# Patient Record
Sex: FEMALE | Race: BLACK OR AFRICAN AMERICAN | NOT HISPANIC OR LATINO | Employment: FULL TIME | URBAN - METROPOLITAN AREA
[De-identification: names, ages, dates, MRNs, and addresses within clinical notes are randomized per-mention and may not be internally consistent; named-entity substitution may affect disease eponyms.]

---

## 2017-03-09 ENCOUNTER — INITIAL PRENATAL (OUTPATIENT)
Dept: OBGYN | Facility: CLINIC | Age: 37
End: 2017-03-09
Payer: MEDICAID

## 2017-03-09 VITALS
WEIGHT: 237 LBS | HEIGHT: 71 IN | DIASTOLIC BLOOD PRESSURE: 78 MMHG | SYSTOLIC BLOOD PRESSURE: 110 MMHG | BODY MASS INDEX: 33.18 KG/M2

## 2017-03-09 DIAGNOSIS — Z98.891 HISTORY OF CESAREAN DELIVERY: ICD-10-CM

## 2017-03-09 DIAGNOSIS — O09.293 HX OF PREECLAMPSIA, PRIOR PREGNANCY, CURRENTLY PREGNANT, THIRD TRIMESTER: ICD-10-CM

## 2017-03-09 DIAGNOSIS — R87.610 ASCUS OF CERVIX WITH NEGATIVE HIGH RISK HPV: ICD-10-CM

## 2017-03-09 DIAGNOSIS — Z87.51 HISTORY OF PRETERM DELIVERY: ICD-10-CM

## 2017-03-09 DIAGNOSIS — D56.4 SICKLE CELL DISEASE WITH HEREDITARY PERSISTENCE OF FETAL HEMOGLOBIN (HPFH) WITHOUT CRISIS (HCC): ICD-10-CM

## 2017-03-09 DIAGNOSIS — D57.1 SICKLE CELL DISEASE WITH HEREDITARY PERSISTENCE OF FETAL HEMOGLOBIN (HPFH) WITHOUT CRISIS (HCC): ICD-10-CM

## 2017-03-09 DIAGNOSIS — Z90.49 HISTORY OF CHOLECYSTECTOMY: ICD-10-CM

## 2017-03-09 DIAGNOSIS — Z34.83 PRENATAL CARE, SUBSEQUENT PREGNANCY, THIRD TRIMESTER: ICD-10-CM

## 2017-03-09 DIAGNOSIS — Z34.80 SUPERVISION OF OTHER NORMAL PREGNANCY, ANTEPARTUM: ICD-10-CM

## 2017-03-09 PROBLEM — O09.299 HX OF PREECLAMPSIA, PRIOR PREGNANCY, CURRENTLY PREGNANT: Status: ACTIVE | Noted: 2017-03-09

## 2017-03-09 LAB
APPEARANCE UR: NORMAL
BILIRUB UR STRIP-MCNC: NORMAL MG/DL
COLOR UR AUTO: NORMAL
GLUCOSE UR STRIP.AUTO-MCNC: NEGATIVE MG/DL
KETONES UR STRIP.AUTO-MCNC: NEGATIVE MG/DL
LEUKOCYTE ESTERASE UR QL STRIP.AUTO: NEGATIVE
NITRITE UR QL STRIP.AUTO: NEGATIVE
PH UR STRIP.AUTO: 6.5 [PH] (ref 5–8)
PROT UR QL STRIP: NEGATIVE MG/DL
RBC UR QL AUTO: NEGATIVE
SP GR UR STRIP.AUTO: 1.01
UROBILINOGEN UR STRIP-MCNC: NORMAL MG/DL

## 2017-03-09 PROCEDURE — 59402 PR NEW OB HIGH RISK: CPT | Performed by: NURSE PRACTITIONER

## 2017-03-09 PROCEDURE — 90715 TDAP VACCINE 7 YRS/> IM: CPT | Performed by: NURSE PRACTITIONER

## 2017-03-09 PROCEDURE — 90471 IMMUNIZATION ADMIN: CPT | Performed by: NURSE PRACTITIONER

## 2017-03-09 PROCEDURE — 81002 URINALYSIS NONAUTO W/O SCOPE: CPT | Performed by: NURSE PRACTITIONER

## 2017-03-09 NOTE — PROGRESS NOTES
Pt. Here for NOB visit today.  #  [phone]  Transfer of care from [facility].   Pt. States having pain in lower abdomen, swelling in hands.   Pharmacy verified.   Pt given johnna sheet and instructions.  Pt given 1 hr gtt and instructions.   Pt would like tdap.   Tdap vaccine given. right Deltoid. VIS given and screening check list reviewed with pt.

## 2017-03-09 NOTE — Clinical Note
"Count Your Baby's Movements  Another step to a healthy delivery    Kaela Favian             Dept: [phone]    How Many Weeks Pregnant? 30w3d    Date to Begin Counting: 3/9/17              How to use this chart    One way for your physician to keep track of your baby's health is by knowing how often the baby moves (or \"kicks\") in your womb.  You can help your physician to do this by using this chart every day.    Every day, you should see how many hours it takes for your baby to move 10 times.  Start in the morning, as soon as you get up.    · First, write down the time your baby moves until you get to 10.  · Check off one box every time your baby moves until you get to 10.  · Write down the time you finished counting in the last column.  · Total how long it took to count up all 10 movements.  · Finally, fill in the box that shows how long this took.  After counting 10 movements, you no longer have to count any more that day.  The next morning, just start counting again as soon as you get up.    What should you call a \"movement\"?  It is hard to say, because it will feel different from one mother to another and from one pregnancy to the next.  The important thing is that you count the movements the same way throughout your pregnancy.  If you have more questions, you should ask your physician.    Count carefully every day!  SAMPLE:  Week 28    How many hours did it take to feel 10 movements?       Start  Time     1     2     3     4     5     6     7     8     9     10   Finish Time   Mon 8:20 ·  ·  ·  ·  ·  ·  ·  ·  ·  ·  11:40   Tue Wed Thu Fri               Sat               Sun                 IMPORTANT: You should contact your physician if it takes more than two hours for you to feel 10 movements.  Each morning, write down the time and start to count the movements of your baby.  Keep track by checking off one box every time you feel one movement.  When you have " "felt 10 \"kicks\", write down the time you finished counting in the last column.  Then fill in the   box (over the check mikayla) for the number of hours it took.  Be sure to read the complete instructions on the previous page.            "

## 2017-03-09 NOTE — MR AVS SNAPSHOT
"        Kaela Penny Ballesteros   3/9/2017 3:00 PM   Initial Prenatal   MRN: [id number]    [facility]   Dept Phone:  [phone]    Description:  Female : 1980   Provider:  Orly Whitten D.N.P.; [facility] IN[facility]           Allergies as of 3/9/2017     Allergen Noted Reactions    Nkda [No Known Drug Allergy] 2007         You were diagnosed with     Prenatal care, subsequent pregnancy, third trimester   [364294]       ASCUS of cervix with negative high risk HPV   [7204234]       Supervision of other normal pregnancy, antepartum   [0153389]       History of  delivery   [323977]       Hx of preeclampsia, prior pregnancy, currently pregnant, third trimester   [569251]       H/O oophorectomy   [633230]       History of cholecystectomy   [128880]       History of  delivery   [9644316]       Sickle cell disease with hereditary persistence of fetal hemoglobin (HPFH) without crisis (CMS-[facility])   [9922781]         Vital Signs     Blood Pressure Height Weight Body Mass Index Last Menstrual Period Smoking Status    110/78 mmHg 1.803 m (5' 11\") 107.502 kg (237 lb) 33.07 kg/m2 2016 (Exact Date) Former Smoker      Basic Information     Date Of Birth Sex Race Ethnicity Preferred Language    1980 Female Black or  Non- English      Problem List              ICD-10-CM Priority Class Noted - Resolved    Acute pancreatitis K85.90   2015 - Present    Calculus of gallbladder K80.20   3/6/2015 - Present    ASCUS of cervix with negative high risk HPV R87.610   3/9/2017 - Present    Supervision of other normal pregnancy, antepartum Z34.80   3/9/2017 - Present    History of  delivery Z98.891   3/9/2017 - Present    Hx of preeclampsia, prior pregnancy, currently pregnant O09.299   3/9/2017 - Present    H/O oophorectomy Z90.721   3/9/2017 - Present    History of cholecystectomy Z90.49   3/9/2017 - Present    History of  delivery Z87.51   3/9/2017 - " Present    Sickle cell carrier D56.4, D57.1   3/9/2017 - Present      Health Maintenance        Date Due Completion Dates    PAP SMEAR 9/20/1999 ---    IMM INFLUENZA (1) 9/1/2016 9/30/2015    IMM DTaP/Tdap/Td Vaccine (2 - Td) 9/30/2025 9/30/2015            Results     POCT Urinalysis      Component Value Standard Range & Units    POC Color  Negative    POC Appearance  Negative    POC Leukocyte Esterase Negative Negative    POC Nitrites Negative Negative    POC Urobiligen  Negative (0.2) mg/dL    POC Protein Negative Negative mg/dL    POC Urine PH 6.5 5.0 - 8.0    POC Blood Negative Negative    POC Specific Gravity 1.015 <1.005 - >1.030    POC Ketones Negative Negative mg/dL    POC Biliruben  Negative mg/dL    POC Glucose Negative Negative mg/dL                        Current Immunizations     Influenza Vaccine Quad Inj (Preserved) 9/30/2015  5:15 PM    MMR/Varicella Combined Vaccine  Incomplete    Tdap Vaccine  Incomplete,  Incomplete, 9/30/2015  5:14 PM    Tetanus Vaccine 4/1/1998      Below and/or attached are the medications your provider expects you to take. Review all of your home medications and newly ordered medications with your provider and/or pharmacist. Follow medication instructions as directed by your provider and/or pharmacist. Please keep your medication list with you and share with your provider. Update the information when medications are discontinued, doses are changed, or new medications (including over-the-counter products) are added; and carry medication information at all times in the event of emergency situations     Allergies:  NKDA - (reactions not documented)               Medications  Valid as of: March 09, 2017 -  3:47 PM    Generic Name Brand Name Tablet Size Instructions for use    Ondansetron (TABLET DISPERSIBLE) ZOFRAN ODT 4 MG Take 1 Tab by mouth every 8 hours as needed for Nausea/Vomiting (give PO if no IV route available).        Prenatal MV-Min-Fe Fum-FA-DHA   Take  by mouth.          Promethazine HCl (Tab) PHENERGAN 25 MG Take 25 mg by mouth every 6 hours as needed for Nausea/Vomiting.        .                 Medicines prescribed today were sent to:     [id number] - [place], [place] - [address]    [address] [address]    Phone: [phone] Fax: [phone]    Open 24 Hours?: No      Medication refill instructions:       If your prescription bottle indicates you have medication refills left, it is not necessary to call your provider’s office. Please contact your pharmacy and they will refill your medication.    If your prescription bottle indicates you do not have any refills left, you may request refills at any time through one of the following ways: The online Predictvia system (except Urgent Care), by calling your provider’s office, or by asking your pharmacy to contact your provider’s office with a refill request. Medication refills are processed only during regular business hours and may not be available until the next business day. Your provider may request additional information or to have a follow-up visit with you prior to refilling your medication.   *Please Note: Medication refills are assigned a new Rx number when refilled electronically. Your pharmacy may indicate that no refills were authorized even though a new prescription for the same medication is available at the pharmacy. Please request the medicine by name with the pharmacy before contacting your provider for a refill.        Your To Do List     Future Labs/Procedures Complete By Expires    GLUCOSE 1HR GESTATIONAL  As directed 3/9/2018    HCT  As directed 3/9/2018    HGB  As directed 3/9/2018    T.PALLIDUM AB EIA  As directed 3/9/2018         Predictvia Access Code: [email or web address]  Expires: 3/11/2017  2:33 PM    Predictvia  A secure, online tool to manage your health information     [email or web address]’s Predictvia® is a secure, online tool that connects you to your personalized health information from the privacy  of your home -- day or night - making it very easy for you to manage your healthcare. Once the activation process is completed, you can even access your medical information using the Foxwordy cyrus, which is available for free in the Apple Cyrus store or Google Play store.     Foxwordy provides the following levels of access (as shown below):   My Chart Features   [facility] Doctor [facility]  Specialists [facility]  Urgent  Care Non-[facility]  Primary Care  Doctor   Email your healthcare team securely and privately 24/7 X X X    Manage appointments: schedule your next appointment; view details of past/upcoming appointments X      Request prescription refills. X      View recent personal medical records, including lab and immunizations X X X X   View health record, including health history, allergies, medications X X X X   Read reports about your outpatient visits, procedures, consult and ER notes X X X X   See your discharge summary, which is a recap of your hospital and/or ER visit that includes your diagnosis, lab results, and care plan. X X       How to register for Foxwordy:  1. Go to  [email or web address].  2. Click on the Sign Up Now box, which takes you to the New Member Sign Up page. You will need to provide the following information:  a. Enter your Foxwordy Access Code exactly as it appears at the top of this page. (You will not need to use this code after you’ve completed the sign-up process. If you do not sign up before the expiration date, you must request a new code.)   b. Enter your date of birth.   c. Enter your home email address.   d. Click Submit, and follow the next screen’s instructions.  3. Create a Foxwordy ID. This will be your Foxwordy login ID and cannot be changed, so think of one that is secure and easy to remember.  4. Create a Foxwordy password. You can change your password at any time.  5. Enter your Password Reset Question and Answer. This can be used at a later time if you forget your  password.   6. Enter your e-mail address. This allows you to receive e-mail notifications when new information is available in [employer].  7. Click Sign Up. You can now view your health information.    For assistance activating your [employer] account, call [phone]
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
## 2017-03-16 ENCOUNTER — HOSPITAL ENCOUNTER (OUTPATIENT)
Dept: LAB | Facility: MEDICAL CENTER | Age: 37
End: 2017-03-16
Attending: NURSE PRACTITIONER
Payer: MEDICAID

## 2017-03-16 DIAGNOSIS — Z34.83 PRENATAL CARE, SUBSEQUENT PREGNANCY, THIRD TRIMESTER: ICD-10-CM

## 2017-03-16 LAB
GLUCOSE 1H P 50 G GLC PO SERPL-MCNC: 162 MG/DL (ref 70–139)
HCT VFR BLD AUTO: 37.6 % (ref 37–47)
HGB BLD-MCNC: 11.7 G/DL (ref 12–16)
TREPONEMA PALLIDUM IGG+IGM AB [PRESENCE] IN SERUM OR PLASMA BY IMMUNOASSAY: NON REACTIVE

## 2017-03-16 PROCEDURE — 85018 HEMOGLOBIN: CPT

## 2017-03-16 PROCEDURE — 82950 GLUCOSE TEST: CPT

## 2017-03-16 PROCEDURE — 36415 COLL VENOUS BLD VENIPUNCTURE: CPT

## 2017-03-16 PROCEDURE — 86780 TREPONEMA PALLIDUM: CPT

## 2017-03-16 PROCEDURE — 85014 HEMATOCRIT: CPT

## 2017-03-17 ENCOUNTER — TELEPHONE (OUTPATIENT)
Dept: OBGYN | Facility: CLINIC | Age: 37
End: 2017-03-17

## 2017-03-17 DIAGNOSIS — R73.09 ELEVATED GLUCOSE TOLERANCE TEST: ICD-10-CM

## 2017-03-17 NOTE — TELEPHONE ENCOUNTER
----- Message from Orly Whitten D.N.P. sent at 3/17/2017  7:57 AM PDT -----  Needs 3 hour glucose asap.  3/17/17@ 10:25 am. N/a, msg left for patient to call back.  Patient called back, informed of new GDM diagnosis and appt scheduled for Friday for diabetic education, states unable to make it due to having only one car and her  gets off work at 2:00pm. Patient was informed I will see if Ceci is willing to see her here.  3/23/17. Talked to Ceci and she agrees to see patient today in the afternoon. I called patient back and asked what is the earliest that  she could come today, so we can do her diabetic education. She was very rude on the phone and states that she already told me that she can not do it before her appt at 3:30 pm today. I explained  that we are just trying to help her since she is already far away in her pregnancy. I ask that even at 3:00 will be good for us so Ceci has time before her appt, again very rude.   Patient got diabetic education on 3/23/17. F/u with diabetic clinic scheduled.

## 2017-03-21 ENCOUNTER — HOSPITAL ENCOUNTER (OUTPATIENT)
Dept: LAB | Facility: MEDICAL CENTER | Age: 37
End: 2017-03-21
Attending: NURSE PRACTITIONER
Payer: MEDICAID

## 2017-03-21 DIAGNOSIS — R73.09 ELEVATED GLUCOSE TOLERANCE TEST: ICD-10-CM

## 2017-03-21 PROCEDURE — 82951 GLUCOSE TOLERANCE TEST (GTT): CPT

## 2017-03-21 PROCEDURE — 36415 COLL VENOUS BLD VENIPUNCTURE: CPT

## 2017-03-21 PROCEDURE — 82952 GTT-ADDED SAMPLES: CPT

## 2017-03-22 DIAGNOSIS — O24.419 GESTATIONAL DIABETES MELLITUS (GDM), ANTEPARTUM, GESTATIONAL DIABETES METHOD OF CONTROL UNSPECIFIED: ICD-10-CM

## 2017-03-22 PROBLEM — O24.410 DIET CONTROLLED GESTATIONAL DIABETES MELLITUS (GDM) IN THIRD TRIMESTER: Status: ACTIVE | Noted: 2017-03-22

## 2017-03-22 LAB
GLUCOSE 1H P CHAL SERPL-MCNC: 196 MG/DL (ref 65–180)
GLUCOSE 2H P CHAL SERPL-MCNC: 144 MG/DL (ref 65–155)
GLUCOSE 3H P CHAL SERPL-MCNC: 171 MG/DL (ref 65–140)
GLUCOSE PRE 100 G GLC PO SERPL-MCNC: 96 MG/DL (ref 65–95)

## 2017-03-23 ENCOUNTER — ROUTINE PRENATAL (OUTPATIENT)
Dept: OBGYN | Facility: CLINIC | Age: 37
End: 2017-03-23
Payer: MEDICAID

## 2017-03-23 VITALS — DIASTOLIC BLOOD PRESSURE: 74 MMHG | WEIGHT: 240 LBS | BODY MASS INDEX: 33.49 KG/M2 | SYSTOLIC BLOOD PRESSURE: 116 MMHG

## 2017-03-23 DIAGNOSIS — Z34.80 SUPERVISION OF OTHER NORMAL PREGNANCY, ANTEPARTUM: Primary | ICD-10-CM

## 2017-03-23 DIAGNOSIS — O24.410 DIET CONTROLLED GESTATIONAL DIABETES MELLITUS IN THIRD TRIMESTER: ICD-10-CM

## 2017-03-23 DIAGNOSIS — O09.293 HX OF PREECLAMPSIA, PRIOR PREGNANCY, CURRENTLY PREGNANT, THIRD TRIMESTER: ICD-10-CM

## 2017-03-23 PROCEDURE — 90040 PR PRENATAL FOLLOW UP: CPT | Performed by: NURSE PRACTITIONER

## 2017-03-23 NOTE — PROGRESS NOTES
S:  Pt is  at 32w3d for routine OB follow up.  Reports an occ BH UC.  Also an occ bloody nose.  Reports good FM.  Denies VB, LOF, RUCs or vaginal DC.    O:  Please see above vitals.        FHTs: 144        Fundal ht: 34 cm.    A:  IUP at 32w3d  Patient Active Problem List    Diagnosis Date Noted   • Gestational diabetes 2017   • Elevated glucose tolerance test 2017   • ASCUS of cervix with negative high risk HPV 2017   • Supervision of other normal pregnancy, antepartum 2017   • History of  delivery 2017   • Hx of preeclampsia, prior pregnancy, currently pregnant 2017   • H/O oophorectomy 2017   • History of cholecystectomy 2017   • History of  delivery 2017   • Sickle cell carrier 2017   • Calculus of gallbladder 2015   • Acute pancreatitis 2015        P:  1.  GBS @ 35 wks.          2.  Continue FKCs.          3.  Questions answered.          4.  Encouraged pt to tour L&D.          5.  Encourage adequate water intake.        6.  F/u 1 wk [facility].        7.  Labs ordered. Lab slip given to pt.          8.  Keep PANN appt next week.         9.  Ceci in to educate pt.        10.  DM supplies sent to pharmacy.

## 2017-03-23 NOTE — PATIENT INSTRUCTIONS
P:  1.  GBS @ 35 wks.          2.  Continue FKCs.          3.  Questions answered.          4.  Encouraged pt to tour L&D.          5.  Encourage adequate water intake.        6.  F/u 1 wk [facility].        7.  Labs ordered. Lab slip given to pt.          8.  Keep PANN appt next week.         9.  Ceci in to educate pt.        10.  DM supplies sent to pharmacy.

## 2017-03-23 NOTE — MR AVS SNAPSHOT
Kaela Ballesteros   3/23/2017 3:30 PM   Routine Prenatal   MRN: [id number]    [facility]   Dept Phone:  [phone]    Description:  Female : 1980   Provider:  Geovanna Darnell C.N.M.           Allergies as of 3/23/2017     Allergen Noted Reactions    Nkda [No Known Drug Allergy] 2007         You were diagnosed with     Supervision of other normal pregnancy, antepartum   [2517222]  -  Primary     Diet controlled gestational diabetes mellitus in third trimester   [6729869]       Hx of preeclampsia, prior pregnancy, currently pregnant, third trimester   [255855]         Vital Signs     Blood Pressure Weight Last Menstrual Period Smoking Status          116/74 mmHg 108.863 kg (240 lb) 2016 (Exact Date) Former Smoker        Basic Information     Date Of Birth Sex Race Ethnicity Preferred Language    1980 Female Black or  Non- English      Your appointments     Mar 30, 2017  2:45 PM   Diabetic with PC MD   [facility] [address] [id number] [phone]              Problem List              ICD-10-CM Priority Class Noted - Resolved    Acute pancreatitis K85.90   2015 - Present    Calculus of gallbladder K80.20   3/6/2015 - Present    ASCUS of cervix with negative high risk HPV R87.610   3/9/2017 - Present    Supervision of other normal pregnancy, antepartum Z34.80   3/9/2017 - Present    History of  delivery Z98.891   3/9/2017 - Present    Hx of preeclampsia, prior pregnancy, currently pregnant O09.299   3/9/2017 - Present    H/O oophorectomy Z90.721   3/9/2017 - Present    History of cholecystectomy Z90.49   3/9/2017 - Present    History of  delivery Z87.51   3/9/2017 - Present    Sickle cell carrier D56.4, D57.1   3/9/2017 - Present    Elevated glucose tolerance test R73.02   3/17/2017 - Present    Gestational diabetes O24.410   3/22/2017 - Present      Health Maintenance           Date Due Completion Dates    IMM PNEUMOCOCCAL 19-64 (ADULT) HIGHEST RISK SERIES (1 of 3 - PCV13) 9/20/1997 ---    IMM INFLUENZA (1) 9/1/2016 9/30/2015    PAP SMEAR 11/2/2019 11/2/2016    IMM DTaP/Tdap/Td Vaccine (2 - Td) 3/9/2027 3/9/2017, 9/30/2015            Current Immunizations     Influenza Vaccine Quad Inj (Preserved) 9/30/2015  5:15 PM    MMR/Varicella Combined Vaccine  Incomplete    Tdap Vaccine 3/9/2017  3:42 PM,  Incomplete, 9/30/2015  5:14 PM    Tetanus Vaccine 4/1/1998      Below and/or attached are the medications your provider expects you to take. Review all of your home medications and newly ordered medications with your provider and/or pharmacist. Follow medication instructions as directed by your provider and/or pharmacist. Please keep your medication list with you and share with your provider. Update the information when medications are discontinued, doses are changed, or new medications (including over-the-counter products) are added; and carry medication information at all times in the event of emergency situations     Allergies:  NKDA - (reactions not documented)               Medications  Valid as of: March 23, 2017 -  5:01 PM    Generic Name Brand Name Tablet Size Instructions for use    Aspirin (Tablet Delayed Response) ECOTRIN 81 MG Take 81 mg by mouth every day.        Blood Glucose Monitoring Suppl (Misc) Blood Glucose Monitoring Suppl SUPPLIES 1 Strip by Percutaneous route 4 times a day. Please dispense TrueMetrix test strips.        Blood Glucose Monitoring Suppl (Misc) Blood Glucose Monitoring Suppl SUPPLIES 1 Each by Percutaneous route 4 times a day. Please dispense lancets for True Metrix meter        Ondansetron (TABLET DISPERSIBLE) ZOFRAN ODT 4 MG Take 1 Tab by mouth every 8 hours as needed for Nausea/Vomiting (give PO if no IV route available).        Prenatal MV-Min-Fe Fum-FA-DHA   Take  by mouth.        .                 Medicines prescribed today were sent to:      [facility]/PHARMACY #[id number] - [place], [place] - [address]    [address] [place] [address]    Phone: [phone] Fax: [phone]    Open 24 Hours?: No      Medication refill instructions:       If your prescription bottle indicates you have medication refills left, it is not necessary to call your provider’s office. Please contact your pharmacy and they will refill your medication.    If your prescription bottle indicates you do not have any refills left, you may request refills at any time through one of the following ways: The online Technology Keiretsu system (except Urgent Care), by calling your provider’s office, or by asking your pharmacy to contact your provider’s office with a refill request. Medication refills are processed only during regular business hours and may not be available until the next business day. Your provider may request additional information or to have a follow-up visit with you prior to refilling your medication.   *Please Note: Medication refills are assigned a new Rx number when refilled electronically. Your pharmacy may indicate that no refills were authorized even though a new prescription for the same medication is available at the pharmacy. Please request the medicine by name with the pharmacy before contacting your provider for a refill.        Your To Do List     Future Labs/Procedures Complete By Expires    HEMOGLOBIN A1C  As directed 3/23/2018    URINETOTAL PROTEIN 24 HR  As directed 3/23/2018      Instructions    P:  1.  GBS @ 35 wks.          2.  Continue FKCs.          3.  Questions answered.          4.  Encouraged pt to tour L&D.          5.  Encourage adequate water intake.        6.  F/u 1 wk MD w GDM clinic.        7.  Labs ordered. Lab slip given to pt.          8.  Keep PANN appt next week.         9.  Ceci in to educate pt.        10.  DM supplies sent to pharmacy.            Technology Keiretsu Access Code: [email or web address]  Expires: 4/9/2017  2:50 PM    Technology Keiretsu  A secure, online tool to  manage your health information     [employer]’s Nerd Kingdom® is a secure, online tool that connects you to your personalized health information from the privacy of your home -- day or night - making it very easy for you to manage your healthcare. Once the activation process is completed, you can even access your medical information using the Nerd Kingdom cyrus, which is available for free in the Apple Cyrus store or Google Play store.     Nerd Kingdom provides the following levels of access (as shown below):   My Chart Features   [facility] Doctor [facility]  Specialists [facility]  Urgent  Care Non-[facility]  Primary Care  Doctor   Email your healthcare team securely and privately 24/7 X X X    Manage appointments: schedule your next appointment; view details of past/upcoming appointments X      Request prescription refills. X      View recent personal medical records, including lab and immunizations X X X X   View health record, including health history, allergies, medications X X X X   Read reports about your outpatient visits, procedures, consult and ER notes X X X X   See your discharge summary, which is a recap of your hospital and/or ER visit that includes your diagnosis, lab results, and care plan. X X       How to register for Nerd Kingdom:  1. Go to  [email or web address].  2. Click on the Sign Up Now box, which takes you to the New Member Sign Up page. You will need to provide the following information:  a. Enter your Nerd Kingdom Access Code exactly as it appears at the top of this page. (You will not need to use this code after you’ve completed the sign-up process. If you do not sign up before the expiration date, you must request a new code.)   b. Enter your date of birth.   c. Enter your home email address.   d. Click Submit, and follow the next screen’s instructions.  3. Create a Nerd Kingdom ID. This will be your Nerd Kingdom login ID and cannot be changed, so think of one that is secure and easy to remember.  4. Create a Content Savvyt  password. You can change your password at any time.  5. Enter your Password Reset Question and Answer. This can be used at a later time if you forget your password.   6. Enter your e-mail address. This allows you to receive e-mail notifications when new information is available in Recensus.  7. Click Sign Up. You can now view your health information.    For assistance activating your Recensus account, call [phone]

## 2017-03-23 NOTE — PROGRESS NOTES
Pt here today for OB follow up  Pt states having dov osiel   Reports +FM  WT: 240lb  BP:116/74  Good # [phone]

## 2017-03-24 ENCOUNTER — NON-PROVIDER VISIT (OUTPATIENT)
Dept: OBGYN | Facility: CLINIC | Age: 37
End: 2017-03-24
Payer: MEDICAID

## 2017-03-24 ENCOUNTER — APPOINTMENT (OUTPATIENT)
Dept: HEALTH INFORMATION MANAGEMENT | Facility: MEDICAL CENTER | Age: 37
End: 2017-03-24
Payer: MEDICAID

## 2017-03-24 VITALS — BODY MASS INDEX: 33.6 KG/M2 | WEIGHT: 240 LBS | HEIGHT: 71 IN

## 2017-03-24 DIAGNOSIS — O24.410 DIET CONTROLLED GESTATIONAL DIABETES MELLITUS (GDM) IN THIRD TRIMESTER: ICD-10-CM

## 2017-03-24 NOTE — MR AVS SNAPSHOT
Kaela Ballesteros   3/24/2017 8:45 AM   Appointment   MRN: [id number]    [facility]   Dept Phone:  [phone]    Description:  Female : 1980   Provider:  IZA NURSE           Allergies as of 3/24/2017     Allergen Noted Reactions    Nkda [No Known Drug Allergy] 2007         Vital Signs     Last Menstrual Period Smoking Status                2016 (Exact Date) Former Smoker          Basic Information     Date Of Birth Sex Race Ethnicity Preferred Language    1980 Female Black or  Non- English      Your appointments     Mar 24, 2017  8:45 AM   Nurse Visit with PC NURSE   [facility])    [address]  [place] [address] [phone]            Mar 30, 2017  2:45 PM   Diabetic with PC MD   [facility])    [address]  [place] [id number] [phone]              Problem List              ICD-10-CM Priority Class Noted - Resolved    Acute pancreatitis K85.90   2015 - Present    Calculus of gallbladder K80.20   3/6/2015 - Present    ASCUS of cervix with negative high risk HPV R87.610   3/9/2017 - Present    Supervision of other normal pregnancy, antepartum Z34.80   3/9/2017 - Present    History of  delivery Z98.891   3/9/2017 - Present    Hx of preeclampsia, prior pregnancy, currently pregnant O09.299   3/9/2017 - Present    H/O oophorectomy Z90.721   3/9/2017 - Present    History of cholecystectomy Z90.49   3/9/2017 - Present    History of  delivery Z87.51   3/9/2017 - Present    Sickle cell carrier D56.4, D57.1   3/9/2017 - Present    Elevated glucose tolerance test R73.02   3/17/2017 - Present    Gestational diabetes O24.410   3/22/2017 - Present      Health Maintenance        Date Due Completion Dates    IMM PNEUMOCOCCAL 19-64 (ADULT) HIGHEST RISK SERIES (1 of 3 - PCV13) 1997 ---    IMM INFLUENZA (1) 2016    PAP SMEAR 2019    IMM  DTaP/Tdap/Td Vaccine (2 - Td) 3/9/2027 3/9/2017, 9/30/2015            Current Immunizations     Influenza Vaccine Quad Inj (Preserved) 9/30/2015  5:15 PM    MMR/Varicella Combined Vaccine  Incomplete    Tdap Vaccine 3/9/2017  3:42 PM,  Incomplete, 9/30/2015  5:14 PM    Tetanus Vaccine 4/1/1998      Below and/or attached are the medications your provider expects you to take. Review all of your home medications and newly ordered medications with your provider and/or pharmacist. Follow medication instructions as directed by your provider and/or pharmacist. Please keep your medication list with you and share with your provider. Update the information when medications are discontinued, doses are changed, or new medications (including over-the-counter products) are added; and carry medication information at all times in the event of emergency situations     Allergies:  NKDA - (reactions not documented)               Medications  Valid as of: March 24, 2017 -  8:32 AM    Generic Name Brand Name Tablet Size Instructions for use    Aspirin (Tablet Delayed Response) ECOTRIN 81 MG Take 81 mg by mouth every day.        Blood Glucose Monitoring Suppl (Misc) Blood Glucose Monitoring Suppl SUPPLIES 1 Strip by Percutaneous route 4 times a day. Please dispense TrueMetrix test strips.        Blood Glucose Monitoring Suppl (Misc) Blood Glucose Monitoring Suppl SUPPLIES 1 Each by Percutaneous route 4 times a day. Please dispense lancets for True Metrix meter        Ondansetron (TABLET DISPERSIBLE) ZOFRAN ODT 4 MG Take 1 Tab by mouth every 8 hours as needed for Nausea/Vomiting (give PO if no IV route available).        Prenatal MV-Min-Fe Fum-FA-DHA   Take  by mouth.        .                 Medicines prescribed today were sent to:     [facility]/PHARMACY #[id number] - [place], [place] - [address]    [address] [place] [address]    Phone: [phone] Fax: [phone]    Open 24 Hours?: No      Medication refill instructions:       If your  prescription bottle indicates you have medication refills left, it is not necessary to call your provider’s office. Please contact your pharmacy and they will refill your medication.    If your prescription bottle indicates you do not have any refills left, you may request refills at any time through one of the following ways: The online Spreadtrum Communications system (except Urgent Care), by calling your provider’s office, or by asking your pharmacy to contact your provider’s office with a refill request. Medication refills are processed only during regular business hours and may not be available until the next business day. Your provider may request additional information or to have a follow-up visit with you prior to refilling your medication.   *Please Note: Medication refills are assigned a new Rx number when refilled electronically. Your pharmacy may indicate that no refills were authorized even though a new prescription for the same medication is available at the pharmacy. Please request the medicine by name with the pharmacy before contacting your provider for a refill.           Spreadtrum Communications Access Code: [email or web address]  Expires: 4/9/2017  2:50 PM    Spreadtrum Communications  A secure, online tool to manage your health information     [employer]’s Spreadtrum Communications® is a secure, online tool that connects you to your personalized health information from the privacy of your home -- day or night - making it very easy for you to manage your healthcare. Once the activation process is completed, you can even access your medical information using the Spreadtrum Communications cyrus, which is available for free in the Apple Cyrus store or Google Play store.     Spreadtrum Communications provides the following levels of access (as shown below):   My Chart Features   [facility] Doctor [facility]  Specialists [facility]  Urgent  Care Non-[facility]  Primary Care  Doctor   Email your healthcare team securely and privately 24/7 X X X    Manage appointments: schedule your next appointment; view details of  past/upcoming appointments X      Request prescription refills. X      View recent personal medical records, including lab and immunizations X X X X   View health record, including health history, allergies, medications X X X X   Read reports about your outpatient visits, procedures, consult and ER notes X X X X   See your discharge summary, which is a recap of your hospital and/or ER visit that includes your diagnosis, lab results, and care plan. X X       How to register for NavTech:  1. Go to  [email or web address].  2. Click on the Sign Up Now box, which takes you to the New Member Sign Up page. You will need to provide the following information:  a. Enter your NavTech Access Code exactly as it appears at the top of this page. (You will not need to use this code after you’ve completed the sign-up process. If you do not sign up before the expiration date, you must request a new code.)   b. Enter your date of birth.   c. Enter your home email address.   d. Click Submit, and follow the next screen’s instructions.  3. Create a NavTech ID. This will be your NavTech login ID and cannot be changed, so think of one that is secure and easy to remember.  4. Create a NavTech password. You can change your password at any time.  5. Enter your Password Reset Question and Answer. This can be used at a later time if you forget your password.   6. Enter your e-mail address. This allows you to receive e-mail notifications when new information is available in NavTech.  7. Click Sign Up. You can now view your health information.    For assistance activating your NavTech account, call [phone]

## 2017-03-24 NOTE — PROGRESS NOTES
Kaela received gestational diabetes training on 3/23/17.  She was provided a True Metrix glucose meter with 10 strips.  A prescription for strips and lancets was sent to the [facility] on [facility] [address].  She was also given a HbA1c lab slip and Geovanna ordered a 24 hour urine and total protein.  Kaela will follow up at diabetes clinic on 3/30/17.

## 2017-03-24 NOTE — Clinical Note
March 23, 2017                   Re: Kaela Ballesteros   1980         [id number]       [facility]  [address] [place] [address] ()  FABY MENEZES [address]      Dear :[facility], [facility].    On 3/34/2017, your patient Kaela Ballesteros, received 2 hours of nutritionand diabetes training from the [facility] at [facility] for management of her gestational diabetes.  Her EDC is Estimated Date of Delivery: 5/15/17.  We taught the following subjects:    Introduction to gestational diabetes, benefits and responsibilities of patient, physiology of diabetes and the diease process, benefits of blood glucose monitoring and record keeping, medication action and possible side effects, hypoglycemia, sick day management, exercise, stress reduction and travel with diabetes.       Nurse assessment / Education:    Comments:    BP:    Edema:no      Weight:Weight: 108.863 kg (240 lb)         Complaints:no      Pathophysiology of diabetes in pregnancy    Discuss  potential maternal and fetal complications in pregnancy with diabetes.     Importance of blood glucose monitoring   Proper testing technique using a True Metrix meter.    At 4:00, the meter read 104, which was 1 after eating.  Testing: fasting and one hour after meals,  expected ranges and rationale for strict control.   Urine ketone testing and rationale    Ketone testing:  First morning void and one other   time of day, alternating times before meals.    Ketone test today:no       Recognition and treatment of hypoglycemia.     Insulin taught: No  Insulin briefly dicussed at this time.    Should patient require insulin later in pregnancy, she would need further education.   Insulin taught: Yes    Patient provided 2200 calorie meal plan with 3 meals and 3 snacks.   220 grams carbohydrate,   135 grams protein,   85 grams fat  Importance of meal planning in diabetes management during pregnancy  Importance of consistent timing of meals and snacks and agreed  upon times  Avoidance of simple carbohydrates  Metabolism of food components relating to pregnancy  Identification of foods in food groups  Patient demonstrates adequate ability to utilize meal planning manual for reference  Plan 3 meals and 3 snacks with 90% accuracy  Review basic principles of eating out  Reviewed precautions with artificial sweeteners  Comments:  Kaela agreed to follow the meal plan and to eat at the times agreed upon.  She will check blood glucose 4 times a day and record the values in her log book.      Patient/caregiver appeared to understand the content as demonstrated by appropriate questions.     Kaela Ballesteros was encouraged to discuss this further with you.    Hopefully this will help in your management of her care.  If we can be of further assistance, please feel free to call.    Thank you for the referral.    Sincerely,  Ceci Mercedes RD, CDE  Certified Diabetes Educator

## 2017-03-28 ENCOUNTER — TELEPHONE (OUTPATIENT)
Dept: OBGYN | Facility: CLINIC | Age: 37
End: 2017-03-28

## 2017-03-28 NOTE — TELEPHONE ENCOUNTER
I called Pt at [phone] to inform her that she needs to do her 24 HR labs and her HgbA1c labs. I left a message for her to call me back at [phone]. I also called Pt's  Richmond Ballesteros and asked him to have  Pt call me back at [phone]. Pt called me back I asked her if she had done her blood work ordered at her last OB visit. Pt stated that she would do her HgbA1c lab tomorrow, Pt then stated why she had to do another 24 Hr urine lab if had done one with [facility] and had normal results. I reviewed Pt's chart and did find her 24 Hr urine results from the records that were in media. Pt's blood pressures have been normal at her last two appt. I informed Pt that per SEEN report she was advised to do 24 Hr urine labs, but she could speak to the provider at her next OB visit if she had further questions. I asked Pt to please do HgbA1c labs tomorrow, Pt agreed and had no further questions.

## 2017-03-29 ENCOUNTER — HOSPITAL ENCOUNTER (OUTPATIENT)
Dept: LAB | Facility: MEDICAL CENTER | Age: 37
End: 2017-03-29
Attending: NURSE PRACTITIONER
Payer: MEDICAID

## 2017-03-29 DIAGNOSIS — O24.410 DIET CONTROLLED GESTATIONAL DIABETES MELLITUS IN THIRD TRIMESTER: ICD-10-CM

## 2017-03-29 DIAGNOSIS — Z34.80 SUPERVISION OF OTHER NORMAL PREGNANCY, ANTEPARTUM: ICD-10-CM

## 2017-03-29 LAB
EST. AVERAGE GLUCOSE BLD GHB EST-MCNC: 114 MG/DL
HBA1C MFR BLD: 5.6 % (ref 0–5.6)

## 2017-03-29 PROCEDURE — 83036 HEMOGLOBIN GLYCOSYLATED A1C: CPT

## 2017-03-29 PROCEDURE — 36415 COLL VENOUS BLD VENIPUNCTURE: CPT

## 2017-03-30 ENCOUNTER — ROUTINE PRENATAL (OUTPATIENT)
Dept: OBGYN | Facility: CLINIC | Age: 37
End: 2017-03-30
Payer: MEDICAID

## 2017-03-30 VITALS — BODY MASS INDEX: 33.35 KG/M2 | DIASTOLIC BLOOD PRESSURE: 68 MMHG | WEIGHT: 239 LBS | SYSTOLIC BLOOD PRESSURE: 118 MMHG

## 2017-03-30 DIAGNOSIS — O24.410 DIET CONTROLLED GESTATIONAL DIABETES MELLITUS IN THIRD TRIMESTER: ICD-10-CM

## 2017-03-30 DIAGNOSIS — O24.419 GESTATIONAL DIABETES MELLITUS, CLASS A2: ICD-10-CM

## 2017-03-30 LAB
APPEARANCE UR: NORMAL
BILIRUB UR STRIP-MCNC: NORMAL MG/DL
COLOR UR AUTO: NORMAL
GLUCOSE UR STRIP.AUTO-MCNC: NORMAL MG/DL
KETONES UR STRIP.AUTO-MCNC: NORMAL MG/DL
LEUKOCYTE ESTERASE UR QL STRIP.AUTO: NORMAL
NITRITE UR QL STRIP.AUTO: NORMAL
PH UR STRIP.AUTO: 6.5 [PH] (ref 5–8)
PROT UR QL STRIP: NORMAL MG/DL
RBC UR QL AUTO: NORMAL
SP GR UR STRIP.AUTO: 1.01
UROBILINOGEN UR STRIP-MCNC: NORMAL MG/DL

## 2017-03-30 PROCEDURE — 90040 PR PRENATAL FOLLOW UP: CPT | Performed by: OBSTETRICS & GYNECOLOGY

## 2017-03-30 PROCEDURE — 81002 URINALYSIS NONAUTO W/O SCOPE: CPT | Performed by: OBSTETRICS & GYNECOLOGY

## 2017-03-30 NOTE — PROGRESS NOTES
36 y.o.   33w3d with diagnosis of GDM: gestational. Previous C/S , Previous Pre Term Delivery   Previous preeclampsia, Prior Abruption     Subjective: Fetal movement: positive, Vaginal Bleeding:negative, Loss of Fluid: negative, Following diet :positive, Insulin Use:negative. Blood sugar logs reviewed and are posted in diabetic flowsheet.   Patient Active Problem List    Diagnosis Date Noted   • Gestational diabetes 2017   • Elevated glucose tolerance test 2017   • ASCUS of cervix with negative high risk HPV 2017   • Supervision of other normal pregnancy, antepartum 2017   • History of  delivery 2017   • Hx of preeclampsia, prior pregnancy, currently pregnant 2017   • H/O oophorectomy 2017   • History of cholecystectomy 2017   • History of  delivery 2017   • Sickle cell carrier 2017   • Calculus of gallbladder 2015   • Acute pancreatitis 2015       Current Treatment:     AM     diet    PM:    diet    QHS   diet    FBS Range: all FBS >    Postprandial Range: many > 130    Filed Vitals:    17 1513   BP: 118/68   Weight: 108.41 kg (239 lb)       NST:    NST scheduled    Ass:     33w3d  GDM: Class _A-2 by log book _____  Good control negative      GDM: gestational. Previous C/S , Previous Pre Term Delivery   Previous preeclampsia, Prior Abruption     P. New(Yes)  Continue Same ( No )Diabetic treatment Plan  AM: diet  PM diet  QHS:insulin injections: NPH: 5   Need to start insulin     Need to get follow up 24 hr urine     Continue ASA until 36 week   NST 2x /week after 32 weeks  Frequent assessment of  Fetal weight  IOL /C/S delivery at 38-39 weeks

## 2017-03-30 NOTE — PROGRESS NOTES
OB f/u - New GDM  + fetal movement.  No VB, LOF or UC's.  Good phone # [phone]  Rx for lancets and strips sent to pharmacy, pharmacy verified with patient  Pt has f/u appt with KATHERYN on 3-31-17  Pt c/o [place] Flores

## 2017-03-30 NOTE — NON-PROVIDER
Insulin instruction given to patient on  3/30/17. Patient will start 5 units of NPH, QHS. Patient instructed how to draw up insulin,site selection and rotation, self injection technique, proper storage of insulin and disposal of syringes. Patient demonstrated back self injection technique successfully. Advised to follow really close her meal plan. Will call back in case of questions or problems.. Prescriptions were sent for insulin and syringes.

## 2017-03-30 NOTE — MR AVS SNAPSHOT
Kaela Ballesteros   3/30/2017 2:45 PM   Routine Prenatal   MRN: [id number]    [facility]   Dept Phone:  [phone]    Description:  Female : 1980   Provider:  Horace Quinones M.D.           Allergies as of 3/30/2017     Allergen Noted Reactions    Nkda [No Known Drug Allergy] 2007         You were diagnosed with     Diet controlled gestational diabetes mellitus in third trimester   [0731222]       Gestational diabetes mellitus, class A2   [212830]         Vital Signs     Blood Pressure Weight Last Menstrual Period Smoking Status          118/68 mmHg 108.41 kg (239 lb) 2016 (Exact Date) Former Smoker        Basic Information     Date Of Birth Sex Race Ethnicity Preferred Language    1980 Female Black or  Non- English      Problem List              ICD-10-CM Priority Class Noted - Resolved    Acute pancreatitis K85.90   2015 - Present    Calculus of gallbladder K80.20   3/6/2015 - Present    ASCUS of cervix with negative high risk HPV R87.610   3/9/2017 - Present    Supervision of other normal pregnancy, antepartum Z34.80   3/9/2017 - Present    History of  delivery Z98.891   3/9/2017 - Present    Hx of preeclampsia, prior pregnancy, currently pregnant O09.299   3/9/2017 - Present    H/O oophorectomy Z90.721   3/9/2017 - Present    History of cholecystectomy Z90.49   3/9/2017 - Present    History of  delivery Z87.51   3/9/2017 - Present    Sickle cell carrier D56.4, D57.1   3/9/2017 - Present    GDM, class A2 O24.414 High  3/22/2017 - Present      Health Maintenance        Date Due Completion Dates    IMM PNEUMOCOCCAL 19-64 (ADULT) HIGHEST RISK SERIES (1 of 3 - PCV13) 1997 ---    IMM INFLUENZA (1) 2016    PAP SMEAR 2019    IMM DTaP/Tdap/Td Vaccine (2 - Td) 3/9/2027 3/9/2017, 2015            Results     POCT Urinalysis      Component Value Standard Range & Units    POC Color   Negative    POC Appearance  Negative    POC Leukocyte Esterase Neg Negative    POC Nitrites Neg Negative    POC Urobiligen  Negative (0.2) mg/dL    POC Protein Neg Negative mg/dL    POC Urine PH 6.5 5.0 - 8.0    POC Blood 2+ Negative    POC Specific Gravity 1.010 <1.005 - >1.030    POC Ketones Neg Negative mg/dL    POC Biliruben  Negative mg/dL    POC Glucose Neg Negative mg/dL                        Current Immunizations     Influenza Vaccine Quad Inj (Preserved) 9/30/2015  5:15 PM    MMR/Varicella Combined Vaccine  Incomplete    Tdap Vaccine 3/9/2017  3:42 PM,  Incomplete, 9/30/2015  5:14 PM    Tetanus Vaccine 4/1/1998      Below and/or attached are the medications your provider expects you to take. Review all of your home medications and newly ordered medications with your provider and/or pharmacist. Follow medication instructions as directed by your provider and/or pharmacist. Please keep your medication list with you and share with your provider. Update the information when medications are discontinued, doses are changed, or new medications (including over-the-counter products) are added; and carry medication information at all times in the event of emergency situations     Allergies:  NKDA - (reactions not documented)               Medications  Valid as of: March 30, 2017 -  4:00 PM    Generic Name Brand Name Tablet Size Instructions for use    Aspirin (Tablet Delayed Response) ECOTRIN 81 MG Take 81 mg by mouth every day.        Blood Glucose Monitoring Suppl (Misc) Blood Glucose Monitoring Suppl SUPPLIES 1 Strip by Percutaneous route 4 times a day. Please dispense TrueMetrix test strips.        Blood Glucose Monitoring Suppl (Misc) Blood Glucose Monitoring Suppl SUPPLIES 1 Each by Percutaneous route 4 times a day. Please dispense lancets for True Metrix meter        Blood Glucose Monitoring Suppl (Misc) Blood Glucose Monitoring Suppl  Please dispense test strips for True Metrix glucose meter. Patient to check  blood sugars 4 times a day.        Insulin NPH Human (Isophane) (Suspension) HUMULIN,NOVOLIN 100 UNIT/ML Inject 5 units SQ QHS        Insulin Syringes (Disposable) (Misc) Insulin Syringes (Disposable) U-100 1 ML Use to inject insulin QHS        Lancets (Misc) B-D ULTRA FINE LANCETS  Please dispense lancets for True Metrix glucose meter. Patient to check blood sugars 4 times a day.        Ondansetron (TABLET DISPERSIBLE) ZOFRAN ODT 4 MG Take 1 Tab by mouth every 8 hours as needed for Nausea/Vomiting (give PO if no IV route available).        Prenatal MV-Min-Fe Fum-FA-DHA   Take  by mouth.        .                 Medicines prescribed today were sent to:     [facility]/PHARMACY #[id number] - [place], [place] - [address]    [address] [address]    Phone: [phone] Fax: [phone]    Open 24 Hours?: No      Medication refill instructions:       If your prescription bottle indicates you have medication refills left, it is not necessary to call your provider’s office. Please contact your pharmacy and they will refill your medication.    If your prescription bottle indicates you do not have any refills left, you may request refills at any time through one of the following ways: The online [employer] system (except Urgent Care), by calling your provider’s office, or by asking your pharmacy to contact your provider’s office with a refill request. Medication refills are processed only during regular business hours and may not be available until the next business day. Your provider may request additional information or to have a follow-up visit with you prior to refilling your medication.   *Please Note: Medication refills are assigned a new Rx number when refilled electronically. Your pharmacy may indicate that no refills were authorized even though a new prescription for the same medication is available at the pharmacy. Please request the medicine by name with the pharmacy before contacting your provider for a refill.         Your To Do List     Future Labs/Procedures Complete By Expires    URINETOTAL PROTEIN 24 HR  As directed 3/30/2018    Comments:    Please collect serum Creatinine/Bun and random urine protein for calculation      Referral     A referral request has been sent to our patient care coordination department. Please allow 3-5 business days for us to process this request and contact you either by phone or mail. If you do not hear from us by the 5th business day, please call us at [phone].           [employer] Access Code: Activation code not generated  Current [employer] Status: Active

## 2017-04-03 ENCOUNTER — ROUTINE PRENATAL (OUTPATIENT)
Dept: OBGYN | Facility: CLINIC | Age: 37
End: 2017-04-03
Payer: MEDICAID

## 2017-04-03 DIAGNOSIS — O24.419 GDM, CLASS A2: ICD-10-CM

## 2017-04-03 LAB
NST ACOUSTIC STIMULATION: NORMAL
NST ACTION NECESSARY: NORMAL
NST ASSESSMENT: NORMAL
NST BASELINE: NORMAL
NST INDICATIONS: NORMAL
NST OTHER DATA: NORMAL
NST READ BY: NORMAL
NST RETURN: NORMAL
NST UTERINE ACTIVITY: NORMAL

## 2017-04-03 PROCEDURE — 59025 FETAL NON-STRESS TEST: CPT | Performed by: OBSTETRICS & GYNECOLOGY

## 2017-04-03 NOTE — MR AVS SNAPSHOT
Kaela Ballesteros   4/3/2017 3:30 PM   Routine Prenatal   MRN: [id number]    [facility]   Dept Phone:  [phone]    Description:  Female : 1980   Provider:  Siena Farias M.D.           Allergies as of 4/3/2017     Allergen Noted Reactions    Nkda [No Known Drug Allergy] 2007         You were diagnosed with     GDM, class A2   [331250]         Vital Signs     Last Menstrual Period Smoking Status                2016 (Exact Date) Former Smoker          Basic Information     Date Of Birth Sex Race Ethnicity Preferred Language    1980 Female Black or  Non- English      Your appointments     2017  3:00 PM   Fetal Non-Stress Test with PC NST   [facility] [address]  [place] [address]   [phone]            2017  3:30 PM   Diabetic with PC MD   [facility] [address]  [place] [address]   [phone]            Apr 10, 2017  3:30 PM   Fetal Non-Stress Test with PC NST   [facility] [address]  [place] [address]   [phone]              Problem List              ICD-10-CM Priority Class Noted - Resolved    Acute pancreatitis K85.90   2015 - Present    Calculus of gallbladder K80.20   3/6/2015 - Present    ASCUS of cervix with negative high risk HPV R87.610   3/9/2017 - Present    Supervision of other normal pregnancy, antepartum Z34.80   3/9/2017 - Present    History of  delivery Z98.891   3/9/2017 - Present    Hx of preeclampsia, prior pregnancy, currently pregnant O09.299   3/9/2017 - Present    H/O oophorectomy Z90.721   3/9/2017 - Present    History of cholecystectomy Z90.49   3/9/2017 - Present    History of  delivery Z87.51   3/9/2017 - Present    Sickle cell carrier D56.4, D57.1   3/9/2017 - Present    GDM, class A2 O24.414 High  3/22/2017 - Present      Health Maintenance        Date Due  Completion Dates    IMM PNEUMOCOCCAL 19-64 (ADULT) HIGHEST RISK SERIES (1 of 3 - PCV13) 9/20/1997 ---    PAP SMEAR 11/2/2019 11/2/2016    IMM DTaP/Tdap/Td Vaccine (2 - Td) 3/9/2027 3/9/2017, 9/30/2015            Results       Current Immunizations     Influenza Vaccine Quad Inj (Preserved) 9/30/2015  5:15 PM    MMR/Varicella Combined Vaccine  Incomplete    Tdap Vaccine 3/9/2017  3:42 PM,  Incomplete, 9/30/2015  5:14 PM    Tetanus Vaccine 4/1/1998      Below and/or attached are the medications your provider expects you to take. Review all of your home medications and newly ordered medications with your provider and/or pharmacist. Follow medication instructions as directed by your provider and/or pharmacist. Please keep your medication list with you and share with your provider. Update the information when medications are discontinued, doses are changed, or new medications (including over-the-counter products) are added; and carry medication information at all times in the event of emergency situations     Allergies:  NKDA - (reactions not documented)               Medications  Valid as of: April 03, 2017 -  4:31 PM    Generic Name Brand Name Tablet Size Instructions for use    Aspirin (Tablet Delayed Response) ECOTRIN 81 MG Take 81 mg by mouth every day.        Blood Glucose Monitoring Suppl (Misc) Blood Glucose Monitoring Suppl SUPPLIES 1 Strip by Percutaneous route 4 times a day. Please dispense TrueMetrix test strips.        Blood Glucose Monitoring Suppl (Misc) Blood Glucose Monitoring Suppl SUPPLIES 1 Each by Percutaneous route 4 times a day. Please dispense lancets for True Metrix meter        Blood Glucose Monitoring Suppl (Misc) Blood Glucose Monitoring Suppl  Please dispense test strips for True Metrix glucose meter. Patient to check blood sugars 4 times a day.        Insulin NPH Human (Isophane) (Suspension) HUMULIN,NOVOLIN 100 UNIT/ML Inject 5 units SQ QHS        Insulin Syringes (Disposable) (Misc)  Insulin Syringes (Disposable) U-100 1 ML Use to inject insulin QHS        Lancets (Misc) B-D ULTRA FINE LANCETS  Please dispense lancets for True Metrix glucose meter. Patient to check blood sugars 4 times a day.        Ondansetron (TABLET DISPERSIBLE) ZOFRAN ODT 4 MG Take 1 Tab by mouth every 8 hours as needed for Nausea/Vomiting (give PO if no IV route available).        Prenatal MV-Min-Fe Fum-FA-DHA   Take  by mouth.        .                 Medicines prescribed today were sent to:     [facility]/PHARMACY #[id number] - [place], [place] - [address]    [address] [facility] [place] [address]    Phone: [phone] Fax: [phone]    Open 24 Hours?: No      Medication refill instructions:       If your prescription bottle indicates you have medication refills left, it is not necessary to call your provider’s office. Please contact your pharmacy and they will refill your medication.    If your prescription bottle indicates you do not have any refills left, you may request refills at any time through one of the following ways: The online [employer] system (except Urgent Care), by calling your provider’s office, or by asking your pharmacy to contact your provider’s office with a refill request. Medication refills are processed only during regular business hours and may not be available until the next business day. Your provider may request additional information or to have a follow-up visit with you prior to refilling your medication.   *Please Note: Medication refills are assigned a new Rx number when refilled electronically. Your pharmacy may indicate that no refills were authorized even though a new prescription for the same medication is available at the pharmacy. Please request the medicine by name with the pharmacy before contacting your provider for a refill.           [employer] Access Code: Activation code not generated  Current [employer] Status: Active

## 2017-04-04 ENCOUNTER — DATING (OUTPATIENT)
Dept: OBGYN | Facility: CLINIC | Age: 37
End: 2017-04-04

## 2017-04-05 ENCOUNTER — HOSPITAL ENCOUNTER (OUTPATIENT)
Facility: MEDICAL CENTER | Age: 37
End: 2017-04-05
Attending: NURSE PRACTITIONER
Payer: MEDICAID

## 2017-04-05 DIAGNOSIS — Z34.80 SUPERVISION OF OTHER NORMAL PREGNANCY, ANTEPARTUM: ICD-10-CM

## 2017-04-05 DIAGNOSIS — O24.410 DIET CONTROLLED GESTATIONAL DIABETES MELLITUS IN THIRD TRIMESTER: ICD-10-CM

## 2017-04-05 DIAGNOSIS — O09.293 HX OF PREECLAMPSIA, PRIOR PREGNANCY, CURRENTLY PREGNANT, THIRD TRIMESTER: ICD-10-CM

## 2017-04-05 PROCEDURE — 84156 ASSAY OF PROTEIN URINE: CPT

## 2017-04-05 PROCEDURE — 81050 URINALYSIS VOLUME MEASURE: CPT

## 2017-04-05 NOTE — PROGRESS NOTES
Patient is scheduled for Pre OP on 05/05/17 at 3pm With Dr Kirk. Angi  C/S with BTL is scheduled 05/09/17 at 9:30am

## 2017-04-06 ENCOUNTER — ROUTINE PRENATAL (OUTPATIENT)
Dept: OBGYN | Facility: CLINIC | Age: 37
End: 2017-04-06
Payer: MEDICAID

## 2017-04-06 VITALS — DIASTOLIC BLOOD PRESSURE: 64 MMHG | SYSTOLIC BLOOD PRESSURE: 110 MMHG | BODY MASS INDEX: 33.35 KG/M2 | WEIGHT: 239 LBS

## 2017-04-06 DIAGNOSIS — O24.419 GDM, CLASS A2: ICD-10-CM

## 2017-04-06 LAB
APPEARANCE UR: NORMAL
BILIRUB UR STRIP-MCNC: NORMAL MG/DL
COLOR UR AUTO: NORMAL
GLUCOSE UR STRIP.AUTO-MCNC: NORMAL MG/DL
KETONES UR STRIP.AUTO-MCNC: NORMAL MG/DL
LEUKOCYTE ESTERASE UR QL STRIP.AUTO: NORMAL
NITRITE UR QL STRIP.AUTO: NORMAL
NST ACOUSTIC STIMULATION: NO
NST ACTION NECESSARY: NORMAL
NST ASSESSMENT: REACTIVE
NST BASELINE: 130
NST INDICATIONS: NORMAL
NST OTHER DATA: NORMAL
NST READ BY: NORMAL
NST RETURN: NORMAL
NST UTERINE ACTIVITY: NO
PH UR STRIP.AUTO: 6.5 [PH] (ref 5–8)
PROT 24H UR-MCNC: 198.3 MG/24 HR (ref 30–150)
PROT 24H UR-MRATE: 6.5 MG/DL (ref 0–15)
PROT UR QL STRIP: NORMAL MG/DL
RBC UR QL AUTO: NORMAL
SP GR UR STRIP.AUTO: 1
SPECIMEN VOL UR: 3050 ML
UROBILINOGEN UR STRIP-MCNC: NORMAL MG/DL

## 2017-04-06 PROCEDURE — 81002 URINALYSIS NONAUTO W/O SCOPE: CPT | Performed by: OBSTETRICS & GYNECOLOGY

## 2017-04-06 PROCEDURE — 59025 FETAL NON-STRESS TEST: CPT | Performed by: OBSTETRICS & GYNECOLOGY

## 2017-04-06 PROCEDURE — 90040 PR PRENATAL FOLLOW UP: CPT | Performed by: OBSTETRICS & GYNECOLOGY

## 2017-04-06 NOTE — MR AVS SNAPSHOT
Kaela Ballesteros   2017 3:00 PM   Routine Prenatal   MRN: [id number]    [facility]   Dept Phone:  [phone]    Description:  Female : 1980   Provider:  Eryn Mora M.D.           Allergies as of 2017     Allergen Noted Reactions    Nkda [No Known Drug Allergy] 2007         You were diagnosed with     GDM, class A2   [015290]         Vital Signs     Last Menstrual Period Smoking Status                2016 (Exact Date) Former Smoker          Basic Information     Date Of Birth Sex Race Ethnicity Preferred Language    1980 Female Black or  Non- English      Your appointments     Apr 10, 2017  3:30 PM   Fetal Non-Stress Test with PC RENZO   [facility]    [address] [place] [address] [address]  [place] [id number] [phone]            May 05, 2017  3:00 PM   OB Follow Up with IZA GALICIA   [facility] [address]  [place] [address] [phone]              Problem List              ICD-10-CM Priority Class Noted - Resolved    Acute pancreatitis K85.90   2015 - Present    Calculus of gallbladder K80.20   3/6/2015 - Present    ASCUS of cervix with negative high risk HPV R87.610   3/9/2017 - Present    Supervision of other normal pregnancy, antepartum Z34.80   3/9/2017 - Present    History of  delivery Z98.891   3/9/2017 - Present    Hx of preeclampsia, prior pregnancy, currently pregnant O09.299   3/9/2017 - Present    H/O oophorectomy Z90.721   3/9/2017 - Present    History of cholecystectomy Z90.49   3/9/2017 - Present    History of  delivery Z87.51   3/9/2017 - Present    Sickle cell carrier D56.4, D57.1   3/9/2017 - Present    GDM, class A2 O24.414 High  3/22/2017 - Present      Health Maintenance        Date Due Completion Dates    IMM PNEUMOCOCCAL 19-64 (ADULT) HIGHEST RISK SERIES (1 of 3 - PCV13) 1997 ---    PAP SMEAR 2019    IMM DTaP/Tdap/Td  Vaccine (2 - Td) 3/9/2027 3/9/2017, 9/30/2015            Results     POCT Fetal Nonstress Test      Component    NST Indications    gdm    NST Baseline    130    NST Uterine Activity    no    NST Acoustic Stimulation    no    NST Assessment    reactive    NST Action Necessary    NST Other Data    NST Return    NST Read By                        Current Immunizations     Influenza Vaccine Quad Inj (Preserved) 9/30/2015  5:15 PM    MMR/Varicella Combined Vaccine  Incomplete    Tdap Vaccine 3/9/2017  3:42 PM,  Incomplete, 9/30/2015  5:14 PM    Tetanus Vaccine 4/1/1998      Below and/or attached are the medications your provider expects you to take. Review all of your home medications and newly ordered medications with your provider and/or pharmacist. Follow medication instructions as directed by your provider and/or pharmacist. Please keep your medication list with you and share with your provider. Update the information when medications are discontinued, doses are changed, or new medications (including over-the-counter products) are added; and carry medication information at all times in the event of emergency situations     Allergies:  NKDA - (reactions not documented)               Medications  Valid as of: April 06, 2017 -  3:54 PM    Generic Name Brand Name Tablet Size Instructions for use    Aspirin (Tablet Delayed Response) ECOTRIN 81 MG Take 81 mg by mouth every day.        Blood Glucose Monitoring Suppl (Misc) Blood Glucose Monitoring Suppl SUPPLIES 1 Strip by Percutaneous route 4 times a day. Please dispense TrueMetrix test strips.        Blood Glucose Monitoring Suppl (Misc) Blood Glucose Monitoring Suppl SUPPLIES 1 Each by Percutaneous route 4 times a day. Please dispense lancets for True Metrix meter        Blood Glucose Monitoring Suppl (Misc) Blood Glucose Monitoring Suppl  Please dispense test strips for True Metrix glucose meter. Patient to check blood sugars 4 times a day.        Insulin NPH Human  (Isophane) (Suspension) HUMULIN,NOVOLIN 100 UNIT/ML Inject 5 units SQ QHS        Insulin Syringes (Disposable) (Misc) Insulin Syringes (Disposable) U-100 1 ML Use to inject insulin QHS        Lancets (Misc) B-D ULTRA FINE LANCETS  Please dispense lancets for True Metrix glucose meter. Patient to check blood sugars 4 times a day.        Ondansetron (TABLET DISPERSIBLE) ZOFRAN ODT 4 MG Take 1 Tab by mouth every 8 hours as needed for Nausea/Vomiting (give PO if no IV route available).        Prenatal MV-Min-Fe Fum-FA-DHA   Take  by mouth.        .                 Medicines prescribed today were sent to:     [facility]/PHARMACY #[id number] - [place], [place] - [address]    [address] [facility] [place] [address]    Phone: [phone] Fax: [phone]    Open 24 Hours?: No      Medication refill instructions:       If your prescription bottle indicates you have medication refills left, it is not necessary to call your provider’s office. Please contact your pharmacy and they will refill your medication.    If your prescription bottle indicates you do not have any refills left, you may request refills at any time through one of the following ways: The online [employer] system (except Urgent Care), by calling your provider’s office, or by asking your pharmacy to contact your provider’s office with a refill request. Medication refills are processed only during regular business hours and may not be available until the next business day. Your provider may request additional information or to have a follow-up visit with you prior to refilling your medication.   *Please Note: Medication refills are assigned a new Rx number when refilled electronically. Your pharmacy may indicate that no refills were authorized even though a new prescription for the same medication is available at the pharmacy. Please request the medicine by name with the pharmacy before contacting your provider for a refill.           [employer] Access Code: Activation code not  generated  Current MyChart Status: Active

## 2017-04-06 NOTE — PROGRESS NOTES
GDM follow up   +FM  Pt contractions on and off.   Last PANN appt on 3/3/2017 no follow up.  Good phone [phone]  Pt informed about  C/S with BTL on 05/09/17 with Dr Yelena Whitley. Pre OP is scheduled on 05/05/17 at 3pm

## 2017-04-06 NOTE — PROGRESS NOTES
Kaelaabeba Penny Favian 36 y.o.  34w3d here today for obstetrical visit. Patient reports good  fetal movement. denies contractions, denies vaginal bleeding, denies loss of fluid.    Pregnancy is complicated by   Patient Active Problem List    Diagnosis Date Noted   • GDM, class A2 2017     Priority: High   • ASCUS of cervix with negative high risk HPV 2017   • Supervision of other normal pregnancy, antepartum 2017   • History of  delivery 2017   • Hx of preeclampsia, prior pregnancy, currently pregnant 2017   • H/O oophorectomy 2017   • History of cholecystectomy 2017   • History of  delivery 2017   • Sickle cell carrier 2017   • Calculus of gallbladder 2015   • Acute pancreatitis 2015       GBS not done  Fasting blood sugars range >90  Postprandial blood sugars range <140        Insulin regimen  NPH a.m. 0 , regular a.m.0  Regular with dinner 0  NPH at bedtime 10     Followup in 1 weeks   labor precautions are reviewed  Continue kick counts daily after 28 weeks  NST twice weekly after 32 weeks if on insulin

## 2017-04-06 NOTE — MR AVS SNAPSHOT
Kaela Ballesteros   2017 3:30 PM   Routine Prenatal   MRN: [id number]    Department[facility]  [facility]   Dept Phone:  [phone]    Description:  Female : 1980   Provider:  Eryn Mora M.D.           Allergies as of 2017     Allergen Noted Reactions    Nkda [No Known Drug Allergy] 2007         You were diagnosed with     GDM, class A2   [070938]         Vital Signs     Blood Pressure Weight Last Menstrual Period Smoking Status          110/64 mmHg 108.41 kg (239 lb) 2016 (Exact Date) Former Smoker        Basic Information     Date Of Birth Sex Race Ethnicity Preferred Language    1980 Female Black or  Non- English      Your appointments     Apr 10, 2017  3:30 PM   Fetal Non-Stress Test with IZA MULLER   [facility])    [address] [place] [address] [address]  [place] [id number] [phone]            May 05, 2017  3:00 PM   OB Follow Up with IZA GALICIA   [facility])    [address] [place] [address] [address]  [place] [address] [phone]              Problem List              ICD-10-CM Priority Class Noted - Resolved    Acute pancreatitis K85.90   2015 - Present    Calculus of gallbladder K80.20   3/6/2015 - Present    ASCUS of cervix with negative high risk HPV R87.610   3/9/2017 - Present    Supervision of other normal pregnancy, antepartum Z34.80   3/9/2017 - Present    History of  delivery Z98.891   3/9/2017 - Present    Hx of preeclampsia, prior pregnancy, currently pregnant O09.299   3/9/2017 - Present    H/O oophorectomy Z90.721   3/9/2017 - Present    History of cholecystectomy Z90.49   3/9/2017 - Present    History of  delivery Z87.51   3/9/2017 - Present    Sickle cell carrier D56.4, D57.1   3/9/2017 - Present    GDM, class A2 O24.414 High  3/22/2017 - Present      Health Maintenance        Date Due Completion Dates    IMM PNEUMOCOCCAL 19-64 (ADULT) HIGHEST RISK SERIES (1 of 3 - PCV13) 1997 ---    PAP SMEAR  11/2/2019 11/2/2016    IMM DTaP/Tdap/Td Vaccine (2 - Td) 3/9/2027 3/9/2017, 9/30/2015            Results     POCT Urinalysis      Component Value Standard Range & Units    POC Color  Negative    POC Appearance  Negative    POC Leukocyte Esterase Trace Negative    POC Nitrites Neg Negative    POC Urobiligen  Negative (0.2) mg/dL    POC Protein Neg Negative mg/dL    POC Urine PH 6.5 5.0 - 8.0    POC Blood Trace Negative    POC Specific Gravity 1.005 <1.005 - >1.030    POC Ketones Neg Negative mg/dL    POC Biliruben  Negative mg/dL    POC Glucose Neg Negative mg/dL                        Current Immunizations     Influenza Vaccine Quad Inj (Preserved) 9/30/2015  5:15 PM    MMR/Varicella Combined Vaccine  Incomplete    Tdap Vaccine 3/9/2017  3:42 PM,  Incomplete, 9/30/2015  5:14 PM    Tetanus Vaccine 4/1/1998      Below and/or attached are the medications your provider expects you to take. Review all of your home medications and newly ordered medications with your provider and/or pharmacist. Follow medication instructions as directed by your provider and/or pharmacist. Please keep your medication list with you and share with your provider. Update the information when medications are discontinued, doses are changed, or new medications (including over-the-counter products) are added; and carry medication information at all times in the event of emergency situations     Allergies:  NKDA - (reactions not documented)               Medications  Valid as of: April 06, 2017 -  3:54 PM    Generic Name Brand Name Tablet Size Instructions for use    Aspirin (Tablet Delayed Response) ECOTRIN 81 MG Take 81 mg by mouth every day.        Blood Glucose Monitoring Suppl (Misc) Blood Glucose Monitoring Suppl SUPPLIES 1 Strip by Percutaneous route 4 times a day. Please dispense TrueMetrix test strips.        Blood Glucose Monitoring Suppl (Misc) Blood Glucose Monitoring Suppl SUPPLIES 1 Each by Percutaneous route 4 times a day. Please  dispense lancets for True Metrix meter        Blood Glucose Monitoring Suppl (Misc) Blood Glucose Monitoring Suppl  Please dispense test strips for True Metrix glucose meter. Patient to check blood sugars 4 times a day.        Insulin NPH Human (Isophane) (Suspension) HUMULIN,NOVOLIN 100 UNIT/ML Inject 5 units SQ QHS        Insulin Syringes (Disposable) (Misc) Insulin Syringes (Disposable) U-100 1 ML Use to inject insulin QHS        Lancets (Misc) B-D ULTRA FINE LANCETS  Please dispense lancets for True Metrix glucose meter. Patient to check blood sugars 4 times a day.        Ondansetron (TABLET DISPERSIBLE) ZOFRAN ODT 4 MG Take 1 Tab by mouth every 8 hours as needed for Nausea/Vomiting (give PO if no IV route available).        Prenatal MV-Min-Fe Fum-FA-DHA   Take  by mouth.        .                 Medicines prescribed today were sent to:     [facility]/PHARMACY #[id number] - [place], [place] - [address] [facility] [address] [facility]    [address] [facility] [place] [address]    Phone: [phone] Fax: [phone]    Open 24 Hours?: No      Medication refill instructions:       If your prescription bottle indicates you have medication refills left, it is not necessary to call your provider’s office. Please contact your pharmacy and they will refill your medication.    If your prescription bottle indicates you do not have any refills left, you may request refills at any time through one of the following ways: The online [address] system (except Urgent Care), by calling your provider’s office, or by asking your pharmacy to contact your provider’s office with a refill request. Medication refills are processed only during regular business hours and may not be available until the next business day. Your provider may request additional information or to have a follow-up visit with you prior to refilling your medication.   *Please Note: Medication refills are assigned a new Rx number when refilled electronically. Your pharmacy may indicate that no refills were  authorized even though a new prescription for the same medication is available at the pharmacy. Please request the medicine by name with the pharmacy before contacting your provider for a refill.           [employer] Access Code: Activation code not generated  Current [employer] Status: Active

## 2017-04-10 ENCOUNTER — ROUTINE PRENATAL (OUTPATIENT)
Dept: OBGYN | Facility: CLINIC | Age: 37
End: 2017-04-10
Payer: MEDICAID

## 2017-04-10 DIAGNOSIS — O24.419 GDM, CLASS A2: ICD-10-CM

## 2017-04-10 LAB
NST ACOUSTIC STIMULATION: NO
NST ACTION NECESSARY: NORMAL
NST ASSESSMENT: NORMAL
NST BASELINE: 140
NST INDICATIONS: NORMAL
NST OTHER DATA: NORMAL
NST READ BY: NORMAL
NST RETURN: NORMAL
NST UTERINE ACTIVITY: NORMAL

## 2017-04-10 PROCEDURE — 59025 FETAL NON-STRESS TEST: CPT | Performed by: OBSTETRICS & GYNECOLOGY

## 2017-04-13 ENCOUNTER — HOSPITAL ENCOUNTER (OUTPATIENT)
Facility: MEDICAL CENTER | Age: 37
End: 2017-04-13
Attending: OBSTETRICS & GYNECOLOGY
Payer: MEDICAID

## 2017-04-13 ENCOUNTER — ROUTINE PRENATAL (OUTPATIENT)
Dept: OBGYN | Facility: CLINIC | Age: 37
End: 2017-04-13
Payer: MEDICAID

## 2017-04-13 VITALS — WEIGHT: 242 LBS | DIASTOLIC BLOOD PRESSURE: 70 MMHG | BODY MASS INDEX: 33.77 KG/M2 | SYSTOLIC BLOOD PRESSURE: 120 MMHG

## 2017-04-13 DIAGNOSIS — O24.419 GDM, CLASS A2: ICD-10-CM

## 2017-04-13 LAB
APPEARANCE UR: NORMAL
BILIRUB UR STRIP-MCNC: NORMAL MG/DL
COLOR UR AUTO: NORMAL
GLUCOSE UR STRIP.AUTO-MCNC: NEGATIVE MG/DL
KETONES UR STRIP.AUTO-MCNC: NEGATIVE MG/DL
LEUKOCYTE ESTERASE UR QL STRIP.AUTO: NORMAL
NITRITE UR QL STRIP.AUTO: NEGATIVE
NST ACOUSTIC STIMULATION: NORMAL
NST ACTION NECESSARY: NORMAL
NST ASSESSMENT: REACTIVE
NST BASELINE: 150
NST INDICATIONS: NORMAL
NST OTHER DATA: NORMAL
NST READ BY: NORMAL
NST RETURN: NORMAL
NST UTERINE ACTIVITY: NORMAL
PH UR STRIP.AUTO: 6 [PH] (ref 5–8)
PROT UR QL STRIP: NORMAL MG/DL
RBC UR QL AUTO: NORMAL
SP GR UR STRIP.AUTO: 1.02
UROBILINOGEN UR STRIP-MCNC: NORMAL MG/DL

## 2017-04-13 PROCEDURE — 59025 FETAL NON-STRESS TEST: CPT | Performed by: OBSTETRICS & GYNECOLOGY

## 2017-04-13 PROCEDURE — 90040 PR PRENATAL FOLLOW UP: CPT | Performed by: OBSTETRICS & GYNECOLOGY

## 2017-04-13 PROCEDURE — 87653 STREP B DNA AMP PROBE: CPT

## 2017-04-13 PROCEDURE — 81002 URINALYSIS NONAUTO W/O SCOPE: CPT | Performed by: OBSTETRICS & GYNECOLOGY

## 2017-04-13 NOTE — MR AVS SNAPSHOT
Kaela Penny Ballesteros   2017 3:00 PM   Routine Prenatal   MRN: [id number]    Department:  [facility]   Dept Phone:  [phone]    Description:  Female : 1980   Provider:  Angi Cabral M.D.           Allergies as of 2017     Allergen Noted Reactions    Nkda [No Known Drug Allergy] 2007         You were diagnosed with     GDM, class A2   [641824]         Vital Signs     Last Menstrual Period Smoking Status                2016 (Exact Date) Former Smoker          Basic Information     Date Of Birth Sex Race Ethnicity Preferred Language    1980 Female Black or  Non- English      Your appointments     2017  3:30 PM   Fetal Non-Stress Test with PC NST   [facility] [address]  [place] [address]   [phone]            2017  3:00 PM   Fetal Non-Stress Test with PC NST   The [address]  [place] [address]   [phone]            2017  3:30 PM   Diabetic with PC MD   The [address]  [place] [address]   [phone]            May 01, 2017 11:15 AM   OB Follow Up with PC MD   The [address]  [place] [address]   [phone]              Problem List              ICD-10-CM Priority Class Noted - Resolved    Acute pancreatitis K85.90   2015 - Present    Calculus of gallbladder K80.20   3/6/2015 - Present    ASCUS of cervix with negative high risk HPV R87.610   3/9/2017 - Present    Supervision of other normal pregnancy, antepartum Z34.80   3/9/2017 - Present    History of  delivery Z98.891   3/9/2017 - Present    Hx of preeclampsia, prior pregnancy, currently pregnant O09.299   3/9/2017 - Present    H/O oophorectomy Z90.721   3/9/2017 - Present    History of cholecystectomy Z90.49   3/9/2017 - Present    History of  delivery Z87.51   3/9/2017 -  Present    Sickle cell carrier D56.4, D57.1   3/9/2017 - Present    GDM, class A2 O24.414 High  3/22/2017 - Present      Health Maintenance        Date Due Completion Dates    IMM PNEUMOCOCCAL 19-64 (ADULT) HIGHEST RISK SERIES (1 of 3 - PCV13) 9/20/1997 ---    PAP SMEAR 11/2/2019 11/2/2016    IMM DTaP/Tdap/Td Vaccine (2 - Td) 3/9/2027 3/9/2017, 9/30/2015            Results       Current Immunizations     Influenza Vaccine Quad Inj (Preserved) 9/30/2015  5:15 PM    MMR/Varicella Combined Vaccine  Incomplete    Tdap Vaccine 3/9/2017  3:42 PM,  Incomplete, 9/30/2015  5:14 PM    Tetanus Vaccine 4/1/1998      Below and/or attached are the medications your provider expects you to take. Review all of your home medications and newly ordered medications with your provider and/or pharmacist. Follow medication instructions as directed by your provider and/or pharmacist. Please keep your medication list with you and share with your provider. Update the information when medications are discontinued, doses are changed, or new medications (including over-the-counter products) are added; and carry medication information at all times in the event of emergency situations     Allergies:  NKDA - (reactions not documented)               Medications  Valid as of: April 13, 2017 -  4:08 PM    Generic Name Brand Name Tablet Size Instructions for use    Aspirin (Tablet Delayed Response) ECOTRIN 81 MG Take 81 mg by mouth every day.        Blood Glucose Monitoring Suppl (Misc) Blood Glucose Monitoring Suppl SUPPLIES 1 Strip by Percutaneous route 4 times a day. Please dispense TrueMetrix test strips.        Blood Glucose Monitoring Suppl (Misc) Blood Glucose Monitoring Suppl SUPPLIES 1 Each by Percutaneous route 4 times a day. Please dispense lancets for True Metrix meter        Blood Glucose Monitoring Suppl (Misc) Blood Glucose Monitoring Suppl  Please dispense test strips for True Metrix glucose meter. Patient to check blood sugars 4 times  a day.        Insulin NPH Human (Isophane) (Suspension) HUMULIN,NOVOLIN 100 UNIT/ML Inject 5 units SQ QHS        Insulin Syringes (Disposable) (Misc) Insulin Syringes (Disposable) U-100 1 ML Use to inject insulin QHS        Lancets (Misc) B-D ULTRA FINE LANCETS  Please dispense lancets for True Metrix glucose meter. Patient to check blood sugars 4 times a day.        Ondansetron (TABLET DISPERSIBLE) ZOFRAN ODT 4 MG Take 1 Tab by mouth every 8 hours as needed for Nausea/Vomiting (give PO if no IV route available).        Prenatal MV-Min-Fe Fum-FA-DHA   Take  by mouth.        .                 Medicines prescribed today were sent to:     [facility]/PHARMACY #[id number] - [place], [place] - [address] [facility] [address]    [address] [facility] [address]    Phone: [phone] Fax: [phone]    Open 24 Hours?: No      Medication refill instructions:       If your prescription bottle indicates you have medication refills left, it is not necessary to call your provider’s office. Please contact your pharmacy and they will refill your medication.    If your prescription bottle indicates you do not have any refills left, you may request refills at any time through one of the following ways: The online [employer] system (except Urgent Care), by calling your provider’s office, or by asking your pharmacy to contact your provider’s office with a refill request. Medication refills are processed only during regular business hours and may not be available until the next business day. Your provider may request additional information or to have a follow-up visit with you prior to refilling your medication.   *Please Note: Medication refills are assigned a new Rx number when refilled electronically. Your pharmacy may indicate that no refills were authorized even though a new prescription for the same medication is available at the pharmacy. Please request the medicine by name with the pharmacy before contacting your provider for a refill.            [employer] Access Code: Activation code not generated  Current [employer] Status: Active

## 2017-04-13 NOTE — PROGRESS NOTES
S: Doing well. Doing fine. Good FM, no contractions, no LOF< no VB  negative headache. negative nausea/vomiting.  negative RUQ pain.  negative vision changes.  Reviewed blood sugar log with patient.  Reviewed diet.  Questions answered.    O: VSS Afeb    FBS range:wnl       1 hour post prandial range:wnl    A/P:  36 y.o.  35w3d by 14 week ultrasound with A2GDM who presents for obstetric follow-up.    1.  Labs: HgbA1C= 5.6  2.  Insulin dosage as follows:       QHS: 10NPH  3. NSTs: 2x/week   4. Continue prenatal vitamins.  5. Continue fetal kick counts   6.  Watch weight gain.  7.  Increase water intake.  8.  Encouraged prenatal classes.  9.  Follow-up in 1 week for obsetric follow-up  10. GBS collected

## 2017-04-13 NOTE — MR AVS SNAPSHOT
Kaela Ballesteros   2017 3:30 PM   Routine Prenatal   MRN: [id number]    Department[facility]  [facility]   Dept Phone:  [phone]    Description:  Female : 1980   Provider:  Angi Cabral M.D.           Allergies as of 2017     Allergen Noted Reactions    Nkda [No Known Drug Allergy] 2007         You were diagnosed with     GDM, class A2   [287556]         Vital Signs     Blood Pressure Weight Last Menstrual Period Smoking Status          120/70 mmHg 109.77 kg (242 lb) 2016 (Exact Date) Former Smoker        Basic Information     Date Of Birth Sex Race Ethnicity Preferred Language    1980 Female Black or  Non- English      Your appointments     2017  3:30 PM   Fetal Non-Stress Test with PC NST   [facility] [address]  [place] [address]   [phone]            2017  3:00 PM   Fetal Non-Stress Test with PC NST   The [address]  [place] [address]   [phone]            2017  3:30 PM   Diabetic with PC MD   [facility] [address]  [place] [address]   [phone]            May 01, 2017 11:15 AM   OB Follow Up with PC MD   [facility] [address]  [place] [address]   [phone]              Problem List              ICD-10-CM Priority Class Noted - Resolved    Acute pancreatitis K85.90   2015 - Present    Calculus of gallbladder K80.20   3/6/2015 - Present    ASCUS of cervix with negative high risk HPV R87.610   3/9/2017 - Present    Supervision of other normal pregnancy, antepartum Z34.80   3/9/2017 - Present    History of  delivery Z98.891   3/9/2017 - Present    Hx of preeclampsia, prior pregnancy, currently pregnant O09.299   3/9/2017 - Present    H/O oophorectomy Z90.721   3/9/2017 - Present    History of cholecystectomy Z90.49   3/9/2017 - Present    History of  delivery Z87.51   3/9/2017 - Present    Sickle cell carrier D56.4, D57.1   3/9/2017 - Present    GDM, class A2 O24.414 High  3/22/2017 - Present      Health Maintenance        Date Due Completion Dates    IMM PNEUMOCOCCAL 19-64 (ADULT) HIGHEST RISK SERIES (1 of 3 - PCV13) 1997 ---    PAP SMEAR 2019    IMM DTaP/Tdap/Td Vaccine (2 - Td) 3/9/2027 3/9/2017, 2015            Results     POCT Urinalysis      Component Value Standard Range & Units    POC Color  Negative    POC Appearance  Negative    POC Leukocyte Esterase trace Negative    POC Nitrites negative Negative    POC Urobiligen  Negative (0.2) mg/dL    POC Protein trace Negative mg/dL    POC Urine PH 6.0 5.0 - 8.0    POC Blood large Negative    POC Specific Gravity 1.020 <1.005 - >1.030    POC Ketones negative Negative mg/dL    POC Biliruben  Negative mg/dL    POC Glucose negative Negative mg/dL                        Current Immunizations     Influenza Vaccine Quad Inj (Preserved) 2015  5:15 PM    MMR/Varicella Combined Vaccine  Incomplete    Tdap Vaccine 3/9/2017  3:42 PM,  Incomplete, 2015  5:14 PM    Tetanus Vaccine 1998      Below and/or attached are the medications your provider expects you to take. Review all of your home medications and newly ordered medications with your provider and/or pharmacist. Follow medication instructions as directed by your provider and/or pharmacist. Please keep your medication list with you and share with your provider. Update the information when medications are discontinued, doses are changed, or new medications (including over-the-counter products) are added; and carry medication information at all times in the event of emergency situations     Allergies:  NKDA - (reactions not documented)               Medications  Valid as of: 2017 -  4:09 PM    Generic Name Brand Name Tablet Size Instructions for use    Aspirin (Tablet Delayed Response) ECOTRIN 81 MG Take  81 mg by mouth every day.        Blood Glucose Monitoring Suppl (Misc) Blood Glucose Monitoring Suppl SUPPLIES 1 Strip by Percutaneous route 4 times a day. Please dispense TrueMetrix test strips.        Blood Glucose Monitoring Suppl (Misc) Blood Glucose Monitoring Suppl SUPPLIES 1 Each by Percutaneous route 4 times a day. Please dispense lancets for True Metrix meter        Blood Glucose Monitoring Suppl (Misc) Blood Glucose Monitoring Suppl  Please dispense test strips for True Metrix glucose meter. Patient to check blood sugars 4 times a day.        Insulin NPH Human (Isophane) (Suspension) HUMULIN,NOVOLIN 100 UNIT/ML Inject 5 units SQ QHS        Insulin Syringes (Disposable) (Misc) Insulin Syringes (Disposable) U-100 1 ML Use to inject insulin QHS        Lancets (Misc) B-D ULTRA FINE LANCETS  Please dispense lancets for True Metrix glucose meter. Patient to check blood sugars 4 times a day.        Ondansetron (TABLET DISPERSIBLE) ZOFRAN ODT 4 MG Take 1 Tab by mouth every 8 hours as needed for Nausea/Vomiting (give PO if no IV route available).        Prenatal MV-Min-Fe Fum-FA-DHA   Take  by mouth.        .                 Medicines prescribed today were sent to:     [facility]/PHARMACY #[id number] - [place], [place] - [address] [place] [address] AT [address]    [address] [facility] [address]    Phone: [phone] Fax: [phone]    Open 24 Hours?: No      Medication refill instructions:       If your prescription bottle indicates you have medication refills left, it is not necessary to call your provider’s office. Please contact your pharmacy and they will refill your medication.    If your prescription bottle indicates you do not have any refills left, you may request refills at any time through one of the following ways: The online [employer] system (except Urgent Care), by calling your provider’s office, or by asking your pharmacy to contact your provider’s office with a refill request. Medication refills are processed only  during regular business hours and may not be available until the next business day. Your provider may request additional information or to have a follow-up visit with you prior to refilling your medication.   *Please Note: Medication refills are assigned a new Rx number when refilled electronically. Your pharmacy may indicate that no refills were authorized even though a new prescription for the same medication is available at the pharmacy. Please request the medicine by name with the pharmacy before contacting your provider for a refill.           [employer] Access Code: Activation code not generated  Current [employer] Status: Active

## 2017-04-13 NOTE — PROGRESS NOTES
Pt. here for Ob f/u, GBS and NST today. Good # [phone]  Good FM  Pt states having more consistent and painful contractions.   Pharmacy verified.

## 2017-04-15 LAB — GP B STREP DNA SPEC QL NAA+PROBE: NEGATIVE

## 2017-04-17 ENCOUNTER — ROUTINE PRENATAL (OUTPATIENT)
Dept: OBGYN | Facility: CLINIC | Age: 37
End: 2017-04-17
Payer: MEDICAID

## 2017-04-17 DIAGNOSIS — O24.419 GDM, CLASS A2: ICD-10-CM

## 2017-04-17 LAB
NST ACOUSTIC STIMULATION: NORMAL
NST ACTION NECESSARY: NORMAL
NST ASSESSMENT: NORMAL
NST BASELINE: 140
NST INDICATIONS: NORMAL
NST OTHER DATA: NORMAL
NST READ BY: NORMAL
NST RETURN: NORMAL
NST UTERINE ACTIVITY: NORMAL

## 2017-04-17 PROCEDURE — 59025 FETAL NON-STRESS TEST: CPT | Performed by: NURSE PRACTITIONER

## 2017-04-17 NOTE — NON-PROVIDER
Pt here for NST and reports having a cold and cough. Denies any fever. List of cold and cough with GDM restrictions given to pt and advised to use mucinex instead of sudafed and increase water intake. Pt agreed and verbalized understanding.

## 2017-04-20 ENCOUNTER — ROUTINE PRENATAL (OUTPATIENT)
Dept: OBGYN | Facility: CLINIC | Age: 37
End: 2017-04-20
Payer: MEDICAID

## 2017-04-20 VITALS — BODY MASS INDEX: 32.09 KG/M2 | DIASTOLIC BLOOD PRESSURE: 72 MMHG | WEIGHT: 230 LBS | SYSTOLIC BLOOD PRESSURE: 112 MMHG

## 2017-04-20 DIAGNOSIS — O24.419 GDM, CLASS A2: ICD-10-CM

## 2017-04-20 LAB
APPEARANCE UR: NORMAL
BILIRUB UR STRIP-MCNC: NORMAL MG/DL
COLOR UR AUTO: NORMAL
GLUCOSE UR STRIP.AUTO-MCNC: NORMAL MG/DL
KETONES UR STRIP.AUTO-MCNC: NORMAL MG/DL
LEUKOCYTE ESTERASE UR QL STRIP.AUTO: NORMAL
NITRITE UR QL STRIP.AUTO: NORMAL
NST ACOUSTIC STIMULATION: NORMAL
NST ACTION NECESSARY: NORMAL
NST ASSESSMENT: REACTIVE
NST BASELINE: 140
NST INDICATIONS: NORMAL
NST OTHER DATA: NORMAL
NST READ BY: NORMAL
NST RETURN: NORMAL
NST UTERINE ACTIVITY: NORMAL
PH UR STRIP.AUTO: 6.5 [PH] (ref 5–8)
PROT UR QL STRIP: NORMAL MG/DL
RBC UR QL AUTO: NORMAL
SP GR UR STRIP.AUTO: 1.01
UROBILINOGEN UR STRIP-MCNC: NORMAL MG/DL

## 2017-04-20 PROCEDURE — 90040 PR PRENATAL FOLLOW UP: CPT | Performed by: OBSTETRICS & GYNECOLOGY

## 2017-04-20 PROCEDURE — 59025 FETAL NON-STRESS TEST: CPT | Performed by: OBSTETRICS & GYNECOLOGY

## 2017-04-20 PROCEDURE — 81002 URINALYSIS NONAUTO W/O SCOPE: CPT | Performed by: OBSTETRICS & GYNECOLOGY

## 2017-04-20 NOTE — PROGRESS NOTES
Pt here for GDM f/u. Denies MAYA, WANDAF, [facility]. Reports +FM. Reports having a cold  Good phone# [phone]  Pharmacy verified with pt.

## 2017-04-20 NOTE — MR AVS SNAPSHOT
Kaela Ballesteros   2017 3:00 PM   Routine Prenatal   MRN: [id number]    [facility]   Dept Phone:  [phone]    Description:  Female : 1980   Provider:  Freddie Campos M.D.           Allergies as of 2017     Allergen Noted Reactions    Nkda [No Known Drug Allergy] 2007         You were diagnosed with     GDM, class A2   [750662]         Vital Signs     Last Menstrual Period Smoking Status                2016 (Exact Date) Former Smoker          Basic Information     Date Of Birth Sex Race Ethnicity Preferred Language    1980 Female Black or  Non- English      Your appointments     2017  3:00 PM   Fetal Non-Stress Test with PC RENZO   [facility])    [address] [place] [address] [address]  [place] [address] [phone]            May 01, 2017 11:15 AM   OB Follow Up with IZA GALICIA   [facility] [address]  [place] [address] [phone]              Problem List              ICD-10-CM Priority Class Noted - Resolved    Acute pancreatitis K85.90   2015 - Present    Calculus of gallbladder K80.20   3/6/2015 - Present    ASCUS of cervix with negative high risk HPV R87.610   3/9/2017 - Present    Supervision of other normal pregnancy, antepartum Z34.80   3/9/2017 - Present    History of  delivery Z98.891   3/9/2017 - Present    Hx of preeclampsia, prior pregnancy, currently pregnant O09.299   3/9/2017 - Present    H/O oophorectomy Z90.721   3/9/2017 - Present    History of cholecystectomy Z90.49   3/9/2017 - Present    History of  delivery Z87.51   3/9/2017 - Present    Sickle cell carrier D56.4, D57.1   3/9/2017 - Present    GDM, class A2 O24.414 High  3/22/2017 - Present      Health Maintenance        Date Due Completion Dates    IMM PNEUMOCOCCAL 19-64 (ADULT) HIGHEST RISK SERIES (1 of 3 - PCV13) 1997 ---    PAP SMEAR 2019    IMM DTaP/Tdap/Td  Vaccine (2 - Td) 3/9/2027 3/9/2017, 9/30/2015            Results     POCT Urinalysis      Component Value Standard Range & Units    POC Color  Negative    POC Appearance  Negative    POC Leukocyte Esterase Neg Negative    POC Nitrites Neg Negative    POC Urobiligen  Negative (0.2) mg/dL    POC Protein trace Negative mg/dL    POC Urine PH 6.5 5.0 - 8.0    POC Blood trace Negative    POC Specific Gravity 1.010 <1.005 - >1.030    POC Ketones Neg Negative mg/dL    POC Biliruben  Negative mg/dL    POC Glucose Neg Negative mg/dL                        Current Immunizations     Influenza Vaccine Quad Inj (Preserved) 9/30/2015  5:15 PM    MMR/Varicella Combined Vaccine  Incomplete    Tdap Vaccine 3/9/2017  3:42 PM,  Incomplete, 9/30/2015  5:14 PM    Tetanus Vaccine 4/1/1998      Below and/or attached are the medications your provider expects you to take. Review all of your home medications and newly ordered medications with your provider and/or pharmacist. Follow medication instructions as directed by your provider and/or pharmacist. Please keep your medication list with you and share with your provider. Update the information when medications are discontinued, doses are changed, or new medications (including over-the-counter products) are added; and carry medication information at all times in the event of emergency situations     Allergies:  NKDA - (reactions not documented)               Medications  Valid as of: April 20, 2017 -  3:45 PM    Generic Name Brand Name Tablet Size Instructions for use    Aspirin (Tablet Delayed Response) ECOTRIN 81 MG Take 81 mg by mouth every day.        Blood Glucose Monitoring Suppl (Misc) Blood Glucose Monitoring Suppl SUPPLIES 1 Strip by Percutaneous route 4 times a day. Please dispense TrueMetrix test strips.        Blood Glucose Monitoring Suppl (Misc) Blood Glucose Monitoring Suppl SUPPLIES 1 Each by Percutaneous route 4 times a day. Please dispense lancets for True Metrix meter          Blood Glucose Monitoring Suppl (Misc) Blood Glucose Monitoring Suppl  Please dispense test strips for True Metrix glucose meter. Patient to check blood sugars 4 times a day.        Insulin NPH Human (Isophane) (Suspension) HUMULIN,NOVOLIN 100 UNIT/ML Inject 5 units SQ QHS        Insulin Syringes (Disposable) (Misc) Insulin Syringes (Disposable) U-100 1 ML Use to inject insulin QHS        Lancets (Misc) B-D ULTRA FINE LANCETS  Please dispense lancets for True Metrix glucose meter. Patient to check blood sugars 4 times a day.        Ondansetron (TABLET DISPERSIBLE) ZOFRAN ODT 4 MG Take 1 Tab by mouth every 8 hours as needed for Nausea/Vomiting (give PO if no IV route available).        Prenatal MV-Min-Fe Fum-FA-DHA   Take  by mouth.        .                 Medicines prescribed today were sent to:     [facility]/PHARMACY #[id number] - [place], [place] - [address]    [address] [facility] [address]    Phone: [phone] Fax: [phone]    Open 24 Hours?: No      Medication refill instructions:       If your prescription bottle indicates you have medication refills left, it is not necessary to call your provider’s office. Please contact your pharmacy and they will refill your medication.    If your prescription bottle indicates you do not have any refills left, you may request refills at any time through one of the following ways: The online [employer] system (except Urgent Care), by calling your provider’s office, or by asking your pharmacy to contact your provider’s office with a refill request. Medication refills are processed only during regular business hours and may not be available until the next business day. Your provider may request additional information or to have a follow-up visit with you prior to refilling your medication.   *Please Note: Medication refills are assigned a new Rx number when refilled electronically. Your pharmacy may indicate that no refills were authorized even though a  new prescription for the same medication is available at the pharmacy. Please request the medicine by name with the pharmacy before contacting your provider for a refill.           MyChart Access Code: Activation code not generated  Current [employer] Status: Active

## 2017-04-20 NOTE — MR AVS SNAPSHOT
Kaela Ballesteros   2017 3:30 PM   Routine Prenatal   MRN: [id number]    [facility]   Dept Phone:  [phone]    Description:  Female : 1980   Provider:  Freddie Campos M.D.           Allergies as of 2017     Allergen Noted Reactions    Nkda [No Known Drug Allergy] 2007         You were diagnosed with     GDM, class A2   [075392]         Vital Signs     Blood Pressure Weight Last Menstrual Period Smoking Status          112/72 mmHg 104.327 kg (230 lb) 2016 (Exact Date) Former Smoker        Basic Information     Date Of Birth Sex Race Ethnicity Preferred Language    1980 Female Black or  Non- English      Your appointments     2017  3:00 PM   Fetal Non-Stress Test with PC RENZO   [facility])    [address] [place] [address] [address]  [place] [address] [phone]            May 01, 2017 11:15 AM   OB Follow Up with IZA GALICIA   [facility])    [address] [place] [address] [address]  [place] [address] [phone]              Problem List              ICD-10-CM Priority Class Noted - Resolved    Acute pancreatitis K85.90   2015 - Present    Calculus of gallbladder K80.20   3/6/2015 - Present    ASCUS of cervix with negative high risk HPV R87.610   3/9/2017 - Present    Supervision of other normal pregnancy, antepartum Z34.80   3/9/2017 - Present    History of  delivery Z98.891   3/9/2017 - Present    Hx of preeclampsia, prior pregnancy, currently pregnant O09.299   3/9/2017 - Present    H/O oophorectomy Z90.721   3/9/2017 - Present    History of cholecystectomy Z90.49   3/9/2017 - Present    History of  delivery Z87.51   3/9/2017 - Present    Sickle cell carrier D56.4, D57.1   3/9/2017 - Present    GDM, class A2 O24.414 High  3/22/2017 - Present      Health Maintenance        Date Due Completion Dates    IMM PNEUMOCOCCAL 19-64 (ADULT) HIGHEST RISK SERIES (1 of 3 - PCV13) 1997 ---    PAP SMEAR  11/2/2019 11/2/2016    IMM DTaP/Tdap/Td Vaccine (2 - Td) 3/9/2027 3/9/2017, 9/30/2015            Results       Current Immunizations     Influenza Vaccine Quad Inj (Preserved) 9/30/2015  5:15 PM    MMR/Varicella Combined Vaccine  Incomplete    Tdap Vaccine 3/9/2017  3:42 PM,  Incomplete, 9/30/2015  5:14 PM    Tetanus Vaccine 4/1/1998      Below and/or attached are the medications your provider expects you to take. Review all of your home medications and newly ordered medications with your provider and/or pharmacist. Follow medication instructions as directed by your provider and/or pharmacist. Please keep your medication list with you and share with your provider. Update the information when medications are discontinued, doses are changed, or new medications (including over-the-counter products) are added; and carry medication information at all times in the event of emergency situations     Allergies:  NKDA - (reactions not documented)               Medications  Valid as of: April 20, 2017 -  3:45 PM    Generic Name Brand Name Tablet Size Instructions for use    Aspirin (Tablet Delayed Response) ECOTRIN 81 MG Take 81 mg by mouth every day.        Blood Glucose Monitoring Suppl (Misc) Blood Glucose Monitoring Suppl SUPPLIES 1 Strip by Percutaneous route 4 times a day. Please dispense TrueMetrix test strips.        Blood Glucose Monitoring Suppl (Misc) Blood Glucose Monitoring Suppl SUPPLIES 1 Each by Percutaneous route 4 times a day. Please dispense lancets for True Metrix meter        Blood Glucose Monitoring Suppl (Misc) Blood Glucose Monitoring Suppl  Please dispense test strips for True Metrix glucose meter. Patient to check blood sugars 4 times a day.        Insulin NPH Human (Isophane) (Suspension) HUMULIN,NOVOLIN 100 UNIT/ML Inject 5 units SQ QHS        Insulin Syringes (Disposable) (Misc) Insulin Syringes (Disposable) U-100 1 ML Use to inject insulin QHS        Lancets (Misc) B-D ULTRA FINE LANCETS  Please  dispense lancets for True Metrix glucose meter. Patient to check blood sugars 4 times a day.        Ondansetron (TABLET DISPERSIBLE) ZOFRAN ODT 4 MG Take 1 Tab by mouth every 8 hours as needed for Nausea/Vomiting (give PO if no IV route available).        Prenatal MV-Min-Fe Fum-FA-DHA   Take  by mouth.        .                 Medicines prescribed today were sent to:     [facility]/PHARMACY #[id number] - [place], [place] - [address]    [address] [facility] [place] [address]    Phone: [phone] Fax: [phone]    Open 24 Hours?: No      Medication refill instructions:       If your prescription bottle indicates you have medication refills left, it is not necessary to call your provider’s office. Please contact your pharmacy and they will refill your medication.    If your prescription bottle indicates you do not have any refills left, you may request refills at any time through one of the following ways: The online [employer] system (except Urgent Care), by calling your provider’s office, or by asking your pharmacy to contact your provider’s office with a refill request. Medication refills are processed only during regular business hours and may not be available until the next business day. Your provider may request additional information or to have a follow-up visit with you prior to refilling your medication.   *Please Note: Medication refills are assigned a new Rx number when refilled electronically. Your pharmacy may indicate that no refills were authorized even though a new prescription for the same medication is available at the pharmacy. Please request the medicine by name with the pharmacy before contacting your provider for a refill.           [employer] Access Code: Activation code not generated  Current [employer] Status: Active

## 2017-04-21 ENCOUNTER — HOSPITAL ENCOUNTER (OUTPATIENT)
Facility: MEDICAL CENTER | Age: 37
End: 2017-04-21
Attending: OBSTETRICS & GYNECOLOGY | Admitting: OBSTETRICS & GYNECOLOGY
Payer: MEDICAID

## 2017-04-21 ENCOUNTER — APPOINTMENT (OUTPATIENT)
Dept: RADIOLOGY | Facility: MEDICAL CENTER | Age: 37
End: 2017-04-21
Attending: FAMILY MEDICINE
Payer: MEDICAID

## 2017-04-21 VITALS
BODY MASS INDEX: 32.23 KG/M2 | WEIGHT: 238 LBS | HEIGHT: 72 IN | RESPIRATION RATE: 18 BRPM | SYSTOLIC BLOOD PRESSURE: 111 MMHG | DIASTOLIC BLOOD PRESSURE: 76 MMHG | HEART RATE: 137 BPM | TEMPERATURE: 97 F

## 2017-04-21 LAB
BASOPHILS # BLD AUTO: 0.3 % (ref 0–1.8)
BASOPHILS # BLD: 0.02 K/UL (ref 0–0.12)
EOSINOPHIL # BLD AUTO: 0.08 K/UL (ref 0–0.51)
EOSINOPHIL NFR BLD: 1.1 % (ref 0–6.9)
ERYTHROCYTE [DISTWIDTH] IN BLOOD BY AUTOMATED COUNT: 37.7 FL (ref 35.9–50)
HCT VFR BLD AUTO: 34.1 % (ref 37–47)
HGB BLD-MCNC: 11.5 G/DL (ref 12–16)
HOLDING TUBE BB 8507: NORMAL
IMM GRANULOCYTES # BLD AUTO: 0.05 K/UL (ref 0–0.11)
IMM GRANULOCYTES NFR BLD AUTO: 0.7 % (ref 0–0.9)
LYMPHOCYTES # BLD AUTO: 0.96 K/UL (ref 1–4.8)
LYMPHOCYTES NFR BLD: 13.7 % (ref 22–41)
MCH RBC QN AUTO: 29 PG (ref 27–33)
MCHC RBC AUTO-ENTMCNC: 33.7 G/DL (ref 33.6–35)
MCV RBC AUTO: 86.1 FL (ref 81.4–97.8)
MONOCYTES # BLD AUTO: 0.52 K/UL (ref 0–0.85)
MONOCYTES NFR BLD AUTO: 7.4 % (ref 0–13.4)
NEUTROPHILS # BLD AUTO: 5.38 K/UL (ref 2–7.15)
NEUTROPHILS NFR BLD: 76.8 % (ref 44–72)
NRBC # BLD AUTO: 0 K/UL
NRBC BLD AUTO-RTO: 0 /100 WBC
PLATELET # BLD AUTO: 154 K/UL (ref 164–446)
PMV BLD AUTO: 12 FL (ref 9–12.9)
RBC # BLD AUTO: 3.96 M/UL (ref 4.2–5.4)
WBC # BLD AUTO: 7 K/UL (ref 4.8–10.8)

## 2017-04-21 PROCEDURE — 59025 FETAL NON-STRESS TEST: CPT | Performed by: OBSTETRICS & GYNECOLOGY

## 2017-04-21 PROCEDURE — 76819 FETAL BIOPHYS PROFIL W/O NST: CPT

## 2017-04-21 PROCEDURE — 85025 COMPLETE CBC W/AUTO DIFF WBC: CPT

## 2017-04-21 RX ORDER — SODIUM CHLORIDE, SODIUM LACTATE, POTASSIUM CHLORIDE, CALCIUM CHLORIDE 600; 310; 30; 20 MG/100ML; MG/100ML; MG/100ML; MG/100ML
1000 INJECTION, SOLUTION INTRAVENOUS CONTINUOUS
Status: DISCONTINUED | OUTPATIENT
Start: 2017-04-21 | End: 2017-04-21 | Stop reason: HOSPADM

## 2017-04-21 NOTE — PROGRESS NOTES
"[facility] LABOR AND DELIVERY TRIAGE PROGRESS NOTE    PATIENT ID:  NAME:  Kaela Ballesteros  MRN:               [id number]  YOB: 1980     36 y.o. female  at 36w4d.    Subjective: Pt presents for painful bleeding at home at approximately 0900. Reports lower abdominal pain and discomfort with bright red blood from the vaginal. Reports symptoms are similar to previous abruption from previous pregnancy. Headache yesterday, which has resolved; denies visual changes, or epigastric pain. Mild swelling in hands bilaterally. States baby had decreased movement last night, but has been feeling baby move normally this morning. No other complaints at this time.     Pregnancy complicated by GDM A2.     negative  For CTXS.   positive Feels pain   negative for LOF  positive for vaginal bleeding.   positive for fetal movement    ROS: Patient denies any fever chills, nausea, vomiting, headache, chest pain, shortness of breath, or dysuria.     Objective:    Filed Vitals:    17 0931 17 0937   BP: 111/76    Pulse: 137    Height:  1.816 m (5' 11.5\")   Weight:  107.956 kg (238 lb)     No data recorded.    General: No acute distress, resting comfortably in bed.  HEENT: normocephalic, nontraumatic, PERRLA, EOMI  Cardiovascular: Heart RRR with no murmurs, rubs or gallops. Distal Pulses 2+  Respiratory: symmetric chest expansion, lungs CTAB, with no wheezes, rales, rhonci  Abdomen: gravid, nontender, but firm; no guarding or rebound  Musculoskeletal: strength 5/5 in four extremities; no significant swelling appreciated.   Neuro: non focal with no numbness, tingling or changes in sensation    Cervix:  3cm/50%/-3; browish and red blood on glove at 1000; repeat SVE @1100 without cervical change  [place]: Uterine Contractions not regularly seen  FHRM: Baseline 150s, Accels seen, no decels seen, moderate variability    Labs:   CBC: 7>11.5/34.1<154  Blood bank hold done    Ultrasound:   BPP:   LELA: WNL  No signs of " abruption.     Assessment: 36 y.o. female  at 36w4d with vaginal bleeding and cervical dilation. May be in early labor; will hydrate and observe. Given history of previous emergency  due to abruption in setting of preeclampsia, concerned for potential recurrence; blood pressures currently well controlled, no headache, visual changes, epigastric pain, or unusual swelling noted at this time. FHT within acceptable parameters. Will hydrate with IVF and obtain CBC and blood bank orders placed. No cervical change on repeat exam. NST reassuring/category 1.     Plan:   1. Discharge to home.   2. Return precautions for signs or symptoms or labor.       Discussed case with SANJANA Mathew Attending. Case was discussed and attending agreed with plan; agreed with discharge to home.

## 2017-04-21 NOTE — PROGRESS NOTES
0928 Patient is a 36 year old  at 36 4/7 weeks gestation. Patient presents to LND complaining of vaginal bleeding with abdominal pain. External monitors x2 applied/vitals taken. Patient denies LOF, uc's/cramping, and states good fetal movement. 0945 Resident in room, SVE 3/50/-3, vtx, moderate amount of bright red blood noted on glove, abdomen soft to palpation. 0955 Dr. Amie Whitley in room, order received for IV hydration/labs, will continue to monitor for labor. 1110 No uc's noted/palpated. SVE unchanged, small amount of brown blood noted on pad, Dr. Amie Whitley notified, order received for BPP, if WNL patient may be discharged to home. 1151 Ultrasound tech in room. 1230 BPP 8/8, normal LELA, no signs of abruption, Dr. Palm notified/order received for discharge. 1235 Labor discharge instructions given, patient verbalized understanding. Patient discharged to home, ambulatory in stable condition.

## 2017-04-23 ENCOUNTER — HOSPITAL ENCOUNTER (OUTPATIENT)
Facility: MEDICAL CENTER | Age: 37
End: 2017-04-23
Attending: OBSTETRICS & GYNECOLOGY | Admitting: OBSTETRICS & GYNECOLOGY
Payer: MEDICAID

## 2017-04-23 VITALS — DIASTOLIC BLOOD PRESSURE: 77 MMHG | HEART RATE: 95 BPM | TEMPERATURE: 97.9 F | SYSTOLIC BLOOD PRESSURE: 110 MMHG

## 2017-04-23 PROCEDURE — 59025 FETAL NON-STRESS TEST: CPT | Performed by: OBSTETRICS & GYNECOLOGY

## 2017-04-24 ENCOUNTER — ROUTINE PRENATAL (OUTPATIENT)
Dept: OBGYN | Facility: CLINIC | Age: 37
End: 2017-04-24
Payer: MEDICAID

## 2017-04-24 DIAGNOSIS — O24.419 GDM, CLASS A2: ICD-10-CM

## 2017-04-24 PROCEDURE — 59025 FETAL NON-STRESS TEST: CPT | Performed by: OBSTETRICS & GYNECOLOGY

## 2017-04-24 NOTE — MR AVS SNAPSHOT
Kaela Ballesteros   2017 3:00 PM   Routine Prenatal   MRN: [id number]    [facility]   Dept Phone:  [phone]    Description:  Female : 1980   Provider:  Siena Farias M.D.           Allergies as of 2017     Allergen Noted Reactions    Nkda [No Known Drug Allergy] 2007         You were diagnosed with     GDM, class A2   [720903]         Vital Signs     Last Menstrual Period Smoking Status                2016 (Exact Date) Former Smoker          Basic Information     Date Of Birth Sex Race Ethnicity Preferred Language    1980 Female Black or  Non- English      Your appointments     May 01, 2017 11:15 AM   OB Follow Up with IZA GALICIA   [facility])    [address] [place] [address] [phone]              Problem List              ICD-10-CM Priority Class Noted - Resolved    Acute pancreatitis K85.90   2015 - Present    Calculus of gallbladder K80.20   3/6/2015 - Present    ASCUS of cervix with negative high risk HPV R87.610   3/9/2017 - Present    Supervision of other normal pregnancy, antepartum Z34.80   3/9/2017 - Present    History of  delivery Z98.891   3/9/2017 - Present    Hx of preeclampsia, prior pregnancy, currently pregnant O09.299   3/9/2017 - Present    H/O oophorectomy Z90.721   3/9/2017 - Present    History of cholecystectomy Z90.49   3/9/2017 - Present    History of  delivery Z87.51   3/9/2017 - Present    Sickle cell carrier D56.4, D57.1   3/9/2017 - Present    GDM, class A2 O24.414 High  3/22/2017 - Present      Health Maintenance        Date Due Completion Dates    IMM PNEUMOCOCCAL 19-64 (ADULT) HIGHEST RISK SERIES (1 of 3 - PCV13) 1997 ---    PAP SMEAR 2019    IMM DTaP/Tdap/Td Vaccine (2 - Td) 3/9/2027 3/9/2017, 2015            Results       Current Immunizations     Influenza Vaccine Quad Inj (Preserved) 2015  5:15 PM    MMR/Varicella Combined Vaccine  Incomplete    Tdap Vaccine 3/9/2017  3:42 PM,  Incomplete, 9/30/2015  5:14 PM    Tetanus Vaccine 4/1/1998      Below and/or attached are the medications your provider expects you to take. Review all of your home medications and newly ordered medications with your provider and/or pharmacist. Follow medication instructions as directed by your provider and/or pharmacist. Please keep your medication list with you and share with your provider. Update the information when medications are discontinued, doses are changed, or new medications (including over-the-counter products) are added; and carry medication information at all times in the event of emergency situations     Allergies:  NKDA - (reactions not documented)               Medications  Valid as of: April 24, 2017 -  3:48 PM    Generic Name Brand Name Tablet Size Instructions for use    Aspirin (Tablet Delayed Response) ECOTRIN 81 MG Take 81 mg by mouth every day.        Blood Glucose Monitoring Suppl (Misc) Blood Glucose Monitoring Suppl SUPPLIES 1 Strip by Percutaneous route 4 times a day. Please dispense TrueMetrix test strips.        Blood Glucose Monitoring Suppl (Misc) Blood Glucose Monitoring Suppl SUPPLIES 1 Each by Percutaneous route 4 times a day. Please dispense lancets for True Metrix meter        Blood Glucose Monitoring Suppl (Misc) Blood Glucose Monitoring Suppl  Please dispense test strips for True Metrix glucose meter. Patient to check blood sugars 4 times a day.        Insulin NPH Human (Isophane) (Suspension) HUMULIN,NOVOLIN 100 UNIT/ML Inject 5 units SQ QHS        Insulin Syringes (Disposable) (Misc) Insulin Syringes (Disposable) U-100 1 ML Use to inject insulin QHS        Lancets (Misc) B-D ULTRA FINE LANCETS  Please dispense lancets for True Metrix glucose meter. Patient to check blood sugars 4 times a day.        Ondansetron (TABLET DISPERSIBLE) ZOFRAN ODT 4 MG Take 1 Tab by mouth every 8 hours as needed for  Nausea/Vomiting (give PO if no IV route available).        Prenatal MV-Min-Fe Fum-FA-DHA   Take  by mouth.        .                 Medicines prescribed today were sent to:     [facility]/PHARMACY #[id number] - [place], [place] - [address]    [address] [place] [address]    Phone: [phone] Fax: [phone]    Open 24 Hours?: No      Medication refill instructions:       If your prescription bottle indicates you have medication refills left, it is not necessary to call your provider’s office. Please contact your pharmacy and they will refill your medication.    If your prescription bottle indicates you do not have any refills left, you may request refills at any time through one of the following ways: The online [employer] system (except Urgent Care), by calling your provider’s office, or by asking your pharmacy to contact your provider’s office with a refill request. Medication refills are processed only during regular business hours and may not be available until the next business day. Your provider may request additional information or to have a follow-up visit with you prior to refilling your medication.   *Please Note: Medication refills are assigned a new Rx number when refilled electronically. Your pharmacy may indicate that no refills were authorized even though a new prescription for the same medication is available at the pharmacy. Please request the medicine by name with the pharmacy before contacting your provider for a refill.           [employer] Access Code: Activation code not generated  Current [employer] Status: Active

## 2017-04-24 NOTE — PROGRESS NOTES
1900 report from ANGELINA Gaspar, patient may discharge home  1920 discharge instructions provided, patient waiting on ride

## 2017-04-24 NOTE — PROGRESS NOTES
Patient came in for ob check.EFM applied and POC discussed.she is due 5-15 which makes her 36.6weeks.she is here for pressure and loss of mucous plug.she is a repeat  section.baby is moving well.SVE 3/thick/floating.difficult to get baby to stay in on monitor.Dr Campos informed.when good strip is obtained patient can go home.report to Darcie

## 2017-04-27 ENCOUNTER — ROUTINE PRENATAL (OUTPATIENT)
Dept: OBGYN | Facility: CLINIC | Age: 37
End: 2017-04-27
Payer: MEDICAID

## 2017-04-27 VITALS — WEIGHT: 243 LBS | SYSTOLIC BLOOD PRESSURE: 112 MMHG | DIASTOLIC BLOOD PRESSURE: 74 MMHG | BODY MASS INDEX: 33.42 KG/M2

## 2017-04-27 DIAGNOSIS — O24.419 GDM, CLASS A2: ICD-10-CM

## 2017-04-27 LAB
APPEARANCE UR: NORMAL
BILIRUB UR STRIP-MCNC: NORMAL MG/DL
COLOR UR AUTO: NORMAL
GLUCOSE UR STRIP.AUTO-MCNC: NORMAL MG/DL
KETONES UR STRIP.AUTO-MCNC: NORMAL MG/DL
LEUKOCYTE ESTERASE UR QL STRIP.AUTO: NORMAL
NITRITE UR QL STRIP.AUTO: NORMAL
NST ACOUSTIC STIMULATION: NO
NST ACTION NECESSARY: NORMAL
NST ASSESSMENT: REACTIVE
NST BASELINE: 130
NST INDICATIONS: NORMAL
NST OTHER DATA: NORMAL
NST READ BY: NORMAL
NST RETURN: NORMAL
NST UTERINE ACTIVITY: NO
PH UR STRIP.AUTO: 7.5 [PH] (ref 5–8)
PROT UR QL STRIP: NORMAL MG/DL
RBC UR QL AUTO: NORMAL
SP GR UR STRIP.AUTO: 1
UROBILINOGEN UR STRIP-MCNC: NORMAL MG/DL

## 2017-04-27 PROCEDURE — 59025 FETAL NON-STRESS TEST: CPT | Performed by: OBSTETRICS & GYNECOLOGY

## 2017-04-27 PROCEDURE — 90040 PR PRENATAL FOLLOW UP: CPT | Performed by: OBSTETRICS & GYNECOLOGY

## 2017-04-27 PROCEDURE — 81002 URINALYSIS NONAUTO W/O SCOPE: CPT | Performed by: OBSTETRICS & GYNECOLOGY

## 2017-04-27 NOTE — MR AVS SNAPSHOT
Kaela Penny Ballesteros   2017 2:45 PM   Routine Prenatal   MRN: [id number]    Department[facility]  [facility]   Dept Phone:  [phone]    Description:  Female : 1980   Provider:  Eryn Mora M.D.           Allergies as of 2017     Allergen Noted Reactions    Nkda [No Known Drug Allergy] 2007         You were diagnosed with     GDM, class A2   [494547]         Vital Signs     Blood Pressure Weight Last Menstrual Period Smoking Status          112/74 mmHg 110.224 kg (243 lb) 2016 (Exact Date) Former Smoker        Basic Information     Date Of Birth Sex Race Ethnicity Preferred Language    1980 Female Black or  Non- English      Your appointments     May 01, 2017 10:30 AM   Fetal Non-Stress Test with IZA MULLER   [facility])    [address] [place] [address] [address]  [place] [id number] [phone]            May 01, 2017 11:15 AM   OB Follow Up with IZA GALICIA   [facility])    [address] [place] [address] [address]  [place] [address] [phone]              Problem List              ICD-10-CM Priority Class Noted - Resolved    Acute pancreatitis K85.90   2015 - Present    Calculus of gallbladder K80.20   3/6/2015 - Present    ASCUS of cervix with negative high risk HPV R87.610   3/9/2017 - Present    Supervision of other normal pregnancy, antepartum Z34.80   3/9/2017 - Present    History of  delivery Z98.891   3/9/2017 - Present    Hx of preeclampsia, prior pregnancy, currently pregnant O09.299   3/9/2017 - Present    H/O oophorectomy Z90.721   3/9/2017 - Present    History of cholecystectomy Z90.49   3/9/2017 - Present    History of  delivery Z87.51   3/9/2017 - Present    Sickle cell carrier D56.4, D57.1   3/9/2017 - Present    GDM, class A2 O24.414 High  3/22/2017 - Present      Health Maintenance        Date Due Completion Dates    IMM PNEUMOCOCCAL 19-64 (ADULT) HIGHEST RISK SERIES (1 of 3 - PCV13) 1997 ---    PAP  SMEAR 11/2/2019 11/2/2016    IMM DTaP/Tdap/Td Vaccine (2 - Td) 3/9/2027 3/9/2017, 9/30/2015            Results     POCT Urinalysis      Component Value Standard Range & Units    POC Color  Negative    POC Appearance  Negative    POC Leukocyte Esterase small Negative    POC Nitrites neg Negative    POC Urobiligen  Negative (0.2) mg/dL    POC Protein trace Negative mg/dL    POC Urine PH 7.5 5.0 - 8.0    POC Blood moderate Negative    POC Specific Gravity 1.005 <1.005 - >1.030    POC Ketones neg Negative mg/dL    POC Biliruben  Negative mg/dL    POC Glucose neg Negative mg/dL                        Current Immunizations     Influenza Vaccine Quad Inj (Preserved) 9/30/2015  5:15 PM    MMR/Varicella Combined Vaccine  Incomplete    Tdap Vaccine 3/9/2017  3:42 PM,  Incomplete, 9/30/2015  5:14 PM    Tetanus Vaccine 4/1/1998      Below and/or attached are the medications your provider expects you to take. Review all of your home medications and newly ordered medications with your provider and/or pharmacist. Follow medication instructions as directed by your provider and/or pharmacist. Please keep your medication list with you and share with your provider. Update the information when medications are discontinued, doses are changed, or new medications (including over-the-counter products) are added; and carry medication information at all times in the event of emergency situations     Allergies:  NKDA - (reactions not documented)               Medications  Valid as of: April 27, 2017 -  4:54 PM    Generic Name Brand Name Tablet Size Instructions for use    Aspirin (Tablet Delayed Response) ECOTRIN 81 MG Take 81 mg by mouth every day.        Blood Glucose Monitoring Suppl (Misc) Blood Glucose Monitoring Suppl SUPPLIES 1 Strip by Percutaneous route 4 times a day. Please dispense TrueMetrix test strips.        Blood Glucose Monitoring Suppl (Misc) Blood Glucose Monitoring Suppl SUPPLIES 1 Each by Percutaneous route 4 times a day.  Please dispense lancets for True Metrix meter        Blood Glucose Monitoring Suppl (Misc) Blood Glucose Monitoring Suppl  Please dispense test strips for True Metrix glucose meter. Patient to check blood sugars 4 times a day.        Insulin NPH Human (Isophane) (Suspension) HUMULIN,NOVOLIN 100 UNIT/ML Inject 5 units SQ QHS        Insulin Syringes (Disposable) (Misc) Insulin Syringes (Disposable) U-100 1 ML Use to inject insulin QHS        Lancets (Misc) B-D ULTRA FINE LANCETS  Please dispense lancets for True Metrix glucose meter. Patient to check blood sugars 4 times a day.        Ondansetron (TABLET DISPERSIBLE) ZOFRAN ODT 4 MG Take 1 Tab by mouth every 8 hours as needed for Nausea/Vomiting (give PO if no IV route available).        Prenatal MV-Min-Fe Fum-FA-DHA   Take  by mouth.        .                 Medicines prescribed today were sent to:     [facility]/PHARMACY #[id number] - [place], [place] - [address] [facility] [address]    [address] [place] [address]    Phone: [phone] Fax: [phone]    Open 24 Hours?: No      Medication refill instructions:       If your prescription bottle indicates you have medication refills left, it is not necessary to call your provider’s office. Please contact your pharmacy and they will refill your medication.    If your prescription bottle indicates you do not have any refills left, you may request refills at any time through one of the following ways: The online [facility] system (except Urgent Care), by calling your provider’s office, or by asking your pharmacy to contact your provider’s office with a refill request. Medication refills are processed only during regular business hours and may not be available until the next business day. Your provider may request additional information or to have a follow-up visit with you prior to refilling your medication.   *Please Note: Medication refills are assigned a new Rx number when refilled electronically. Your pharmacy may  indicate that no refills were authorized even though a new prescription for the same medication is available at the pharmacy. Please request the medicine by name with the pharmacy before contacting your provider for a refill.           [employer] Access Code: Activation code not generated  Current [employer] Status: Active

## 2017-04-27 NOTE — PROGRESS NOTES
Kaela Penny Ballesteros 36 y.o.  37w3d here today for obstetrical visit. Patient reports good  fetal movement. denies contractions, denies vaginal bleeding, denies loss of fluid.    Pregnancy is complicated by   Patient Active Problem List    Diagnosis Date Noted   • GDM, class A2 2017     Priority: High   • ASCUS of cervix with negative high risk HPV 2017   • Supervision of other normal pregnancy, antepartum 2017   • History of  delivery 2017   • Hx of preeclampsia, prior pregnancy, currently pregnant 2017   • H/O oophorectomy 2017   • History of cholecystectomy 2017   • History of  delivery 2017   • Sickle cell carrier 2017   • Calculus of gallbladder 2015   • Acute pancreatitis 2015       GBS done  Fasting blood sugars range >90  Postprandial blood sugars range <140    Patient has induction of labor scheduled for May 19. Patient is otherwise without complaints    Insulin regimen  NPH a.m. 0 , regular a.m.0  Regular with dinner 0  NPH at bedtime 20     Followup in 1 weeks   labor precautions are reviewed  Continue kick counts daily after 28 weeks  NST twice weekly after 32 weeks if on insulin

## 2017-04-27 NOTE — PROGRESS NOTES
Pt here for GDM f/u. Denies VB, LOF. Reports +FM and irregular UCs  Good phone#  [phone]  Pharmacy verified with pt.

## 2017-04-27 NOTE — MR AVS SNAPSHOT
Kaela Ballesteros   2017 1:30 PM   Routine Prenatal   MRN: [id number]    Department:  [facility]   Dept Phone:  [phone]    Description:  Female : 1980   Provider:  Eryn Mora M.D.           Allergies as of 2017     Allergen Noted Reactions    Nkda [No Known Drug Allergy] 2007         You were diagnosed with     GDM, class A2   [997959]         Vital Signs     Last Menstrual Period Smoking Status                2016 (Exact Date) Former Smoker          Basic Information     Date Of Birth Sex Race Ethnicity Preferred Language    1980 Female Black or  Non- English      Your appointments     May 01, 2017 10:30 AM   Fetal Non-Stress Test with IZA MULLER   [facility])    [address] [place] [address] [address]  [place] [id number] [phone]            May 01, 2017 11:15 AM   OB Follow Up with IZA GALICIA   [facility] [address]  [place] [address] [phone]              Problem List              ICD-10-CM Priority Class Noted - Resolved    Acute pancreatitis K85.90   2015 - Present    Calculus of gallbladder K80.20   3/6/2015 - Present    ASCUS of cervix with negative high risk HPV R87.610   3/9/2017 - Present    Supervision of other normal pregnancy, antepartum Z34.80   3/9/2017 - Present    History of  delivery Z98.891   3/9/2017 - Present    Hx of preeclampsia, prior pregnancy, currently pregnant O09.299   3/9/2017 - Present    H/O oophorectomy Z90.721   3/9/2017 - Present    History of cholecystectomy Z90.49   3/9/2017 - Present    History of  delivery Z87.51   3/9/2017 - Present    Sickle cell carrier D56.4, D57.1   3/9/2017 - Present    GDM, class A2 O24.414 High  3/22/2017 - Present      Health Maintenance        Date Due Completion Dates    IMM PNEUMOCOCCAL 19-64 (ADULT) HIGHEST RISK SERIES (1 of 3 - PCV13) 1997 ---    PAP SMEAR 2019    IMM DTaP/Tdap/Td  Vaccine (2 - Td) 3/9/2027 3/9/2017, 9/30/2015            Results     POCT Fetal Nonstress Test      Component    NST Indications    gdm    NST Baseline    130    NST Uterine Activity    no    NST Acoustic Stimulation    no    NST Assessment    reactive    NST Action Necessary    NST Other Data    NST Return    NST Read By                        Current Immunizations     Influenza Vaccine Quad Inj (Preserved) 9/30/2015  5:15 PM    MMR/Varicella Combined Vaccine  Incomplete    Tdap Vaccine 3/9/2017  3:42 PM,  Incomplete, 9/30/2015  5:14 PM    Tetanus Vaccine 4/1/1998      Below and/or attached are the medications your provider expects you to take. Review all of your home medications and newly ordered medications with your provider and/or pharmacist. Follow medication instructions as directed by your provider and/or pharmacist. Please keep your medication list with you and share with your provider. Update the information when medications are discontinued, doses are changed, or new medications (including over-the-counter products) are added; and carry medication information at all times in the event of emergency situations     Allergies:  NKDA - (reactions not documented)               Medications  Valid as of: April 27, 2017 -  4:54 PM    Generic Name Brand Name Tablet Size Instructions for use    Aspirin (Tablet Delayed Response) ECOTRIN 81 MG Take 81 mg by mouth every day.        Blood Glucose Monitoring Suppl (Misc) Blood Glucose Monitoring Suppl SUPPLIES 1 Strip by Percutaneous route 4 times a day. Please dispense TrueMetrix test strips.        Blood Glucose Monitoring Suppl (Misc) Blood Glucose Monitoring Suppl SUPPLIES 1 Each by Percutaneous route 4 times a day. Please dispense lancets for True Metrix meter        Blood Glucose Monitoring Suppl (Misc) Blood Glucose Monitoring Suppl  Please dispense test strips for True Metrix glucose meter. Patient to check blood sugars 4 times a day.        Insulin NPH Human  (Isophane) (Suspension) HUMULIN,NOVOLIN 100 UNIT/ML Inject 5 units SQ QHS        Insulin Syringes (Disposable) (Misc) Insulin Syringes (Disposable) U-100 1 ML Use to inject insulin QHS        Lancets (Misc) B-D ULTRA FINE LANCETS  Please dispense lancets for True Metrix glucose meter. Patient to check blood sugars 4 times a day.        Ondansetron (TABLET DISPERSIBLE) ZOFRAN ODT 4 MG Take 1 Tab by mouth every 8 hours as needed for Nausea/Vomiting (give PO if no IV route available).        Prenatal MV-Min-Fe Fum-FA-DHA   Take  by mouth.        .                 Medicines prescribed today were sent to:     [facility]ARMACY #[id number] - [place], [place] - [address] [facility] [facility] [address]    [address] [facility] [place] [address]    Phone: [phone] Fax: [phone]    Open 24 Hours?: No      Medication refill instructions:       If your prescription bottle indicates you have medication refills left, it is not necessary to call your provider’s office. Please contact your pharmacy and they will refill your medication.    If your prescription bottle indicates you do not have any refills left, you may request refills at any time through one of the following ways: The online [employer] system (except Urgent Care), by calling your provider’s office, or by asking your pharmacy to contact your provider’s office with a refill request. Medication refills are processed only during regular business hours and may not be available until the next business day. Your provider may request additional information or to have a follow-up visit with you prior to refilling your medication.   *Please Note: Medication refills are assigned a new Rx number when refilled electronically. Your pharmacy may indicate that no refills were authorized even though a new prescription for the same medication is available at the pharmacy. Please request the medicine by name with the pharmacy before contacting your provider for a refill.           [employer] Access Code:  Activation code not generated  Current MyChart Status: Active

## 2017-05-01 ENCOUNTER — ROUTINE PRENATAL (OUTPATIENT)
Dept: OBGYN | Facility: CLINIC | Age: 37
End: 2017-05-01
Payer: MEDICAID

## 2017-05-01 VITALS — WEIGHT: 243 LBS | DIASTOLIC BLOOD PRESSURE: 68 MMHG | SYSTOLIC BLOOD PRESSURE: 117 MMHG | BODY MASS INDEX: 33.42 KG/M2

## 2017-05-01 DIAGNOSIS — O24.419 GDM, CLASS A2: ICD-10-CM

## 2017-05-01 DIAGNOSIS — R05.9 COUGH: ICD-10-CM

## 2017-05-01 DIAGNOSIS — Z98.891 HISTORY OF CESAREAN DELIVERY: ICD-10-CM

## 2017-05-01 LAB
APPEARANCE UR: NORMAL
BILIRUB UR STRIP-MCNC: NORMAL MG/DL
COLOR UR AUTO: NORMAL
GLUCOSE UR STRIP.AUTO-MCNC: NEGATIVE MG/DL
KETONES UR STRIP.AUTO-MCNC: NEGATIVE MG/DL
LEUKOCYTE ESTERASE UR QL STRIP.AUTO: NORMAL
NITRITE UR QL STRIP.AUTO: NEGATIVE
NST ACOUSTIC STIMULATION: NORMAL
NST ACTION NECESSARY: NORMAL
NST ASSESSMENT: REACTIVE
NST BASELINE: 145
NST INDICATIONS: NORMAL
NST OTHER DATA: NORMAL
NST READ BY: NORMAL
NST RETURN: NORMAL
NST UTERINE ACTIVITY: NORMAL
PH UR STRIP.AUTO: 6 [PH] (ref 5–8)
PROT UR QL STRIP: NORMAL MG/DL
RBC UR QL AUTO: NORMAL
SP GR UR STRIP.AUTO: 1.01
UROBILINOGEN UR STRIP-MCNC: NORMAL MG/DL

## 2017-05-01 PROCEDURE — 59025 FETAL NON-STRESS TEST: CPT | Performed by: OBSTETRICS & GYNECOLOGY

## 2017-05-01 PROCEDURE — 81002 URINALYSIS NONAUTO W/O SCOPE: CPT | Performed by: OBSTETRICS & GYNECOLOGY

## 2017-05-01 PROCEDURE — 90040 PR PRENATAL FOLLOW UP: CPT | Performed by: OBSTETRICS & GYNECOLOGY

## 2017-05-01 RX ORDER — AZITHROMYCIN 250 MG/1
TABLET, FILM COATED ORAL
Qty: 6 TAB | Refills: 0 | Status: ON HOLD | OUTPATIENT
Start: 2017-05-01 | End: 2017-05-05

## 2017-05-01 NOTE — MR AVS SNAPSHOT
Kaela Ballesteros   2017 10:30 AM   Routine Prenatal   MRN: [id number]    Department[facility]  [facility]   Dept Phone:  [phone]    Description:  Female : 1980   Provider:  Angi Cabral M.D.           Allergies as of 2017     Allergen Noted Reactions    Nkda [No Known Drug Allergy] 2007         You were diagnosed with     GDM, class A2   [[id number]]         Vital Signs     Last Menstrual Period Smoking Status                2016 (Exact Date) Former Smoker          Basic Information     Date Of Birth Sex Race Ethnicity Preferred Language    1980 Female Black or  Non- English      Problem List              ICD-10-CM Priority Class Noted - Resolved    Acute pancreatitis K85.90   2015 - Present    Calculus of gallbladder K80.20   3/6/2015 - Present    ASCUS of cervix with negative high risk HPV R87.610   3/9/2017 - Present    Supervision of other normal pregnancy, antepartum Z34.80   3/9/2017 - Present    History of  delivery Z98.891   3/9/2017 - Present    Hx of preeclampsia, prior pregnancy, currently pregnant O09.299   3/9/2017 - Present    H/O oophorectomy Z90.721   3/9/2017 - Present    History of cholecystectomy Z90.49   3/9/2017 - Present    History of  delivery Z87.51   3/9/2017 - Present    Sickle cell carrier D56.4, D57.1   3/9/2017 - Present    GDM, class A2 O24.414 High  3/22/2017 - Present      Health Maintenance        Date Due Completion Dates    IMM PNEUMOCOCCAL 19-64 (ADULT) HIGHEST RISK SERIES (1 of 3 - PCV13) 1997 ---    PAP SMEAR 2019    IMM DTaP/Tdap/Td Vaccine (2 - Td) 3/9/2027 3/9/2017, 2015            Results     POCT NST      Component    NST Indications    A2GDM    NST Baseline    145    NST Uterine Activity    none    NST Acoustic Stimulation    none    NST Assessment    reactive    NST Action Necessary    NST Other Data    NST Return    NST Read By    MARTHA                        Current Immunizations     Influenza Vaccine Quad Inj (Preserved) 9/30/2015  5:15 PM    MMR/Varicella Combined Vaccine  Incomplete    Tdap Vaccine 3/9/2017  3:42 PM,  Incomplete, 9/30/2015  5:14 PM    Tetanus Vaccine 4/1/1998      Below and/or attached are the medications your provider expects you to take. Review all of your home medications and newly ordered medications with your provider and/or pharmacist. Follow medication instructions as directed by your provider and/or pharmacist. Please keep your medication list with you and share with your provider. Update the information when medications are discontinued, doses are changed, or new medications (including over-the-counter products) are added; and carry medication information at all times in the event of emergency situations     Allergies:  NKDA - (reactions not documented)               Medications  Valid as of: May 01, 2017 - 11:15 AM    Generic Name Brand Name Tablet Size Instructions for use    Aspirin (Tablet Delayed Response) ECOTRIN 81 MG Take 81 mg by mouth every day.        Blood Glucose Monitoring Suppl (Misc) Blood Glucose Monitoring Suppl SUPPLIES 1 Strip by Percutaneous route 4 times a day. Please dispense TrueMetrix test strips.        Blood Glucose Monitoring Suppl (Misc) Blood Glucose Monitoring Suppl SUPPLIES 1 Each by Percutaneous route 4 times a day. Please dispense lancets for True Metrix meter        Blood Glucose Monitoring Suppl (Misc) Blood Glucose Monitoring Suppl  Please dispense test strips for True Metrix glucose meter. Patient to check blood sugars 4 times a day.        Insulin NPH Human (Isophane) (Suspension) HUMULIN,NOVOLIN 100 UNIT/ML Inject 5 units SQ QHS        Insulin Syringes (Disposable) (Misc) Insulin Syringes (Disposable) U-100 1 ML Use to inject insulin QHS        Lancets (Misc) B-D ULTRA FINE LANCETS  Please dispense lancets for True Metrix glucose meter. Patient to check blood sugars 4 times a day.         Ondansetron (TABLET DISPERSIBLE) ZOFRAN ODT 4 MG Take 1 Tab by mouth every 8 hours as needed for Nausea/Vomiting (give PO if no IV route available).        Prenatal MV-Min-Fe Fum-FA-DHA   Take  by mouth.        .                 Medicines prescribed today were sent to:     [facility]/PHARMACY #[id number] - [place], [place] - [address] [facility] [address]    [address] [facility] [place] [address]    Phone: [phone] Fax: [phone]    Open 24 Hours?: No      Medication refill instructions:       If your prescription bottle indicates you have medication refills left, it is not necessary to call your provider’s office. Please contact your pharmacy and they will refill your medication.    If your prescription bottle indicates you do not have any refills left, you may request refills at any time through one of the following ways: The online [employer] system (except Urgent Care), by calling your provider’s office, or by asking your pharmacy to contact your provider’s office with a refill request. Medication refills are processed only during regular business hours and may not be available until the next business day. Your provider may request additional information or to have a follow-up visit with you prior to refilling your medication.   *Please Note: Medication refills are assigned a new Rx number when refilled electronically. Your pharmacy may indicate that no refills were authorized even though a new prescription for the same medication is available at the pharmacy. Please request the medicine by name with the pharmacy before contacting your provider for a refill.           [employer] Access Code: Activation code not generated  Current [employer] Status: Active
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
## 2017-05-01 NOTE — MR AVS SNAPSHOT
Kaela Ballesteros   2017 11:15 AM   Routine Prenatal   MRN: [id number]    [facility]   Dept Phone:  [phone]    Description:  Female : 1980   Provider:  Angi Cabral M.D.           Allergies as of 2017     Allergen Noted Reactions    Nkda [No Known Drug Allergy] 2007         You were diagnosed with     GDM, class A2   [295473]       History of  delivery   [584598]       Cough   [786.2.ICD-9-CM]         Vital Signs     Blood Pressure Weight Last Menstrual Period Smoking Status          117/68 mmHg 110.224 kg (243 lb) 2016 (Exact Date) Former Smoker        Basic Information     Date Of Birth Sex Race Ethnicity Preferred Language    1980 Female Black or  Non- English      Problem List              ICD-10-CM Priority Class Noted - Resolved    Acute pancreatitis K85.90   2015 - Present    Calculus of gallbladder K80.20   3/6/2015 - Present    ASCUS of cervix with negative high risk HPV R87.610   3/9/2017 - Present    Supervision of other normal pregnancy, antepartum Z34.80   3/9/2017 - Present    History of  delivery Z98.891   3/9/2017 - Present    Hx of preeclampsia, prior pregnancy, currently pregnant O09.299   3/9/2017 - Present    H/O oophorectomy Z90.721   3/9/2017 - Present    History of cholecystectomy Z90.49   3/9/2017 - Present    History of  delivery Z87.51   3/9/2017 - Present    Sickle cell carrier D56.4, D57.1   3/9/2017 - Present    GDM, class A2 O24.414 High  3/22/2017 - Present      Health Maintenance        Date Due Completion Dates    IMM PNEUMOCOCCAL 19-64 (ADULT) HIGHEST RISK SERIES (1 of 3 - PCV13) 1997 ---    PAP SMEAR 2019    IMM DTaP/Tdap/Td Vaccine (2 - Td) 3/9/2027 3/9/2017, 2015            Results     POCT Urinalysis      Component Value Standard Range & Units    POC Color  Negative    POC Appearance  Negative    POC Leukocyte Esterase  trace Negative    POC Nitrites negative Negative    POC Urobiligen  Negative (0.2) mg/dL    POC Protein 1+ Negative mg/dL    POC Urine PH 6.0 5.0 - 8.0    POC Blood large Negative    POC Specific Gravity 1.010 <1.005 - >1.030    POC Ketones negative Negative mg/dL    POC Biliruben  Negative mg/dL    POC Glucose negative Negative mg/dL                        Current Immunizations     Influenza Vaccine Quad Inj (Preserved) 9/30/2015  5:15 PM    MMR/Varicella Combined Vaccine  Incomplete    Tdap Vaccine 3/9/2017  3:42 PM,  Incomplete, 9/30/2015  5:14 PM    Tetanus Vaccine 4/1/1998      Below and/or attached are the medications your provider expects you to take. Review all of your home medications and newly ordered medications with your provider and/or pharmacist. Follow medication instructions as directed by your provider and/or pharmacist. Please keep your medication list with you and share with your provider. Update the information when medications are discontinued, doses are changed, or new medications (including over-the-counter products) are added; and carry medication information at all times in the event of emergency situations     Allergies:  NKDA - (reactions not documented)               Medications  Valid as of: May 01, 2017 - 11:15 AM    Generic Name Brand Name Tablet Size Instructions for use    Aspirin (Tablet Delayed Response) ECOTRIN 81 MG Take 81 mg by mouth every day.        Blood Glucose Monitoring Suppl (Misc) Blood Glucose Monitoring Suppl SUPPLIES 1 Strip by Percutaneous route 4 times a day. Please dispense TrueMetrix test strips.        Blood Glucose Monitoring Suppl (Misc) Blood Glucose Monitoring Suppl SUPPLIES 1 Each by Percutaneous route 4 times a day. Please dispense lancets for True Metrix meter        Blood Glucose Monitoring Suppl (Misc) Blood Glucose Monitoring Suppl  Please dispense test strips for True Metrix glucose meter. Patient to check blood sugars 4 times a day.        Insulin  NPH Human (Isophane) (Suspension) HUMULIN,NOVOLIN 100 UNIT/ML Inject 5 units SQ QHS        Insulin Syringes (Disposable) (Misc) Insulin Syringes (Disposable) U-100 1 ML Use to inject insulin QHS        Lancets (Misc) B-D ULTRA FINE LANCETS  Please dispense lancets for True Metrix glucose meter. Patient to check blood sugars 4 times a day.        Ondansetron (TABLET DISPERSIBLE) ZOFRAN ODT 4 MG Take 1 Tab by mouth every 8 hours as needed for Nausea/Vomiting (give PO if no IV route available).        Prenatal MV-Min-Fe Fum-FA-DHA   Take  by mouth.        .                 Medicines prescribed today were sent to:     [facility] #[id number] - [place], [place] - [address]    [address] [facility] [address] [place] [address]    Phone: [phone] Fax: [phone]    Open 24 Hours?: No      Medication refill instructions:       If your prescription bottle indicates you have medication refills left, it is not necessary to call your provider’s office. Please contact your pharmacy and they will refill your medication.    If your prescription bottle indicates you do not have any refills left, you may request refills at any time through one of the following ways: The online EverPresent system (except Urgent Care), by calling your provider’s office, or by asking your pharmacy to contact your provider’s office with a refill request. Medication refills are processed only during regular business hours and may not be available until the next business day. Your provider may request additional information or to have a follow-up visit with you prior to refilling your medication.   *Please Note: Medication refills are assigned a new Rx number when refilled electronically. Your pharmacy may indicate that no refills were authorized even though a new prescription for the same medication is available at the pharmacy. Please request the medicine by name with the pharmacy before contacting your provider for a refill.           EverPresent  Access Code: Activation code not generated  Current MyChart Status: Active

## 2017-05-01 NOTE — PROGRESS NOTES
Pt. Here for Pre Op, GDM and NST visit today , with Dr. Arzola, c/s scheduled for 5/9/17 @ 0930am.   Pt. Reports Good FM.  Nhan # [phone]  Pt states having cramping.   Pharmacy verified.

## 2017-05-02 ENCOUNTER — HOSPITAL ENCOUNTER (INPATIENT)
Facility: MEDICAL CENTER | Age: 37
LOS: 3 days | End: 2017-05-05
Attending: OBSTETRICS & GYNECOLOGY | Admitting: OBSTETRICS & GYNECOLOGY
Payer: MEDICAID

## 2017-05-02 PROBLEM — O09.529 AMA (ADVANCED MATERNAL AGE) MULTIGRAVIDA 35+: Status: ACTIVE | Noted: 2017-05-02

## 2017-05-02 PROBLEM — Z98.891 H/O CESAREAN SECTION: Status: ACTIVE | Noted: 2017-05-02

## 2017-05-02 PROBLEM — Z30.2 ENCOUNTER FOR STERILIZATION: Status: ACTIVE | Noted: 2017-05-02

## 2017-05-02 LAB
BASOPHILS # BLD AUTO: 0.5 % (ref 0–1.8)
BASOPHILS # BLD: 0.03 K/UL (ref 0–0.12)
EOSINOPHIL # BLD AUTO: 0.05 K/UL (ref 0–0.51)
EOSINOPHIL NFR BLD: 0.8 % (ref 0–6.9)
ERYTHROCYTE [DISTWIDTH] IN BLOOD BY AUTOMATED COUNT: 38.5 FL (ref 35.9–50)
HCT VFR BLD AUTO: 34.4 % (ref 37–47)
HGB BLD-MCNC: 11.3 G/DL (ref 12–16)
HOLDING TUBE BB 8507: NORMAL
IMM GRANULOCYTES # BLD AUTO: 0.05 K/UL (ref 0–0.11)
IMM GRANULOCYTES NFR BLD AUTO: 0.8 % (ref 0–0.9)
LYMPHOCYTES # BLD AUTO: 1.09 K/UL (ref 1–4.8)
LYMPHOCYTES NFR BLD: 17.2 % (ref 22–41)
MCH RBC QN AUTO: 28.5 PG (ref 27–33)
MCHC RBC AUTO-ENTMCNC: 32.8 G/DL (ref 33.6–35)
MCV RBC AUTO: 86.6 FL (ref 81.4–97.8)
MONOCYTES # BLD AUTO: 0.44 K/UL (ref 0–0.85)
MONOCYTES NFR BLD AUTO: 7 % (ref 0–13.4)
NEUTROPHILS # BLD AUTO: 4.66 K/UL (ref 2–7.15)
NEUTROPHILS NFR BLD: 73.7 % (ref 44–72)
NRBC # BLD AUTO: 0 K/UL
NRBC BLD AUTO-RTO: 0 /100 WBC
PLATELET # BLD AUTO: 181 K/UL (ref 164–446)
PMV BLD AUTO: 12.1 FL (ref 9–12.9)
RBC # BLD AUTO: 3.97 M/UL (ref 4.2–5.4)
WBC # BLD AUTO: 6.3 K/UL (ref 4.8–10.8)

## 2017-05-02 PROCEDURE — 700111 HCHG RX REV CODE 636 W/ 250 OVERRIDE (IP)

## 2017-05-02 PROCEDURE — 770002 HCHG ROOM/CARE - OB PRIVATE (112)

## 2017-05-02 PROCEDURE — A9270 NON-COVERED ITEM OR SERVICE: HCPCS | Performed by: ANESTHESIOLOGY

## 2017-05-02 PROCEDURE — 305385 HCHG SURGICAL SERVICES 1/4 HOUR

## 2017-05-02 PROCEDURE — 304964 HCHG RECOVERY ROOM TIME 1HR

## 2017-05-02 PROCEDURE — 0UL70CZ OCCLUSION OF BILATERAL FALLOPIAN TUBES WITH EXTRALUMINAL DEVICE, OPEN APPROACH: ICD-10-PCS | Performed by: OBSTETRICS & GYNECOLOGY

## 2017-05-02 PROCEDURE — 700111 HCHG RX REV CODE 636 W/ 250 OVERRIDE (IP): Performed by: ANESTHESIOLOGY

## 2017-05-02 PROCEDURE — 59514 CESAREAN DELIVERY ONLY: CPT

## 2017-05-02 PROCEDURE — 85025 COMPLETE CBC W/AUTO DIFF WBC: CPT

## 2017-05-02 PROCEDURE — 700102 HCHG RX REV CODE 250 W/ 637 OVERRIDE(OP): Performed by: ANESTHESIOLOGY

## 2017-05-02 PROCEDURE — 36415 COLL VENOUS BLD VENIPUNCTURE: CPT

## 2017-05-02 PROCEDURE — 306828 HCHG ANES-TIME GENERAL: Performed by: OBSTETRICS & GYNECOLOGY

## 2017-05-02 PROCEDURE — 503172 HCHG FILSHIE CLIPS (PAIR)

## 2017-05-02 RX ORDER — SODIUM CHLORIDE, SODIUM LACTATE, POTASSIUM CHLORIDE, CALCIUM CHLORIDE 600; 310; 30; 20 MG/100ML; MG/100ML; MG/100ML; MG/100ML
1500 INJECTION, SOLUTION INTRAVENOUS ONCE
Status: COMPLETED | OUTPATIENT
Start: 2017-05-02 | End: 2017-05-03

## 2017-05-02 RX ORDER — MISOPROSTOL 200 UG/1
800 TABLET ORAL
Status: DISCONTINUED | OUTPATIENT
Start: 2017-05-02 | End: 2017-05-03 | Stop reason: HOSPADM

## 2017-05-02 RX ORDER — SODIUM CHLORIDE, SODIUM LACTATE, POTASSIUM CHLORIDE, CALCIUM CHLORIDE 600; 310; 30; 20 MG/100ML; MG/100ML; MG/100ML; MG/100ML
INJECTION, SOLUTION INTRAVENOUS CONTINUOUS
Status: DISCONTINUED | OUTPATIENT
Start: 2017-05-02 | End: 2017-05-05 | Stop reason: HOSPADM

## 2017-05-02 RX ORDER — SODIUM CHLORIDE, SODIUM LACTATE, POTASSIUM CHLORIDE, CALCIUM CHLORIDE 600; 310; 30; 20 MG/100ML; MG/100ML; MG/100ML; MG/100ML
INJECTION, SOLUTION INTRAVENOUS CONTINUOUS
Status: DISCONTINUED | OUTPATIENT
Start: 2017-05-02 | End: 2017-05-03 | Stop reason: HOSPADM

## 2017-05-02 RX ORDER — METHYLERGONOVINE MALEATE 0.2 MG/ML
0.2 INJECTION INTRAVENOUS
Status: DISCONTINUED | OUTPATIENT
Start: 2017-05-02 | End: 2017-05-03 | Stop reason: HOSPADM

## 2017-05-02 RX ORDER — CEFAZOLIN SODIUM 1 G/3ML
1 INJECTION, POWDER, FOR SOLUTION INTRAMUSCULAR; INTRAVENOUS ONCE
Status: DISCONTINUED | OUTPATIENT
Start: 2017-05-02 | End: 2017-05-03 | Stop reason: HOSPADM

## 2017-05-02 RX ORDER — METOCLOPRAMIDE HYDROCHLORIDE 5 MG/ML
10 INJECTION INTRAMUSCULAR; INTRAVENOUS ONCE
Status: COMPLETED | OUTPATIENT
Start: 2017-05-02 | End: 2017-05-02

## 2017-05-02 RX ADMIN — METOCLOPRAMIDE 10 MG: 5 INJECTION, SOLUTION INTRAMUSCULAR; INTRAVENOUS at 21:44

## 2017-05-02 RX ADMIN — FAMOTIDINE 20 MG: 10 INJECTION, SOLUTION INTRAVENOUS at 21:44

## 2017-05-02 RX ADMIN — SODIUM CHLORIDE, POTASSIUM CHLORIDE, SODIUM LACTATE AND CALCIUM CHLORIDE 1500 ML: 600; 310; 30; 20 INJECTION, SOLUTION INTRAVENOUS at 21:00

## 2017-05-02 RX ADMIN — Medication 2000 ML/HR: at 22:30

## 2017-05-02 RX ADMIN — SODIUM CITRATE AND CITRIC ACID MONOHYDRATE 30 ML: 500; 334 SOLUTION ORAL at 21:44

## 2017-05-02 ASSESSMENT — PAIN SCALES - GENERAL
PAINLEVEL_OUTOF10: 0

## 2017-05-02 ASSESSMENT — LIFESTYLE VARIABLES
ALCOHOL_USE: NO
EVER_SMOKED: YES
DO YOU DRINK ALCOHOL: NO

## 2017-05-02 NOTE — IP AVS SNAPSHOT
Home Care Instructions                                                                                                                Kaela Ballesteros   MRN: [id number]    Department:  POST PARTUM 31   2017           Your appointments     May 10, 2017 11:30 AM   Post Partum  Check with IZA GALICIA   [facility] [address]  [facility] [address]   [phone]            2017  1:00 PM   Post Partum with Geovanna Darnell C.N.M.   [facility] [address]  [facility] [id number] [phone]              Follow-up Information     1. Follow up with Edvin Edouard M.D. In 1 week.    Specialty:  OB/Gyn    Why:  incision check     Contact information    [address] [place] [id number] [phone]          2. Follow up with Edvin Edouard M.D. In 5 weeks.    Specialty:  OB/Gyn    Why:  OB check, hospital follow-up     Contact information    [address] [place] [id number]  [phone]         I assume responsibility for securing a follow-up  Screening blood test on my baby within the specified date range.    -                  Discharge Instructions       POSTPARTUM DISCHARGE INSTRUCTIONS FOR MOM    YOB: 1980   Age: 36 y.o.               Admit Date: 2017     Discharge Date: 2017  Attending Doctor:  Kirti Ehcavarria M.D.                  Allergies:  Nkda    Discharged to home by car. Discharged via wheelchair, hospital escort: Yes.  Special equipment needed: Not Applicable  Belongings with: Personal  Be sure to schedule a follow-up appointment with your primary care doctor or any specialists as instructed.     Discharge Plan:   Influenza Vaccine Indication: Patient Refuses    REASONS TO CALL YOUR OBSTETRICIAN:  1.   Persistent fever or shaking chills (Temperature higher than 100.4)  2.   Heavy bleeding (soaking more than 1 pad per hour); Passing clots  3.   Foul odor from vagina  4.   Mastitis (Breast  "infection; breast pain, chills, fever, redness)  5.   Urinary pain, burning or frequency  6.   Episiotomy infection  7.   Abdominal incision infection  8.   Severe depression longer than 24 hours    HAND WASHING  · Prior to handling the baby.  · Before breastfeeding or bottle feeding baby.  · After using the bathroom or changing the baby's diaper.    WOUND CARE  Ask your physician for additional care instructions.  In general:    ·  Incision:      · Keep clean and dry.    · Do NOT lift anything heavier than your baby for up to 6 weeks.    · There should not be any opening or pus.      VAGINAL CARE  · Nothing inside vagina for 6 weeks: no sexual intercourse, tampons or douching.  · Bleeding may continue for 2-4 weeks.  Amount may vary.    · Call your physician for heavy bleeding which means soaking more than 1 pad per hour    BIRTH CONTROL  · It is possible to become pregnant at any time after delivery and while breastfeeding.  · Plan to discuss a method of birth control with your physician at your follow up visit. visit.    DIET AND ELIMINATION  · Eating more fiber (bran cereal, fruits, and vegetables) and drinking plenty of fluids will help to avoid constipation.  · Urinary frequency after childbirth is normal.    POSTPARTUM BLUES  During the first few days after birth, you may experience a sense of the \"blues\" which may include impatience, irritability or even crying.  These feeling come and go quickly.  However, as many as 1 in 10 women experience emotional symptoms known as postpartum depression.    Postpartum depression:  May start as early as the second or third day after delivery or take several weeks or months to develop.  Symptoms of \"blues\" are present, but are more intense:  Crying spells; loss of appetite; feelings of hopelessness or loss of control; fear of touching the baby; over concern or no concern at all about the baby; little or no concern about your own appearance/caring for yourself; " "and/or inability to sleep or excessive sleeping.  Contact your physician if you are experiencing any of these symptoms.    Crisis Hotline:  · [place] Crisis Hotline:  3-661-SMMAUVL  Or 1-882.585.9384  · [place] Crisis Hotline:  1-892.693.2797  Or 693-924-6971    PREVENTING SHAKEN BABY:  If you are angry or stressed, PUT THE BABY IN THE CRIB, step away, take some deep breaths, and wait until you are calm to care for the baby.  DO NOT SHAKE THE BABY.  You are not alone, call a supporter for help.    · Crisis Call Center 24/7 crisis line 359-416-2490 or 1-657.212.2000  · You can also text them, text \"ANSWER\" to 712887    QUIT SMOKING/TOBACCO USE:  I understand the use of any tobacco products increases my chance of suffering from future heart disease and could cause other illnesses which may shorten my life. Quitting the use of tobacco products is the single most important thing I can do to improve my health. For further information on smoking / tobacco cessation call a Toll Free Quit Line at 1-277.713.7810 (*National Cancer [employer]) or 1-696.987.8077 (American Lung Association) or you can access the web based program at www.lungusa.org.    · [place] Tobacco Users Help Line:  (624) 844-9488       Toll Free: 1-411.979.5849  · Quit Tobacco Program [facility] (357)602-9888    DEPRESSION / SUICIDE RISK:  As you are discharged from this [facility], it is important to learn how to keep safe from harming yourself.    Recognize the warning signs:  · Abrupt changes in personality, positive or negative- including increase in energy   · Giving away possessions  · Change in eating patterns- significant weight changes-  positive or negative  · Change in sleeping patterns- unable to sleep or sleeping all the time   · Unwillingness or inability to communicate  · Depression  · Unusual sadness, discouragement and loneliness  · Talk of wanting to die  · Neglect of personal " appearance   · Rebelliousness- reckless behavior  · Withdrawal from people/activities they love  · Confusion- inability to concentrate     If you or a loved one observes any of these behaviors or has concerns about self-harm, here's what you can do:  · Talk about it- your feelings and reasons for harming yourself  · Remove any means that you might use to hurt yourself (examples: pills, rope, extension cords, firearm)  · Get professional help from the community (Mental Health, Substance Abuse, psychological counseling)  · Do not be alone:Call your Safe Contact- someone whom you trust who will be there for you.  · Call your local CRISIS HOTLINE 461-2275 or 918-310-2794  · Call your local Children's Mobile Crisis Response Team [place] (818) 289-8675 or www.[employer]  · Call the toll free National Suicide Prevention Hotlines   · National Suicide Prevention Lifeline 160-595-LXZE (6625)  · [employer] Hope Line Network 800-SUICIDE (013-1034)    DISCHARGE SURVEY:  Thank you for choosing [facility].  We hope we provided you with very good care.  You may be receiving a survey in the mail.  Please fill it out.  Your opinion is valuable to us.    ADDITIONAL EDUCATIONAL MATERIALS GIVEN TO PATIENT:        My signature on this form indicates that:  1.  I have reviewed and understand the above information  2.  My questions regarding this information have been answered to my satisfaction.  3.  I have formulated a plan with my discharge nurse to obtain my prescribed medication for home.         Discharge Medication Instructions:    Below are the medications your physician expects you to take upon discharge:    Review all your home medications and newly ordered medications with your doctor and/or pharmacist. Follow medication instructions as directed by your doctor and/or pharmacist.    Please keep your medication list with you and share with your physician.               Medication List      START taking these  medications        Instructions    Morning Afternoon Evening Bedtime     MG Caps   Last time this was given:  100 mg on 5/4/2017  1:25 PM        Take 100 mg by mouth 2 times a day as needed for Constipation.   Dose:  100 mg                        ibuprofen 800 MG Tabs   Last time this was given:  800 mg on 5/5/2017  5:50 AM   Commonly known as:  MOTRIN        Take 1 Tab by mouth every 8 hours as needed (For cramping after delivery; do not give if patient is receiving ketorolac (Toradol)).   Dose:  800 mg                        oxycodone-acetaminophen  MG Tabs   Last time this was given:  1 Tab on 5/5/2017  5:50 AM   Commonly known as:  PERCOCET-10        Take 1 Tab by mouth every four hours as needed (1/2 tab for moderate pin (pain scale 4-6/10) and 1 tab for Severe Pain (Pain Scale 7-10) after delivery).   Dose:  1 Tab                        simethicone 80 MG Chew   Last time this was given:  80 mg on 5/5/2017  7:57 AM   Commonly known as:  MYLICON        Take 1 Tab by mouth 4 times a day.   Dose:  80 mg                          CHANGE how you take these medications        Instructions    Morning Afternoon Evening Bedtime    PRENATAL 1 30-0.975-200 MG Caps   What changed:    - medication strength  - how much to take  - when to take this        Take 1 Capsule by mouth every day.   Dose:  1 Capsule                          STOP taking these medications     aspirin EC 81 MG Tbec   Commonly known as:  ECOTRIN               azithromycin 250 MG Tabs   Commonly known as:  ZITHROMAX               B-D ULTRA FINE LANCETS Misc               Blood Glucose Monitoring Suppl Misc               Blood Glucose Monitoring Suppl SUPPLIES Misc               insulin  UNIT/ML Susp   Commonly known as:  HUMULIN,NOVOLIN               Insulin Syringes (Disposable) U-100 1 ML Misc               ondansetron 4 MG Tbdp   Commonly known as:  ZOFRAN ODT                    Where to Get Your Medications      Information about  where to get these medications is not yet available     ! Ask your nurse or doctor about these medications    -  MG Caps  - ibuprofen 800 MG Tabs  - oxycodone-acetaminophen  MG Tabs  - PRENATAL 1 30-0.975-200 MG Caps  - simethicone 80 MG Chew            Crisis Hotline:     [place] Crisis Hotline:  0-071-DJMTZXJ or 1-566.928.9783    [place] Crisis Hotline:    1-994.411.4126 or 469-320-4338        Disclaimer           _____________________________________                     __________       ________       Patient/Mother Signature or Legal                          Date                   Time

## 2017-05-02 NOTE — IP AVS SNAPSHOT
5/5/2017    Kaela Ballesteros  [address] [place] [address]    Dear Kaela:    [facility] wants to ensure your discharge home is safe and you or your loved ones have had all of your questions answered regarding your care after you leave the hospital.    Below is a list of resources and contact information should you have any questions regarding your hospital stay, follow-up instructions, or active medical symptoms.    Questions or Concerns Regarding… Contact   Medical Questions Related to Your Discharge  (7 days a week, 8am-5pm) Contact a Nurse Care Coordinator   [phone]   Medical Questions Not Related to Your Discharge  (24 hours a day / 7 days a week)  Contact the Nurse Health Line   [phone]    Medications or Discharge Instructions Refer to your discharge packet   or contact your [facility] Primary Care Provider   [phone]   Follow-up Appointment(s) Schedule your appointment via [employer]   or contact Scheduling [phone]   Billing Review your statement via [employer]  or contact Billing [phone]   Medical Records Review your records via [employer]   or contact Medical Records [phone]     You may receive a telephone call within two days of discharge. This call is to make certain you understand your discharge instructions and have the opportunity to have any questions answered. You can also easily access your medical information, test results and upcoming appointments via the [employer] free online health management tool. You can learn more and sign up at [employer]. For assistance setting up your [employer] account, please call [phone].    Once again, we want to ensure your discharge home is safe and that you have a clear understanding of any next steps in your care. If you have any questions or concerns, please do not hesitate to contact us, we are here for you. Thank you for choosing [facility] for your healthcare needs.    Sincerely,    Your [facility] Healthcare Team

## 2017-05-02 NOTE — IP AVS SNAPSHOT
eEye Access Code: Activation code not generated  Current eEye Status: Active    [employer]  A secure, online tool to manage your health information     [employer]’s eEye® is a secure, online tool that connects you to your personalized health information from the privacy of your home -- day or night - making it very easy for you to manage your healthcare. Once the activation process is completed, you can even access your medical information using the eEye cyrus, which is available for free in the Apple Cyrus store or Google Play store.     eEye provides the following levels of access (as shown below):   My Chart Features   [facility] Primary Care Doctor [facility]  Specialists [facility]  Urgent  Care Non-[facility]  Primary Care  Doctor   Email your healthcare team securely and privately 24/7 X X X X   Manage appointments: schedule your next appointment; view details of past/upcoming appointments X      Request prescription refills. X      View recent personal medical records, including lab and immunizations X X X X   View health record, including health history, allergies, medications X X X X   Read reports about your outpatient visits, procedures, consult and ER notes X X X X   See your discharge summary, which is a recap of your hospital and/or ER visit that includes your diagnosis, lab results, and care plan. X X       How to register for eEye:  1. Go to  [email or web address].  2. Click on the Sign Up Now box, which takes you to the New Member Sign Up page. You will need to provide the following information:  a. Enter your eEye Access Code exactly as it appears at the top of this page. (You will not need to use this code after you’ve completed the sign-up process. If you do not sign up before the expiration date, you must request a new code.)   b. Enter your date of birth.   c. Enter your home email address.   d. Click Submit, and follow the next screen’s instructions.  3. Create a eEye ID. This will  be your [employer] login ID and cannot be changed, so think of one that is secure and easy to remember.  4. Create a [employer] password. You can change your password at any time.  5. Enter your Password Reset Question and Answer. This can be used at a later time if you forget your password.   6. Enter your e-mail address. This allows you to receive e-mail notifications when new information is available in [employer].  7. Click Sign Up. You can now view your health information.    For assistance activating your [employer] account, call [phone]

## 2017-05-03 LAB
GLUCOSE BLD-MCNC: 135 MG/DL (ref 65–99)
GLUCOSE BLD-MCNC: 92 MG/DL (ref 65–99)

## 2017-05-03 PROCEDURE — 82962 GLUCOSE BLOOD TEST: CPT | Mod: 91

## 2017-05-03 PROCEDURE — 700111 HCHG RX REV CODE 636 W/ 250 OVERRIDE (IP): Performed by: OBSTETRICS & GYNECOLOGY

## 2017-05-03 PROCEDURE — 700112 HCHG RX REV CODE 229: Performed by: OBSTETRICS & GYNECOLOGY

## 2017-05-03 PROCEDURE — 700102 HCHG RX REV CODE 250 W/ 637 OVERRIDE(OP): Performed by: OBSTETRICS & GYNECOLOGY

## 2017-05-03 PROCEDURE — A9270 NON-COVERED ITEM OR SERVICE: HCPCS | Performed by: OBSTETRICS & GYNECOLOGY

## 2017-05-03 PROCEDURE — 700111 HCHG RX REV CODE 636 W/ 250 OVERRIDE (IP): Performed by: FAMILY MEDICINE

## 2017-05-03 PROCEDURE — 770002 HCHG ROOM/CARE - OB PRIVATE (112)

## 2017-05-03 PROCEDURE — 700111 HCHG RX REV CODE 636 W/ 250 OVERRIDE (IP): Performed by: NURSE PRACTITIONER

## 2017-05-03 RX ORDER — MISOPROSTOL 200 UG/1
600 TABLET ORAL
Status: DISCONTINUED | OUTPATIENT
Start: 2017-05-03 | End: 2017-05-05 | Stop reason: HOSPADM

## 2017-05-03 RX ORDER — BISACODYL 10 MG
10 SUPPOSITORY, RECTAL RECTAL PRN
Status: DISCONTINUED | OUTPATIENT
Start: 2017-05-03 | End: 2017-05-05 | Stop reason: HOSPADM

## 2017-05-03 RX ORDER — MORPHINE SULFATE 4 MG/ML
4 INJECTION, SOLUTION INTRAMUSCULAR; INTRAVENOUS
Status: DISCONTINUED | OUTPATIENT
Start: 2017-05-03 | End: 2017-05-05 | Stop reason: HOSPADM

## 2017-05-03 RX ORDER — DIPHENHYDRAMINE HYDROCHLORIDE 50 MG/ML
25 INJECTION INTRAMUSCULAR; INTRAVENOUS EVERY 6 HOURS PRN
Status: DISCONTINUED | OUTPATIENT
Start: 2017-05-03 | End: 2017-05-05 | Stop reason: HOSPADM

## 2017-05-03 RX ORDER — KETOROLAC TROMETHAMINE 30 MG/ML
30 INJECTION, SOLUTION INTRAMUSCULAR; INTRAVENOUS ONCE
Status: COMPLETED | OUTPATIENT
Start: 2017-05-03 | End: 2017-05-03

## 2017-05-03 RX ORDER — ONDANSETRON 4 MG/1
4 TABLET, ORALLY DISINTEGRATING ORAL EVERY 6 HOURS PRN
Status: DISCONTINUED | OUTPATIENT
Start: 2017-05-03 | End: 2017-05-05 | Stop reason: HOSPADM

## 2017-05-03 RX ORDER — ONDANSETRON 2 MG/ML
4 INJECTION INTRAMUSCULAR; INTRAVENOUS EVERY 6 HOURS PRN
Status: DISCONTINUED | OUTPATIENT
Start: 2017-05-03 | End: 2017-05-05 | Stop reason: HOSPADM

## 2017-05-03 RX ORDER — SODIUM CHLORIDE, SODIUM LACTATE, POTASSIUM CHLORIDE, CALCIUM CHLORIDE 600; 310; 30; 20 MG/100ML; MG/100ML; MG/100ML; MG/100ML
INJECTION, SOLUTION INTRAVENOUS PRN
Status: DISCONTINUED | OUTPATIENT
Start: 2017-05-03 | End: 2017-05-05 | Stop reason: HOSPADM

## 2017-05-03 RX ORDER — DOCUSATE SODIUM 100 MG/1
100 CAPSULE, LIQUID FILLED ORAL 2 TIMES DAILY PRN
Status: DISCONTINUED | OUTPATIENT
Start: 2017-05-03 | End: 2017-05-05 | Stop reason: HOSPADM

## 2017-05-03 RX ORDER — SIMETHICONE 80 MG
80 TABLET,CHEWABLE ORAL 4 TIMES DAILY PRN
Status: DISCONTINUED | OUTPATIENT
Start: 2017-05-03 | End: 2017-05-04

## 2017-05-03 RX ORDER — IBUPROFEN 800 MG/1
800 TABLET ORAL EVERY 8 HOURS PRN
Status: DISCONTINUED | OUTPATIENT
Start: 2017-05-03 | End: 2017-05-03

## 2017-05-03 RX ORDER — KETOROLAC TROMETHAMINE 30 MG/ML
30 INJECTION, SOLUTION INTRAMUSCULAR; INTRAVENOUS EVERY 6 HOURS PRN
Status: DISCONTINUED | OUTPATIENT
Start: 2017-05-03 | End: 2017-05-03

## 2017-05-03 RX ORDER — IBUPROFEN 800 MG/1
800 TABLET ORAL EVERY 8 HOURS PRN
Status: DISCONTINUED | OUTPATIENT
Start: 2017-05-04 | End: 2017-05-05 | Stop reason: HOSPADM

## 2017-05-03 RX ORDER — MAG HYDROX/ALUMINUM HYD/SIMETH 400-400-40
1 SUSPENSION, ORAL (FINAL DOSE FORM) ORAL
Status: DISCONTINUED | OUTPATIENT
Start: 2017-05-03 | End: 2017-05-05 | Stop reason: HOSPADM

## 2017-05-03 RX ORDER — METHYLERGONOVINE MALEATE 0.2 MG/ML
0.2 INJECTION INTRAVENOUS
Status: DISCONTINUED | OUTPATIENT
Start: 2017-05-03 | End: 2017-05-05 | Stop reason: HOSPADM

## 2017-05-03 RX ORDER — DIPHENHYDRAMINE HCL 25 MG
25 TABLET ORAL EVERY 6 HOURS PRN
Status: DISCONTINUED | OUTPATIENT
Start: 2017-05-03 | End: 2017-05-05 | Stop reason: HOSPADM

## 2017-05-03 RX ORDER — OXYCODONE HYDROCHLORIDE AND ACETAMINOPHEN 5; 325 MG/1; MG/1
1 TABLET ORAL EVERY 4 HOURS PRN
Status: DISCONTINUED | OUTPATIENT
Start: 2017-05-03 | End: 2017-05-05 | Stop reason: HOSPADM

## 2017-05-03 RX ORDER — OXYCODONE AND ACETAMINOPHEN 10; 325 MG/1; MG/1
1 TABLET ORAL EVERY 4 HOURS PRN
Status: DISCONTINUED | OUTPATIENT
Start: 2017-05-03 | End: 2017-05-05 | Stop reason: HOSPADM

## 2017-05-03 RX ORDER — KETOROLAC TROMETHAMINE 30 MG/ML
30 INJECTION, SOLUTION INTRAMUSCULAR; INTRAVENOUS EVERY 6 HOURS
Status: COMPLETED | OUTPATIENT
Start: 2017-05-03 | End: 2017-05-04

## 2017-05-03 RX ADMIN — KETOROLAC TROMETHAMINE 30 MG: 30 INJECTION, SOLUTION INTRAMUSCULAR at 17:12

## 2017-05-03 RX ADMIN — OXYCODONE HYDROCHLORIDE AND ACETAMINOPHEN 1 TABLET: 10; 325 TABLET ORAL at 10:52

## 2017-05-03 RX ADMIN — SIMETHICONE CHEW TAB 80 MG 80 MG: 80 TABLET ORAL at 14:00

## 2017-05-03 RX ADMIN — OXYCODONE HYDROCHLORIDE AND ACETAMINOPHEN 1 TABLET: 10; 325 TABLET ORAL at 06:16

## 2017-05-03 RX ADMIN — KETOROLAC TROMETHAMINE 30 MG: 30 INJECTION, SOLUTION INTRAMUSCULAR at 23:09

## 2017-05-03 RX ADMIN — KETOROLAC TROMETHAMINE 30 MG: 30 INJECTION, SOLUTION INTRAMUSCULAR at 10:51

## 2017-05-03 RX ADMIN — SIMETHICONE CHEW TAB 80 MG 80 MG: 80 TABLET ORAL at 04:45

## 2017-05-03 RX ADMIN — MORPHINE SULFATE 4 MG: 4 INJECTION INTRAVENOUS at 02:52

## 2017-05-03 RX ADMIN — OXYCODONE HYDROCHLORIDE AND ACETAMINOPHEN 1 TABLET: 10; 325 TABLET ORAL at 01:17

## 2017-05-03 RX ADMIN — KETOROLAC TROMETHAMINE 30 MG: 30 INJECTION, SOLUTION INTRAMUSCULAR at 04:45

## 2017-05-03 RX ADMIN — Medication 125 ML/HR: at 01:29

## 2017-05-03 RX ADMIN — DIPHENHYDRAMINE HCL 25 MG: 25 TABLET ORAL at 17:12

## 2017-05-03 RX ADMIN — OXYCODONE HYDROCHLORIDE AND ACETAMINOPHEN 1 TABLET: 10; 325 TABLET ORAL at 19:23

## 2017-05-03 RX ADMIN — OXYCODONE HYDROCHLORIDE AND ACETAMINOPHEN 1 TABLET: 10; 325 TABLET ORAL at 23:39

## 2017-05-03 RX ADMIN — DOCUSATE SODIUM 100 MG: 100 CAPSULE ORAL at 14:00

## 2017-05-03 RX ADMIN — SIMETHICONE CHEW TAB 80 MG 80 MG: 80 TABLET ORAL at 23:14

## 2017-05-03 RX ADMIN — MORPHINE SULFATE 4 MG: 4 INJECTION INTRAVENOUS at 03:27

## 2017-05-03 RX ADMIN — MORPHINE SULFATE 4 MG: 4 INJECTION INTRAVENOUS at 09:25

## 2017-05-03 RX ADMIN — OXYCODONE HYDROCHLORIDE AND ACETAMINOPHEN 1 TABLET: 10; 325 TABLET ORAL at 15:05

## 2017-05-03 ASSESSMENT — PAIN SCALES - GENERAL
PAINLEVEL_OUTOF10: 8
PAINLEVEL_OUTOF10: 7
PAINLEVEL_OUTOF10: 7
PAINLEVEL_OUTOF10: 5
PAINLEVEL_OUTOF10: 7
PAINLEVEL_OUTOF10: 8
PAINLEVEL_OUTOF10: 10
PAINLEVEL_OUTOF10: 6
PAINLEVEL_OUTOF10: 7
PAINLEVEL_OUTOF10: 10
PAINLEVEL_OUTOF10: 3
PAINLEVEL_OUTOF10: 0

## 2017-05-03 NOTE — PROGRESS NOTES
0425- called midwife Gracia Copeland to report pt's uncontrolled pain. Min ordered Toradol 30 mg IV 1 time dose.

## 2017-05-03 NOTE — CONSULTS
Mother reports baby latching well, states she knows to readjust baby when latch is shallow, educated on importance of deep latch/damage from shallow latch, encouraged to call for assistance as needed, educated on outpatient assistance available at [facility] and encouraged to seek assistance as needed.

## 2017-05-03 NOTE — OR SURGEON
Immediate Post-Operative Note      PreOp Diagnosis:   1. TIUP @ 38w1d  2. Previous C/S  3. A2GDM  4. AMA  5. Desires BTL  6. Late prenatal care    PostOp Diagnosis:   1. TIUP @ 38w1d  2. Previous C/S  3. A2GDM  4. AMA  5. Desires BTL  6. Late prenatal care    Procedure(s):  PRIMARY C SECTION WITH TUBAL LIGATION - Wound Class: Clean Contaminated    Surgeon(s):  Kirti Echavarria M.D.    Assistant:  CASI Copeland CNM    Anesthesiologist/Type of Anesthesia:  Gansert/Spinal    Specimen: none    Estimated Blood Loss: 650 cc    Findings: viable female infant, apgars 8/9. Absence of right tube and ovary, normal left adnexa. Thin lower uterine segment.    Complications: none        5/2/2017 10:18 PM Kirti Echavarria

## 2017-05-03 NOTE — PROGRESS NOTES
Marie RN brought pt from labor and delivery. ID bands and cuddles checked and verified with labor and delivery nurse, Marie. Patient oriented to room and surroundings. Skylight discussed. Bulb syring demonstration. Educated on emergency call light. ID bands and security issues discussed. Educated about the pink photo ID name badges. Assessment completed on patient. IV without redness and swelling. Encourage pt to call for any needs.

## 2017-05-03 NOTE — DISCHARGE PLANNING
:    Infant: Michell Ballesteros (: 17)    Referral: History of meth and THC use.    Intervention:  Reviewed medical record and met with MOB, Kaela Ballesteros.  The FOB is Richmond Ballesteros and this is their second child.  They have a 19 month old daughter, Daniel Ballesteros (9/30/15).  Parents live together at [address] [place] [address].  Phone number is [phone].  RG is receiving Medicaid and WIC and their pediatrician is [facility].  Parents are prepared for infant except for a car seat.  Provided information to the [facility] [place].  RG has a history of meth use over six years ago and stopped using marijuana once she found out she was pregnant.  Infant's tox screen was negative.  Provided MOB with a children's resource list and a diaper bank referral.  No further needs identified at this time.    Plan:  Infant is cleared to discharge home with mother once medically cleared.

## 2017-05-03 NOTE — CARE PLAN
Problem: Potential for postpartum infection related to surgical incision, compromised uterine condition, urinary tract or respiratory compromise  Goal: Patient will be afebrile and free from signs and symptoms of infection  Outcome: PROGRESSING AS EXPECTED  Patient is afebrile. No signs and symptoms of infection.

## 2017-05-03 NOTE — OP REPORT
DATE OF SERVICE:  2017    DATE OF SURGERY:  2017    PREOPERATIVE DIAGNOSES:  1.  Term intrauterine pregnancy at 38 and 1/7 weeks.  2.  Previous  section.  3.  A2 gestational diabetic.  4.  Advanced maternal age.  5.  Multiparous female desires permanent sterilization.  6.  Late prenatal care.    POSTOPERATIVE DIAGNOSES:  1.  Term intrauterine pregnancy at 38 and 1/7 weeks.  2.  Previous  section.  3.  A2 gestational diabetic.  4.  Advanced maternal age.  5.  Multiparous female desires permanent sterilization.  6.  Late prenatal care.    PROCEDURES PERFORMED:  1.  Primary low-transverse  section.  2.  Bilateral tubal ligation with Filshie clips.    SURGEON:  Kirti Echavarria MD    ASSISTANT:  Gracia Copeland.    ANESTHESIA:  Spinal.    ANESTHESIOLOGIST:  Tobey Gansert, MD    SPECIMEN:  None.    ESTIMATED BLOOD LOSS:  650 mL.    FINDINGS:  A viable female infant, Apgars of 8 and 9.  Absence of the right   tube and ovary, normal left adnexa, thin lower uterine segment with clear   urine at the end of procedure.    COMPLICATIONS:  None.    PROCEDURE:  The patient was taken to the operating room, where spinal   anesthesia was applied without complications.  The patient was prepped and   draped in the usual sterile manner.  A Pfannenstiel incision was made with a   knife down to the rectus fascia.  The rectus fascia was  from the   underlying rectus muscle first superiorly then inferiorly.  The rectus muscle   was  sharply in the midline.  The peritoneum was tented up and   entered with Metzenbaum scissors and incision was extended with care to avoid   injury to underlying bowel or bladder.  The vesicouterine peritoneum was   tented up with hemostats and entered with Metzenbaum scissors and a bladder   flap was created.  A 4 cm incision was made in the lower uterine segment   transversely and incision was extended bluntly.  Amniotomy was performed and   there was noted to be  copious amount of clear amniotic fluid.  The head was   then guided towards the hysterotomy incision and delivered.  The mouth and   nares were suctioned.  The remainder of the infant delivered without   complications.  The cord was doubly clamped and cut.  The infant was handed   off to awaiting neonatology team.  The placenta was manually extracted.    Fragments of membranes were removed as well as the placenta, and the   hysterotomy incision was reapproximated with 0 chromic in a running locking   stitch x1.  Hemostasis was noted.  The pericolic gutters were examined.  Blood   clots were removed.  The adnexa were examined and there was noted to be   absence of the right adnexa.  The left adnexa was within normal limits and   therefore, the tubal ligation was started on the left fallopian tube.  The   left fallopian tube was grasped with Babcocks and _____ transverse segment of   the tube was occluded with Filshie clips.  Hemostasis was noted.  The muscle   and peritoneum was then reapproximated with mattress sutures of 0 chromic x3   and hemostasis was noted.  The fascia was then reapproximated with 0 Vicryl.    The subcutaneous fat was irrigated and small bleeders were bovied.  The   subcutaneous fat was reapproximated with 3 interrupted sutures of 0 Vicryl.    The skin was reapproximated with skin staples.  Sponge, needle, instrument,   and lap counts were correct x2.  Patient tolerated the procedure well and went   to recovery room in stable condition.       ____________________________________     MD ANIRUDH HOYOS / MÓNICA    DD:  05/02/2017 23:04:11  DT:  05/02/2017 23:44:20    D#:  [id number]  Job#:  [id number]

## 2017-05-03 NOTE — CARE PLAN
Problem: Potential for postpartum infection related to surgical incision, compromised uterine condition, urinary tract or respiratory compromise  Goal: Patient will be afebrile and free from signs and symptoms of infection  Outcome: PROGRESSING AS EXPECTED  . No signs ans symptoms of infection noted.

## 2017-05-03 NOTE — PROGRESS NOTES
"36 y.o. , EDC 5/15=38w1d here to L&D c/o blood clot from vagina at home and UC. Hx previous c/s    2100- Report received from Jenise SPENCER. Dr. Gansert at bedside drawing labs. Pre-op procedures completed    -  delivery by Dr. Echavarria of viable female, \"Michell\", apgars 8/9    0030- Pt transferred to PPU with infant wrapped and in arms, bedside report given to KELLY Arguello      "

## 2017-05-03 NOTE — CARE PLAN
Problem: Communication  Goal: The ability to communicate needs accurately and effectively will improve  Outcome: PROGRESSING AS EXPECTED  Reviewed plan of care with patient who verbalized understanding.    Problem: Altered physiologic condition related to postoperative  delivery  Goal: Patient physiologically stable as evidenced by normal lochia, palpable uterine involution and vital signs within normal limits  Outcome: PROGRESSING AS EXPECTED  Fundus firm.  Lochia scant.  VSS.    Problem: Potential for postpartum infection related to surgical incision, compromised uterine condition, urinary tract or respiratory compromise  Goal: Patient will be afebrile and free from signs and symptoms of infection  Outcome: PROGRESSING AS EXPECTED  Patient is afebrile.

## 2017-05-03 NOTE — PROGRESS NOTES
EDC 5-15-17 38.1 38.1 weeks pt is here with C/O loosing a very big blood cloth and UCs. External monitors applied.  UCs are every min and very small amount of bloody show is noted. SVE done 4 cm 100% -2. Arleen cnm was called report given. Will be in to see the pt.   Arleen reviewed the prenatal and ordered to monitor and recheck pt.   IV was ordered, difficult IV start pt states she has had IV in her foot and neck before. Dr Gansert was called, IV was started. Labs drawn by lab.  2100 SVE done 4-5cm and has cloths on my glove, CASI Copeland was given report, pt is uncomfortable. Dr JADE was notified and order received to prepare for C/S.  Report given to Marie Agrawal RN.

## 2017-05-03 NOTE — PROGRESS NOTES
Name:   Kaela Ballesteros   Date/Time:  5/3/2017 6:40 AM  Gestational Age:  38w2d  Admit Date:   2017  Admitting Dx:   pregnancy  H/O  section  Indication for care in labor or delivery  AMA (advanced maternal age) multigravida 35+, third trimester  GDM, class A2  Encounter for sterilization  Contractions    POD# 1 S/P repeat      S:  Abdominal pain yes and received morphine with minimal improvement; Toradol was helpful   Ambulating   Not yet  Tolerating PO  yes  Flatus    Unsure   Bleeding   Minimal and well controlled   Voiding   no and still has crawford   Dizziness   No, but has not been up and walking yet   Breast feeding  Yes, but feels she is not producing   Breast tenderness  no    O:  Pulse: 81, Heart Rate (Monitored): 84  Blood Pressure: 110/77 mmHg, NIBP: 118/79 mmHg     Temp  Av.6 °C (97.8 °F)  Min: 36.1 °C (97 °F)  Max: 36.8 °C (98.2 °F)  Heart: regular rate and rhythm without gallops or murmurs  Lungs: clear bases  Abdomen: flat and appropriately tender / bowel sounds present / incision clean and dry.  Extremities: non-tender  Catheter: indwelling  adequate output/clear    Intake/Output Summary (Last 24 hours) at 17 0640  Last data filed at 17 0530   Gross per 24 hour   Intake   1500 ml   Output   1230 ml   Net    270 ml       A:  POD# 1 S/P repeat    Stable/progressing well though some difficulty with pain control, improved with Toradol.   Not up and ambulating or voiding spontaneously; goal to get up and walk after crawford removed today.   Lactation consult placed.   Mother would like blood glucose checked; will order for 4 occurences for GDM A2    P:  Routine C/S Postpartum care, continue pain management, encourage ambulation, advance diet, and anticipate DC POD#3     Chris Palm M.D.

## 2017-05-03 NOTE — PROGRESS NOTES
0705- Report received from JOHANNA Arguello.  Patient sleeping.  0805- Patient stated she ate breakfast already.  Patient instructed to call prior to next meals for blood sugar checks.  Patient verbalized understanding.  Patient assessment done.  Patient stated that she is not passing flatus, denied nausea.  Patient denied dizziness.  Discussed pain management plan and patient prefers to be medicated as it becomes available.  Reviewed plan of care.  1025- Patient sat up at the edge of bed.  Patient able to stand with standby assist.  Catheter care done and pads changed.  Patient c/o incisional pain and assisted back to bed.  Patient medicated for c/o incisional pain.  1712- Patient stated she ate dinner about 20 minutes ago and forgot to call for blood sugar check.

## 2017-05-04 LAB
ERYTHROCYTE [DISTWIDTH] IN BLOOD BY AUTOMATED COUNT: 39.8 FL (ref 35.9–50)
HCT VFR BLD AUTO: 31.8 % (ref 37–47)
HGB BLD-MCNC: 10.1 G/DL (ref 12–16)
MCH RBC QN AUTO: 28.1 PG (ref 27–33)
MCHC RBC AUTO-ENTMCNC: 31.8 G/DL (ref 33.6–35)
MCV RBC AUTO: 88.3 FL (ref 81.4–97.8)
PLATELET # BLD AUTO: 169 K/UL (ref 164–446)
PMV BLD AUTO: 11.7 FL (ref 9–12.9)
RBC # BLD AUTO: 3.6 M/UL (ref 4.2–5.4)
WBC # BLD AUTO: 6.7 K/UL (ref 4.8–10.8)

## 2017-05-04 PROCEDURE — 700102 HCHG RX REV CODE 250 W/ 637 OVERRIDE(OP): Performed by: FAMILY MEDICINE

## 2017-05-04 PROCEDURE — 700102 HCHG RX REV CODE 250 W/ 637 OVERRIDE(OP): Performed by: OBSTETRICS & GYNECOLOGY

## 2017-05-04 PROCEDURE — 700112 HCHG RX REV CODE 229: Performed by: OBSTETRICS & GYNECOLOGY

## 2017-05-04 PROCEDURE — 770002 HCHG ROOM/CARE - OB PRIVATE (112)

## 2017-05-04 PROCEDURE — 36415 COLL VENOUS BLD VENIPUNCTURE: CPT

## 2017-05-04 PROCEDURE — A9270 NON-COVERED ITEM OR SERVICE: HCPCS | Performed by: OBSTETRICS & GYNECOLOGY

## 2017-05-04 PROCEDURE — A9270 NON-COVERED ITEM OR SERVICE: HCPCS | Performed by: FAMILY MEDICINE

## 2017-05-04 PROCEDURE — 700111 HCHG RX REV CODE 636 W/ 250 OVERRIDE (IP): Performed by: FAMILY MEDICINE

## 2017-05-04 PROCEDURE — 85027 COMPLETE CBC AUTOMATED: CPT

## 2017-05-04 RX ORDER — SIMETHICONE 80 MG
80 TABLET,CHEWABLE ORAL 4 TIMES DAILY
Status: DISCONTINUED | OUTPATIENT
Start: 2017-05-04 | End: 2017-05-05 | Stop reason: HOSPADM

## 2017-05-04 RX ADMIN — OXYCODONE HYDROCHLORIDE AND ACETAMINOPHEN 1 TABLET: 5; 325 TABLET ORAL at 13:26

## 2017-05-04 RX ADMIN — OXYCODONE HYDROCHLORIDE AND ACETAMINOPHEN 1 TABLET: 10; 325 TABLET ORAL at 21:34

## 2017-05-04 RX ADMIN — SIMETHICONE 80 MG: 80 TABLET, CHEWABLE ORAL at 08:08

## 2017-05-04 RX ADMIN — OXYCODONE HYDROCHLORIDE AND ACETAMINOPHEN 1 TABLET: 5; 325 TABLET ORAL at 17:16

## 2017-05-04 RX ADMIN — SIMETHICONE CHEW TAB 80 MG 80 MG: 80 TABLET ORAL at 05:20

## 2017-05-04 RX ADMIN — KETOROLAC TROMETHAMINE 30 MG: 30 INJECTION, SOLUTION INTRAMUSCULAR at 05:20

## 2017-05-04 RX ADMIN — SIMETHICONE 80 MG: 80 TABLET, CHEWABLE ORAL at 17:16

## 2017-05-04 RX ADMIN — IBUPROFEN 800 MG: 800 TABLET, FILM COATED ORAL at 21:34

## 2017-05-04 RX ADMIN — DOCUSATE SODIUM 100 MG: 100 CAPSULE ORAL at 13:25

## 2017-05-04 RX ADMIN — OXYCODONE HYDROCHLORIDE AND ACETAMINOPHEN 1 TABLET: 5; 325 TABLET ORAL at 08:08

## 2017-05-04 RX ADMIN — SIMETHICONE 80 MG: 80 TABLET, CHEWABLE ORAL at 13:25

## 2017-05-04 RX ADMIN — IBUPROFEN 800 MG: 800 TABLET, FILM COATED ORAL at 13:25

## 2017-05-04 RX ADMIN — OXYCODONE HYDROCHLORIDE AND ACETAMINOPHEN 1 TABLET: 10; 325 TABLET ORAL at 03:21

## 2017-05-04 ASSESSMENT — PAIN SCALES - GENERAL
PAINLEVEL_OUTOF10: 5
PAINLEVEL_OUTOF10: 7
PAINLEVEL_OUTOF10: 6
PAINLEVEL_OUTOF10: 6
PAINLEVEL_OUTOF10: 5
PAINLEVEL_OUTOF10: 3
PAINLEVEL_OUTOF10: 0
PAINLEVEL_OUTOF10: 4
PAINLEVEL_OUTOF10: 0
PAINLEVEL_OUTOF10: 0
PAINLEVEL_OUTOF10: 8

## 2017-05-04 NOTE — PROGRESS NOTES
Got patient up to bathroom. Candy care done.very little bleeding. Patient up walking around in the room per her request. Says she is not dizzy

## 2017-05-04 NOTE — PROGRESS NOTES
1900 - Report received from JOHANNA Juarez. Patient care assumed.  2030 - Pt assessment complete, wnl. Fundus firm with minimal discharge. Turner catheter in place draining to gravity. Patient denies any dizziness or lightheadedness at this time. Patient denies any calf pain or tenderness at this time. Reviewed use of emergency light. Plan of care discussed with patient for the day. Pain medication plan discussed with patient; patient requesting PRN pain medication ATC and would like to be woken up if sleeping. All questions/concerns addressed at this time. Encouraged to call with needs, will monitor  2035 - Turner catheter D/C'd. Patient ambulated to the bathroom and voided 100mL of urine. Patient requesting infant to go to the NBN so she can get some sleep. Per mom, if infant is fussy and not consolable with a pacifier would like us to give her a bottle.

## 2017-05-04 NOTE — PROGRESS NOTES
Name:   Kaela Ballesteros   Date/Time:  2017 7:02 AM  Gestational Age:  38w3d  Admit Date:   2017  Admitting Dx:   pregnancy  H/O  section  Indication for care in labor or delivery  AMA (advanced maternal age) multigravida 35+, third trimester  GDM, class A2  Encounter for sterilization  Contractions    POD# 2 S/P repeat     S:  Abdominal pain No; well controlled   Ambulating   yes  Tolerating PO  yes  Flatus    yes  Bleeding   Minimal   Voiding   yes   Dizziness   no  Breast feeding  yes  Breast tenderness  no    O:  Pulse: 95  Blood Pressure: 123/78 mmHg     Temp  Av.7 °C (98 °F)  Min: 36.6 °C (97.8 °F)  Max: 36.7 °C (98.1 °F)  Heart: regular rate and rhythm without gallops or murmurs  Lungs: clear bases  Abdomen: slightly distended and soft/nontender / bowelsounds present / incision clean and dry.  Extremities: non-tender  Catheter: DC'd    Intake/Output Summary (Last 24 hours) at 17 0702  Last data filed at 17 2340   Gross per 24 hour   Intake      0 ml   Output   1300 ml   Net  -1300 ml       A:  POD# 2 S/P repeat   Stable/progressing well   Scheduling simethicone for gas/bloating/distension     P:  Routine C/S Postpartum care, continue pain management, encourage ambulation, anticipate DC POD#3     Chris Palm M.D.

## 2017-05-04 NOTE — CARE PLAN
Problem: Safety  Goal: Will remain free from falls  Outcome: PROGRESSING AS EXPECTED  Fall precautions in place: treaded slipper socks on, mobility signs posted, hourly rounding, bed in lowest position, belongings and call light are within reach, and near nurses station.    Problem: Altered physiologic condition related to postoperative  delivery  Goal: Patient physiologically stable as evidenced by normal lochia, palpable uterine involution and vital signs within normal limits  Outcome: PROGRESSING AS EXPECTED  Assessment complete, VSS, fundus firm, lochia light.        Problem: Alteration in comfort related to surgical incision and/or after birth pains  Goal: Patient is able to ambulate, care for self and infant with acceptable pain level  Outcome: PROGRESSING AS EXPECTED  Pain managed well with PRN medication at this time.

## 2017-05-05 VITALS
HEIGHT: 71 IN | DIASTOLIC BLOOD PRESSURE: 77 MMHG | WEIGHT: 244 LBS | BODY MASS INDEX: 34.16 KG/M2 | SYSTOLIC BLOOD PRESSURE: 122 MMHG | RESPIRATION RATE: 20 BRPM | OXYGEN SATURATION: 98 % | TEMPERATURE: 98.4 F | HEART RATE: 103 BPM

## 2017-05-05 PROBLEM — Z30.2 ENCOUNTER FOR STERILIZATION: Status: RESOLVED | Noted: 2017-05-02 | Resolved: 2017-05-05

## 2017-05-05 PROBLEM — O24.419 GDM, CLASS A2: Status: RESOLVED | Noted: 2017-03-22 | Resolved: 2017-05-05

## 2017-05-05 PROCEDURE — A9270 NON-COVERED ITEM OR SERVICE: HCPCS | Performed by: OBSTETRICS & GYNECOLOGY

## 2017-05-05 PROCEDURE — A9270 NON-COVERED ITEM OR SERVICE: HCPCS | Performed by: FAMILY MEDICINE

## 2017-05-05 PROCEDURE — 700102 HCHG RX REV CODE 250 W/ 637 OVERRIDE(OP): Performed by: FAMILY MEDICINE

## 2017-05-05 PROCEDURE — 700111 HCHG RX REV CODE 636 W/ 250 OVERRIDE (IP): Performed by: OBSTETRICS & GYNECOLOGY

## 2017-05-05 PROCEDURE — 700102 HCHG RX REV CODE 250 W/ 637 OVERRIDE(OP): Performed by: OBSTETRICS & GYNECOLOGY

## 2017-05-05 PROCEDURE — 700112 HCHG RX REV CODE 229: Performed by: OBSTETRICS & GYNECOLOGY

## 2017-05-05 RX ORDER — OXYCODONE AND ACETAMINOPHEN 10; 325 MG/1; MG/1
1 TABLET ORAL EVERY 4 HOURS PRN
Qty: 30 TAB | Refills: 0 | Status: SHIPPED | OUTPATIENT
Start: 2017-05-05 | End: 2019-04-14

## 2017-05-05 RX ORDER — PSEUDOEPHEDRINE HCL 30 MG
100 TABLET ORAL 2 TIMES DAILY PRN
Qty: 60 CAP | Refills: 0 | Status: SHIPPED | OUTPATIENT
Start: 2017-05-05 | End: 2019-04-14

## 2017-05-05 RX ORDER — SIMETHICONE 80 MG
80 TABLET,CHEWABLE ORAL 4 TIMES DAILY
Qty: 30 TAB | Refills: 0 | Status: SHIPPED | OUTPATIENT
Start: 2017-05-05 | End: 2019-04-14

## 2017-05-05 RX ORDER — IBUPROFEN 800 MG/1
800 TABLET ORAL EVERY 8 HOURS PRN
Qty: 30 TAB | Refills: 0 | Status: SHIPPED | OUTPATIENT
Start: 2017-05-05 | End: 2019-04-14

## 2017-05-05 RX ADMIN — MORPHINE SULFATE 4 MG: 4 INJECTION INTRAVENOUS at 02:31

## 2017-05-05 RX ADMIN — OXYCODONE HYDROCHLORIDE AND ACETAMINOPHEN 1 TABLET: 10; 325 TABLET ORAL at 01:41

## 2017-05-05 RX ADMIN — IBUPROFEN 800 MG: 800 TABLET, FILM COATED ORAL at 05:50

## 2017-05-05 RX ADMIN — DOCUSATE SODIUM 100 MG: 100 CAPSULE ORAL at 11:06

## 2017-05-05 RX ADMIN — OXYCODONE HYDROCHLORIDE AND ACETAMINOPHEN 1 TABLET: 10; 325 TABLET ORAL at 11:00

## 2017-05-05 RX ADMIN — OXYCODONE HYDROCHLORIDE AND ACETAMINOPHEN 1 TABLET: 10; 325 TABLET ORAL at 05:50

## 2017-05-05 RX ADMIN — SIMETHICONE 80 MG: 80 TABLET, CHEWABLE ORAL at 07:57

## 2017-05-05 ASSESSMENT — PAIN SCALES - GENERAL
PAINLEVEL_OUTOF10: 8
PAINLEVEL_OUTOF10: 7
PAINLEVEL_OUTOF10: 3
PAINLEVEL_OUTOF10: 6
PAINLEVEL_OUTOF10: 7

## 2017-05-05 NOTE — DISCHARGE INSTRUCTIONS
POSTPARTUM DISCHARGE INSTRUCTIONS FOR MOM    YOB: 1980   Age: 36 y.o.               Admit Date: 2017     Discharge Date: 2017  Attending Doctor:  Kirti Echavarria M.D.                  Allergies:  Nkda    Discharged to home by car. Discharged via wheelchair, hospital escort: Yes.  Special equipment needed: Not Applicable  Belongings with: Personal  Be sure to schedule a follow-up appointment with your primary care doctor or any specialists as instructed.     Discharge Plan:   Influenza Vaccine Indication: Patient Refuses    REASONS TO CALL YOUR OBSTETRICIAN:  1.   Persistent fever or shaking chills (Temperature higher than 100.4)  2.   Heavy bleeding (soaking more than 1 pad per hour); Passing clots  3.   Foul odor from vagina  4.   Mastitis (Breast infection; breast pain, chills, fever, redness)  5.   Urinary pain, burning or frequency  6.   Episiotomy infection  7.   Abdominal incision infection  8.   Severe depression longer than 24 hours    HAND WASHING  · Prior to handling the baby.  · Before breastfeeding or bottle feeding baby.  · After using the bathroom or changing the baby's diaper.    WOUND CARE  Ask your physician for additional care instructions.  In general:    ·  Incision:      · Keep clean and dry.    · Do NOT lift anything heavier than your baby for up to 6 weeks.    · There should not be any opening or pus.      VAGINAL CARE  · Nothing inside vagina for 6 weeks: no sexual intercourse, tampons or douching.  · Bleeding may continue for 2-4 weeks.  Amount may vary.    · Call your physician for heavy bleeding which means soaking more than 1 pad per hour    BIRTH CONTROL  · It is possible to become pregnant at any time after delivery and while breastfeeding.  · Plan to discuss a method of birth control with your physician at your follow up visit. visit.    DIET AND ELIMINATION  · Eating more fiber (bran cereal, fruits, and vegetables) and drinking plenty of fluids will help to  "avoid constipation.  · Urinary frequency after childbirth is normal.    POSTPARTUM BLUES  During the first few days after birth, you may experience a sense of the \"blues\" which may include impatience, irritability or even crying.  These feeling come and go quickly.  However, as many as 1 in 10 women experience emotional symptoms known as postpartum depression.    Postpartum depression:  May start as early as the second or third day after delivery or take several weeks or months to develop.  Symptoms of \"blues\" are present, but are more intense:  Crying spells; loss of appetite; feelings of hopelessness or loss of control; fear of touching the baby; over concern or no concern at all about the baby; little or no concern about your own appearance/caring for yourself; and/or inability to sleep or excessive sleeping.  Contact your physician if you are experiencing any of these symptoms.    Crisis Hotline:  · [place] Crisis Hotline:  8-811-UOEVTYG  Or 1-336.161.7038  · [place] Crisis Hotline:  1-313.723.9367  Or 403-766-7881    PREVENTING SHAKEN BABY:  If you are angry or stressed, PUT THE BABY IN THE CRIB, step away, take some deep breaths, and wait until you are calm to care for the baby.  DO NOT SHAKE THE BABY.  You are not alone, call a supporter for help.    · Crisis Call Center 24/7 crisis line 859-437-5418 or 1-595.414.1632  · You can also text them, text \"ANSWER\" to 275980    QUIT SMOKING/TOBACCO USE:  I understand the use of any tobacco products increases my chance of suffering from future heart disease and could cause other illnesses which may shorten my life. Quitting the use of tobacco products is the single most important thing I can do to improve my health. For further information on smoking / tobacco cessation call a Toll Free Quit Line at 1-128.724.8560 (*[employer] [place]) or 1-983.612.7717 (American Lung Association) or you can access the web based program at www.lungusa.org.    · [place] Tobacco " Users Help Line:  (718) 248-1272       Toll Free: 1-309.509.8637  · Quit Tobacco Program [facility] (527)593-4095    DEPRESSION / SUICIDE RISK:  As you are discharged from this [facility], it is important to learn how to keep safe from harming yourself.    Recognize the warning signs:  · Abrupt changes in personality, positive or negative- including increase in energy   · Giving away possessions  · Change in eating patterns- significant weight changes-  positive or negative  · Change in sleeping patterns- unable to sleep or sleeping all the time   · Unwillingness or inability to communicate  · Depression  · Unusual sadness, discouragement and loneliness  · Talk of wanting to die  · Neglect of personal appearance   · Rebelliousness- reckless behavior  · Withdrawal from people/activities they love  · Confusion- inability to concentrate     If you or a loved one observes any of these behaviors or has concerns about self-harm, here's what you can do:  · Talk about it- your feelings and reasons for harming yourself  · Remove any means that you might use to hurt yourself (examples: pills, rope, extension cords, firearm)  · Get professional help from the community (Mental Health, Substance Abuse, psychological counseling)  · Do not be alone:Call your Safe Contact- someone whom you trust who will be there for you.  · Call your local CRISIS HOTLINE 812-2903 or 853-394-5538  · Call your local Children's Mobile Crisis Response Team [place] (826) 443-0806 or www.Social Media [employer]plified  · Call the toll free National Suicide Prevention Hotlines   · National Suicide Prevention Lifeline 209-699-VGVJ (7390)  · National Hope Line Network 800-SUICIDE (335-3618)    DISCHARGE SURVEY:  Thank you for choosing [facility].  We hope we provided you with very good care.  You may be receiving a survey in the mail.  Please fill it out.  Your opinion is valuable to us.    ADDITIONAL EDUCATIONAL MATERIALS  GIVEN TO PATIENT:        My signature on this form indicates that:  1.  I have reviewed and understand the above information  2.  My questions regarding this information have been answered to my satisfaction.  3.  I have formulated a plan with my discharge nurse to obtain my prescribed medication for home.

## 2017-05-05 NOTE — PROGRESS NOTES
Bedside report received on mother and infant. Mother reports pain at a 5/10. Encourage mother to rest and informed her when she had medications available for pain.   0800 Mother reported pain at a 3.
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
## 2017-05-05 NOTE — PROGRESS NOTES
D/c instructions reviewed with pt, questions answered. Pt verbalized understanding when to f/u with TPC.

## 2017-05-05 NOTE — CARE PLAN
Problem: Altered physiologic condition related to postoperative  delivery  Goal: Patient physiologically stable as evidenced by normal lochia, palpable uterine involution and vital signs within normal limits  Outcome: PROGRESSING AS EXPECTED  VSS, fundus firm, lochia light.     Problem: Alteration in comfort related to surgical incision and/or after birth pains  Goal: Patient is able to ambulate, care for self and infant with acceptable pain level  Outcome: PROGRESSING AS EXPECTED  Pt experiencing significant pain when ambulating; requests pain medication scheduled as available, will continue to assess pain.

## 2017-05-05 NOTE — PROGRESS NOTES
Bedside report received, assumed care of pt. Assessment complete, VSS. Pt rating pain 8/10 after ambulating to bathroom and requesting to wait until after pain medication to do fundal rub; lochia is scant. Pt voiding without difficulty. Bonding well with infant. Pt requests to receive pain medication regularly as available. Pt able to latch infant with no assistance from RN. POC discussed with pt and family, questions answered, call light within reach.

## 2017-05-10 ENCOUNTER — POST PARTUM (OUTPATIENT)
Dept: OBGYN | Facility: CLINIC | Age: 37
End: 2017-05-10
Payer: MEDICAID

## 2017-05-10 VITALS — WEIGHT: 230 LBS | SYSTOLIC BLOOD PRESSURE: 130 MMHG | DIASTOLIC BLOOD PRESSURE: 80 MMHG | BODY MASS INDEX: 31.63 KG/M2

## 2017-05-10 DIAGNOSIS — Z48.89 ENCOUNTER FOR POSTOPERATIVE WOUND CHECK: ICD-10-CM

## 2017-05-10 PROCEDURE — 90050 PR POSTPARTUM VISIT: CPT | Performed by: OBSTETRICS & GYNECOLOGY

## 2017-05-10 NOTE — PROGRESS NOTES
S/  No complaints  O/Vss Afebrile      Incision-healing well  IMP/ Normal Post-Op  PLAN/Follow up at 5 week Post partum

## 2017-05-10 NOTE — MR AVS SNAPSHOT
Kaela Ballesteros   5/10/2017 11:30 AM   Post Partum   MRN: [id number]    [facility]   Dept Phone:  [phone]    Description:  Female : 1980   Provider:  Freddie Campos M.D.           Allergies as of 5/10/2017     Allergen Noted Reactions    Nkda [No Known Drug Allergy] 2007         You were diagnosed with     Encounter for postoperative wound check   [[id number]]         Vital Signs     Blood Pressure Weight Last Menstrual Period Smoking Status          130/80 mmHg 104.327 kg (230 lb) 2016 (Exact Date) Former Smoker        Basic Information     Date Of Birth Sex Race Ethnicity Preferred Language    1980 Female Black or  Non- English      Your appointments     2017  1:00 PM   Post Partum with Geovanna Darnell C.N.M.   [facility] [address]  [facility] [id number] [phone]              Problem List              ICD-10-CM Priority Class Noted - Resolved    Acute pancreatitis K85.90   2015 - Present    Calculus of gallbladder K80.20   3/6/2015 - Present    ASCUS of cervix with negative high risk HPV R87.610   3/9/2017 - Present    Supervision of other normal pregnancy, antepartum Z34.80   3/9/2017 - Present    History of  delivery Z98.891   3/9/2017 - Present    Hx of preeclampsia, prior pregnancy, currently pregnant O09.299   3/9/2017 - Present    H/O oophorectomy Z90.721   3/9/2017 - Present    History of cholecystectomy Z90.49   3/9/2017 - Present    History of  delivery Z87.51   3/9/2017 - Present    Sickle cell carrier D56.4, D57.1   3/9/2017 - Present    AMA (advanced maternal age) multigravida 35+ O09.529   2017 - Present    H/O  section Z98.891   2017 - Present      Health Maintenance        Date Due Completion Dates    IMM PNEUMOCOCCAL 19-64 (ADULT) HIGHEST RISK SERIES (1 of 3 - PCV13) 1997 ---    PAP SMEAR 2019    IMM DTaP/Tdap/Td  Vaccine (2 - Td) 3/9/2027 3/9/2017, 9/30/2015            Current Immunizations     Influenza Vaccine Quad Inj (Preserved) 9/30/2015  5:15 PM    MMR/Varicella Combined Vaccine  Incomplete    Tdap Vaccine 3/9/2017  3:42 PM,  Incomplete, 9/30/2015  5:14 PM    Tetanus Vaccine 4/1/1998      Below and/or attached are the medications your provider expects you to take. Review all of your home medications and newly ordered medications with your provider and/or pharmacist. Follow medication instructions as directed by your provider and/or pharmacist. Please keep your medication list with you and share with your provider. Update the information when medications are discontinued, doses are changed, or new medications (including over-the-counter products) are added; and carry medication information at all times in the event of emergency situations     Allergies:  NKDA - (reactions not documented)               Medications  Valid as of: May 10, 2017 - 11:53 AM    Generic Name Brand Name Tablet Size Instructions for use    Docusate Sodium (Cap)  MG Take 100 mg by mouth 2 times a day as needed for Constipation.        Ibuprofen (Tab) MOTRIN 800 MG Take 1 Tab by mouth every 8 hours as needed (For cramping after delivery; do not give if patient is receiving ketorolac (Toradol)).        Oxycodone-Acetaminophen (Tab) PERCOCET-10  MG Take 1 Tab by mouth every four hours as needed (1/2 tab for moderate pin (pain scale 4-6/10) and 1 tab for Severe Pain (Pain Scale 7-10) after delivery).        Prenatal MV-Min-Fe Fum-FA-DHA (Cap) PRENATAL 1 30-0.975-200 MG Take 1 Capsule by mouth every day.        Simethicone (Chew Tab) MYLICON 80 MG Take 1 Tab by mouth 4 times a day.        .                 Medicines prescribed today were sent to:     [facility]/PHARMACY #[id number] - [place], [place] - [address]    [address] [place] [address]    Phone: [phone] Fax: [phone]    Open 24 Hours?: No      Medication  refill instructions:       If your prescription bottle indicates you have medication refills left, it is not necessary to call your provider’s office. Please contact your pharmacy and they will refill your medication.    If your prescription bottle indicates you do not have any refills left, you may request refills at any time through one of the following ways: The online [employer] [facility] system (except Urgent Care), by calling your provider’s office, or by asking your pharmacy to contact your provider’s office with a refill request. Medication refills are processed only during regular business hours and may not be available until the next business day. Your provider may request additional information or to have a follow-up visit with you prior to refilling your medication.   *Please Note: Medication refills are assigned a new Rx number when refilled electronically. Your pharmacy may indicate that no refills were authorized even though a new prescription for the same medication is available at the pharmacy. Please request the medicine by name with the pharmacy before contacting your provider for a refill.           [employer] [facility] Access Code: Activation code not generated  Current [employer] [facility] Status: Active

## 2017-06-14 ENCOUNTER — POST PARTUM (OUTPATIENT)
Dept: OBGYN | Facility: CLINIC | Age: 37
End: 2017-06-14
Payer: MEDICAID

## 2017-06-14 VITALS — DIASTOLIC BLOOD PRESSURE: 82 MMHG | WEIGHT: 220 LBS | BODY MASS INDEX: 30.26 KG/M2 | SYSTOLIC BLOOD PRESSURE: 124 MMHG

## 2017-06-14 PROBLEM — Z34.80 SUPERVISION OF OTHER NORMAL PREGNANCY, ANTEPARTUM: Status: RESOLVED | Noted: 2017-03-09 | Resolved: 2017-06-14

## 2017-06-14 PROBLEM — Z98.891 HISTORY OF CESAREAN DELIVERY: Status: RESOLVED | Noted: 2017-03-09 | Resolved: 2017-06-14

## 2017-06-14 PROBLEM — Z87.51 HISTORY OF PRETERM DELIVERY: Status: RESOLVED | Noted: 2017-03-09 | Resolved: 2017-06-14

## 2017-06-14 PROBLEM — O09.299 HX OF PREECLAMPSIA, PRIOR PREGNANCY, CURRENTLY PREGNANT: Status: RESOLVED | Noted: 2017-03-09 | Resolved: 2017-06-14

## 2017-06-14 PROBLEM — O09.529 AMA (ADVANCED MATERNAL AGE) MULTIGRAVIDA 35+: Status: RESOLVED | Noted: 2017-05-02 | Resolved: 2017-06-14

## 2017-06-14 PROBLEM — Z90.49 HISTORY OF CHOLECYSTECTOMY: Status: RESOLVED | Noted: 2017-03-09 | Resolved: 2017-06-14

## 2017-06-14 PROBLEM — Z98.891 H/O CESAREAN SECTION: Status: RESOLVED | Noted: 2017-05-02 | Resolved: 2017-06-14

## 2017-06-14 PROCEDURE — 90050 PR POSTPARTUM VISIT: CPT | Performed by: NURSE PRACTITIONER

## 2017-06-14 NOTE — MR AVS SNAPSHOT
Kaela Ballesteros   2017 1:00 PM   Post Partum   MRN: [id number]    [facility]   Dept Phone:  [phone]    Description:  Female : 1980   Provider:  Geovanna Darnell C.N.M.           Allergies as of 2017     Allergen Noted Reactions    Nkda [No Known Drug Allergy] 2007         You were diagnosed with     Postpartum care following  delivery   [[id number]]  -  Primary       Vital Signs     Blood Pressure Weight Last Menstrual Period Smoking Status          124/82 mmHg 99.791 kg (220 lb) 2016 (Exact Date) Former Smoker        Basic Information     Date Of Birth Sex Race Ethnicity Preferred Language    1980 Female Black or  Non- English      Problem List              ICD-10-CM Priority Class Noted - Resolved    ASCUS of cervix with negative high risk HPV R87.610   3/9/2017 - Present    H/O oophorectomy Z90.721   3/9/2017 - Present    Sickle cell carrier D56.4, D57.1   3/9/2017 - Present    Postpartum care following  delivery Z39.2   2017 - Present      Health Maintenance        Date Due Completion Dates    IMM PNEUMOCOCCAL 19-64 (ADULT) HIGHEST RISK SERIES (1 of 3 - PCV13) 1997 ---    PAP SMEAR 2019    IMM DTaP/Tdap/Td Vaccine (2 - Td) 3/9/2027 3/9/2017, 2015            Current Immunizations     Influenza Vaccine Quad Inj (Preserved) 2015  5:15 PM    MMR/Varicella Combined Vaccine  Incomplete    Tdap Vaccine 3/9/2017  3:42 PM,  Incomplete, 2015  5:14 PM    Tetanus Vaccine 1998      Below and/or attached are the medications your provider expects you to take. Review all of your home medications and newly ordered medications with your provider and/or pharmacist. Follow medication instructions as directed by your provider and/or pharmacist. Please keep your medication list with you and share with your provider. Update the information when medications are discontinued, doses  are changed, or new medications (including over-the-counter products) are added; and carry medication information at all times in the event of emergency situations     Allergies:  NKDA - (reactions not documented)               Medications  Valid as of: June 14, 2017 -  1:18 PM    Generic Name Brand Name Tablet Size Instructions for use    Docusate Sodium (Cap)  MG Take 100 mg by mouth 2 times a day as needed for Constipation.        Ibuprofen (Tab) MOTRIN 800 MG Take 1 Tab by mouth every 8 hours as needed (For cramping after delivery; do not give if patient is receiving ketorolac (Toradol)).        Oxycodone-Acetaminophen (Tab) PERCOCET-10  MG Take 1 Tab by mouth every four hours as needed (1/2 tab for moderate pin (pain scale 4-6/10) and 1 tab for Severe Pain (Pain Scale 7-10) after delivery).        Prenatal MV-Min-Fe Fum-FA-DHA (Cap) PRENATAL 1 30-0.975-200 MG Take 1 Capsule by mouth every day.        Simethicone (Chew Tab) MYLICON 80 MG Take 1 Tab by mouth 4 times a day.        .                 Medicines prescribed today were sent to:     [facility]/PHARMACY #[id number] - [place], [place] - [address]    [address] [place] [address] [place] [address]    Phone: [phone] Fax: [phone]    Open 24 Hours?: No      Medication refill instructions:       If your prescription bottle indicates you have medication refills left, it is not necessary to call your provider’s office. Please contact your pharmacy and they will refill your medication.    If your prescription bottle indicates you do not have any refills left, you may request refills at any time through one of the following ways: The online [employer] system (except Urgent Care), by calling your provider’s office, or by asking your pharmacy to contact your provider’s office with a refill request. Medication refills are processed only during regular business hours and may not be available until the next business day. Your provider may request  additional information or to have a follow-up visit with you prior to refilling your medication.   *Please Note: Medication refills are assigned a new Rx number when refilled electronically. Your pharmacy may indicate that no refills were authorized even though a new prescription for the same medication is available at the pharmacy. Please request the medicine by name with the pharmacy before contacting your provider for a refill.           [employer] Access Code: Activation code not generated  Current [employer] Status: Active

## 2017-06-14 NOTE — PROGRESS NOTES
Pt here today for postpartum exam.  Operation Date: 5/2/17  Currently: bottle feeding   BCM: pt had btl , [facility]   Good ph:[phone]  Pt states no complaints

## 2017-06-14 NOTE — PROGRESS NOTES
Subjective   Subjective:    Kaela Ballesteros is a 36 y.o. AA female who presents for her postpartum exam. She had RCS c BTL without complication. Her prenatal course was uncomplicated. She denies dysuria, vaginal bleeding, odor, itching or breast problems. She is bottlefeeding. She has BTL for her birth control method. Reports sex prior to this appointment.  Denies any S/S of PP depression.            HPI  Review of Systems   All other systems reviewed and are negative.         Objective:     /82 mmHg  Wt 99.791 kg (220 lb)  LMP 08/19/2016 (Exact Date)     Physical Exam   Constitutional: She is oriented to person, place, and time. She appears well-developed and well-nourished.   HENT:   Head: Normocephalic and atraumatic.   Neck: Normal range of motion. Neck supple. No thyromegaly present.   Cardiovascular: Normal rate, regular rhythm, normal heart sounds and intact distal pulses.    Pulmonary/Chest: Effort normal and breath sounds normal. Right breast exhibits no inverted nipple, no mass, no nipple discharge, no skin change and no tenderness. Left breast exhibits no inverted nipple, no mass, no nipple discharge, no skin change and no tenderness. Breasts are symmetrical. There is no breast swelling.   Abdominal: Soft. Normal appearance and bowel sounds are normal. There is no CVA tenderness.   Genitourinary: Vagina normal and uterus normal. No breast tenderness, discharge or bleeding. Pelvic exam was performed with patient supine. No labial fusion. There is no rash, tenderness, lesion or injury on the right labia. There is no rash, tenderness, lesion or injury on the left labia. Cervix exhibits no motion tenderness, no discharge and no friability. Right adnexum displays no mass, no tenderness and no fullness. Left adnexum displays no mass, no tenderness and no fullness.   Musculoskeletal: Normal range of motion.   Neurological: She is alert and oriented to person, place, and time. She has normal  reflexes.   Skin: Skin is warm and dry.   Incision well healed   Psychiatric: She has a normal mood and affect. Her behavior is normal. Judgment and thought content normal.   Nursing note and vitals reviewed.         Assessment   Assessment:    1. PP care s/p RCS c BTL  2. Exam WNL   3. Pap ASCUS    Patient Active Problem List    Diagnosis Date Noted   • Postpartum care following  delivery 2017   • ASCUS of cervix with negative high risk HPV 2017   • H/O oophorectomy 2017   • Sickle cell carrier 2017       Plan   Plan:    1. Encourage monthly SBE  2. Continue PNV   3. Encouraged condom use   4. Discussed diet, exercise and resumption of sexual activity   5. Gave copy of pap , needs f/u appt w Gyn for repeat pap  7.  F/u c PCP or [facility] clinic as needed for primary care needs.

## 2017-10-03 ENCOUNTER — GYNECOLOGY VISIT (OUTPATIENT)
Dept: OBGYN | Facility: CLINIC | Age: 37
End: 2017-10-03
Payer: MEDICAID

## 2017-10-03 VITALS — BODY MASS INDEX: 32.23 KG/M2 | WEIGHT: 238 LBS | HEIGHT: 72 IN

## 2017-10-03 DIAGNOSIS — Z01.419 ENCOUNTER FOR GYNECOLOGICAL EXAMINATION WITHOUT ABNORMAL FINDING: ICD-10-CM

## 2017-10-03 DIAGNOSIS — Z86.32 HX OF GESTATIONAL DIABETES MELLITUS, NOT CURRENTLY PREGNANT: ICD-10-CM

## 2017-10-03 PROCEDURE — 99395 PREV VISIT EST AGE 18-39: CPT | Performed by: OBSTETRICS & GYNECOLOGY
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
## 2017-11-07 ENCOUNTER — GYNECOLOGY VISIT (OUTPATIENT)
Dept: OBGYN | Facility: CLINIC | Age: 37
End: 2017-11-07
Payer: MEDICAID

## 2017-11-07 ENCOUNTER — HOSPITAL ENCOUNTER (OUTPATIENT)
Facility: MEDICAL CENTER | Age: 37
End: 2017-11-07
Attending: OBSTETRICS & GYNECOLOGY
Payer: MEDICAID

## 2017-11-07 VITALS — SYSTOLIC BLOOD PRESSURE: 100 MMHG | DIASTOLIC BLOOD PRESSURE: 64 MMHG | WEIGHT: 239 LBS | BODY MASS INDEX: 32.87 KG/M2

## 2017-11-07 DIAGNOSIS — Z11.51 SCREENING FOR HPV (HUMAN PAPILLOMAVIRUS): ICD-10-CM

## 2017-11-07 DIAGNOSIS — Z12.4 SCREENING FOR MALIGNANT NEOPLASM OF CERVIX: ICD-10-CM

## 2017-11-07 DIAGNOSIS — Z01.419 ENCOUNTER FOR GYNECOLOGICAL EXAMINATION WITHOUT ABNORMAL FINDING: ICD-10-CM

## 2017-11-07 PROCEDURE — 99395 PREV VISIT EST AGE 18-39: CPT | Mod: 25 | Performed by: OBSTETRICS & GYNECOLOGY

## 2017-11-07 PROCEDURE — 90686 IIV4 VACC NO PRSV 0.5 ML IM: CPT | Performed by: OBSTETRICS & GYNECOLOGY

## 2017-11-07 PROCEDURE — 87624 HPV HI-RISK TYP POOLED RSLT: CPT

## 2017-11-07 PROCEDURE — 90471 IMMUNIZATION ADMIN: CPT | Performed by: OBSTETRICS & GYNECOLOGY

## 2017-11-07 PROCEDURE — 88175 CYTOPATH C/V AUTO FLUID REDO: CPT
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
## 2017-11-07 NOTE — NON-PROVIDER
Pt here today for Pap    LMP 10/17/17  Flu shot today  Will do labs tomorrow  Good # [phone]   Pharmacy verified

## 2017-11-08 LAB
CYTOLOGY REG CYTOL: NORMAL
HPV HR 12 DNA CVX QL NAA+PROBE: NEGATIVE
HPV16 DNA SPEC QL NAA+PROBE: NEGATIVE
HPV18 DNA SPEC QL NAA+PROBE: NEGATIVE
SPECIMEN SOURCE: NORMAL

## 2018-11-05 ENCOUNTER — GYNECOLOGY VISIT (OUTPATIENT)
Dept: OBGYN | Facility: CLINIC | Age: 38
End: 2018-11-05
Payer: MEDICAID

## 2018-11-05 ENCOUNTER — HOSPITAL ENCOUNTER (OUTPATIENT)
Facility: MEDICAL CENTER | Age: 38
End: 2018-11-05
Attending: NURSE PRACTITIONER
Payer: MEDICAID

## 2018-11-05 VITALS — WEIGHT: 256 LBS | BODY MASS INDEX: 35.21 KG/M2 | SYSTOLIC BLOOD PRESSURE: 126 MMHG | DIASTOLIC BLOOD PRESSURE: 84 MMHG

## 2018-11-05 DIAGNOSIS — Z01.419 WOMEN'S ANNUAL ROUTINE GYNECOLOGICAL EXAMINATION: Primary | ICD-10-CM

## 2018-11-05 PROBLEM — R87.610 ASCUS OF CERVIX WITH NEGATIVE HIGH RISK HPV: Status: RESOLVED | Noted: 2017-03-09 | Resolved: 2018-11-05

## 2018-11-05 PROCEDURE — G0101 CA SCREEN;PELVIC/BREAST EXAM: HCPCS | Mod: 25 | Performed by: NURSE PRACTITIONER

## 2018-11-05 PROCEDURE — 87491 CHLMYD TRACH DNA AMP PROBE: CPT

## 2018-11-05 PROCEDURE — 87624 HPV HI-RISK TYP POOLED RSLT: CPT

## 2018-11-05 PROCEDURE — 88175 CYTOPATH C/V AUTO FLUID REDO: CPT

## 2018-11-05 PROCEDURE — 87591 N.GONORRHOEAE DNA AMP PROB: CPT
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
## 2018-11-06 DIAGNOSIS — Z01.419 WOMEN'S ANNUAL ROUTINE GYNECOLOGICAL EXAMINATION: ICD-10-CM

## 2018-11-08 LAB
C TRACH DNA GENITAL QL NAA+PROBE: NEGATIVE
CYTOLOGY REG CYTOL: NORMAL
HPV HR 12 DNA CVX QL NAA+PROBE: NEGATIVE
HPV16 DNA SPEC QL NAA+PROBE: NEGATIVE
HPV18 DNA SPEC QL NAA+PROBE: NEGATIVE
N GONORRHOEA DNA GENITAL QL NAA+PROBE: NEGATIVE
SPECIMEN SOURCE: NORMAL
SPECIMEN SOURCE: NORMAL

## 2018-11-26 ENCOUNTER — HOSPITAL ENCOUNTER (OUTPATIENT)
Dept: RADIOLOGY | Facility: MEDICAL CENTER | Age: 38
End: 2018-11-26
Attending: NURSE PRACTITIONER
Payer: MEDICAID

## 2018-11-26 DIAGNOSIS — Z01.419 WOMEN'S ANNUAL ROUTINE GYNECOLOGICAL EXAMINATION: ICD-10-CM

## 2018-11-26 PROCEDURE — 77067 SCR MAMMO BI INCL CAD: CPT

## 2019-03-26 ENCOUNTER — HOSPITAL ENCOUNTER (EMERGENCY)
Facility: MEDICAL CENTER | Age: 39
End: 2019-03-26
Attending: EMERGENCY MEDICINE
Payer: MEDICAID

## 2019-03-26 VITALS
HEIGHT: 72 IN | OXYGEN SATURATION: 98 % | DIASTOLIC BLOOD PRESSURE: 78 MMHG | RESPIRATION RATE: 18 BRPM | TEMPERATURE: 97.9 F | HEART RATE: 78 BPM | BODY MASS INDEX: 32.55 KG/M2 | SYSTOLIC BLOOD PRESSURE: 120 MMHG | WEIGHT: 240.3 LBS
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
## 2019-03-26 DIAGNOSIS — M54.2 NECK PAIN: ICD-10-CM

## 2019-03-26 PROCEDURE — 99284 EMERGENCY DEPT VISIT MOD MDM: CPT
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
## 2019-03-27 NOTE — ED NOTES
Discharge instructions provided.  Pt verbalized the understanding of discharge instructions to follow up with PCP and to return to ER if condition worsens.  Pt ambulated out of ER without difficulty. No Rx.

## 2019-03-27 NOTE — ED NOTES
ERP at bedside. Pt agrees with plan of care discussed by ERP. AIDET acknowledged with patient. Vira in low position, side rail up for pt safety. Call light within reach. Will continue to monitor.

## 2019-04-12 ENCOUNTER — HOSPITAL ENCOUNTER (EMERGENCY)
Facility: MEDICAL CENTER | Age: 39
End: 2019-04-13
Attending: EMERGENCY MEDICINE
Payer: MEDICAID

## 2019-04-12 ENCOUNTER — APPOINTMENT (OUTPATIENT)
Dept: RADIOLOGY | Facility: MEDICAL CENTER | Age: 39
End: 2019-04-12
Attending: EMERGENCY MEDICINE
Payer: MEDICAID

## 2019-04-12 DIAGNOSIS — R11.2 NON-INTRACTABLE VOMITING WITH NAUSEA, UNSPECIFIED VOMITING TYPE: ICD-10-CM

## 2019-04-12 DIAGNOSIS — R10.84 GENERALIZED ABDOMINAL PAIN: ICD-10-CM

## 2019-04-12 DIAGNOSIS — K63.89 EPIPLOIC APPENDAGITIS: ICD-10-CM

## 2019-04-12 DIAGNOSIS — K52.9 GASTROENTERITIS: ICD-10-CM

## 2019-04-12 LAB
ALBUMIN SERPL BCP-MCNC: 4.8 G/DL (ref 3.2–4.9)
ALBUMIN/GLOB SERPL: 1.5 G/DL
ALP SERPL-CCNC: 60 U/L (ref 30–99)
ALT SERPL-CCNC: 24 U/L (ref 2–50)
ANION GAP SERPL CALC-SCNC: 15 MMOL/L (ref 0–11.9)
APPEARANCE UR: CLEAR
AST SERPL-CCNC: 20 U/L (ref 12–45)
BASOPHILS # BLD AUTO: 0.7 % (ref 0–1.8)
BASOPHILS # BLD: 0.05 K/UL (ref 0–0.12)
BILIRUB SERPL-MCNC: 0.6 MG/DL (ref 0.1–1.5)
BILIRUB UR QL STRIP.AUTO: NEGATIVE
BLOOD CULTURE HOLD CXBCH: NORMAL
BUN SERPL-MCNC: 9 MG/DL (ref 8–22)
CALCIUM SERPL-MCNC: 10.3 MG/DL (ref 8.5–10.5)
CHLORIDE SERPL-SCNC: 104 MMOL/L (ref 96–112)
CO2 SERPL-SCNC: 22 MMOL/L (ref 20–33)
COLOR UR: YELLOW
CREAT SERPL-MCNC: 0.92 MG/DL (ref 0.5–1.4)
EOSINOPHIL # BLD AUTO: 0.03 K/UL (ref 0–0.51)
EOSINOPHIL NFR BLD: 0.4 % (ref 0–6.9)
ERYTHROCYTE [DISTWIDTH] IN BLOOD BY AUTOMATED COUNT: 40.1 FL (ref 35.9–50)
GLOBULIN SER CALC-MCNC: 3.2 G/DL (ref 1.9–3.5)
GLUCOSE SERPL-MCNC: 125 MG/DL (ref 65–99)
GLUCOSE UR STRIP.AUTO-MCNC: NEGATIVE MG/DL
HCG SERPL QL: NEGATIVE
HCT VFR BLD AUTO: 44.4 % (ref 37–47)
HGB BLD-MCNC: 14.4 G/DL (ref 12–16)
IMM GRANULOCYTES # BLD AUTO: 0.02 K/UL (ref 0–0.11)
IMM GRANULOCYTES NFR BLD AUTO: 0.3 % (ref 0–0.9)
KETONES UR STRIP.AUTO-MCNC: NEGATIVE MG/DL
LEUKOCYTE ESTERASE UR QL STRIP.AUTO: NEGATIVE
LIPASE SERPL-CCNC: 12 U/L (ref 11–82)
LYMPHOCYTES # BLD AUTO: 1.34 K/UL (ref 1–4.8)
LYMPHOCYTES NFR BLD: 18 % (ref 22–41)
MCH RBC QN AUTO: 27.3 PG (ref 27–33)
MCHC RBC AUTO-ENTMCNC: 32.4 G/DL (ref 33.6–35)
MCV RBC AUTO: 84.3 FL (ref 81.4–97.8)
MICRO URNS: NORMAL
MONOCYTES # BLD AUTO: 0.43 K/UL (ref 0–0.85)
MONOCYTES NFR BLD AUTO: 5.8 % (ref 0–13.4)
NEUTROPHILS # BLD AUTO: 5.58 K/UL (ref 2–7.15)
NEUTROPHILS NFR BLD: 74.8 % (ref 44–72)
NITRITE UR QL STRIP.AUTO: NEGATIVE
NRBC # BLD AUTO: 0 K/UL
NRBC BLD-RTO: 0 /100 WBC
PH UR STRIP.AUTO: 8 [PH]
PLATELET # BLD AUTO: 262 K/UL (ref 164–446)
PMV BLD AUTO: 11.6 FL (ref 9–12.9)
POTASSIUM SERPL-SCNC: 3.8 MMOL/L (ref 3.6–5.5)
PROT SERPL-MCNC: 8 G/DL (ref 6–8.2)
PROT UR QL STRIP: NEGATIVE MG/DL
RBC # BLD AUTO: 5.27 M/UL (ref 4.2–5.4)
RBC UR QL AUTO: NEGATIVE
SODIUM SERPL-SCNC: 141 MMOL/L (ref 135–145)
SP GR UR STRIP.AUTO: 1.02
UROBILINOGEN UR STRIP.AUTO-MCNC: 0.2 MG/DL
WBC # BLD AUTO: 7.5 K/UL (ref 4.8–10.8)

## 2019-04-12 PROCEDURE — 85025 COMPLETE CBC W/AUTO DIFF WBC: CPT

## 2019-04-12 PROCEDURE — 80053 COMPREHEN METABOLIC PANEL: CPT

## 2019-04-12 PROCEDURE — 84703 CHORIONIC GONADOTROPIN ASSAY: CPT

## 2019-04-12 PROCEDURE — 700111 HCHG RX REV CODE 636 W/ 250 OVERRIDE (IP): Performed by: EMERGENCY MEDICINE

## 2019-04-12 PROCEDURE — 81003 URINALYSIS AUTO W/O SCOPE: CPT

## 2019-04-12 PROCEDURE — 99285 EMERGENCY DEPT VISIT HI MDM: CPT

## 2019-04-12 PROCEDURE — 83690 ASSAY OF LIPASE: CPT

## 2019-04-12 PROCEDURE — 96372 THER/PROPH/DIAG INJ SC/IM: CPT

## 2019-04-12 RX ORDER — DICYCLOMINE HYDROCHLORIDE 10 MG/ML
20 INJECTION INTRAMUSCULAR ONCE
Status: COMPLETED | OUTPATIENT
Start: 2019-04-12 | End: 2019-04-12

## 2019-04-12 RX ORDER — ONDANSETRON 4 MG/1
4 TABLET, ORALLY DISINTEGRATING ORAL ONCE
Status: COMPLETED | OUTPATIENT
Start: 2019-04-12 | End: 2019-04-12

## 2019-04-12 RX ORDER — MORPHINE SULFATE 4 MG/ML
4 INJECTION, SOLUTION INTRAMUSCULAR; INTRAVENOUS ONCE
Status: COMPLETED | OUTPATIENT
Start: 2019-04-12 | End: 2019-04-13

## 2019-04-12 RX ADMIN — ONDANSETRON 4 MG: 4 TABLET, ORALLY DISINTEGRATING ORAL at 22:48

## 2019-04-12 RX ADMIN — DICYCLOMINE HYDROCHLORIDE 20 MG: 20 INJECTION, SOLUTION INTRAMUSCULAR at 22:48

## 2019-04-12 ASSESSMENT — ENCOUNTER SYMPTOMS
CHILLS: 0
DIZZINESS: 0
MYALGIAS: 0
NAUSEA: 1
VOMITING: 1
FEVER: 0
PALPITATIONS: 0

## 2019-04-12 ASSESSMENT — PAIN DESCRIPTION - DESCRIPTORS: DESCRIPTORS: CRAMPING

## 2019-04-13 ENCOUNTER — APPOINTMENT (OUTPATIENT)
Dept: RADIOLOGY | Facility: MEDICAL CENTER | Age: 39
End: 2019-04-13
Attending: EMERGENCY MEDICINE
Payer: MEDICAID

## 2019-04-13 ENCOUNTER — HOSPITAL ENCOUNTER (EMERGENCY)
Facility: MEDICAL CENTER | Age: 39
End: 2019-04-13
Attending: EMERGENCY MEDICINE
Payer: MEDICAID

## 2019-04-13 VITALS
WEIGHT: 233.47 LBS | HEART RATE: 70 BPM | BODY MASS INDEX: 32.69 KG/M2 | DIASTOLIC BLOOD PRESSURE: 73 MMHG | RESPIRATION RATE: 16 BRPM | OXYGEN SATURATION: 97 % | SYSTOLIC BLOOD PRESSURE: 122 MMHG | TEMPERATURE: 97 F | HEIGHT: 71 IN

## 2019-04-13 VITALS
SYSTOLIC BLOOD PRESSURE: 145 MMHG | BODY MASS INDEX: 32.62 KG/M2 | OXYGEN SATURATION: 91 % | HEART RATE: 99 BPM | WEIGHT: 233 LBS | HEIGHT: 71 IN | TEMPERATURE: 99.2 F | RESPIRATION RATE: 17 BRPM | DIASTOLIC BLOOD PRESSURE: 96 MMHG

## 2019-04-13 DIAGNOSIS — E86.0 DEHYDRATION: ICD-10-CM

## 2019-04-13 DIAGNOSIS — K63.89 EPIPLOIC APPENDAGITIS: ICD-10-CM

## 2019-04-13 DIAGNOSIS — R10.9 ABDOMINAL PAIN, UNSPECIFIED ABDOMINAL LOCATION: ICD-10-CM

## 2019-04-13 DIAGNOSIS — R11.2 NAUSEA AND VOMITING, INTRACTABILITY OF VOMITING NOT SPECIFIED, UNSPECIFIED VOMITING TYPE: ICD-10-CM

## 2019-04-13 LAB
ALBUMIN SERPL BCP-MCNC: 4.9 G/DL (ref 3.2–4.9)
ALBUMIN/GLOB SERPL: 1.5 G/DL
ALP SERPL-CCNC: 58 U/L (ref 30–99)
ALT SERPL-CCNC: 26 U/L (ref 2–50)
ANION GAP SERPL CALC-SCNC: 14 MMOL/L (ref 0–11.9)
AST SERPL-CCNC: 22 U/L (ref 12–45)
BASOPHILS # BLD AUTO: 0.2 % (ref 0–1.8)
BASOPHILS # BLD: 0.02 K/UL (ref 0–0.12)
BILIRUB SERPL-MCNC: 0.6 MG/DL (ref 0.1–1.5)
BUN SERPL-MCNC: 10 MG/DL (ref 8–22)
CALCIUM SERPL-MCNC: 11 MG/DL (ref 8.5–10.5)
CHLORIDE SERPL-SCNC: 103 MMOL/L (ref 96–112)
CO2 SERPL-SCNC: 20 MMOL/L (ref 20–33)
CREAT SERPL-MCNC: 0.9 MG/DL (ref 0.5–1.4)
EOSINOPHIL # BLD AUTO: 0 K/UL (ref 0–0.51)
EOSINOPHIL NFR BLD: 0 % (ref 0–6.9)
ERYTHROCYTE [DISTWIDTH] IN BLOOD BY AUTOMATED COUNT: 39.8 FL (ref 35.9–50)
GLOBULIN SER CALC-MCNC: 3.3 G/DL (ref 1.9–3.5)
GLUCOSE SERPL-MCNC: 124 MG/DL (ref 65–99)
HCT VFR BLD AUTO: 41.8 % (ref 37–47)
HGB BLD-MCNC: 13.6 G/DL (ref 12–16)
IMM GRANULOCYTES # BLD AUTO: 0.03 K/UL (ref 0–0.11)
IMM GRANULOCYTES NFR BLD AUTO: 0.3 % (ref 0–0.9)
LACTATE BLD-SCNC: 2.3 MMOL/L (ref 0.5–2)
LIPASE SERPL-CCNC: 14 U/L (ref 11–82)
LYMPHOCYTES # BLD AUTO: 1.1 K/UL (ref 1–4.8)
LYMPHOCYTES NFR BLD: 9.5 % (ref 22–41)
MCH RBC QN AUTO: 27 PG (ref 27–33)
MCHC RBC AUTO-ENTMCNC: 32.5 G/DL (ref 33.6–35)
MCV RBC AUTO: 82.9 FL (ref 81.4–97.8)
MONOCYTES # BLD AUTO: 0.58 K/UL (ref 0–0.85)
MONOCYTES NFR BLD AUTO: 5 % (ref 0–13.4)
NEUTROPHILS # BLD AUTO: 9.8 K/UL (ref 2–7.15)
NEUTROPHILS NFR BLD: 85 % (ref 44–72)
NRBC # BLD AUTO: 0 K/UL
NRBC BLD-RTO: 0 /100 WBC
PLATELET # BLD AUTO: 243 K/UL (ref 164–446)
PMV BLD AUTO: 11.5 FL (ref 9–12.9)
POTASSIUM SERPL-SCNC: 3.6 MMOL/L (ref 3.6–5.5)
PROT SERPL-MCNC: 8.2 G/DL (ref 6–8.2)
RBC # BLD AUTO: 5.04 M/UL (ref 4.2–5.4)
SODIUM SERPL-SCNC: 137 MMOL/L (ref 135–145)
WBC # BLD AUTO: 11.5 K/UL (ref 4.8–10.8)

## 2019-04-13 PROCEDURE — 700117 HCHG RX CONTRAST REV CODE 255: Performed by: EMERGENCY MEDICINE

## 2019-04-13 PROCEDURE — 700111 HCHG RX REV CODE 636 W/ 250 OVERRIDE (IP): Performed by: EMERGENCY MEDICINE

## 2019-04-13 PROCEDURE — 83605 ASSAY OF LACTIC ACID: CPT

## 2019-04-13 PROCEDURE — 80053 COMPREHEN METABOLIC PANEL: CPT

## 2019-04-13 PROCEDURE — 96374 THER/PROPH/DIAG INJ IV PUSH: CPT | Mod: XU

## 2019-04-13 PROCEDURE — 700105 HCHG RX REV CODE 258: Performed by: EMERGENCY MEDICINE

## 2019-04-13 PROCEDURE — 99284 EMERGENCY DEPT VISIT MOD MDM: CPT

## 2019-04-13 PROCEDURE — 36415 COLL VENOUS BLD VENIPUNCTURE: CPT

## 2019-04-13 PROCEDURE — 85025 COMPLETE CBC W/AUTO DIFF WBC: CPT

## 2019-04-13 PROCEDURE — 96374 THER/PROPH/DIAG INJ IV PUSH: CPT

## 2019-04-13 PROCEDURE — 96375 TX/PRO/DX INJ NEW DRUG ADDON: CPT

## 2019-04-13 PROCEDURE — 96372 THER/PROPH/DIAG INJ SC/IM: CPT

## 2019-04-13 PROCEDURE — 74177 CT ABD & PELVIS W/CONTRAST: CPT

## 2019-04-13 PROCEDURE — 74176 CT ABD & PELVIS W/O CONTRAST: CPT

## 2019-04-13 PROCEDURE — 83690 ASSAY OF LIPASE: CPT

## 2019-04-13 RX ORDER — ONDANSETRON 4 MG/1
4 TABLET, ORALLY DISINTEGRATING ORAL EVERY 6 HOURS PRN
Qty: 10 TAB | Refills: 0 | Status: SHIPPED | OUTPATIENT
Start: 2019-04-13 | End: 2019-05-17

## 2019-04-13 RX ORDER — SODIUM CHLORIDE 9 MG/ML
1000 INJECTION, SOLUTION INTRAVENOUS ONCE
Status: COMPLETED | OUTPATIENT
Start: 2019-04-13 | End: 2019-04-13

## 2019-04-13 RX ORDER — ONDANSETRON 4 MG/1
4 TABLET, ORALLY DISINTEGRATING ORAL EVERY 6 HOURS PRN
Qty: 10 TAB | Refills: 0 | Status: SHIPPED | OUTPATIENT
Start: 2019-04-13 | End: 2019-04-14

## 2019-04-13 RX ORDER — ONDANSETRON 2 MG/ML
4 INJECTION INTRAMUSCULAR; INTRAVENOUS ONCE
Status: COMPLETED | OUTPATIENT
Start: 2019-04-13 | End: 2019-04-13

## 2019-04-13 RX ORDER — PROMETHAZINE HYDROCHLORIDE 25 MG/1
25 SUPPOSITORY RECTAL EVERY 6 HOURS PRN
Qty: 10 SUPPOSITORY | Refills: 0 | Status: SHIPPED | OUTPATIENT
Start: 2019-04-13 | End: 2019-04-14

## 2019-04-13 RX ORDER — HYDROMORPHONE HYDROCHLORIDE 1 MG/ML
0.5 INJECTION, SOLUTION INTRAMUSCULAR; INTRAVENOUS; SUBCUTANEOUS ONCE
Status: COMPLETED | OUTPATIENT
Start: 2019-04-13 | End: 2019-04-13

## 2019-04-13 RX ORDER — DICYCLOMINE HYDROCHLORIDE 10 MG/1
10 CAPSULE ORAL
Qty: 120 CAP | Refills: 0 | Status: SHIPPED | OUTPATIENT
Start: 2019-04-13 | End: 2019-05-17

## 2019-04-13 RX ORDER — HALOPERIDOL 5 MG/ML
5 INJECTION INTRAMUSCULAR ONCE
Status: COMPLETED | OUTPATIENT
Start: 2019-04-13 | End: 2019-04-13

## 2019-04-13 RX ADMIN — ONDANSETRON 4 MG: 2 INJECTION INTRAMUSCULAR; INTRAVENOUS at 17:38

## 2019-04-13 RX ADMIN — MORPHINE SULFATE 4 MG: 4 INJECTION INTRAVENOUS at 00:04

## 2019-04-13 RX ADMIN — HALOPERIDOL LACTATE 5 MG: 5 INJECTION, SOLUTION INTRAMUSCULAR at 03:26

## 2019-04-13 RX ADMIN — IOHEXOL 100 ML: 350 INJECTION, SOLUTION INTRAVENOUS at 19:19

## 2019-04-13 RX ADMIN — HYDROMORPHONE HYDROCHLORIDE 0.5 MG: 1 INJECTION, SOLUTION INTRAMUSCULAR; INTRAVENOUS; SUBCUTANEOUS at 17:38

## 2019-04-13 RX ADMIN — SODIUM CHLORIDE 1000 ML: 9 INJECTION, SOLUTION INTRAVENOUS at 17:48

## 2019-04-13 RX ADMIN — FENTANYL CITRATE 50 MCG: 50 INJECTION, SOLUTION INTRAMUSCULAR; INTRAVENOUS at 02:15

## 2019-04-13 NOTE — ED NOTES
"Assist RN note: Pt out of room, sat on floor, c/o increased low abd pain, states, \"I need more pain medication.\" Pt reports vomiting in trash can. Escorted pt back to room, pt steady on feet. Dr. Israel informed, further orders received. Pt medicated per orders.   "

## 2019-04-13 NOTE — ED NOTES
Patient given dc instructions and prescription and understands to follow up with digestive health. She ambulated out of department with steady gait.

## 2019-04-13 NOTE — ED TRIAGE NOTES
"Kaela Penny Favian  38 y.o. female  Chief Complaint   Patient presents with   • Abdominal Pain     Pt reports lower abdominal pain off and on since Wednesday.  Pt reports 9/10 cramping pain   • Nausea/Vomiting/Diarrhea     Pt unsure how many episodes but patient states it has been frequent.        Pt ambulated to triage with steady gait for above complaint. Pt extremely tearful, and reports fatigue associated with symptoms.   Pt placed in hallway for blood draw.   Protocol ordered.    Hx: pancreatitis    Blood Pressure:  (unable to obtain, pt will not stay still ), Pulse: (!) 103, Respiration: (!) 24 (pt crying), Temperature: 36.1 °C (97 °F), Height: 180.3 cm (5' 11\"), Weight: 105.9 kg (233 lb 7.5 oz), Pulse Oximetry: 95 %, O2 (LPM): 0  "

## 2019-04-13 NOTE — ED PROVIDER NOTES
ED Provider Note    CHIEF COMPLAINT  Chief Complaint   Patient presents with   • Abdominal Pain     Pt reports lower abdominal pain off and on since Wednesday.  Pt reports 9/10 cramping pain   • Nausea/Vomiting/Diarrhea     Pt unsure how many episodes but patient states it has been frequent.        HPI  Kaela Ballesteros is a 38 y.o. female who presents with nausea vomiting diarrhea.  Mild associated abdominal cramping.  She reports a cramping comes and goes and is in her lower abdomen.  She denies any associated dysuria, hematuria, urgency or frequency.  She recently just finished her cycle, she denies any vaginal discharge.  Patient reports her daughter was recently seen for identical symptoms.  Patient denies any hematemesis or bilious emesis.  Patient reports loose stool without any melena or hematochezia.  Patient denies any history of abdominal surgeries.  Patient denies any fevers.  No recent travel.    REVIEW OF SYSTEMS  Review of Systems   Constitutional: Positive for malaise/fatigue. Negative for chills and fever.   Cardiovascular: Negative for chest pain and palpitations.   Gastrointestinal: Positive for nausea and vomiting.   Genitourinary: Negative for dysuria, frequency and urgency.   Musculoskeletal: Negative for myalgias.   Neurological: Negative for dizziness.       See HPI for further details. All other systems are negative.     PAST MEDICAL HISTORY   has a past medical history of Anemia (2017); Backpain (2001); Diabetes (HCC) (2017); Heart burn; Hypertension (2017); Indigestion (2006); Infectious disease (1995); Other specified symptom associated with female genital organs (2012); Pain (03/03/15); Pancreatitis (2011/2014); Sickle cell trait syndrome; and Urinary tract infection.    SOCIAL HISTORY  Social History     Social History Main Topics   • Smoking status: Former Smoker     Packs/day: 0.50     Years: 15.00     Types: Cigarettes     Quit date: 1/1/2011   • Smokeless tobacco: Never  Used      Comment: occasional   • Alcohol use No      Comment: occasionally   • Drug use: Yes     Types: Marijuana, Inhaled      Comment: marijuana   • Sexual activity: Yes     Partners: Male      Comment: BTL       SURGICAL HISTORY   has a past surgical history that includes pelviscopy (1/13/2012); dilation and curettage (12/3/2012); egd w/endoscopic ultrasound (1/26/2015); gastroscopy with biopsy (1/26/2015); primary c section (9/30/2015); primary c section with tubal ligation (5/2/2017); abdominal exploration (2016); and cholecystectomy.    CURRENT MEDICATIONS  Home Medications     Reviewed by Alistair Romero R.N. (Registered Nurse) on 04/12/19 at 2100  Med List Status: Partial   Medication Last Dose Status   docusate sodium 100 MG Cap  Active   ibuprofen (MOTRIN) 800 MG Tab  Active   oxycodone-acetaminophen (PERCOCET-10)  MG Tab  Active   Prenatal MV-Min-Fe Fum-FA-DHA (PRENATAL 1) 30-0.975-200 MG Cap  Active   simethicone (MYLICON) 80 MG Chew Tab  Active                ALLERGIES  Allergies   Allergen Reactions   • Nkda [No Known Drug Allergy]        PHYSICAL EXAM  Physical Exam   Constitutional: She is oriented to person, place, and time. She appears well-developed and well-nourished.   HENT:   Head: Normocephalic and atraumatic.   Eyes: Conjunctivae are normal.   Neck: Normal range of motion. Neck supple.   Cardiovascular: Normal rate and regular rhythm.    Pulmonary/Chest: Effort normal and breath sounds normal.   Abdominal: Soft. Bowel sounds are normal. She exhibits no distension. There is no tenderness. There is no rebound.   Neurological: She is alert and oriented to person, place, and time.   Skin: Skin is warm and dry. No rash noted.   Psychiatric: She has a normal mood and affect. Her behavior is normal.         DIAGNOSTIC STUDIES / PROCEDURES      LABS  Results for orders placed or performed during the hospital encounter of 04/12/19   CBC WITH DIFFERENTIAL   Result Value Ref Range    WBC 7.5  4.8 - 10.8 K/uL    RBC 5.27 4.20 - 5.40 M/uL    Hemoglobin 14.4 12.0 - 16.0 g/dL    Hematocrit 44.4 37.0 - 47.0 %    MCV 84.3 81.4 - 97.8 fL    MCH 27.3 27.0 - 33.0 pg    MCHC 32.4 (L) 33.6 - 35.0 g/dL    RDW 40.1 35.9 - 50.0 fL    Platelet Count 262 164 - 446 K/uL    MPV 11.6 9.0 - 12.9 fL    Neutrophils-Polys 74.80 (H) 44.00 - 72.00 %    Lymphocytes 18.00 (L) 22.00 - 41.00 %    Monocytes 5.80 0.00 - 13.40 %    Eosinophils 0.40 0.00 - 6.90 %    Basophils 0.70 0.00 - 1.80 %    Immature Granulocytes 0.30 0.00 - 0.90 %    Nucleated RBC 0.00 /100 WBC    Neutrophils (Absolute) 5.58 2.00 - 7.15 K/uL    Lymphs (Absolute) 1.34 1.00 - 4.80 K/uL    Monos (Absolute) 0.43 0.00 - 0.85 K/uL    Eos (Absolute) 0.03 0.00 - 0.51 K/uL    Baso (Absolute) 0.05 0.00 - 0.12 K/uL    Immature Granulocytes (abs) 0.02 0.00 - 0.11 K/uL    NRBC (Absolute) 0.00 K/uL   COMP METABOLIC PANEL   Result Value Ref Range    Sodium 141 135 - 145 mmol/L    Potassium 3.8 3.6 - 5.5 mmol/L    Chloride 104 96 - 112 mmol/L    Co2 22 20 - 33 mmol/L    Anion Gap 15.0 (H) 0.0 - 11.9    Glucose 125 (H) 65 - 99 mg/dL    Bun 9 8 - 22 mg/dL    Creatinine 0.92 0.50 - 1.40 mg/dL    Calcium 10.3 8.5 - 10.5 mg/dL    AST(SGOT) 20 12 - 45 U/L    ALT(SGPT) 24 2 - 50 U/L    Alkaline Phosphatase 60 30 - 99 U/L    Total Bilirubin 0.6 0.1 - 1.5 mg/dL    Albumin 4.8 3.2 - 4.9 g/dL    Total Protein 8.0 6.0 - 8.2 g/dL    Globulin 3.2 1.9 - 3.5 g/dL    A-G Ratio 1.5 g/dL   LIPASE   Result Value Ref Range    Lipase 12 11 - 82 U/L   URINALYSIS,CULTURE IF INDICATED   Result Value Ref Range    Color Yellow     Character Clear     Specific Gravity 1.016 <1.035    Ph 8.0 5.0 - 8.0    Glucose Negative Negative mg/dL    Ketones Negative Negative mg/dL    Protein Negative Negative mg/dL    Bilirubin Negative Negative    Urobilinogen, Urine 0.2 Negative    Nitrite Negative Negative    Leukocyte Esterase Negative Negative    Occult Blood Negative Negative    Micro Urine Req see below     ESTIMATED GFR   Result Value Ref Range    GFR If African American >60 >60 mL/min/1.73 m 2    GFR If Non African American >60 >60 mL/min/1.73 m 2   BETA-HCG QUALITATIVE SERUM   Result Value Ref Range    Beta-Hcg Qualitative Serum Negative Negative   Blood Culture,Hold   Result Value Ref Range    Blood Culture Hold Collected      RADIOLOGY  CT-ABDOMEN-PELVIS WITH    (Results Pending)       COURSE & MEDICAL DECISION MAKING  Pertinent Labs & Imaging studies reviewed. (See chart for details)  Patient with likely gastroenteritis, this is more likely given patient with recent sick contacts, her daughter.  She does not have any blood cultures or fever to suggest bacterial enteritis at this point.  Patient's abdominal exam is benign, she has no focal tenderness to suggest a surgical pathology.  She has no bilious emesis or distention to suggest bowel obstruction.  Will check basic labs and give Bentyl and Zofran.  Patient will be reassessed.  Patient's labs are unremarkable.  Given patient is remaining in pain will check CT for possible atypical presentation of small bowel obstruction do not believe this is less likely.  Fortunately there is been a delay in patient's CT as patient's IV blew immediately prior to the CT, she did not have any contrast infusion prior to this.  We are having considerable physical the placing of her IV, given patient with a considerable adipose load, I believe this will provide enough contrast for detection of obvious surgical pathology.  I believe a low pretest probability of surgical pathology in this well-appearing patient.  I believe torsion is highly unlikely in this patient given her benign exam and no focal tenderness.  She will be followed up by oncoming emergency physician, if CT is normal and patient's pain is well controlled she will be discharged home.      FINAL IMPRESSION  1.  Abdominal pain      Electronically signed by: Joshua Mackey, 4/12/2019 9:55 PM
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
## 2019-04-13 NOTE — ED PROVIDER NOTES
ED Provider Note    Patient was signed out from Dr. Mackey with CT abdomen/pelvis pending.  Please see his note for the initial evaluation and management.  CT abdomen/pelvis without significant acute findings except for epiploic appendagitis.  Labs were essentially unremarkable.  Patient was given Haldol for her nausea and vomiting with no subsequent vomiting noted in the ED.  I discussed the findings with the patient.  She is noted to be ambulating without difficulty and in no acute distress, nontoxic in appearance.  She has been hemodynamically stable.  No clinical evidence of acute surgical abdomen.  She will be given outpatient referral to GI for follow-up.  I will also give her prescription of Zofran to use as needed.  Return to ED precautions were discussed and given.  She verbalized understanding and agreed with plan of care with no further questions or concerns.

## 2019-04-13 NOTE — ED NOTES
Assumed care for this patient who upon receiving report from JOHANNA Monaco was screaming in her room reportedly due to pain, no active vomiting noted, Dr. Israel notified of patient's behavior, orders to continue to observe and dc in 30 mins.

## 2019-04-13 NOTE — ED NOTES
"Assist RN note: Pt walked to nurses station with steady gait. Pt states, \"Eveything I have gotten for pain is not working. I was here before and they gave me dilaudid and it worked. When is the doctor coming? I need some pain medication.\" Advised pt her chart is up for re-eval and the doctor will be in shortly. Pt walked back to room with steady gait.   "

## 2019-04-13 NOTE — ED TRIAGE NOTES
Chief Complaint   Patient presents with   • N/V     pt reports N/V since Wed. pt explains taking presribed meds and still vomiting. told to come back if not better.      Explained to pt triage process, made pt aware to tell this RN/staff of any changes/concerns, pt verbalized understanding of process and instructions given. Pt to ER lobby.

## 2019-04-14 ENCOUNTER — HOSPITAL ENCOUNTER (OUTPATIENT)
Facility: MEDICAL CENTER | Age: 39
End: 2019-04-15
Attending: EMERGENCY MEDICINE | Admitting: HOSPITALIST
Payer: MEDICAID

## 2019-04-14 DIAGNOSIS — R11.15 INTRACTABLE CYCLICAL VOMITING WITH NAUSEA: ICD-10-CM

## 2019-04-14 DIAGNOSIS — R10.84 GENERALIZED ABDOMINAL PAIN: ICD-10-CM

## 2019-04-14 LAB
ALBUMIN SERPL BCP-MCNC: 4.8 G/DL (ref 3.2–4.9)
ALBUMIN/GLOB SERPL: 1.4 G/DL
ALP SERPL-CCNC: 62 U/L (ref 30–99)
ALT SERPL-CCNC: 28 U/L (ref 2–50)
AMPHET UR QL SCN: NEGATIVE
ANION GAP SERPL CALC-SCNC: 10 MMOL/L (ref 0–11.9)
APPEARANCE UR: ABNORMAL
AST SERPL-CCNC: 36 U/L (ref 12–45)
BACTERIA #/AREA URNS HPF: ABNORMAL /HPF
BARBITURATES UR QL SCN: POSITIVE
BASOPHILS # BLD AUTO: 0.3 % (ref 0–1.8)
BASOPHILS # BLD: 0.03 K/UL (ref 0–0.12)
BENZODIAZ UR QL SCN: NEGATIVE
BILIRUB SERPL-MCNC: 0.6 MG/DL (ref 0.1–1.5)
BILIRUB UR QL STRIP.AUTO: NEGATIVE
BUN SERPL-MCNC: 9 MG/DL (ref 8–22)
BZE UR QL SCN: NEGATIVE
CALCIUM SERPL-MCNC: 10.4 MG/DL (ref 8.5–10.5)
CANNABINOIDS UR QL SCN: POSITIVE
CHLORIDE SERPL-SCNC: 103 MMOL/L (ref 96–112)
CO2 SERPL-SCNC: 22 MMOL/L (ref 20–33)
COLOR UR: YELLOW
CREAT SERPL-MCNC: 0.83 MG/DL (ref 0.5–1.4)
EOSINOPHIL # BLD AUTO: 0 K/UL (ref 0–0.51)
EOSINOPHIL NFR BLD: 0 % (ref 0–6.9)
EPI CELLS #/AREA URNS HPF: ABNORMAL /HPF
ERYTHROCYTE [DISTWIDTH] IN BLOOD BY AUTOMATED COUNT: 41.4 FL (ref 35.9–50)
GLOBULIN SER CALC-MCNC: 3.5 G/DL (ref 1.9–3.5)
GLUCOSE SERPL-MCNC: 135 MG/DL (ref 65–99)
GLUCOSE UR STRIP.AUTO-MCNC: NEGATIVE MG/DL
HCT VFR BLD AUTO: 44.9 % (ref 37–47)
HGB BLD-MCNC: 14.2 G/DL (ref 12–16)
IMM GRANULOCYTES # BLD AUTO: 0.03 K/UL (ref 0–0.11)
IMM GRANULOCYTES NFR BLD AUTO: 0.3 % (ref 0–0.9)
KETONES UR STRIP.AUTO-MCNC: NEGATIVE MG/DL
LACTATE BLD-SCNC: 1.8 MMOL/L (ref 0.5–2)
LEUKOCYTE ESTERASE UR QL STRIP.AUTO: ABNORMAL
LYMPHOCYTES # BLD AUTO: 1.18 K/UL (ref 1–4.8)
LYMPHOCYTES NFR BLD: 11.5 % (ref 22–41)
MCH RBC QN AUTO: 27 PG (ref 27–33)
MCHC RBC AUTO-ENTMCNC: 31.6 G/DL (ref 33.6–35)
MCV RBC AUTO: 85.5 FL (ref 81.4–97.8)
METHADONE UR QL SCN: NEGATIVE
MICRO URNS: ABNORMAL
MONOCYTES # BLD AUTO: 0.53 K/UL (ref 0–0.85)
MONOCYTES NFR BLD AUTO: 5.2 % (ref 0–13.4)
MUCOUS THREADS #/AREA URNS HPF: ABNORMAL /HPF
NEUTROPHILS # BLD AUTO: 8.46 K/UL (ref 2–7.15)
NEUTROPHILS NFR BLD: 82.7 % (ref 44–72)
NITRITE UR QL STRIP.AUTO: POSITIVE
NRBC # BLD AUTO: 0 K/UL
NRBC BLD-RTO: 0 /100 WBC
OPIATES UR QL SCN: NEGATIVE
OXYCODONE UR QL SCN: NEGATIVE
PCP UR QL SCN: NEGATIVE
PH UR STRIP.AUTO: 6.5 [PH]
PLATELET # BLD AUTO: 263 K/UL (ref 164–446)
PMV BLD AUTO: 11.1 FL (ref 9–12.9)
POTASSIUM SERPL-SCNC: 4.2 MMOL/L (ref 3.6–5.5)
PROPOXYPH UR QL SCN: NEGATIVE
PROT SERPL-MCNC: 8.3 G/DL (ref 6–8.2)
PROT UR QL STRIP: 100 MG/DL
RBC # BLD AUTO: 5.25 M/UL (ref 4.2–5.4)
RBC # URNS HPF: ABNORMAL /HPF
RBC UR QL AUTO: ABNORMAL
SODIUM SERPL-SCNC: 135 MMOL/L (ref 135–145)
SP GR UR STRIP.AUTO: 1.02
UROBILINOGEN UR STRIP.AUTO-MCNC: 1 MG/DL
WBC # BLD AUTO: 10.2 K/UL (ref 4.8–10.8)
WBC #/AREA URNS HPF: ABNORMAL /HPF

## 2019-04-14 PROCEDURE — G0378 HOSPITAL OBSERVATION PER HR: HCPCS

## 2019-04-14 PROCEDURE — 700111 HCHG RX REV CODE 636 W/ 250 OVERRIDE (IP): Performed by: STUDENT IN AN ORGANIZED HEALTH CARE EDUCATION/TRAINING PROGRAM

## 2019-04-14 PROCEDURE — A9270 NON-COVERED ITEM OR SERVICE: HCPCS | Performed by: STUDENT IN AN ORGANIZED HEALTH CARE EDUCATION/TRAINING PROGRAM

## 2019-04-14 PROCEDURE — 81001 URINALYSIS AUTO W/SCOPE: CPT | Mod: XU

## 2019-04-14 PROCEDURE — 96375 TX/PRO/DX INJ NEW DRUG ADDON: CPT

## 2019-04-14 PROCEDURE — 96374 THER/PROPH/DIAG INJ IV PUSH: CPT

## 2019-04-14 PROCEDURE — 96372 THER/PROPH/DIAG INJ SC/IM: CPT

## 2019-04-14 PROCEDURE — 700101 HCHG RX REV CODE 250: Performed by: EMERGENCY MEDICINE

## 2019-04-14 PROCEDURE — 700102 HCHG RX REV CODE 250 W/ 637 OVERRIDE(OP): Performed by: STUDENT IN AN ORGANIZED HEALTH CARE EDUCATION/TRAINING PROGRAM

## 2019-04-14 PROCEDURE — 80307 DRUG TEST PRSMV CHEM ANLYZR: CPT

## 2019-04-14 PROCEDURE — 36415 COLL VENOUS BLD VENIPUNCTURE: CPT

## 2019-04-14 PROCEDURE — 700105 HCHG RX REV CODE 258: Performed by: STUDENT IN AN ORGANIZED HEALTH CARE EDUCATION/TRAINING PROGRAM

## 2019-04-14 PROCEDURE — 700111 HCHG RX REV CODE 636 W/ 250 OVERRIDE (IP): Performed by: EMERGENCY MEDICINE

## 2019-04-14 PROCEDURE — 85025 COMPLETE CBC W/AUTO DIFF WBC: CPT

## 2019-04-14 PROCEDURE — 83605 ASSAY OF LACTIC ACID: CPT

## 2019-04-14 PROCEDURE — 700105 HCHG RX REV CODE 258: Performed by: EMERGENCY MEDICINE

## 2019-04-14 PROCEDURE — 700111 HCHG RX REV CODE 636 W/ 250 OVERRIDE (IP): Performed by: HOSPITALIST

## 2019-04-14 PROCEDURE — 80053 COMPREHEN METABOLIC PANEL: CPT

## 2019-04-14 PROCEDURE — 99285 EMERGENCY DEPT VISIT HI MDM: CPT

## 2019-04-14 RX ORDER — ONDANSETRON 2 MG/ML
4 INJECTION INTRAMUSCULAR; INTRAVENOUS EVERY 4 HOURS PRN
Status: DISCONTINUED | OUTPATIENT
Start: 2019-04-14 | End: 2019-04-14

## 2019-04-14 RX ORDER — POLYETHYLENE GLYCOL 3350 17 G/17G
1 POWDER, FOR SOLUTION ORAL
Status: DISCONTINUED | OUTPATIENT
Start: 2019-04-14 | End: 2019-04-15 | Stop reason: HOSPADM

## 2019-04-14 RX ORDER — PROMETHAZINE HYDROCHLORIDE 25 MG/1
12.5-25 TABLET ORAL EVERY 4 HOURS PRN
Status: DISCONTINUED | OUTPATIENT
Start: 2019-04-14 | End: 2019-04-15 | Stop reason: HOSPADM

## 2019-04-14 RX ORDER — HYDRALAZINE HYDROCHLORIDE 20 MG/ML
10 INJECTION INTRAMUSCULAR; INTRAVENOUS EVERY 4 HOURS PRN
Status: DISCONTINUED | OUTPATIENT
Start: 2019-04-14 | End: 2019-04-15 | Stop reason: HOSPADM

## 2019-04-14 RX ORDER — ACETAMINOPHEN 325 MG/1
650 TABLET ORAL EVERY 6 HOURS PRN
Status: DISCONTINUED | OUTPATIENT
Start: 2019-04-14 | End: 2019-04-15 | Stop reason: HOSPADM

## 2019-04-14 RX ORDER — SODIUM CHLORIDE 9 MG/ML
1000 INJECTION, SOLUTION INTRAVENOUS CONTINUOUS
Status: ACTIVE | OUTPATIENT
Start: 2019-04-14 | End: 2019-04-14

## 2019-04-14 RX ORDER — KETOROLAC TROMETHAMINE 30 MG/ML
30 INJECTION, SOLUTION INTRAMUSCULAR; INTRAVENOUS EVERY 6 HOURS PRN
Status: DISCONTINUED | OUTPATIENT
Start: 2019-04-14 | End: 2019-04-15 | Stop reason: HOSPADM

## 2019-04-14 RX ORDER — ONDANSETRON 2 MG/ML
4 INJECTION INTRAMUSCULAR; INTRAVENOUS EVERY 4 HOURS PRN
Status: DISCONTINUED | OUTPATIENT
Start: 2019-04-14 | End: 2019-04-15 | Stop reason: HOSPADM

## 2019-04-14 RX ORDER — MORPHINE SULFATE 4 MG/ML
2 INJECTION, SOLUTION INTRAMUSCULAR; INTRAVENOUS EVERY 4 HOURS PRN
Status: DISCONTINUED | OUTPATIENT
Start: 2019-04-14 | End: 2019-04-15 | Stop reason: HOSPADM

## 2019-04-14 RX ORDER — PROMETHAZINE HYDROCHLORIDE 25 MG/1
12.5-25 SUPPOSITORY RECTAL EVERY 4 HOURS PRN
Status: DISCONTINUED | OUTPATIENT
Start: 2019-04-14 | End: 2019-04-15 | Stop reason: HOSPADM

## 2019-04-14 RX ORDER — ONDANSETRON 4 MG/1
4 TABLET, ORALLY DISINTEGRATING ORAL EVERY 4 HOURS PRN
Status: DISCONTINUED | OUTPATIENT
Start: 2019-04-14 | End: 2019-04-15 | Stop reason: HOSPADM

## 2019-04-14 RX ORDER — BISACODYL 10 MG
10 SUPPOSITORY, RECTAL RECTAL
Status: DISCONTINUED | OUTPATIENT
Start: 2019-04-14 | End: 2019-04-15 | Stop reason: HOSPADM

## 2019-04-14 RX ORDER — HALOPERIDOL 5 MG/ML
2 INJECTION INTRAMUSCULAR ONCE
Status: COMPLETED | OUTPATIENT
Start: 2019-04-14 | End: 2019-04-14

## 2019-04-14 RX ORDER — KETOROLAC TROMETHAMINE 30 MG/ML
30 INJECTION, SOLUTION INTRAMUSCULAR; INTRAVENOUS ONCE
Status: COMPLETED | OUTPATIENT
Start: 2019-04-14 | End: 2019-04-14

## 2019-04-14 RX ORDER — DIPHENHYDRAMINE HCL 25 MG
50 TABLET ORAL ONCE
Status: COMPLETED | OUTPATIENT
Start: 2019-04-14 | End: 2019-04-14

## 2019-04-14 RX ORDER — ONDANSETRON 2 MG/ML
4 INJECTION INTRAMUSCULAR; INTRAVENOUS ONCE
Status: COMPLETED | OUTPATIENT
Start: 2019-04-14 | End: 2019-04-14

## 2019-04-14 RX ORDER — AMOXICILLIN 250 MG
2 CAPSULE ORAL 2 TIMES DAILY
Status: DISCONTINUED | OUTPATIENT
Start: 2019-04-14 | End: 2019-04-15 | Stop reason: HOSPADM

## 2019-04-14 RX ORDER — SODIUM CHLORIDE 9 MG/ML
INJECTION, SOLUTION INTRAVENOUS CONTINUOUS
Status: DISCONTINUED | OUTPATIENT
Start: 2019-04-14 | End: 2019-04-15 | Stop reason: HOSPADM

## 2019-04-14 RX ADMIN — KETOROLAC TROMETHAMINE 30 MG: 30 INJECTION, SOLUTION INTRAMUSCULAR; INTRAVENOUS at 06:29

## 2019-04-14 RX ADMIN — HALOPERIDOL LACTATE 2 MG: 5 INJECTION, SOLUTION INTRAMUSCULAR at 06:31

## 2019-04-14 RX ADMIN — KETAMINE HYDROCHLORIDE 25 MG: 10 INJECTION, SOLUTION INTRAMUSCULAR; INTRAVENOUS at 11:38

## 2019-04-14 RX ADMIN — PROMETHAZINE HYDROCHLORIDE 25 MG: 25 TABLET ORAL at 18:39

## 2019-04-14 RX ADMIN — ENOXAPARIN SODIUM 40 MG: 100 INJECTION SUBCUTANEOUS at 10:18

## 2019-04-14 RX ADMIN — SENNOSIDES, DOCUSATE SODIUM 2 TABLET: 50; 8.6 TABLET, FILM COATED ORAL at 10:19

## 2019-04-14 RX ADMIN — MORPHINE SULFATE 2 MG: 4 INJECTION INTRAVENOUS at 10:16

## 2019-04-14 RX ADMIN — SODIUM CHLORIDE: 900 INJECTION INTRAVENOUS at 10:17

## 2019-04-14 RX ADMIN — PROCHLORPERAZINE EDISYLATE 10 MG: 5 INJECTION INTRAMUSCULAR; INTRAVENOUS at 13:41

## 2019-04-14 RX ADMIN — DIPHENHYDRAMINE HCL 50 MG: 25 TABLET ORAL at 23:26

## 2019-04-14 RX ADMIN — ONDANSETRON 4 MG: 2 SOLUTION INTRAMUSCULAR; INTRAVENOUS at 06:29

## 2019-04-14 RX ADMIN — SODIUM CHLORIDE 1000 ML: 9 INJECTION, SOLUTION INTRAVENOUS at 06:29

## 2019-04-14 RX ADMIN — ONDANSETRON 4 MG: 4 TABLET, ORALLY DISINTEGRATING ORAL at 20:12

## 2019-04-14 RX ADMIN — SENNOSIDES, DOCUSATE SODIUM 2 TABLET: 50; 8.6 TABLET, FILM COATED ORAL at 18:20

## 2019-04-14 ASSESSMENT — COGNITIVE AND FUNCTIONAL STATUS - GENERAL
MOBILITY SCORE: 24
SUGGESTED CMS G CODE MODIFIER MOBILITY: CH
DAILY ACTIVITIY SCORE: 24
SUGGESTED CMS G CODE MODIFIER DAILY ACTIVITY: CH

## 2019-04-14 ASSESSMENT — COPD QUESTIONNAIRES
DO YOU EVER COUGH UP ANY MUCUS OR PHLEGM?: NO/ONLY WITH OCCASIONAL COLDS OR INFECTIONS
IN THE PAST 12 MONTHS DO YOU DO LESS THAN YOU USED TO BECAUSE OF YOUR BREATHING PROBLEMS: DISAGREE/UNSURE
DURING THE PAST 4 WEEKS HOW MUCH DID YOU FEEL SHORT OF BREATH: NONE/LITTLE OF THE TIME
COPD SCREENING SCORE: 2
HAVE YOU SMOKED AT LEAST 100 CIGARETTES IN YOUR ENTIRE LIFE: YES

## 2019-04-14 ASSESSMENT — LIFESTYLE VARIABLES: ALCOHOL_USE: NO

## 2019-04-14 ASSESSMENT — PATIENT HEALTH QUESTIONNAIRE - PHQ9
2. FEELING DOWN, DEPRESSED, IRRITABLE, OR HOPELESS: NOT AT ALL
1. LITTLE INTEREST OR PLEASURE IN DOING THINGS: NOT AT ALL
SUM OF ALL RESPONSES TO PHQ9 QUESTIONS 1 AND 2: 0

## 2019-04-14 NOTE — ED TRIAGE NOTES
Patient to ED via [facility] from home for generalized abd pain. Patient was seen yesterday for same complaint and discharged. Unable to fill prescriptions and states the pain has not improved. Patient appeared calm and in no distress after immediately arriving to ED. However, after getting on cot patient began crying hysterically and rolling around complaining of pain. After a few minutes, patient stopped and again appeared calm. Got up and drank water from the sink, turned the lights off, and appeared to fall asleep. Patient received 600mg of ibuprofen en route.

## 2019-04-14 NOTE — ED NOTES
Patient updated to plan of care, resting on gurney, no distress noted. Respirations even and unlabored.

## 2019-04-14 NOTE — ED NOTES
Report to JOHANNA Wilkes. [phone]. Saline to be continued to the floor and ketamine ordered reported to oncoming RN. Lock box not currently available.

## 2019-04-14 NOTE — ED PROVIDER NOTES
ED Provider Note    Scribed for Dhara Huffman M.D. by Nadia Rod. 4/14/2019  6:02 AM    Primary care provider: Pcp Pt States None  Means of arrival: Ambulance  History obtained from: Patient  History limited by: None    CHIEF COMPLAINT  Chief Complaint   Patient presents with   • Abdominal Pain     HPI  Kaela Ballesteros is a 38 y.o. female who presents to the Emergency Department brought in by ambulance from home for generalized abdominal pain with associated nausea, vomiting, and diarrhea. Patient had a cholecystectomy but still is unable to keep anything down. She was seen here the last two days for this same complaint and treated with IV fluids. She had lab work done yesterday evening showing an elevated white count and a CT showing a small focal area of colitis. Denies bloody or mucousy stool, fever, chills.     REVIEW OF SYSTEMS  HEENT:  No ear pain, congestion, or sore throat   EYES: no discharge, redness, or vision changes  CARDIAC: no chest pain, no palpitations    PULMONARY: no dyspnea, cough, or congestion   GI: Positive for abdominal pain, nausea, vomiting, and diarrhea. No hematochezia or mucous in stool.  : no dysuria, back pain, or hematuria   Neuro: no weakness, numbness, aphasia, or headache  Musculoskeletal: no swelling, deformity, pain, or joint swelling  Endocrine: no fevers, chills, sweating, or weight loss   SKIN: no rash, erythema, or contusions     See history of present illness. All other systems are negative.     PAST MEDICAL HISTORY   has a past medical history of Anemia (2017); Backpain (2001); Diabetes (HCC) (2017); Heart burn; Hypertension (2017); Indigestion (2006); Infectious disease (1995); Other specified symptom associated with female genital organs (2012); Pain (03/03/15); Pancreatitis (2011/2014); Sickle cell trait syndrome; and Urinary tract infection.    SURGICAL HISTORY   has a past surgical history that includes pelviscopy (1/13/2012); dilation and  curettage (12/3/2012); egd w/endoscopic ultrasound (1/26/2015); gastroscopy with biopsy (1/26/2015); primary c section (9/30/2015); primary c section with tubal ligation (5/2/2017); abdominal exploration (2016); and cholecystectomy.    SOCIAL HISTORY  Social History   Substance Use Topics   • Smoking status: Former Smoker     Packs/day: 0.50     Years: 15.00     Types: Cigarettes     Quit date: 1/1/2011   • Smokeless tobacco: Never Used      Comment: occasional   • Alcohol use No      Comment: occasionally      History   Drug Use   • Types: Marijuana, Inhaled     Comment: marijuana       FAMILY HISTORY  Family History   Problem Relation Age of Onset   • Diabetes Father    • Diabetes Brother    • Diabetes Paternal Grandmother        CURRENT MEDICATIONS    Current Facility-Administered Medications:   •  NS infusion 1,000 mL, 1,000 mL, Intravenous, Continuous, Dhara Huffman M.D., Last Rate: 1,000 mL/hr at 04/14/19 0629, 1,000 mL at 04/14/19 0629    Current Outpatient Prescriptions:   •  dicyclomine (BENTYL) 10 MG Cap, Take 1 Cap by mouth 4 Times a Day,Before Meals and at Bedtime., Disp: 120 Cap, Rfl: 0  •  ondansetron (ZOFRAN ODT) 4 MG TABLET DISPERSIBLE, Take 1 Tab by mouth every 6 hours as needed for Nausea., Disp: 10 Tab, Rfl: 0  •  ondansetron (ZOFRAN ODT) 4 MG TABLET DISPERSIBLE, Take 1 Tab by mouth every 6 hours as needed., Disp: 10 Tab, Rfl: 0  •  promethazine (PHENERGAN) 25 MG Suppos, Insert 1 Suppository in rectum every 6 hours as needed for Nausea/Vomiting., Disp: 10 Suppository, Rfl: 0  •  oxycodone-acetaminophen (PERCOCET-10)  MG Tab, Take 1 Tab by mouth every four hours as needed (1/2 tab for moderate pin (pain scale 4-6/10) and 1 tab for Severe Pain (Pain Scale 7-10) after delivery). (Patient not taking: Reported on 11/5/2018), Disp: 30 Tab, Rfl: 0  •  ibuprofen (MOTRIN) 800 MG Tab, Take 1 Tab by mouth every 8 hours as needed (For cramping after delivery; do not give if patient is receiving  "ketorolac (Toradol)). (Patient not taking: Reported on 11/5/2018), Disp: 30 Tab, Rfl: 0  •  docusate sodium 100 MG Cap, Take 100 mg by mouth 2 times a day as needed for Constipation. (Patient not taking: Reported on 11/5/2018), Disp: 60 Cap, Rfl: 0  •  simethicone (MYLICON) 80 MG Chew Tab, Take 1 Tab by mouth 4 times a day. (Patient not taking: Reported on 11/5/2018), Disp: 30 Tab, Rfl: 0  •  Prenatal MV-Min-Fe Fum-FA-DHA (PRENATAL 1) 30-0.975-200 MG Cap, Take 1 Capsule by mouth every day. (Patient not taking: Reported on 11/5/2018), Disp: 30 Cap, Rfl: 0    ALLERGIES  Allergies   Allergen Reactions   • Nkda [No Known Drug Allergy]        PHYSICAL EXAM  VITAL SIGNS: /99   Pulse 86   Resp 18   Ht 1.803 m (5' 11\")   Wt 105.7 kg (233 lb)   LMP 04/08/2019 (Approximate)   BMI 32.50 kg/m²     Constitutional: Well developed, Well nourished, No acute distress, Non-toxic appearance.   HEENT: Normocephalic, Atraumatic, external ears normal, pharynx pink,  Mucous  Membranes moist, No rhinorrhea or mucosal edema  Eyes: PERRL, EOMI, Conjunctiva normal, No discharge.   Neck: Normal range of motion, No tenderness, Supple, No stridor.   Lymphatic: No lymphadenopathy    Cardiovascular: Regular Rate and Rhythm, No murmurs,  rubs, or gallops.   Thorax & Lungs: Lungs clear to auscultation bilaterally, No respiratory distress, No wheezes, rhales or rhonchi, No chest wall tenderness.   Abdomen: Diffuse tenderness. Obese. Bowel sounds normal, Soft, non distended,  No pulsatile masses., no rebound guarding or peritoneal signs.   Skin: Warm, Dry, No erythema, No rash,   Back:  No CVA tenderness,  No spinal tenderness, bony crepitance, step offs, or instability.   Neurologic: Alert & oriented x 3, Normal motor function, Normal sensory function, No focal deficits noted. Normal reflexes. Normal Cranial Nerves.  Extremities: Equal, intact distal pulses, No cyanosis, clubbing or edema,  No tenderness.   Musculoskeletal: Good range of " motion in all major joints. No tenderness to palpation or major deformities noted.       DIAGNOSTIC STUDIES / PROCEDURES    LABS  Results for orders placed or performed during the hospital encounter of 04/14/19   CBC WITH DIFFERENTIAL   Result Value Ref Range    WBC 10.2 4.8 - 10.8 K/uL    RBC 5.25 4.20 - 5.40 M/uL    Hemoglobin 14.2 12.0 - 16.0 g/dL    Hematocrit 44.9 37.0 - 47.0 %    MCV 85.5 81.4 - 97.8 fL    MCH 27.0 27.0 - 33.0 pg    MCHC 31.6 (L) 33.6 - 35.0 g/dL    RDW 41.4 35.9 - 50.0 fL    Platelet Count 263 164 - 446 K/uL    MPV 11.1 9.0 - 12.9 fL    Neutrophils-Polys 82.70 (H) 44.00 - 72.00 %    Lymphocytes 11.50 (L) 22.00 - 41.00 %    Monocytes 5.20 0.00 - 13.40 %    Eosinophils 0.00 0.00 - 6.90 %    Basophils 0.30 0.00 - 1.80 %    Immature Granulocytes 0.30 0.00 - 0.90 %    Nucleated RBC 0.00 /100 WBC    Neutrophils (Absolute) 8.46 (H) 2.00 - 7.15 K/uL    Lymphs (Absolute) 1.18 1.00 - 4.80 K/uL    Monos (Absolute) 0.53 0.00 - 0.85 K/uL    Eos (Absolute) 0.00 0.00 - 0.51 K/uL    Baso (Absolute) 0.03 0.00 - 0.12 K/uL    Immature Granulocytes (abs) 0.03 0.00 - 0.11 K/uL    NRBC (Absolute) 0.00 K/uL   CMP   Result Value Ref Range    Sodium 135 135 - 145 mmol/L    Potassium 4.2 3.6 - 5.5 mmol/L    Chloride 103 96 - 112 mmol/L    Co2 22 20 - 33 mmol/L    Anion Gap 10.0 0.0 - 11.9    Glucose 135 (H) 65 - 99 mg/dL    Bun 9 8 - 22 mg/dL    Creatinine 0.83 0.50 - 1.40 mg/dL    Calcium 10.4 8.5 - 10.5 mg/dL    AST(SGOT) 36 12 - 45 U/L    ALT(SGPT) 28 2 - 50 U/L    Alkaline Phosphatase 62 30 - 99 U/L    Total Bilirubin 0.6 0.1 - 1.5 mg/dL    Albumin 4.8 3.2 - 4.9 g/dL    Total Protein 8.3 (H) 6.0 - 8.2 g/dL    Globulin 3.5 1.9 - 3.5 g/dL    A-G Ratio 1.4 g/dL   LACTIC ACID   Result Value Ref Range    Lactic Acid 1.8 0.5 - 2.0 mmol/L   URINE DRUG SCREEN   Result Value Ref Range    Amphetamines Urine Negative Negative    Barbiturates Positive (A) Negative    Benzodiazepines Negative Negative    Cocaine Metabolite  Negative Negative    Methadone Negative Negative    Opiates Negative Negative    Oxycodone Negative Negative    Phencyclidine -Pcp Negative Negative    Propoxyphene Negative Negative    Cannabinoid Metab Positive (A) Negative   ESTIMATED GFR   Result Value Ref Range    GFR If African American >60 >60 mL/min/1.73 m 2    GFR If Non African American >60 >60 mL/min/1.73 m 2     All labs reviewed by me.      COURSE & MEDICAL DECISION MAKING  Nursing notes, VS, PMSFHx reviewed in chart.    6:02 AM - Patient seen and examined at bedside. Plan of care was discussed with the patient which is to repeat labs and treat her pain and dehydration. Patient will be treated with IV fluids, Haldol 2 mg, Zofran 4 mg, and Toradol 30 mg. Intravenous fluids administered for rehydration. Ordered urinalysis, urine drug screen, CBC with differential, CMP, and lactic acid to evaluate her symptoms. The differential diagnoses include but are not limited to: worsening colitis, marijuana induced hyperemesis    7:09 AM - Evaluated lab work showing improvement from yesterday.     7:32 AM - Recheck. Patient is resting but still having abdominal pain. Updated her on her lab work showing improvement from yesterday. I suspect her symptoms are due to her marijuana use and strongly advised the patient on marijuana cessation. Patient verbalized her understanding and agrees to stop using marijuana. There is no surgical intervention required today but she will be admitted overnight for pain management and observation.     7:37 AM - Consulted with Dr. Corea (Hospitalist) who says to call [facility].     7:56 AM - Paged [facility] to admit.     8:11 AM - Consultd with [facility] who accepts the patient for admission.     HYDRATION: Based on the patient's presentation of Dehydration the patient was given IV fluids. IV Hydration was used because oral hydration was not adequate alone. Upon recheck following hydration, the patient was  improved.    Heart Score is:    DISPOSITION:  Patient will be admitted to [facility] (Hospitalist) in stable condition.      FINAL IMPRESSION  1. Generalized abdominal pain    2. Intractable cyclical vomiting with nausea          Nadia LERNER (Scribe), am scribing for, and in the presence of, Dhara Huffman M.D..    Electronically signed by: Nadia Rod (Scribe), 4/14/2019    Dhara LERNER M.D. personally performed the services described in this documentation, as scribed by Nadia Rod in my presence, and it is both accurate and complete. C    The note accurately reflects work and decisions made by me.  Dhara Huffman  4/14/2019  12:53 PM
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
## 2019-04-14 NOTE — ED NOTES
PT given meds per mar.  Tolerated well, resting at this time. siderails up x2, call light within reach.

## 2019-04-14 NOTE — ED PROVIDER NOTES
ED Provider Note    Scribed for Rosas Fatima M.D. by Nadia Rod. 4/13/2019  5:13 PM    Primary care provider: Pcp Pt States None  Means of arrival: Walk-In  History obtained from: Patient  History limited by: None    CHIEF COMPLAINT  Chief Complaint   Patient presents with   • N/V     pt reports N/V since Wed. pt explains taking presribed meds and still vomiting. told to come back if not better.      HPI  Kaela Ballesteros is a 38 y.o. female who presents to the Emergency Department for bilateral lower abdominal pain with associated constant nausea and vomiting onset 3 days ago. She also endorses chills, hot and cold flashes, and diaphoresis. Patient was seen here 2 days ago on Thursday for these same complaints and had CT abdomen showing epiploic appendagitis. Since being discharged she reports worsening abdominal pain and persistent nausea and vomiting. She was discharged home with a prescription for Zofran and outpatient referral to GI. Patient has been treated with multiple medication regimens in the past and states that she has only ever experienced relief after given Dilaudid. She has been drinking fluids but unable to tolerate them and is feeling dehydrated. Denies fever.     REVIEW OF SYSTEMS  Pertinent positives include nausea, vomiting, abdominal pain, chills, hot and cold flashes, diaphoresis. Pertinent negatives include no fever. As above, all other systems reviewed and are negative  See HPI for further details.     PAST MEDICAL HISTORY   has a past medical history of Anemia (2017); Backpain (2001); Diabetes (HCC) (2017); Heart burn; Hypertension (2017); Indigestion (2006); Infectious disease (1995); Other specified symptom associated with female genital organs (2012); Pain (03/03/15); Pancreatitis (2011/2014); Sickle cell trait syndrome; and Urinary tract infection.    SURGICAL HISTORY   has a past surgical history that includes pelviscopy (1/13/2012); dilation and curettage  (12/3/2012); egd w/endoscopic ultrasound (1/26/2015); gastroscopy with biopsy (1/26/2015); primary c section (9/30/2015); primary c section with tubal ligation (5/2/2017); abdominal exploration (2016); and cholecystectomy.    SOCIAL HISTORY  Social History   Substance Use Topics   • Smoking status: Former Smoker     Packs/day: 0.50     Years: 15.00     Types: Cigarettes     Quit date: 1/1/2011   • Smokeless tobacco: Never Used      Comment: occasional   • Alcohol use No      Comment: occasionally      History   Drug Use   • Types: Marijuana, Inhaled     Comment: marijuana       FAMILY HISTORY  Family History   Problem Relation Age of Onset   • Diabetes Father    • Diabetes Brother    • Diabetes Paternal Grandmother        CURRENT MEDICATIONS  No current facility-administered medications for this encounter.     Current Outpatient Prescriptions:   •  dicyclomine (BENTYL) 10 MG Cap, Take 1 Cap by mouth 4 Times a Day,Before Meals and at Bedtime., Disp: 120 Cap, Rfl: 0  •  ondansetron (ZOFRAN ODT) 4 MG TABLET DISPERSIBLE, Take 1 Tab by mouth every 6 hours as needed for Nausea., Disp: 10 Tab, Rfl: 0  •  ondansetron (ZOFRAN ODT) 4 MG TABLET DISPERSIBLE, Take 1 Tab by mouth every 6 hours as needed., Disp: 10 Tab, Rfl: 0  •  oxycodone-acetaminophen (PERCOCET-10)  MG Tab, Take 1 Tab by mouth every four hours as needed (1/2 tab for moderate pin (pain scale 4-6/10) and 1 tab for Severe Pain (Pain Scale 7-10) after delivery). (Patient not taking: Reported on 11/5/2018), Disp: 30 Tab, Rfl: 0  •  ibuprofen (MOTRIN) 800 MG Tab, Take 1 Tab by mouth every 8 hours as needed (For cramping after delivery; do not give if patient is receiving ketorolac (Toradol)). (Patient not taking: Reported on 11/5/2018), Disp: 30 Tab, Rfl: 0  •  docusate sodium 100 MG Cap, Take 100 mg by mouth 2 times a day as needed for Constipation. (Patient not taking: Reported on 11/5/2018), Disp: 60 Cap, Rfl: 0  •  simethicone (MYLICON) 80 MG Chew Tab, Take  "1 Tab by mouth 4 times a day. (Patient not taking: Reported on 11/5/2018), Disp: 30 Tab, Rfl: 0  •  Prenatal MV-Min-Fe Fum-FA-DHA (PRENATAL 1) 30-0.975-200 MG Cap, Take 1 Capsule by mouth every day. (Patient not taking: Reported on 11/5/2018), Disp: 30 Cap, Rfl: 0    ALLERGIES  Allergies   Allergen Reactions   • Nkda [No Known Drug Allergy]        PHYSICAL EXAM  VITAL SIGNS: /96   Pulse (!) 105   Temp 37.1 °C (98.7 °F) (Temporal)   Resp 18   Ht 1.803 m (5' 11\")   Wt 105.7 kg (233 lb)   LMP 04/08/2019 (Approximate)   SpO2 100%   BMI 32.50 kg/m²     Constitutional: Well developed, Well nourished, Moderate distress, Non-toxic appearance.   HENT: Normocephalic, Atraumatic, Bilateral external ears normal, Oropharynx moist, No oral exudates.   Eyes: PERRLA, EOMI, Conjunctiva normal, No discharge.   Neck: No tenderness, Supple, No stridor.   Lymphatic: No lymphadenopathy noted.   Cardiovascular: Normal heart rate, Normal rhythm.   Thorax & Lungs: Clear to auscultation bilaterally, No respiratory distress, No wheezing, No crackles.   Abdomen: Lower abdominal tenderness, Soft, No masses, No pulsatile masses.   Skin: Warm, Dry, No erythema, No rash.   Extremities: No edema No cyanosis.   Musculoskeletal: No tenderness to palpation or major deformities noted.  Intact distal pulses  Neurologic: Awake, alert. Moves all extremities spontaneously.  Psychiatric: Affect normal, Judgment normal, Mood normal.       LABS  Results for orders placed or performed during the hospital encounter of 04/13/19   CBC WITH DIFFERENTIAL   Result Value Ref Range    WBC 11.5 (H) 4.8 - 10.8 K/uL    RBC 5.04 4.20 - 5.40 M/uL    Hemoglobin 13.6 12.0 - 16.0 g/dL    Hematocrit 41.8 37.0 - 47.0 %    MCV 82.9 81.4 - 97.8 fL    MCH 27.0 27.0 - 33.0 pg    MCHC 32.5 (L) 33.6 - 35.0 g/dL    RDW 39.8 35.9 - 50.0 fL    Platelet Count 243 164 - 446 K/uL    MPV 11.5 9.0 - 12.9 fL    Neutrophils-Polys 85.00 (H) 44.00 - 72.00 %    Lymphocytes 9.50 (L) " 22.00 - 41.00 %    Monocytes 5.00 0.00 - 13.40 %    Eosinophils 0.00 0.00 - 6.90 %    Basophils 0.20 0.00 - 1.80 %    Immature Granulocytes 0.30 0.00 - 0.90 %    Nucleated RBC 0.00 /100 WBC    Neutrophils (Absolute) 9.80 (H) 2.00 - 7.15 K/uL    Lymphs (Absolute) 1.10 1.00 - 4.80 K/uL    Monos (Absolute) 0.58 0.00 - 0.85 K/uL    Eos (Absolute) 0.00 0.00 - 0.51 K/uL    Baso (Absolute) 0.02 0.00 - 0.12 K/uL    Immature Granulocytes (abs) 0.03 0.00 - 0.11 K/uL    NRBC (Absolute) 0.00 K/uL   LIPASE   Result Value Ref Range    Lipase 14 11 - 82 U/L   COMP METABOLIC PANEL   Result Value Ref Range    Sodium 137 135 - 145 mmol/L    Potassium 3.6 3.6 - 5.5 mmol/L    Chloride 103 96 - 112 mmol/L    Co2 20 20 - 33 mmol/L    Anion Gap 14.0 (H) 0.0 - 11.9    Glucose 124 (H) 65 - 99 mg/dL    Bun 10 8 - 22 mg/dL    Creatinine 0.90 0.50 - 1.40 mg/dL    Calcium 11.0 (H) 8.5 - 10.5 mg/dL    AST(SGOT) 22 12 - 45 U/L    ALT(SGPT) 26 2 - 50 U/L    Alkaline Phosphatase 58 30 - 99 U/L    Total Bilirubin 0.6 0.1 - 1.5 mg/dL    Albumin 4.9 3.2 - 4.9 g/dL    Total Protein 8.2 6.0 - 8.2 g/dL    Globulin 3.3 1.9 - 3.5 g/dL    A-G Ratio 1.5 g/dL   LACTIC ACID   Result Value Ref Range    Lactic Acid 2.3 (H) 0.5 - 2.0 mmol/L   ESTIMATED GFR   Result Value Ref Range    GFR If African American >60 >60 mL/min/1.73 m 2    GFR If Non African American >60 >60 mL/min/1.73 m 2     All labs reviewed by me.    RADIOLOGY  CT-ABDOMEN-PELVIS WITH   Final Result      1.  2 small focal areas of soft tissue density or induration are unchanged anterior to the distal end of the left colon. These could represent small areas of inflammation.      2.  Probable left ovary cyst again identified.      3.  4 cm hiatal hernia again identified.      4.  Post cholecystectomy.           The radiologist's interpretation of all radiological studies have been reviewed by me.    COURSE & MEDICAL DECISION MAKING  Nursing notes, VS, PMSFHx reviewed in chart.    5:13 PM - Patient  seen and examined at bedside. Plan of care was discussed with the patient who verbalized her agreement. Patient is requesting pain medication at this time, specifically Dilaudid. Patient will be treated with IV fluids, Zofran 4 mg, and Dilaudid 0.5 mg. Ordered CT-abdomen, CBC with differential, CMP, lipase, and lactic acid to evaluate her symptoms.     7:40 PM - Patient was improved after treated with IV fluids and Dilaudid. Upon recheck patient was found to be sleeping comfortably. After waking her up she reports unchanged abdominal pain. Discussed lab and radiology results, see above. Strict ED return precautions discussed and follow-up encouraged. She will be discharged home at this time. Patient verbalized her understanding and agrees to the discharge instructions.     HYDRATION: Based on the patient's presentation of Acute Vomiting, Dehydration and Inability to take oral fluids the patient was given IV fluids. IV Hydration was used because oral hydration was not adequate alone. Upon recheck following hydration, the patient was improved.    The patient will return for new or worsening symptoms and is stable at the time of discharge.    The patient is referred to a primary physician for blood pressure management, diabetic screening, and for all other preventative health concerns.  The patient's workup does show same findings as previously found last night.  I had repeated her CT scan because the first 1 was done without contrast and she has reportedly increased pain.  The patient continues to complain of some pain and nausea although was sleeping soundly.  There is no other findings found think the patient can be managed on an outpatient basis I will prescribe some Phenergan suppositories for nausea patient was treated with IV fluids for dehydration as she is taking not taking much fluids she had elevated lactic acid level which I think is secondary to dehydration.  She does not have significant leukocytosis or  fever and has a benign exam, I do not think significant intra-abdominal processes likely    FOLLOW UP:  No follow-up provider specified.    OUTPATIENT MEDICATIONS:  New Prescriptions    PROMETHAZINE (PHENERGAN) 25 MG SUPPOS    Insert 1 Suppository in rectum every 6 hours as needed for Nausea/Vomiting.         FINAL IMPRESSION  1. Nausea and vomiting, intractability of vomiting not specified, unspecified vomiting type    2. Dehydration    3. Abdominal pain, unspecified abdominal location    4. Epiploic appendagitis          Nadia LERNER (Glenn), am scribing for, and in the presence of, Rosas Fatima M.D..    Electronically signed by: Nadia Rod (Glenn), 4/13/2019    IRosas M.D. personally performed the services described in this documentation, as scribed by Nadia Rod in my presence, and it is both accurate and complete. C    The note accurately reflects work and decisions made by me.  Rosas Fatima  4/13/2019  8:18 PM

## 2019-04-14 NOTE — ED TRIAGE NOTES
Patient to ED via [facility] from home for generalized abd pain. Patient was seen yesterday for same complaint and discharged. Unable to fill prescriptions and states the pain has not improved. Patient appeared calm and in no distress after immediately arriving to ED. However, after getting on cot patient began crying hysterically and rolling around complaining of pain. After a few minutes, patient stopped and again appeared calm. Got up and drank water from the sink, turned the lights off, and appeared to fall asleep. Patient received 600mg of ibuprofen en route.

## 2019-04-14 NOTE — ED NOTES
PIV removed and bandaged.  Kaela Ballesteros discharged via ambulation with steady gait.  Discharge instructions given and reviewed, patient educated to follow up with Primary MD, verbalized understanding.  Prescriptions given x 1.  All personal belongings in possession.  No questions at this time.  Patient instructed not to drive. Pt reports  is coming to pick her up.        .

## 2019-04-15 VITALS
SYSTOLIC BLOOD PRESSURE: 144 MMHG | DIASTOLIC BLOOD PRESSURE: 98 MMHG | HEART RATE: 92 BPM | HEIGHT: 71 IN | TEMPERATURE: 97.8 F | OXYGEN SATURATION: 93 % | WEIGHT: 233 LBS | RESPIRATION RATE: 18 BRPM | BODY MASS INDEX: 32.62 KG/M2

## 2019-04-15 LAB
ALBUMIN SERPL BCP-MCNC: 4.4 G/DL (ref 3.2–4.9)
ALBUMIN/GLOB SERPL: 1.5 G/DL
ALP SERPL-CCNC: 59 U/L (ref 30–99)
ALT SERPL-CCNC: 40 U/L (ref 2–50)
ANION GAP SERPL CALC-SCNC: 12 MMOL/L (ref 0–11.9)
AST SERPL-CCNC: 35 U/L (ref 12–45)
BASOPHILS # BLD AUTO: 0.3 % (ref 0–1.8)
BASOPHILS # BLD: 0.02 K/UL (ref 0–0.12)
BILIRUB SERPL-MCNC: 0.6 MG/DL (ref 0.1–1.5)
BUN SERPL-MCNC: 7 MG/DL (ref 8–22)
CALCIUM SERPL-MCNC: 9.8 MG/DL (ref 8.5–10.5)
CHLORIDE SERPL-SCNC: 100 MMOL/L (ref 96–112)
CO2 SERPL-SCNC: 23 MMOL/L (ref 20–33)
CREAT SERPL-MCNC: 0.75 MG/DL (ref 0.5–1.4)
EOSINOPHIL # BLD AUTO: 0 K/UL (ref 0–0.51)
EOSINOPHIL NFR BLD: 0 % (ref 0–6.9)
ERYTHROCYTE [DISTWIDTH] IN BLOOD BY AUTOMATED COUNT: 38.9 FL (ref 35.9–50)
GLOBULIN SER CALC-MCNC: 3 G/DL (ref 1.9–3.5)
GLUCOSE SERPL-MCNC: 133 MG/DL (ref 65–99)
HCT VFR BLD AUTO: 43.2 % (ref 37–47)
HGB BLD-MCNC: 14.1 G/DL (ref 12–16)
IMM GRANULOCYTES # BLD AUTO: 0.02 K/UL (ref 0–0.11)
IMM GRANULOCYTES NFR BLD AUTO: 0.3 % (ref 0–0.9)
LYMPHOCYTES # BLD AUTO: 1.19 K/UL (ref 1–4.8)
LYMPHOCYTES NFR BLD: 19.2 % (ref 22–41)
MCH RBC QN AUTO: 27.1 PG (ref 27–33)
MCHC RBC AUTO-ENTMCNC: 32.6 G/DL (ref 33.6–35)
MCV RBC AUTO: 82.9 FL (ref 81.4–97.8)
MONOCYTES # BLD AUTO: 0.45 K/UL (ref 0–0.85)
MONOCYTES NFR BLD AUTO: 7.2 % (ref 0–13.4)
NEUTROPHILS # BLD AUTO: 4.53 K/UL (ref 2–7.15)
NEUTROPHILS NFR BLD: 73 % (ref 44–72)
NRBC # BLD AUTO: 0 K/UL
NRBC BLD-RTO: 0 /100 WBC
PLATELET # BLD AUTO: 208 K/UL (ref 164–446)
PMV BLD AUTO: 11.2 FL (ref 9–12.9)
POTASSIUM SERPL-SCNC: 3.8 MMOL/L (ref 3.6–5.5)
PROT SERPL-MCNC: 7.4 G/DL (ref 6–8.2)
RBC # BLD AUTO: 5.21 M/UL (ref 4.2–5.4)
SODIUM SERPL-SCNC: 135 MMOL/L (ref 135–145)
WBC # BLD AUTO: 6.2 K/UL (ref 4.8–10.8)

## 2019-04-15 PROCEDURE — 97161 PT EVAL LOW COMPLEX 20 MIN: CPT

## 2019-04-15 PROCEDURE — 700102 HCHG RX REV CODE 250 W/ 637 OVERRIDE(OP): Performed by: STUDENT IN AN ORGANIZED HEALTH CARE EDUCATION/TRAINING PROGRAM

## 2019-04-15 PROCEDURE — G0378 HOSPITAL OBSERVATION PER HR: HCPCS

## 2019-04-15 PROCEDURE — 96376 TX/PRO/DX INJ SAME DRUG ADON: CPT

## 2019-04-15 PROCEDURE — 80053 COMPREHEN METABOLIC PANEL: CPT

## 2019-04-15 PROCEDURE — 700105 HCHG RX REV CODE 258: Performed by: STUDENT IN AN ORGANIZED HEALTH CARE EDUCATION/TRAINING PROGRAM

## 2019-04-15 PROCEDURE — A9270 NON-COVERED ITEM OR SERVICE: HCPCS | Performed by: STUDENT IN AN ORGANIZED HEALTH CARE EDUCATION/TRAINING PROGRAM

## 2019-04-15 PROCEDURE — 700111 HCHG RX REV CODE 636 W/ 250 OVERRIDE (IP): Performed by: HOSPITALIST

## 2019-04-15 PROCEDURE — 85025 COMPLETE CBC W/AUTO DIFF WBC: CPT

## 2019-04-15 PROCEDURE — 700111 HCHG RX REV CODE 636 W/ 250 OVERRIDE (IP): Performed by: STUDENT IN AN ORGANIZED HEALTH CARE EDUCATION/TRAINING PROGRAM

## 2019-04-15 PROCEDURE — 96372 THER/PROPH/DIAG INJ SC/IM: CPT

## 2019-04-15 PROCEDURE — 36415 COLL VENOUS BLD VENIPUNCTURE: CPT

## 2019-04-15 RX ORDER — ENALAPRIL MALEATE 2.5 MG/1
2.5 TABLET ORAL ONCE
Status: COMPLETED | OUTPATIENT
Start: 2019-04-15 | End: 2019-04-15

## 2019-04-15 RX ADMIN — ENOXAPARIN SODIUM 40 MG: 100 INJECTION SUBCUTANEOUS at 04:27

## 2019-04-15 RX ADMIN — PROMETHAZINE HYDROCHLORIDE 25 MG: 25 SUPPOSITORY RECTAL at 00:21

## 2019-04-15 RX ADMIN — ENALAPRIL MALEATE 2.5 MG: 2.5 TABLET ORAL at 04:27

## 2019-04-15 RX ADMIN — SENNOSIDES, DOCUSATE SODIUM 2 TABLET: 50; 8.6 TABLET, FILM COATED ORAL at 04:27

## 2019-04-15 RX ADMIN — ONDANSETRON 4 MG: 2 SOLUTION INTRAMUSCULAR; INTRAVENOUS at 09:48

## 2019-04-15 RX ADMIN — MORPHINE SULFATE 2 MG: 4 INJECTION INTRAVENOUS at 01:40

## 2019-04-15 RX ADMIN — SODIUM CHLORIDE: 900 INJECTION INTRAVENOUS at 09:56

## 2019-04-15 ASSESSMENT — COGNITIVE AND FUNCTIONAL STATUS - GENERAL
SUGGESTED CMS G CODE MODIFIER MOBILITY: CJ
WALKING IN HOSPITAL ROOM: A LITTLE
CLIMB 3 TO 5 STEPS WITH RAILING: A LITTLE
MOBILITY SCORE: 22

## 2019-04-15 ASSESSMENT — GAIT ASSESSMENTS
ASSISTIVE DEVICE: HAND HELD ASSIST
DEVIATION: DECREASED BASE OF SUPPORT;OTHER (COMMENT)
DISTANCE (FEET): 200
GAIT LEVEL OF ASSIST: MINIMAL ASSIST

## 2019-04-15 NOTE — PROGRESS NOTES
Received report from day shift RN. Assumed patient care at 1900. Patient resting comfortably, sleeping with no complaints of pain. Bed locked and in the lowest position. Call light within reach. RN and CNA numbers provided.

## 2019-04-15 NOTE — PROGRESS NOTES
received return call from night MD team. Ok to not have IV access as long as pt tolerating fluids orally. If pt becomes ill and vomits, team to be notified to obtain access. PO zofran for overnight. If pain becomes issue, MD to be notified to give something IM.

## 2019-04-15 NOTE — PROGRESS NOTES
Spoke with team about lack of PIV access. Informed team would need to place IJ/EJ if they want access. Informed team no POpain meds ordered and only PO zofran. Team to review and call this RN back

## 2019-04-15 NOTE — PROGRESS NOTES
Pt complains of difficulty sleeping, asking for something to help sleep. [facility] family medicine doctor paged.     11:14 PM Discussed with Dr. Cruz. Order received for one time dose of Benadryl 50 mg.     12:12 AM Patient is now vomiting. Family medicine doctor paged.    12:23 AM Discussed with Dr. Cruz. Phenergan suppository administered. Rapid nurse contacted for IV start.     1:00 AM IV placed L AC. Patient feeling improved following administration of phenergan suppository. Nausea subsided, no further vomiting.     3:49 AM Loss of IV access. BP elevated 152/111, hr 93. [facility] family [facility] doctor paged.     4:05 AM Discussed with Dr. Cruz, order received for one time dose of Vasotec 2.5 mg PO. No further nausea or vomiting.

## 2019-04-15 NOTE — PROGRESS NOTES
Oral and written discharge instructions reviewed. IV removed. Patient understands follow up instructions. Discharged to home with .

## 2019-04-15 NOTE — PROGRESS NOTES
Report received from previous RN at the bedside. Assumed care of patient. Assessment completed. See doc flowsheet. Bed in low locked position. Call light at the bedside. Fall precautions maintained. Hourly rounding initiated. Patient encouraged to call out with any needs.

## 2019-04-15 NOTE — PROGRESS NOTES
Unable to obtain IV access. x1 with RN. Tried with 2 different RNs to use US to place. Unable to obtain access. [facility] family [facility] paged for orders. No PO pain meds available and will need team to decide if IJ/EJ appropriate

## 2019-04-15 NOTE — DISCHARGE INSTRUCTIONS
Nausea and Vomiting, Adult  Introduction  Feeling sick to your stomach (nausea) means that your stomach is upset or you feel like you have to throw up (vomit). Feeling more and more sick to your stomach can lead to throwing up. Throwing up happens when food and liquid from your stomach are thrown up and out the mouth. Throwing up can make you feel weak and cause you to get dehydrated. Dehydration can make you tired and thirsty, make you have a dry mouth, and make it so you pee (urinate) less often. Older adults and people with other diseases or a weak defense system (immune system) are at higher risk for dehydration. If you feel sick to your stomach or if you throw up, it is important to follow instructions from your doctor about how to take care of yourself.  Follow these instructions at home:  Eating and drinking  Follow these instructions as told by your doctor:  · Take an oral rehydration solution (ORS). This is a drink that is sold at pharmacies and stores.  · Drink clear fluids in small amounts as you are able, such as:  ¨ Water.  ¨ Ice chips.  ¨ Diluted fruit juice.  ¨ Low-calorie sports drinks.  · Eat bland, easy-to-digest foods in small amounts as you are able, such as:  ¨ Bananas.  ¨ Applesauce.  ¨ Rice.  ¨ Low-fat (lean) meats.  ¨ Toast.  ¨ Crackers.  · Avoid fluids that have a lot of sugar or caffeine in them.  · Avoid alcohol.  · Avoid spicy or fatty foods.  General instructions  · Drink enough fluid to keep your pee (urine) clear or pale yellow.  · Wash your hands often. If you cannot use soap and water, use hand .  · Make sure that all people in your home wash their hands well and often.  · Take over-the-counter and prescription medicines only as told by your doctor.  · Rest at home while you get better.  · Watch your condition for any changes.  · Breathe slowly and deeply when you feel sick to your stomach.  · Keep all follow-up visits as told by your doctor. This is important.  Contact  a doctor if:  · You have a fever.  · You cannot keep fluids down.  · Your symptoms get worse.  · You have new symptoms.  · You feel sick to your stomach for more than two days.  · You feel light-headed or dizzy.  · You have a headache.  · You have muscle cramps.  Get help right away if:  · You have pain in your chest, neck, arm, or jaw.  · You feel very weak or you pass out (faint).  · You throw up again and again.  · You see blood in your throw-up.  · Your throw-up looks like black coffee grounds.  · You have bloody or black poop (stools) or poop that look like tar.  · You have a very bad headache, a stiff neck, or both.  · You have a rash.  · You have very bad pain, cramping, or bloating in your belly (abdomen).  · You have trouble breathing.  · You are breathing very quickly.  · Your heart is beating very quickly.  · Your skin feels cold and clammy.  · You feel confused.  · You have pain when you pee.  · You have signs of dehydration, such as:  ¨ Dark pee, hardly any pee, or no pee.  ¨ Cracked lips.  ¨ Dry mouth.  ¨ Sunken eyes.  ¨ Sleepiness.  ¨ Weakness.  These symptoms may be an emergency. Do not wait to see if the symptoms will go away. Get medical help right away. Call your local emergency services (911 in the [place] Do not drive yourself to the hospital.   This information is not intended to replace advice given to you by your health care provider. Make sure you discuss any questions you have with your health care provider.  Document Released: 06/05/2009 Document Revised: 07/07/2017 Document Reviewed: 08/23/2016  © 2017 Elsevier      Discharge Instructions    Discharged to home by car with friend. Discharged via walking, hospital escort: Yes.  Special equipment needed: Not Applicable    Be sure to schedule a follow-up appointment with your primary care doctor or any specialists as instructed.     Discharge Plan:   Pneumococcal Vaccine Administered/Refused: Not given - Patient refused pneumococcal  vaccine  Influenza Vaccine Indication: Not indicated: Previously immunized this influenza season and > 8 years of age ([birth year])    I understand that a diet low in cholesterol, fat, and sodium is recommended for good health. Unless I have been given specific instructions below for another diet, I accept this instruction as my diet prescription.   Other diet: Regular diet    Special Instructions: None    · Is patient discharged on Warfarin / Coumadin?   No     Depression / Suicide Risk    As you are discharged from this [facility] facility, it is important to learn how to keep safe from harming yourself.    Recognize the warning signs:  · Abrupt changes in personality, positive or negative- including increase in energy   · Giving away possessions  · Change in eating patterns- significant weight changes-  positive or negative  · Change in sleeping patterns- unable to sleep or sleeping all the time   · Unwillingness or inability to communicate  · Depression  · Unusual sadness, discouragement and loneliness  · Talk of wanting to die  · Neglect of personal appearance   · Rebelliousness- reckless behavior  · Withdrawal from people/activities they love  · Confusion- inability to concentrate     If you or a loved one observes any of these behaviors or has concerns about self-harm, here's what you can do:  · Talk about it- your feelings and reasons for harming yourself  · Remove any means that you might use to hurt yourself (examples: pills, rope, extension cords, firearm)  · Get professional help from the community (Mental Health, Substance Abuse, psychological counseling)  · Do not be alone:Call your Safe Contact- someone whom you trust who will be there for you.  · Call your local CRISIS HOTLINE 425-0745 or 453-775-0381  · Call your local Children's Mobile Crisis Response Team [place] (678) 525-1465 or www.Mobcl[employer]  · Call the toll free National Suicide Prevention Hotlines   · National Suicide Prevention  Lifeline 225-064-UENL (8387)  · National [place] Line Network 800-SUICIDE (491-0433)

## 2019-04-15 NOTE — THERAPY
"Physical Therapy Evaluation completed.   Bed Mobility:  Supine to Sit:  (Seated EOB upon arrival)  Transfers: Sit to Stand: Supervised  Gait: Level Of Assist: Minimal Assist with Intermittent hand held assist or use of external objects for stability       Plan of Care: Will benefit from Physical Therapy 1-2 more treatment sessions prior to DC and Plan to complete next treatment by Wednesday 4/17  Discharge Recommendations: Equipment: Will Continue to Assess for Equipment Needs. Post-acute therapy Currently anticipate no further skilled therapy needs once patient is discharged from the inpatient setting.    Pt is a 38 year old female admitted to the hospital for abdominal pain, nausea and vomiting. Pt had just been DC'd from the ER for similar complaints. Pt reports that prior to admit, she was independent with ambulation and ADL's. PT does have 2 young children at home. At time of initial evaluation, pt performed majority of mobility tasks at SPV level. Pt ambulated around unit without use of AD, intermittent use of HHA or external support to maintain balance. Pt endorses dizziness with ambulation. Pt ambulates with narrow GM and intermittent lateral trunk sway. Requires steadying assist at times for balance. Pt did ascend/descend 1 flight of stairs with use of hand rail and 1 HHA. Pt having a difficult time maintaining eyes open throughout session due to being drowsy. Pt returned to room. Given mild balance impairment, pt would benefit from 1-2 more sessions prior to DC, however, she is functionally capable of DC home. Gait impairments seem to be more related to minor dizziness and lethargy vs true balance deficits.     See \"Rehab Therapy-Acute\" Patient Summary Report for complete documentation.     "

## 2019-04-15 NOTE — PROGRESS NOTES
Page out to family medicine, patient was positive for UTI and is not being discharged on ABT's. Per Sd GALICIA, no antibiotics needed for asymptomatic uti. Will proceed with discharge.

## 2019-04-17 ENCOUNTER — OFFICE VISIT (OUTPATIENT)
Dept: INTERNAL MEDICINE | Facility: MEDICAL CENTER | Age: 39
End: 2019-04-17
Payer: MEDICAID

## 2019-04-17 ENCOUNTER — APPOINTMENT (OUTPATIENT)
Dept: RADIOLOGY | Facility: MEDICAL CENTER | Age: 39
End: 2019-04-17
Attending: EMERGENCY MEDICINE
Payer: MEDICAID

## 2019-04-17 ENCOUNTER — HOSPITAL ENCOUNTER (EMERGENCY)
Facility: MEDICAL CENTER | Age: 39
End: 2019-04-17
Attending: EMERGENCY MEDICINE
Payer: MEDICAID

## 2019-04-17 VITALS
HEIGHT: 71 IN | SYSTOLIC BLOOD PRESSURE: 126 MMHG | BODY MASS INDEX: 32.41 KG/M2 | WEIGHT: 231.48 LBS | HEART RATE: 78 BPM | OXYGEN SATURATION: 99 % | RESPIRATION RATE: 16 BRPM | DIASTOLIC BLOOD PRESSURE: 93 MMHG | TEMPERATURE: 97.4 F

## 2019-04-17 VITALS
DIASTOLIC BLOOD PRESSURE: 112 MMHG | HEART RATE: 110 BPM | TEMPERATURE: 97.3 F | OXYGEN SATURATION: 98 % | SYSTOLIC BLOOD PRESSURE: 130 MMHG

## 2019-04-17 DIAGNOSIS — N83.202 BILATERAL OVARIAN CYSTS: ICD-10-CM

## 2019-04-17 DIAGNOSIS — R10.30 LOWER ABDOMINAL PAIN: ICD-10-CM

## 2019-04-17 DIAGNOSIS — E86.0 DEHYDRATION: ICD-10-CM

## 2019-04-17 DIAGNOSIS — R19.7 DIARRHEA, UNSPECIFIED TYPE: ICD-10-CM

## 2019-04-17 DIAGNOSIS — N83.201 BILATERAL OVARIAN CYSTS: ICD-10-CM

## 2019-04-17 DIAGNOSIS — E87.6 HYPOKALEMIA: ICD-10-CM

## 2019-04-17 DIAGNOSIS — F12.10 MARIJUANA ABUSE: ICD-10-CM

## 2019-04-17 DIAGNOSIS — N80.9 ENDOMETRIOSIS: ICD-10-CM

## 2019-04-17 DIAGNOSIS — R11.2 INTRACTABLE VOMITING WITH NAUSEA, UNSPECIFIED VOMITING TYPE: ICD-10-CM

## 2019-04-17 DIAGNOSIS — R00.0 TACHYCARDIA: ICD-10-CM

## 2019-04-17 LAB
ALBUMIN SERPL BCP-MCNC: 4.1 G/DL (ref 3.2–4.9)
ALBUMIN/GLOB SERPL: 1.2 G/DL
ALP SERPL-CCNC: 71 U/L (ref 30–99)
ALT SERPL-CCNC: 83 U/L (ref 2–50)
ANION GAP SERPL CALC-SCNC: 12 MMOL/L (ref 0–11.9)
APPEARANCE UR: CLEAR
AST SERPL-CCNC: 33 U/L (ref 12–45)
BACTERIA #/AREA URNS HPF: ABNORMAL /HPF
BASOPHILS # BLD AUTO: 0.5 % (ref 0–1.8)
BASOPHILS # BLD: 0.04 K/UL (ref 0–0.12)
BILIRUB SERPL-MCNC: 1 MG/DL (ref 0.1–1.5)
BILIRUB UR QL STRIP.AUTO: ABNORMAL
BUN SERPL-MCNC: 8 MG/DL (ref 8–22)
CALCIUM SERPL-MCNC: 9.8 MG/DL (ref 8.4–10.2)
CHLORIDE SERPL-SCNC: 97 MMOL/L (ref 96–112)
CO2 SERPL-SCNC: 25 MMOL/L (ref 20–33)
COLOR UR: YELLOW
CREAT SERPL-MCNC: 0.94 MG/DL (ref 0.5–1.4)
EOSINOPHIL # BLD AUTO: 0.03 K/UL (ref 0–0.51)
EOSINOPHIL NFR BLD: 0.4 % (ref 0–6.9)
EPI CELLS #/AREA URNS HPF: ABNORMAL /HPF
ERYTHROCYTE [DISTWIDTH] IN BLOOD BY AUTOMATED COUNT: 35.8 FL (ref 35.9–50)
GLOBULIN SER CALC-MCNC: 3.5 G/DL (ref 1.9–3.5)
GLUCOSE SERPL-MCNC: 106 MG/DL (ref 65–99)
GLUCOSE UR STRIP.AUTO-MCNC: NEGATIVE MG/DL
HCG UR QL: NEGATIVE
HCT VFR BLD AUTO: 45.9 % (ref 37–47)
HGB BLD-MCNC: 15.4 G/DL (ref 12–16)
IMM GRANULOCYTES # BLD AUTO: 0.03 K/UL (ref 0–0.11)
IMM GRANULOCYTES NFR BLD AUTO: 0.4 % (ref 0–0.9)
KETONES UR STRIP.AUTO-MCNC: NEGATIVE MG/DL
LEUKOCYTE ESTERASE UR QL STRIP.AUTO: NEGATIVE
LIPASE SERPL-CCNC: 28 U/L (ref 7–58)
LYMPHOCYTES # BLD AUTO: 1.74 K/UL (ref 1–4.8)
LYMPHOCYTES NFR BLD: 23.3 % (ref 22–41)
MCH RBC QN AUTO: 26.9 PG (ref 27–33)
MCHC RBC AUTO-ENTMCNC: 33.6 G/DL (ref 33.6–35)
MCV RBC AUTO: 80.1 FL (ref 81.4–97.8)
MICRO URNS: ABNORMAL
MONOCYTES # BLD AUTO: 0.67 K/UL (ref 0–0.85)
MONOCYTES NFR BLD AUTO: 9 % (ref 0–13.4)
MUCOUS THREADS #/AREA URNS HPF: ABNORMAL /HPF
NEUTROPHILS # BLD AUTO: 4.97 K/UL (ref 2–7.15)
NEUTROPHILS NFR BLD: 66.4 % (ref 44–72)
NITRITE UR QL STRIP.AUTO: POSITIVE
NRBC # BLD AUTO: 0 K/UL
NRBC BLD-RTO: 0 /100 WBC
PH UR STRIP.AUTO: 6 [PH]
PLATELET # BLD AUTO: 257 K/UL (ref 164–446)
PMV BLD AUTO: 10.9 FL (ref 9–12.9)
POTASSIUM SERPL-SCNC: 3.1 MMOL/L (ref 3.6–5.5)
PROT SERPL-MCNC: 7.6 G/DL (ref 6–8.2)
PROT UR QL STRIP: 30 MG/DL
RBC # BLD AUTO: 5.73 M/UL (ref 4.2–5.4)
RBC # URNS HPF: ABNORMAL /HPF
RBC UR QL AUTO: ABNORMAL
SODIUM SERPL-SCNC: 134 MMOL/L (ref 135–145)
SP GR UR REFRACTOMETRY: 1.02
UNIDENT CRYS URNS QL MICRO: ABNORMAL /HPF
WBC # BLD AUTO: 7.5 K/UL (ref 4.8–10.8)
WBC #/AREA URNS HPF: ABNORMAL /HPF

## 2019-04-17 PROCEDURE — 76856 US EXAM PELVIC COMPLETE: CPT

## 2019-04-17 PROCEDURE — 81025 URINE PREGNANCY TEST: CPT

## 2019-04-17 PROCEDURE — 700105 HCHG RX REV CODE 258: Performed by: EMERGENCY MEDICINE

## 2019-04-17 PROCEDURE — 83690 ASSAY OF LIPASE: CPT

## 2019-04-17 PROCEDURE — 99285 EMERGENCY DEPT VISIT HI MDM: CPT

## 2019-04-17 PROCEDURE — 700111 HCHG RX REV CODE 636 W/ 250 OVERRIDE (IP): Performed by: EMERGENCY MEDICINE

## 2019-04-17 PROCEDURE — A9270 NON-COVERED ITEM OR SERVICE: HCPCS | Performed by: EMERGENCY MEDICINE

## 2019-04-17 PROCEDURE — 85025 COMPLETE CBC W/AUTO DIFF WBC: CPT

## 2019-04-17 PROCEDURE — 74019 RADEX ABDOMEN 2 VIEWS: CPT

## 2019-04-17 PROCEDURE — 700102 HCHG RX REV CODE 250 W/ 637 OVERRIDE(OP): Performed by: EMERGENCY MEDICINE

## 2019-04-17 PROCEDURE — 99203 OFFICE O/P NEW LOW 30 MIN: CPT | Mod: GC | Performed by: INTERNAL MEDICINE

## 2019-04-17 PROCEDURE — A9270 NON-COVERED ITEM OR SERVICE: HCPCS

## 2019-04-17 PROCEDURE — 36415 COLL VENOUS BLD VENIPUNCTURE: CPT

## 2019-04-17 PROCEDURE — 700102 HCHG RX REV CODE 250 W/ 637 OVERRIDE(OP)

## 2019-04-17 PROCEDURE — 81001 URINALYSIS AUTO W/SCOPE: CPT

## 2019-04-17 PROCEDURE — 87086 URINE CULTURE/COLONY COUNT: CPT

## 2019-04-17 PROCEDURE — 80053 COMPREHEN METABOLIC PANEL: CPT

## 2019-04-17 PROCEDURE — 96374 THER/PROPH/DIAG INJ IV PUSH: CPT

## 2019-04-17 RX ORDER — SODIUM CHLORIDE 9 MG/ML
1000 INJECTION, SOLUTION INTRAVENOUS ONCE
Status: COMPLETED | OUTPATIENT
Start: 2019-04-17 | End: 2019-04-17

## 2019-04-17 RX ORDER — PROMETHAZINE HYDROCHLORIDE 12.5 MG/1
12.5 SUPPOSITORY RECTAL EVERY 6 HOURS PRN
COMMUNITY
End: 2019-05-17

## 2019-04-17 RX ORDER — POTASSIUM CHLORIDE 20 MEQ/1
20 TABLET, EXTENDED RELEASE ORAL ONCE
Status: DISCONTINUED | OUTPATIENT
Start: 2019-04-17 | End: 2019-04-17 | Stop reason: HOSPADM

## 2019-04-17 RX ORDER — KETOROLAC TROMETHAMINE 30 MG/ML
15 INJECTION, SOLUTION INTRAMUSCULAR; INTRAVENOUS ONCE
Status: COMPLETED | OUTPATIENT
Start: 2019-04-17 | End: 2019-04-17

## 2019-04-17 RX ADMIN — SODIUM CHLORIDE 1000 ML: 9 INJECTION, SOLUTION INTRAVENOUS at 18:54

## 2019-04-17 RX ADMIN — POTASSIUM BICARBONATE 25 MEQ: 25 TABLET, EFFERVESCENT ORAL at 19:47

## 2019-04-17 RX ADMIN — KETOROLAC TROMETHAMINE 15 MG: 30 INJECTION, SOLUTION INTRAMUSCULAR at 19:02

## 2019-04-17 ASSESSMENT — ENCOUNTER SYMPTOMS
DIARRHEA: 1
DOUBLE VISION: 0
FEVER: 0
HEARTBURN: 1
BLURRED VISION: 0
ABDOMINAL PAIN: 1
DIZZINESS: 1
BLOOD IN STOOL: 0
NAUSEA: 1
HEADACHES: 0
CHILLS: 0
COUGH: 0
CONSTIPATION: 0
BACK PAIN: 1
SHORTNESS OF BREATH: 0
SORE THROAT: 0
SINUS PAIN: 0

## 2019-04-17 NOTE — PATIENT INSTRUCTIONS
Rehydration, Adult  Rehydration is the replacement of body fluids and salts and minerals (electrolytes) that are lost during dehydration. Dehydration is when there is not enough fluid or water in the body. This happens when you lose more fluids than you take in. Common causes of dehydration include:  Vomiting.  Diarrhea.  Excessive sweating, such as from heat exposure or exercise.  Taking medicines that cause the body to lose excess fluid (diuretics).  Impaired kidney function.  Not drinking enough fluid.  Certain illnesses or infections.  Certain poorly controlled long-term (chronic) illnesses, such as diabetes, heart disease, and kidney disease.  Symptoms of mild dehydration may include thirst, dry lips and mouth, dry skin, and dizziness. Symptoms of severe dehydration may include increased heart rate, confusion, fainting, and not urinating.  You can rehydrate by drinking certain fluids or getting fluids through an IV tube, as told by your health care provider.  What are the risks?  Generally, rehydration is safe. However, one problem that can happen is taking in too much fluid (overhydration). This is rare. If overhydration happens, it can cause an electrolyte imbalance, kidney failure, or a decrease in salt (sodium) levels in the body.  How to rehydrate  Follow instructions from your health care provider for rehydration. The kind of fluid you should drink and the amount you should drink depend on your condition.  If directed by your health care provider, drink an oral rehydration solution (ORS). This is a drink designed to treat dehydration that is found in pharmacies and retail stores.  Make an ORS by following instructions on the package.  Start by drinking small amounts, about ½ cup (120 mL) every 5-10 minutes.  Slowly increase how much you drink until you have taken the amount recommended by your health care provider.  Drink enough clear fluids to keep your urine clear or pale yellow. If you were instructed  to drink an ORS, finish the ORS first, then start slowly drinking other clear fluids. Drink fluids such as:  Water. Do not drink only water. Doing that can lead to having too little sodium in your body (hyponatremia).  Ice chips.  Fruit juice that you have added water to (diluted juice).  Low-calorie sports drinks.  If you are severely dehydrated, your health care provider may recommend that you receive fluids through an IV tube in the hospital.  Do not take sodium tablets. Doing that can lead to the condition of having too much sodium in your body (hypernatremia).  Eating while you rehydrate  Follow instructions from your health care provider about what to eat while you rehydrate. Your health care provider may recommend that you slowly begin eating regular foods in small amounts.  Eat foods that contain a healthy balance of electrolytes, such as bananas, oranges, potatoes, tomatoes, and spinach.  Avoid foods that are greasy or contain a lot of fat or sugar.  In some cases, you may get nutrition through a feeding tube that is passed through your nose and into your stomach (nasogastric tube, or NG tube). This may be done if you have uncontrolled vomiting or diarrhea.  Beverages to avoid  Certain beverages may make dehydration worse. While you rehydrate, avoid:  Alcohol.  Caffeine.  Drinks that contain a lot of sugar. These include:  High-calorie sports drinks.  Fruit juice that is not diluted.  Soda.  Check nutrition labels to see how much sugar or caffeine a beverage contains.  Signs of dehydration recovery  You may be recovering from dehydration if:  You are urinating more often than before you started rehydrating.  Your urine is clear or pale yellow.  Your energy level improves.  You vomit less frequently.  You have diarrhea less frequently.  Your appetite improves or returns to normal.  You feel less dizzy or less light-headed.  Your skin tone and color start to look more normal.  Contact a health care provider  if:  You continue to have symptoms of mild dehydration, such as:  Thirst.  Dry lips.  Slightly dry mouth.  Dry, warm skin.  Dizziness.  You continue to vomit or have diarrhea.  Get help right away if:  You have symptoms of dehydration that get worse.  You feel:  Confused.  Weak.  Like you are going to faint.  You have not urinated in 6-8 hours.  You have very dark urine.  You have trouble breathing.  Your heart rate while sitting still is over 100 beats a minute.  You cannot drink fluids without vomiting.  You have vomiting or diarrhea that:  Gets worse.  Does not go away.  You have a fever.  This information is not intended to replace advice given to you by your health care provider. Make sure you discuss any questions you have with your health care provider.  Document Released: 03/11/2013 Document Revised: 07/07/2017 Document Reviewed: 02/10/2017  [employer] Interactive Patient Education © 2017 [employer]  Dehydration, Adult  Dehydration is when there is not enough fluid or water in your body. This happens when you lose more fluids than you take in. Dehydration can range from mild to very bad. It should be treated right away to keep it from getting very bad.  Symptoms of mild dehydration may include:  · Thirst.  · Dry lips.  · Slightly dry mouth.  · Dry, warm skin.  · Dizziness.  Symptoms of moderate dehydration may include:  · Very dry mouth.  · Muscle cramps.  · Dark pee (urine). Pee may be the color of tea.  · Your body making less pee.  · Your eyes making fewer tears.  · Heartbeat that is uneven or faster than normal (palpitations).  · Headache.  · Light-headedness, especially when you stand up from sitting.  · Fainting (syncope).  Symptoms of very bad dehydration may include:  · Changes in skin, such as:  ¨ Cold and clammy skin.  ¨ Blotchy (mottled) or pale skin.  ¨ Skin that does not quickly return to normal after being lightly pinched and let go (poor skin turgor).  · Changes in body fluids, such  as:  ¨ Feeling very thirsty.  ¨ Your eyes making fewer tears.  ¨ Not sweating when body temperature is high, such as in hot weather.  ¨ Your body making very little pee.  · Changes in vital signs, such as:  ¨ Weak pulse.  ¨ Pulse that is more than 100 beats a minute when you are sitting still.  ¨ Fast breathing.  ¨ Low blood pressure.  · Other changes, such as:  ¨ Sunken eyes.  ¨ Cold hands and feet.  ¨ Confusion.  ¨ Lack of energy (lethargy).  ¨ Trouble waking up from sleep.  ¨ Short-term weight loss.  ¨ Unconsciousness.  Follow these instructions at home:  · If told by your doctor, drink an ORS:  ¨ Make an ORS by using instructions on the package.  ¨ Start by drinking small amounts, about ½ cup (120 mL) every 5-10 minutes.  ¨ Slowly drink more until you have had the amount that your doctor said to have.  · Drink enough clear fluid to keep your pee clear or pale yellow. If you were told to drink an ORS, finish the ORS first, then start slowly drinking clear fluids. Drink fluids such as:  ¨ Water. Do not drink only water by itself. Doing that can make the salt (sodium) level in your body get too low (hyponatremia).  ¨ Ice chips.  ¨ Fruit juice that you have added water to (diluted).  ¨ Low-calorie sports drinks.  · Avoid:  ¨ Alcohol.  ¨ Drinks that have a lot of sugar. These include high-calorie sports drinks, fruit juice that does not have water added, and soda.  ¨ Caffeine.  ¨ Foods that are greasy or have a lot of fat or sugar.  · Take over-the-counter and prescription medicines only as told by your doctor.  · Do not take salt tablets. Doing that can make the salt level in your body get too high (hypernatremia).  · Eat foods that have minerals (electrolytes). Examples include bananas, oranges, potatoes, tomatoes, and spinach.  · Keep all follow-up visits as told by your doctor. This is important.  Contact a doctor if:  · You have belly (abdominal) pain that:  ¨ Gets worse.  ¨ Stays in one area (localizes).  · You  have a rash.  · You have a stiff neck.  · You get angry or annoyed more easily than normal (irritability).  · You are more sleepy than normal.  · You have a harder time waking up than normal.  · You feel:  ¨ Weak.  ¨ Dizzy.  ¨ Very thirsty.  · You have peed (urinated) only a small amount of very dark pee during 6-8 hours.  Get help right away if:  · You have symptoms of very bad dehydration.  · You cannot drink fluids without throwing up (vomiting).  · Your symptoms get worse with treatment.  · You have a fever.  · You have a very bad headache.  · You are throwing up or having watery poop (diarrhea) and it:  ¨ Gets worse.  ¨ Does not go away.  · You have blood or something green (bile) in your throw-up.  · You have blood in your poop (stool). This may cause poop to look black and tarry.  · You have not peed in 6-8 hours.  · You pass out (faint).  · Your heart rate when you are sitting still is more than 100 beats a minute.  · You have trouble breathing.  This information is not intended to replace advice given to you by your health care provider. Make sure you discuss any questions you have with your health care provider.  Document Released: 10/14/2010 Document Revised: 07/07/2017 Document Reviewed: 02/10/2017  [employer] Interactive Patient Education © 2017 [employer]
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
## 2019-04-17 NOTE — ED NOTES
Chief Complaint   Patient presents with   • Abdominal Pain     pt was just D/C from [facility] on monday for abd pain. Pt was at [facility] and sent here for her pain. Pt took promethazine Sup at 0900

## 2019-04-18 ENCOUNTER — HOSPITAL ENCOUNTER (OUTPATIENT)
Dept: LAB | Facility: MEDICAL CENTER | Age: 39
End: 2019-04-18
Attending: SPECIALIST
Payer: MEDICAID

## 2019-04-18 LAB — PATHOLOGY CONSULT NOTE: NORMAL

## 2019-04-18 PROCEDURE — 88305 TISSUE EXAM BY PATHOLOGIST: CPT

## 2019-04-18 NOTE — ED NOTES
Pt given written and oral discharge instructions. Pt verbalized understanding of all instructions given. All questions answered. VSS. IV removed. Pt given f/u instructions and educated on s/s of when to return to the ER. Pt ambulating independently upon time of discharge in stable condition.

## 2019-04-18 NOTE — ED PROVIDER NOTES
ED Provider Note    CHIEF COMPLAINT  Chief Complaint   Patient presents with   • Abdominal Pain     pt was just D/C from [facility] on monday for abd pain. Pt was at [facility] and sent here for her pain. Pt took promethazine Sup at 0900       HPI  Kaela Ballesteros is a 38 y.o. female with a history of endometriosis, GERD, hypertension, and pancreatitis presents emergency department for evaluation of abdominal pain.  Patient reports that she has had this pain for the past week and has been unchanging.  She was seen on 4/12, 4/13, and 4/14 for the same complaints.  Patient was admitted on the 14th and discharged on Monday.  She was seen by her primary doctor today who sent her back to the emergency department because of the severity of her abdominal pain.  Patient reports no changes in her abdominal pain over the last week.  It is present in the bilateral lower quadrants and associated with nausea.  Patient denies any episodes of emesis today, but did vomit yesterday.  Patient used a Phenergan suppository at 9 AM this morning.  She denies using any medications for pain.  Patient states that she has been able to tolerate liquids today, and is having frequent loose bowel movements.  She reports 6 episodes of nonbloody diarrhea today.    REVIEW OF SYSTEMS  Review of Systems   Constitutional: Negative for chills and fever.   HENT: Negative for congestion, sinus pain and sore throat.    Eyes: Negative for blurred vision and double vision.   Respiratory: Negative for cough and shortness of breath.    Cardiovascular: Negative for chest pain.   Gastrointestinal: Positive for abdominal pain, diarrhea, heartburn and nausea. Negative for blood in stool and constipation.   Genitourinary: Negative for dysuria, frequency and hematuria.   Musculoskeletal: Positive for back pain (chronic).   Skin: Negative for rash.   Neurological: Positive for dizziness (resolved). Negative for headaches.   All other systems  "reviewed and are negative.      PAST MEDICAL HISTORY   has a past medical history of Anemia (2017); Backpain (2001); Diabetes (HCC) (2017); Heart burn; Hypertension (2017); Indigestion (2006); Infectious disease (1995); Other specified symptom associated with female genital organs (2012); Pain (03/03/15); Pancreatitis (2011/2014); Sickle cell trait syndrome; and Urinary tract infection.    SOCIAL HISTORY  Social History     Social History Main Topics   • Smoking status: Former Smoker     Packs/day: 0.50     Years: 15.00     Types: Cigarettes     Quit date: 1/1/2011   • Smokeless tobacco: Never Used      Comment: occasional   • Alcohol use No      Comment: occasionally   • Drug use: Yes     Types: Marijuana, Inhaled      Comment: marijuana   • Sexual activity: Yes     Partners: Male      Comment: BTL       SURGICAL HISTORY   has a past surgical history that includes pelviscopy (1/13/2012); dilation and curettage (12/3/2012); egd w/endoscopic ultrasound (1/26/2015); gastroscopy with biopsy (1/26/2015); primary c section (9/30/2015); primary c section with tubal ligation (5/2/2017); abdominal exploration (2016); and cholecystectomy.    CURRENT MEDICATIONS  Home Medications    **Home medications have not yet been reviewed for this encounter**         ALLERGIES  Allergies   Allergen Reactions   • Nkda [No Known Drug Allergy]        PHYSICAL EXAM  VITAL SIGNS: /106   Pulse 97   Temp 36.7 °C (98.1 °F) (Temporal)   Resp 18   Ht 1.803 m (5' 11\")   Wt 105 kg (231 lb 7.7 oz)   LMP 04/08/2019 (Approximate)   SpO2 98%   BMI 32.29 kg/m²    Pulse ox interpretation: I interpret this pulse ox as normal.    Physical Exam   Constitutional: She is oriented to person, place, and time and well-developed, well-nourished, and in no distress.   Sleeping on my initial exam, arouses appropriately   HENT:   Head: Normocephalic and atraumatic.   Mouth/Throat: Oropharynx is clear and moist.   Eyes: Pupils are equal, round, and " reactive to light. Conjunctivae and EOM are normal.   Neck: Normal range of motion. Neck supple.   Cardiovascular: Regular rhythm and intact distal pulses.    HR ranging from 95-105bpm   Pulmonary/Chest: Effort normal and breath sounds normal. No respiratory distress. She has no rales.   Abdominal: Soft. Bowel sounds are normal. She exhibits no distension. There is tenderness in the right lower quadrant, suprapubic area and left lower quadrant. There is no rebound and no guarding.   Musculoskeletal: Normal range of motion. She exhibits no edema or deformity.   Neurological: She is alert and oriented to person, place, and time. GCS score is 15.   Skin: Skin is warm and dry. She is not diaphoretic.   Psychiatric: Affect and judgment normal.   Nursing note and vitals reviewed.        DIAGNOSTIC STUDIES  Results for orders placed or performed during the hospital encounter of 04/17/19   CBC WITH DIFFERENTIAL   Result Value Ref Range    WBC 7.5 4.8 - 10.8 K/uL    RBC 5.73 (H) 4.20 - 5.40 M/uL    Hemoglobin 15.4 12.0 - 16.0 g/dL    Hematocrit 45.9 37.0 - 47.0 %    MCV 80.1 (L) 81.4 - 97.8 fL    MCH 26.9 (L) 27.0 - 33.0 pg    MCHC 33.6 33.6 - 35.0 g/dL    RDW 35.8 (L) 35.9 - 50.0 fL    Platelet Count 257 164 - 446 K/uL    MPV 10.9 9.0 - 12.9 fL    Neutrophils-Polys 66.40 44.00 - 72.00 %    Lymphocytes 23.30 22.00 - 41.00 %    Monocytes 9.00 0.00 - 13.40 %    Eosinophils 0.40 0.00 - 6.90 %    Basophils 0.50 0.00 - 1.80 %    Immature Granulocytes 0.40 0.00 - 0.90 %    Nucleated RBC 0.00 /100 WBC    Neutrophils (Absolute) 4.97 2.00 - 7.15 K/uL    Lymphs (Absolute) 1.74 1.00 - 4.80 K/uL    Monos (Absolute) 0.67 0.00 - 0.85 K/uL    Eos (Absolute) 0.03 0.00 - 0.51 K/uL    Baso (Absolute) 0.04 0.00 - 0.12 K/uL    Immature Granulocytes (abs) 0.03 0.00 - 0.11 K/uL    NRBC (Absolute) 0.00 K/uL   COMP METABOLIC PANEL   Result Value Ref Range    Sodium 134 (L) 135 - 145 mmol/L    Potassium 3.1 (L) 3.6 - 5.5 mmol/L    Chloride 97 96 -  112 mmol/L    Co2 25 20 - 33 mmol/L    Anion Gap 12.0 (H) 0.0 - 11.9    Glucose 106 (H) 65 - 99 mg/dL    Bun 8 8 - 22 mg/dL    Creatinine 0.94 0.50 - 1.40 mg/dL    Calcium 9.8 8.4 - 10.2 mg/dL    AST(SGOT) 33 12 - 45 U/L    ALT(SGPT) 83 (H) 2 - 50 U/L    Alkaline Phosphatase 71 30 - 99 U/L    Total Bilirubin 1.0 0.1 - 1.5 mg/dL    Albumin 4.1 3.2 - 4.9 g/dL    Total Protein 7.6 6.0 - 8.2 g/dL    Globulin 3.5 1.9 - 3.5 g/dL    A-G Ratio 1.2 g/dL   LIPASE   Result Value Ref Range    Lipase 28 7 - 58 U/L   BETA-HCG QUALITATIVE URINE   Result Value Ref Range    Beta-Hcg Urine Negative Negative   REFRACTOMETER SG   Result Value Ref Range    Specific Gravity 1.019    URINALYSIS,CULTURE IF INDICATED   Result Value Ref Range    Color Yellow     Character Clear     Ph 6.0 5.0 - 8.0    Glucose Negative Negative mg/dL    Ketones Negative Negative mg/dL    Protein 30 (A) Negative mg/dL    Bilirubin Small (A) Negative    Nitrite Positive (A) Negative    Leukocyte Esterase Negative Negative    Occult Blood Small (A) Negative    Micro Urine Req Microscopic    ESTIMATED GFR   Result Value Ref Range    GFR If African American >60 >60 mL/min/1.73 m 2    GFR If Non African American >60 >60 mL/min/1.73 m 2   URINE MICROSCOPIC (W/UA)   Result Value Ref Range    WBC 2-5 /hpf    RBC 0-2 /hpf    Bacteria Moderate (A) None /hpf    Epithelial Cells Many (A) Few /hpf    Mucous Threads Rare /hpf    Urine Crystals Many Amorphous /hpf       US-PELVIC COMPLETE (TRANSABDOMINAL/TRANSVAGINAL) (COMBO)   Final Result         1.  Minimal fibroid uterine changes.   2.  Thickened endometrial stripe, could represent proliferative changes. Consider endometrial pathology as clinically appropriate. Follow-up evaluation in 6 weeks for repeat characterization. Endometrial tissue sampling for further characterization as    clinically appropriate.      [id number] VIEWS   Final Result      No acute intra-abdominal findings.          COURSE & MEDICAL DECISION  MAKING  Pertinent Labs & Imaging studies reviewed. (See chart for details)  38-year-old female presents for abdominal pain.  This is patient's fifth visit for the same over the past week.  Patient was previously admitted and discharged several days ago.  She was seen by her primary care doctor earlier today who sent her to the emergency department due to the severity of her lower abdominal pain.  Patient reports no change in her pain over this past week.  Differential diagnosis includes but is not limited to small bowel obstruction, chronic pain exacerbation, endometriosis, narcotic seeking behavior, UTI, electrolyte abnormality, dehydration, medication side effects    IV access was obtained and basic laboratory studies were drawn.  Patient was given ketorolac for pain with good response.  She was given a normal saline fluid bolus for suspected dehydration.  Labs showed mild hypokalemia with a potassium of 3.1 which was repleted orally.  Patient had no leukocytosis, and labs were largely unchanged from prior studies.  Pregnancy testing was negative.  Urinalysis showed bacteria present and positive nitrite.  There is no significant pyuria.  This was present on her previous urinalysis as well.  Urine culture was sent to evaluate for urinary pathogens.    X-rays obtained to evaluate for small bowel obstruction and showed no acute intra-abdominal findings.  Feel the patient is low risk for small bowel obstruction given that she has been having multiple bowel movements today and has not vomited today at all.  Ultrasound was performed to evaluate the cause of the patient's pain given the location.  This showed minimal fibroid uterine changes and a thickened endometrial stripe which may represent proliferative changes.  Per radiology, they recommend following up for this endometrial abnormality and I instructed the patient regarding this.  Patient reported that she does currently have a gynecologist that she sees at the  pregnancy center.    HYDRATION: Based on the patient's presentation of Dehydration and Tachycardia the patient was given IV fluids. IV Hydration was used because oral hydration was not as rapid as required. Upon recheck following hydration, the patient was improved and tolerating oral intake.     On my repeat evaluation, the patient stated that she was feeling much improved.  She stated that her appetite had returned and she requested fluids and food.  Patient tolerated oral intake without any vomiting in the emergency department.  Feel that she is low risk for small bowel obstruction and given the improvement in her pain, I do not feel that she has any surgical pathology requiring correction at this time.  Patient's CT from her prior visit was reviewed showing some inflammation in the right lower quadrant possibly consistent with epiploic appendicitis.  I explained this diagnosis to the patient and stated that it is not a surgical diagnosis and would not require repair, but should the pain worsen she should return for repeat evaluation.  Patient was comfortable with discharge home, though she requested opioid pain medications.  I explained to her that this would not be an appropriate treatment for her pain, and that she should follow-up with her gynecologist.    The patient will return for new or worsening symptoms and is stable at the time of discharge.    The patient is referred to a primary physician for blood pressure management, diabetic screening, and for all other preventative health concerns.      DISPOSITION:  Patient will be discharged home in stable condition.    FOLLOW UP:  Silvia French M.D.  [address]  [facility] [address]  [facility] [id number] [phone]    Schedule an appointment as soon as possible for a visit       Pregnancy Sangeetha Menjivar M.D.  [address] [address]  [phone]    Schedule an appointment as soon as possible for a visit         OUTPATIENT MEDICATIONS:  Discharge Medication  List as of 4/17/2019  9:31 PM             FINAL IMPRESSION  Visit Diagnoses     ICD-10-CM   1. Dehydration E86.0   2. Lower abdominal pain R10.30   3. Hypokalemia E87.6              Electronically signed by: Soniya Fernandes, 4/17/2019 5:20 PM

## 2019-04-18 NOTE — ED NOTES
Called xray. Per xray, will come to take pt in 10 minutes. ERP notified. Pt asking for pain medication. ERP notified and orders received.

## 2019-04-20 LAB
BACTERIA UR CULT: NORMAL
SIGNIFICANT IND 70042: NORMAL
SITE SITE: NORMAL
SOURCE SOURCE: NORMAL

## 2019-04-24 ENCOUNTER — HOSPITAL ENCOUNTER (EMERGENCY)
Facility: MEDICAL CENTER | Age: 39
End: 2019-04-25
Attending: EMERGENCY MEDICINE
Payer: MEDICAID

## 2019-04-24 DIAGNOSIS — R10.13 EPIGASTRIC PAIN: ICD-10-CM

## 2019-04-24 DIAGNOSIS — R11.2 NON-INTRACTABLE VOMITING WITH NAUSEA, UNSPECIFIED VOMITING TYPE: ICD-10-CM

## 2019-04-24 PROCEDURE — 96374 THER/PROPH/DIAG INJ IV PUSH: CPT

## 2019-04-24 PROCEDURE — 99285 EMERGENCY DEPT VISIT HI MDM: CPT

## 2019-04-24 PROCEDURE — 96375 TX/PRO/DX INJ NEW DRUG ADDON: CPT

## 2019-04-24 PROCEDURE — 700111 HCHG RX REV CODE 636 W/ 250 OVERRIDE (IP): Performed by: EMERGENCY MEDICINE

## 2019-04-24 RX ORDER — ONDANSETRON 2 MG/ML
4 INJECTION INTRAMUSCULAR; INTRAVENOUS ONCE
Status: COMPLETED | OUTPATIENT
Start: 2019-04-25 | End: 2019-04-24

## 2019-04-24 RX ORDER — MORPHINE SULFATE 4 MG/ML
4 INJECTION, SOLUTION INTRAMUSCULAR; INTRAVENOUS ONCE
Status: COMPLETED | OUTPATIENT
Start: 2019-04-25 | End: 2019-04-24

## 2019-04-24 RX ADMIN — MORPHINE SULFATE 4 MG: 4 INJECTION INTRAVENOUS at 23:49

## 2019-04-24 RX ADMIN — ONDANSETRON 4 MG: 2 INJECTION INTRAMUSCULAR; INTRAVENOUS at 23:49

## 2019-04-24 ASSESSMENT — PAIN DESCRIPTION - DESCRIPTORS: DESCRIPTORS: STABBING

## 2019-04-25 VITALS
WEIGHT: 235.45 LBS | TEMPERATURE: 98.1 F | RESPIRATION RATE: 16 BRPM | SYSTOLIC BLOOD PRESSURE: 166 MMHG | OXYGEN SATURATION: 95 % | BODY MASS INDEX: 32.84 KG/M2 | DIASTOLIC BLOOD PRESSURE: 100 MMHG | HEART RATE: 89 BPM

## 2019-04-25 LAB
ALBUMIN SERPL BCP-MCNC: 4.5 G/DL (ref 3.2–4.9)
ALBUMIN/GLOB SERPL: 1.4 G/DL
ALP SERPL-CCNC: 64 U/L (ref 30–99)
ALT SERPL-CCNC: 39 U/L (ref 2–50)
ANION GAP SERPL CALC-SCNC: 10 MMOL/L (ref 0–11.9)
AST SERPL-CCNC: 22 U/L (ref 12–45)
BASOPHILS # BLD AUTO: 0.5 % (ref 0–1.8)
BASOPHILS # BLD: 0.04 K/UL (ref 0–0.12)
BILIRUB SERPL-MCNC: 0.6 MG/DL (ref 0.1–1.5)
BUN SERPL-MCNC: 7 MG/DL (ref 8–22)
CALCIUM SERPL-MCNC: 9.9 MG/DL (ref 8.5–10.5)
CHLORIDE SERPL-SCNC: 105 MMOL/L (ref 96–112)
CO2 SERPL-SCNC: 23 MMOL/L (ref 20–33)
CREAT SERPL-MCNC: 0.68 MG/DL (ref 0.5–1.4)
EOSINOPHIL # BLD AUTO: 0.01 K/UL (ref 0–0.51)
EOSINOPHIL NFR BLD: 0.1 % (ref 0–6.9)
ERYTHROCYTE [DISTWIDTH] IN BLOOD BY AUTOMATED COUNT: 44 FL (ref 35.9–50)
GLOBULIN SER CALC-MCNC: 3.2 G/DL (ref 1.9–3.5)
GLUCOSE SERPL-MCNC: 148 MG/DL (ref 65–99)
HCT VFR BLD AUTO: 41.6 % (ref 37–47)
HGB BLD-MCNC: 13.4 G/DL (ref 12–16)
IMM GRANULOCYTES # BLD AUTO: 0.03 K/UL (ref 0–0.11)
IMM GRANULOCYTES NFR BLD AUTO: 0.4 % (ref 0–0.9)
LIPASE SERPL-CCNC: 73 U/L (ref 11–82)
LYMPHOCYTES # BLD AUTO: 0.79 K/UL (ref 1–4.8)
LYMPHOCYTES NFR BLD: 10.1 % (ref 22–41)
MCH RBC QN AUTO: 27.8 PG (ref 27–33)
MCHC RBC AUTO-ENTMCNC: 32.2 G/DL (ref 33.6–35)
MCV RBC AUTO: 86.3 FL (ref 81.4–97.8)
MONOCYTES # BLD AUTO: 0.31 K/UL (ref 0–0.85)
MONOCYTES NFR BLD AUTO: 4 % (ref 0–13.4)
NEUTROPHILS # BLD AUTO: 6.64 K/UL (ref 2–7.15)
NEUTROPHILS NFR BLD: 84.9 % (ref 44–72)
NRBC # BLD AUTO: 0 K/UL
NRBC BLD-RTO: 0 /100 WBC
PLATELET # BLD AUTO: 171 K/UL (ref 164–446)
PMV BLD AUTO: 11 FL (ref 9–12.9)
POTASSIUM SERPL-SCNC: 4 MMOL/L (ref 3.6–5.5)
PROT SERPL-MCNC: 7.7 G/DL (ref 6–8.2)
RBC # BLD AUTO: 4.82 M/UL (ref 4.2–5.4)
SODIUM SERPL-SCNC: 138 MMOL/L (ref 135–145)
WBC # BLD AUTO: 7.8 K/UL (ref 4.8–10.8)

## 2019-04-25 PROCEDURE — 80053 COMPREHEN METABOLIC PANEL: CPT

## 2019-04-25 PROCEDURE — A9270 NON-COVERED ITEM OR SERVICE: HCPCS | Performed by: EMERGENCY MEDICINE

## 2019-04-25 PROCEDURE — 85025 COMPLETE CBC W/AUTO DIFF WBC: CPT

## 2019-04-25 PROCEDURE — 700111 HCHG RX REV CODE 636 W/ 250 OVERRIDE (IP): Performed by: EMERGENCY MEDICINE

## 2019-04-25 PROCEDURE — 96375 TX/PRO/DX INJ NEW DRUG ADDON: CPT

## 2019-04-25 PROCEDURE — 700102 HCHG RX REV CODE 250 W/ 637 OVERRIDE(OP): Performed by: EMERGENCY MEDICINE

## 2019-04-25 PROCEDURE — 96372 THER/PROPH/DIAG INJ SC/IM: CPT

## 2019-04-25 PROCEDURE — 83690 ASSAY OF LIPASE: CPT

## 2019-04-25 RX ORDER — ONDANSETRON 4 MG/1
4 TABLET, ORALLY DISINTEGRATING ORAL EVERY 8 HOURS PRN
Qty: 10 TAB | Refills: 0 | Status: SHIPPED | OUTPATIENT
Start: 2019-04-25 | End: 2019-05-17

## 2019-04-25 RX ORDER — FAMOTIDINE 20 MG/1
20 TABLET, FILM COATED ORAL 2 TIMES DAILY
Qty: 60 TAB | Refills: 0 | Status: SHIPPED | OUTPATIENT
Start: 2019-04-25 | End: 2019-05-17

## 2019-04-25 RX ORDER — FAMOTIDINE 20 MG/1
20 TABLET, FILM COATED ORAL 2 TIMES DAILY
Qty: 60 TAB | Refills: 0 | Status: SHIPPED | OUTPATIENT
Start: 2019-04-25 | End: 2019-04-25

## 2019-04-25 RX ORDER — METOCLOPRAMIDE HYDROCHLORIDE 5 MG/ML
10 INJECTION INTRAMUSCULAR; INTRAVENOUS ONCE
Status: COMPLETED | OUTPATIENT
Start: 2019-04-25 | End: 2019-04-25

## 2019-04-25 RX ORDER — DICYCLOMINE HYDROCHLORIDE 10 MG/ML
20 INJECTION INTRAMUSCULAR ONCE
Status: COMPLETED | OUTPATIENT
Start: 2019-04-25 | End: 2019-04-25

## 2019-04-25 RX ADMIN — DICYCLOMINE HYDROCHLORIDE 20 MG: 20 INJECTION, SOLUTION INTRAMUSCULAR at 00:29

## 2019-04-25 RX ADMIN — METOCLOPRAMIDE 10 MG: 5 INJECTION, SOLUTION INTRAMUSCULAR; INTRAVENOUS at 00:29

## 2019-04-25 RX ADMIN — LIDOCAINE HYDROCHLORIDE 30 ML: 20 SOLUTION OROPHARYNGEAL at 00:28

## 2019-04-25 NOTE — ED NOTES
Pt given discharge instructions. Medication education provided.  PIV removed, dressing applied. Signed copy in chart. Pt states all belongings in possession. Pt ambulatory off unit with steady gait.

## 2019-04-25 NOTE — ED PROVIDER NOTES
ED Provider Note    Scribed for Reynaldo Foreman M.D. by Sergio Lawrence. 4/24/2019, 11:25 PM.    Primary care provider: Silvia French M.D.  Means of arrival: Walk-In  History obtained from: Patient  History limited by: None    CHIEF COMPLAINT  Chief Complaint   Patient presents with   • N/V   • Abdominal Pain       HPI  Kaela Ballesteros is a 38 y.o. female who presents to the Emergency Department for moderate, upper quadrant abdominal pain onset this morning. She was seen here on the 12th, 13th, and 14th of this month and had a CT showing epiploic apendigitis. However, she states this pain is different because it is in the upper quadrant rather than lower. She has had 3-4 episode of vomiting without obvious blood. The patient also reports chills but no fevers, dysuria, melena, or constipation. She has had normal bowel movements. She denies excessive NSAID use. The patient used marijuana just over a day ago and has been smoking regularly for the last few months. The pain is not alleviated with hot showers. She denies alcohol use. She was referred to GI during her last visit but has not yet been able to schedule an appointment.     REVIEW OF SYSTEMS  Pertinent positives include upper quadrant abdominal pain, nausea, vomiting, and chills. Pertinent negatives include hematemesis, fevers, dysuria, melena, and constipation. All other systems negative.    PAST MEDICAL HISTORY   has a past medical history of Anemia (2017); Backpain (2001); Diabetes (HCC) (2017); Heart burn; Hypertension (2017); Indigestion (2006); Infectious disease (1995); Other specified symptom associated with female genital organs (2012); Pain (03/03/15); Pancreatitis (2011/2014); Sickle cell trait syndrome; and Urinary tract infection.  History of pancreatitis but no history of cholecystitis.     SURGICAL HISTORY   has a past surgical history that includes pelviscopy (1/13/2012); dilation and curettage (12/3/2012); egd w/endoscopic  ultrasound (1/26/2015); gastroscopy with biopsy (1/26/2015); primary c section (9/30/2015); primary c section with tubal ligation (5/2/2017); abdominal exploration (2016); and cholecystectomy.    SOCIAL HISTORY  Social History   Substance Use Topics   • Smoking status: Former Smoker     Packs/day: 0.50     Years: 15.00     Types: Cigarettes     Quit date: 1/1/2011   • Smokeless tobacco: Never Used      Comment: occasional   • Alcohol use No      Comment: occasionally      History   Drug Use   • Types: Marijuana, Inhaled     Comment: marijuana, last used yesterday.        FAMILY HISTORY  Family History   Problem Relation Age of Onset   • Diabetes Father    • Diabetes Brother    • Diabetes Paternal Grandmother        CURRENT MEDICATIONS  Home Medications    **Home medications have not yet been reviewed for this encounter**         ALLERGIES  Allergies   Allergen Reactions   • Nkda [No Known Drug Allergy]        PHYSICAL EXAM  VITAL SIGNS: BP (!) 166/100   Pulse (!) 104   Temp 36.7 °C (98.1 °F) (Temporal)   Resp 16   Wt 106.8 kg (235 lb 7.2 oz)   LMP 04/08/2019 (Approximate)   SpO2 97%   BMI 32.84 kg/m²     Constitutional: Well developed, Well nourished, Moderate distress.   HENT: Normocephalic, Atraumatic, Oropharynx moist.   Eyes: Conjunctiva normal, No discharge, No scleral icterus.   Cardiovascular: Normal heart rate, Normal rhythm, No murmurs, equal pulses.   Pulmonary: Normal breath sounds, No respiratory distress, No wheezing, No rales, No rhonchi.  Chest: No chest wall tenderness or deformity.   Abdomen:Soft, mild diffuse tenderness throughout which is greatest in the epigastric region, no rebound, no guarding, no pain or tenderness at McBurney's Point, No masses, no rebound, no guarding.   Back: No CVA tenderness.   Musculoskeletal: No major deformities noted, No tenderness.   Skin: Warm, Dry, No erythema, No rash.   Neurologic: Alert & oriented x 3, Normal motor function,  No focal deficits noted.    Psychiatric: Affect normal, Judgment normal, Mood normal.     LABS  Results for orders placed or performed during the hospital encounter of 04/24/19   COMP METABOLIC PANEL   Result Value Ref Range    Sodium 138 135 - 145 mmol/L    Potassium 4.0 3.6 - 5.5 mmol/L    Chloride 105 96 - 112 mmol/L    Co2 23 20 - 33 mmol/L    Anion Gap 10.0 0.0 - 11.9    Glucose 148 (H) 65 - 99 mg/dL    Bun 7 (L) 8 - 22 mg/dL    Creatinine 0.68 0.50 - 1.40 mg/dL    Calcium 9.9 8.5 - 10.5 mg/dL    AST(SGOT) 22 12 - 45 U/L    ALT(SGPT) 39 2 - 50 U/L    Alkaline Phosphatase 64 30 - 99 U/L    Total Bilirubin 0.6 0.1 - 1.5 mg/dL    Albumin 4.5 3.2 - 4.9 g/dL    Total Protein 7.7 6.0 - 8.2 g/dL    Globulin 3.2 1.9 - 3.5 g/dL    A-G Ratio 1.4 g/dL   LIPASE   Result Value Ref Range    Lipase 73 11 - 82 U/L   CBC WITH DIFFERENTIAL   Result Value Ref Range    WBC 7.8 4.8 - 10.8 K/uL    RBC 4.82 4.20 - 5.40 M/uL    Hemoglobin 13.4 12.0 - 16.0 g/dL    Hematocrit 41.6 37.0 - 47.0 %    MCV 86.3 81.4 - 97.8 fL    MCH 27.8 27.0 - 33.0 pg    MCHC 32.2 (L) 33.6 - 35.0 g/dL    RDW 44.0 35.9 - 50.0 fL    Platelet Count 171 164 - 446 K/uL    MPV 11.0 9.0 - 12.9 fL    Neutrophils-Polys 84.90 (H) 44.00 - 72.00 %    Lymphocytes 10.10 (L) 22.00 - 41.00 %    Monocytes 4.00 0.00 - 13.40 %    Eosinophils 0.10 0.00 - 6.90 %    Basophils 0.50 0.00 - 1.80 %    Immature Granulocytes 0.40 0.00 - 0.90 %    Nucleated RBC 0.00 /100 WBC    Neutrophils (Absolute) 6.64 2.00 - 7.15 K/uL    Lymphs (Absolute) 0.79 (L) 1.00 - 4.80 K/uL    Monos (Absolute) 0.31 0.00 - 0.85 K/uL    Eos (Absolute) 0.01 0.00 - 0.51 K/uL    Baso (Absolute) 0.04 0.00 - 0.12 K/uL    Immature Granulocytes (abs) 0.03 0.00 - 0.11 K/uL    NRBC (Absolute) 0.00 K/uL   ESTIMATED GFR   Result Value Ref Range    GFR If African American >60 >60 mL/min/1.73 m 2    GFR If Non African American >60 >60 mL/min/1.73 m 2     All labs reviewed by me.    RADIOLOGY  No orders to display     The radiologist's  interpretation of all radiological studies have been reviewed by me.    COURSE & MEDICAL DECISION MAKING  Pertinent Labs & Imaging studies reviewed. (See chart for details)    11:25 PM - Patient seen and examined at bedside. Patient will be treated with 30 mL GI Cocktail, 20 mg Bentyl, 10 mg reglan, 4 mg zofran, and 4 mg morphine for her syptoms. Ordered CBC with evaluate her differential, CMP, lipase, and urinalysis. The differential diagnoses include but are not limited to: gastritis, pancreatitis, bowel obstruction, ulcer, and gastroenteritis.   2:50 AM - Reviewed lab results completed thus far as above.     2:58 AM - Patient is sleeping initially on my reexamination. I discussed lab results completed thus far and explained that this is likely a gastroenteritis. I discussed plan for discharge pending remaining lab results.         Medical Decision Making: At this point time I think patient most likely has a gastritis causing vomiting and epigastric pain.  Her symptoms improved greatly with a GI cocktail.  We will keep her on a H2 blocker and give her Zofran to help with the vomiting.  Patient's labs are otherwise unremarkable she has had 2 CTs within the last month which were unremarkable except for epiploic appendagitis but the patient's pain is not in that location.  Therefore I do not think this is the cause of her pain.    The patient will return for new or worsening symptoms and is stable at the time of discharge.    The patient is referred to a primary physician for blood pressure management, diabetic screening, and for all other preventative health concerns.    DISPOSITION:  Patient will be discharged home in stable condition.    FOLLOW UP:  Silvia French M.D.  [address] [place] [id number]  [phone]    Schedule an appointment as soon as possible for a visit in 3 days        OUTPATIENT MEDICATIONS:  Discharge Medication List as of 4/25/2019  4:14 AM      START taking these medications     Details   !! ondansetron (ZOFRAN ODT) 4 MG TABLET DISPERSIBLE Take 1 Tab by mouth every 8 hours as needed., Disp-10 Tab, R-0, Print Rx Paper       !! - Potential duplicate medications found. Please discuss with provider.            FINAL IMPRESSION  1. Epigastric pain    2. Non-intractable vomiting with nausea, unspecified vomiting type          I, Sergio Lawrence (Scribe), am scribing for, and in the presence of, Reynaldo Foreman M.D.    Electronically signed by: Sergio Lawrence (Scribe), 4/24/2019    IReynaldo M.D. personally performed the services described in this documentation, as scribed by Sergio Lawrence in my presence, and it is both accurate and complete.    The note accurately reflects work and decisions made by me.  Reynaldo Foreman  4/25/2019  4:53 AM    C

## 2019-04-25 NOTE — ED TRIAGE NOTES
Chief Complaint   Patient presents with   • N/V   • Abdominal Pain     BP (!) 166/100   Pulse (!) 104   Temp 36.7 °C (98.1 °F) (Temporal)   Resp 16   Wt 106.8 kg (235 lb 7.2 oz)   LMP 04/08/2019 (Approximate)   SpO2 97%   BMI 32.84 kg/m²     Pt has been having nausea and vomiting starting this afternoon. Pt has also been having stabbing abdominal pain as well.     Denies diarrhea, no blood in vomit.      Pt does smoke marijuana, last use was yesterday.     Pt to senior lounge, asked to tell staff of any changes

## 2019-04-25 NOTE — ED NOTES
Pt to B13 via wheelchair. Inconsolable and writhing in pain.  Agree with triage rn note.  Pt on monitors, erp to see.

## 2019-04-29 NOTE — DISCHARGE SUMMARY
[facility] [place] [facility] PROGRESS NOTE     Attending:   Candace Palm MD    Resident:   Alice Flores MD    PATIENT: Kaela Ballesteros; [id number]; 1980    ID: 38 y.o. female admitted for nausea, vomiting and abdominal pain    24 Hour Events: No acute events overnight. Despite multiple attempts, IV access was unobtainable. However, patient has been tolerating oral liquids. Feeling better today.     OBJECTIVE:     Vitals:    04/14/19 1906 04/15/19 0345 04/15/19 0514 04/15/19 0745   BP:  152/111 150/95 144/98   Pulse:  92 85 92   Resp: 16 16  18   Temp: 36.5 °C (97.7 °F) 36.5 °C (97.7 °F)  36.6 °C (97.8 °F)   TempSrc: Temporal Temporal  Temporal   SpO2: 96% 98%  93%   Weight:       Height:         No intake or output data in the 24 hours ending 04/29/19 0918    PE:   General: No acute distress, resting comfortably in bed.  HEENT: Normocephalic, atraumatic. EOMI. MMM  Cardiovascular: RRR with no M/R/G.  Respiratory: Symmetrical chest. Clear to auscultation bilaterally with no wheezes, rales or rhonchi  Abdomen: soft, non-tender, non-distended no masses, +BS   EXT:  Moves all extremities, grossly normal. No edema or cyanosis. 2+ pulses in all extremities  Neuro: non focal, sensation grossly intact    LABS:  No results for input(s): WBC, RBC, HEMOGLOBIN, HEMATOCRIT, MCV, MCH, RDW, PLATELETCT, MPV, NEUTSPOLYS, LYMPHOCYTES, MONOCYTES, EOSINOPHILS, BASOPHILS, RBCMORPHOLO in the last 72 hours.  No results for input(s): SODIUM, POTASSIUM, CHLORIDE, CO2, BUN, CREATININE, CALCIUM, MAGNESIUM, PHOSPHORUS, ALBUMIN in the last 72 hours.  Estimated GFR/CRCL = Estimated Creatinine Clearance: 147.2 mL/min (by C-G formula based on SCr of 0.68 mg/dL).  No results for input(s): GLUCOSE, POCGLUCOSE in the last 72 hours.              No results for input(s): INR, APTT, FIBRINOGEN in the last 72 hours.    Invalid input(s): DIMER    MICROBIOLOGY:   Results     ** No results found for the last 168 hours. **            IMAGING:   None  new        DISCHARGE SUMMARY:   Brief HPI:  Simon  is a 38 y.o.  Female  who was admitted on 4/14/2019 for nausea, vomiting, and generalized abdominal pain x 5 days. Has been seen in the ED on 4/12, 4/13, and again today. 2 CT scans of her abdomen have been done this month and are unremarkable.    Hospital Problem List  Dehydration  Nausea  Vomiting  Abdominal Pain    Discharge Diagnosis:  Nausea  Vomiting  Abdominal Pain  Viral Gastritis    Hospital Course:   Patient admitted for nausea, vomiting, abdominal pain. She was given IVFs until IV infiltrated and peripheral access was not attained despited multiple attempts. However, patient felt improved overnight with phenergan and zofran and was tolerating liquids. She reported similar symptoms in her family memebers. A UA was done and was a dirty catch with many epithelial cells. Patient also had no complaints of dysuria, so no antibiotics were started. On HD 2, patient felt improved, was tolerating oral liquids, and had no significant imaging finding, and was deemed stable for discharge home.    Procedures:  None     Significant Imaging Findings:  CT Abdomen/Pelvis 4/13/19    1.  2 small focal areas of soft tissue density or induration are unchanged anterior to the distal end of the left colon. These could represent small areas of inflammation.    2.  Probable left ovary cyst again identified.    3.  4 cm hiatal hernia again identified.    4.  Post cholecystectomy.    Significant Laboratory Findings:  Results for SIMON HICKS (MRN [id number]) as of 4/29/2019 09:58   Ref. Range 4/13/2019 17:23 4/14/2019 06:15   Sodium Latest Ref Range: 135 - 145 mmol/L 137 135   Potassium Latest Ref Range: 3.6 - 5.5 mmol/L 3.6 4.2   Chloride Latest Ref Range: 96 - 112 mmol/L 103 103   Co2 Latest Ref Range: 20 - 33 mmol/L 20 22   Anion Gap Latest Ref Range: 0.0 - 11.9  14.0 (H) 10.0   Glucose Latest Ref Range: 65 - 99 mg/dL 124 (H) 135 (H)   Bun Latest Ref Range: 8 - 22  mg/dL 10 9   Creatinine Latest Ref Range: 0.50 - 1.40 mg/dL 0.90 0.83   GFR If  Latest Ref Range: >60 mL/min/1.73 m 2 >60 >60   GFR If Non  Latest Ref Range: >60 mL/min/1.73 m 2 >60 >60   Calcium Latest Ref Range: 8.5 - 10.5 mg/dL 11.0 (H) 10.4   AST(SGOT) Latest Ref Range: 12 - 45 U/L 22 36   ALT(SGPT) Latest Ref Range: 2 - 50 U/L 26 28   Alkaline Phosphatase Latest Ref Range: 30 - 99 U/L 58 62   Total Bilirubin Latest Ref Range: 0.1 - 1.5 mg/dL 0.6 0.6   Albumin Latest Ref Range: 3.2 - 4.9 g/dL 4.9 4.8   Total Protein Latest Ref Range: 6.0 - 8.2 g/dL 8.2 8.3 (H)   Globulin Latest Ref Range: 1.9 - 3.5 g/dL 3.3 3.5   A-G Ratio Latest Units: g/dL 1.5 1.4   Lipase Latest Ref Range: 11 - 82 U/L 14    Lactic Acid Latest Ref Range: 0.5 - 2.0 mmol/L 2.3 (H) 1.8       Disposition:  Discharge to: home    Follow Up:  With [facility]    Discharge  Medications:      Medication List      CONTINUE taking these medications      Instructions   dicyclomine 10 MG Caps  Commonly known as:  BENTYL   Take 1 Cap by mouth 4 Times a Day,Before Meals and at Bedtime.  Dose:  10 mg     ondansetron 4 MG Tbdp  Commonly known as:  ZOFRAN ODT   Take 1 Tab by mouth every 6 hours as needed for Nausea.  Dose:  4 mg              CC: [facility]

## 2019-05-16 NOTE — H&P
DATE OF SCHEDULED SURGERY:  2019    REASON FOR SURGERY:  Menometrorrhagia, dysmenorrhea, dyspareunia, symptomatic   pelvic floor relaxation, type 2 stress urinary incontinence as noted on   urodynamic studies.      HISTORY OF PRESENT ILLNESS:  This is a 38-year-old woman,  2, para 1,   spontaneous  1, with a negative urine pregnancy test on 05/15/2019 who   states that she has exhausted all conservative measures.  She has had a   longstanding progressively worsening history of abnormal uterine bleeding on   transvaginal pelvic ultrasound, she was noted to have a fibroid tumor in the   uterine cavity in the submucosal region, which was felt to be the source of   her longstanding history of menometrorrhagia, dysmenorrhea, and pelvic pain.    She states that she now wishes to proceed forward with attempted definitive   surgical correction of her longstanding history of progressively worsening   history of her symptomatic uterine fibroids and associated menometrorrhagia,   dysmenorrhea, dyspareunia and pelvic pain with a laparoscopic-assisted vaginal   hysterectomy, bilateral salpingectomy.  She wished to proceed forward with   ovarian conservation.  She has also been to the emergency room, also has a   large bulge at the vaginal introitus consistent with uterovaginal prolapse and   also wishes to proceed forward with a native tissue repair in the form of an   anterior and posterior vaginal repair, enterocele repair, perineoplasty,   sacrospinous vault suspension, transobturator tape mid urethral sling   procedure to address her longstanding and progressively worsening history of   pelvic floor symptom complaints and dyspareunia, which she attributes to her   symptomatic pelvic floor relaxation.  She also notes overactive bladder   symptoms and has tried overactive bladder medications in the past.  She does   understand the limitations of surgery in addressing her pelvic pain,   dyspareunia, and  overactive bladder symptoms, as these symptoms maybe   unimproved or actually worsen with the surgery as described above requiring   additional medical or surgical management.  Even understanding the limitations   of surgery, she is wishing to proceed forward with surgery as planned.  She   has undergone a workup including endometrial biopsy, which showed secretory   phase endometrium with stromal breakdown, no evidence of hyperplasia,   endometritis or malignancy.  She has had a normal Pap smear and cervical   cultures.  She has had 2 ultrasounds, 1 through the emergency room and 1   through our office demonstrating and confirming the findings as stated above   with evidence of possible adenomyosis on ultrasound.  She has undergone   urodynamic studies, which did demonstrate and confirm type 2 stress urinary   continence.  Her urinalysis was within normal limits.      PAST MEDICAL HISTORY:  History of endometriosis in  done via laparoscopy   which her right adnexa had been removed by report.  This operative summary is   not available for review, otherwise as stated above.    PAST SURGICAL HISTORY:  Cholecystectomy in ,  section in ,   oophorectomy in .    OBSTETRICAL HISTORY:  One previous delivery complicated by a placental   abruption, requiring a  section, 1 spontaneous .      GYNECOLOGIC HISTORY:  The patient does report a longstanding history of   menometrorrhagia, which she describes excessive bleeding with passage of large   clots, incapacitating dysmenorrhea, dyspareunia, pelvic pain, a bulge at the   vaginal introitus consistent with the cervix and anterior vaginal wall, mixed   urinary incontinence with strong type 2 stress urinary incontinence component   as noted on urodynamic studies.  She denies any previous sexually transmitted   diseases or pelvic infections.      FAMILY HISTORY:  Noncontributory.      REVIEW OF SYSTEMS:  Otherwise unremarkable.      SOCIAL  HISTORY:  She is .  She reports rare use of alcohol.  She denies   tobacco.  She does report marijuana usage daily.  She denies any other drug   use.      MEDICATIONS:  Ibuprofen and Percocet for longstanding history of pelvic pain   and low back pain.      ALLERGIES:  No known drug allergies.    PHYSICAL EXAMINATION:  GENERAL:  She is afebrile, hemodynamically stable.    VITAL SIGNS:  Please see current vital signs on electronic medical record.    HEART:  Regular rate and rhythm.    CHEST:  Clear to auscultation bilaterally.    ABDOMEN:  Soft, morbidly obese, nontender.    PELVIC:  Shows grade II anterior, posterior and uterine relaxation with   tenderness to palpation at the uterine fundus.  The exam was difficult   secondary to patient's increased body mass index.  The patient does have   tenderness to palpation in both adnexal regions, left greater than right, but   no adnexal masses were appreciated.  Rectovaginal exam confirmed the above.    She is guaiac negative.    EXTREMITIES:  Nontender.      LABORATORY DATA:  Urine pregnancy test was negative on 05/15/2019.  Urodynamic   studies did demonstrate and confirm type 2 stress urinary incontinence.    Imaging is as described above.  Pap smear was within normal limits.    Endometrial biopsy is also as described above.      ASSESSMENT AND PLAN:  A 38-year-old woman with menometrorrhagia, dysmenorrhea,   dyspareunia, pelvic pain, history of endometriosis, symptomatic uterine   fibroids, mixed urinary incontinence with a strong type 2 stress urinary   incontinence component as noted on urodynamic studies.  She now elects to   proceed forward with surgery as described above.  Risks, benefits and   alternatives have been addressed.  She has asked appropriate questions, signed   the appropriate consents and wishes to proceed forward with surgery as   planned.       ____________________________________     MD LILA DALTON / MÓNICA    DD:  05/15/2019  08:44:12  DT:  05/15/2019 09:07:14    D#:  [id number]  Job#:  [id number]

## 2019-05-17 DIAGNOSIS — Z01.810 PRE-OPERATIVE CARDIOVASCULAR EXAMINATION: ICD-10-CM

## 2019-05-17 DIAGNOSIS — Z01.812 PRE-OPERATIVE LABORATORY EXAMINATION: ICD-10-CM

## 2019-05-17 LAB
ANION GAP SERPL CALC-SCNC: 6 MMOL/L (ref 0–11.9)
BUN SERPL-MCNC: 10 MG/DL (ref 8–22)
CALCIUM SERPL-MCNC: 9.5 MG/DL (ref 8.5–10.5)
CHLORIDE SERPL-SCNC: 110 MMOL/L (ref 96–112)
CO2 SERPL-SCNC: 23 MMOL/L (ref 20–33)
CREAT SERPL-MCNC: 0.75 MG/DL (ref 0.5–1.4)
EKG IMPRESSION: NORMAL
ERYTHROCYTE [DISTWIDTH] IN BLOOD BY AUTOMATED COUNT: 40.4 FL (ref 35.9–50)
GLUCOSE SERPL-MCNC: 103 MG/DL (ref 65–99)
HCT VFR BLD AUTO: 41.1 % (ref 37–47)
HGB BLD-MCNC: 13.4 G/DL (ref 12–16)
MCH RBC QN AUTO: 27.5 PG (ref 27–33)
MCHC RBC AUTO-ENTMCNC: 32.6 G/DL (ref 33.6–35)
MCV RBC AUTO: 84.4 FL (ref 81.4–97.8)
PLATELET # BLD AUTO: 219 K/UL (ref 164–446)
PMV BLD AUTO: 11 FL (ref 9–12.9)
POTASSIUM SERPL-SCNC: 3.9 MMOL/L (ref 3.6–5.5)
RBC # BLD AUTO: 4.87 M/UL (ref 4.2–5.4)
SODIUM SERPL-SCNC: 139 MMOL/L (ref 135–145)
WBC # BLD AUTO: 4 K/UL (ref 4.8–10.8)

## 2019-05-17 PROCEDURE — 93005 ELECTROCARDIOGRAM TRACING: CPT

## 2019-05-17 PROCEDURE — 36415 COLL VENOUS BLD VENIPUNCTURE: CPT

## 2019-05-17 PROCEDURE — 93010 ELECTROCARDIOGRAM REPORT: CPT | Performed by: INTERNAL MEDICINE

## 2019-05-17 PROCEDURE — 80048 BASIC METABOLIC PNL TOTAL CA: CPT

## 2019-05-17 PROCEDURE — 85027 COMPLETE CBC AUTOMATED: CPT

## 2019-06-04 ENCOUNTER — ANESTHESIA (OUTPATIENT)
Dept: SURGERY | Facility: MEDICAL CENTER | Age: 39
End: 2019-06-04
Payer: MEDICAID

## 2019-06-04 ENCOUNTER — HOSPITAL ENCOUNTER (OUTPATIENT)
Facility: MEDICAL CENTER | Age: 39
End: 2019-06-04
Attending: SPECIALIST | Admitting: SPECIALIST
Payer: MEDICAID

## 2019-06-04 ENCOUNTER — ANESTHESIA EVENT (OUTPATIENT)
Dept: SURGERY | Facility: MEDICAL CENTER | Age: 39
End: 2019-06-04
Payer: MEDICAID

## 2019-06-04 VITALS
HEIGHT: 71 IN | TEMPERATURE: 97 F | RESPIRATION RATE: 18 BRPM | OXYGEN SATURATION: 100 % | DIASTOLIC BLOOD PRESSURE: 87 MMHG | WEIGHT: 229.94 LBS | BODY MASS INDEX: 32.19 KG/M2 | SYSTOLIC BLOOD PRESSURE: 136 MMHG | HEART RATE: 79 BPM

## 2019-06-04 LAB
B-HCG FREE SERPL-ACNC: <5 MIU/ML
IHCGL IHCGL: NEGATIVE MIU/ML
PATHOLOGY CONSULT NOTE: NORMAL

## 2019-06-04 PROCEDURE — 700105 HCHG RX REV CODE 258: Performed by: SPECIALIST

## 2019-06-04 PROCEDURE — 502240 HCHG MISC OR SUPPLY RC 0272: Performed by: SPECIALIST

## 2019-06-04 PROCEDURE — 160002 HCHG RECOVERY MINUTES (STAT): Performed by: SPECIALIST

## 2019-06-04 PROCEDURE — 500886 HCHG PACK, LAPAROSCOPY: Performed by: SPECIALIST

## 2019-06-04 PROCEDURE — A9270 NON-COVERED ITEM OR SERVICE: HCPCS

## 2019-06-04 PROCEDURE — 160047 HCHG PACU  - EA ADDL 30 MINS PHASE II: Performed by: SPECIALIST

## 2019-06-04 PROCEDURE — 160029 HCHG SURGERY MINUTES - 1ST 30 MINS LEVEL 4: Performed by: SPECIALIST

## 2019-06-04 PROCEDURE — 160048 HCHG OR STATISTICAL LEVEL 1-5: Performed by: SPECIALIST

## 2019-06-04 PROCEDURE — 501330 HCHG SET, CYSTO IRRIG TUBING: Performed by: SPECIALIST

## 2019-06-04 PROCEDURE — 500892 HCHG PACK, PERI-GYN: Performed by: SPECIALIST

## 2019-06-04 PROCEDURE — 501577 HCHG TROCAR, STEP 11MM: Performed by: SPECIALIST

## 2019-06-04 PROCEDURE — 700111 HCHG RX REV CODE 636 W/ 250 OVERRIDE (IP)

## 2019-06-04 PROCEDURE — 84702 CHORIONIC GONADOTROPIN TEST: CPT

## 2019-06-04 PROCEDURE — 501516 HCHG SUTURE, CAPIO: Performed by: SPECIALIST

## 2019-06-04 PROCEDURE — 160036 HCHG PACU - EA ADDL 30 MINS PHASE I: Performed by: SPECIALIST

## 2019-06-04 PROCEDURE — 160025 RECOVERY II MINUTES (STATS): Performed by: SPECIALIST

## 2019-06-04 PROCEDURE — A4338 INDWELLING CATHETER LATEX: HCPCS | Performed by: SPECIALIST

## 2019-06-04 PROCEDURE — 88307 TISSUE EXAM BY PATHOLOGIST: CPT

## 2019-06-04 PROCEDURE — 160046 HCHG PACU - 1ST 60 MINS PHASE II: Performed by: SPECIALIST

## 2019-06-04 PROCEDURE — C1771 REP DEV, URINARY, W/SLING: HCPCS | Performed by: SPECIALIST

## 2019-06-04 PROCEDURE — 501838 HCHG SUTURE GENERAL: Performed by: SPECIALIST

## 2019-06-04 PROCEDURE — 700101 HCHG RX REV CODE 250

## 2019-06-04 PROCEDURE — 500854 HCHG NEEDLE, INSUFFLATION FOR STEP: Performed by: SPECIALIST

## 2019-06-04 PROCEDURE — 700105 HCHG RX REV CODE 258

## 2019-06-04 PROCEDURE — 502703 HCHG DEVICE, LIGASURE V SEALER: Performed by: SPECIALIST

## 2019-06-04 PROCEDURE — 501579 HCHG TROCAR, STEP 5MM: Performed by: SPECIALIST

## 2019-06-04 PROCEDURE — 160035 HCHG PACU - 1ST 60 MINS PHASE I: Performed by: SPECIALIST

## 2019-06-04 PROCEDURE — 700102 HCHG RX REV CODE 250 W/ 637 OVERRIDE(OP)

## 2019-06-04 PROCEDURE — 160009 HCHG ANES TIME/MIN: Performed by: SPECIALIST

## 2019-06-04 PROCEDURE — 160041 HCHG SURGERY MINUTES - EA ADDL 1 MIN LEVEL 4: Performed by: SPECIALIST

## 2019-06-04 DEVICE — SLING TOT OBTRYX I HALO: Type: IMPLANTABLE DEVICE | Status: FUNCTIONAL

## 2019-06-04 RX ORDER — SIMETHICONE 80 MG
80 TABLET,CHEWABLE ORAL EVERY 8 HOURS PRN
Status: DISCONTINUED | OUTPATIENT
Start: 2019-06-04 | End: 2019-06-04 | Stop reason: HOSPADM

## 2019-06-04 RX ORDER — OXYCODONE HCL 5 MG/5 ML
10 SOLUTION, ORAL ORAL
Status: COMPLETED | OUTPATIENT
Start: 2019-06-04 | End: 2019-06-04

## 2019-06-04 RX ORDER — BUPIVACAINE HYDROCHLORIDE AND EPINEPHRINE 2.5; 5 MG/ML; UG/ML
INJECTION, SOLUTION EPIDURAL; INFILTRATION; INTRACAUDAL; PERINEURAL
Status: DISCONTINUED | OUTPATIENT
Start: 2019-06-04 | End: 2019-06-04 | Stop reason: HOSPADM

## 2019-06-04 RX ORDER — DEXAMETHASONE SODIUM PHOSPHATE 4 MG/ML
INJECTION, SOLUTION INTRA-ARTICULAR; INTRALESIONAL; INTRAMUSCULAR; INTRAVENOUS; SOFT TISSUE PRN
Status: DISCONTINUED | OUTPATIENT
Start: 2019-06-04 | End: 2019-06-04 | Stop reason: SURG

## 2019-06-04 RX ORDER — SODIUM CHLORIDE, SODIUM LACTATE, POTASSIUM CHLORIDE, CALCIUM CHLORIDE 600; 310; 30; 20 MG/100ML; MG/100ML; MG/100ML; MG/100ML
INJECTION, SOLUTION INTRAVENOUS
Status: DISCONTINUED | OUTPATIENT
Start: 2019-06-04 | End: 2019-06-04 | Stop reason: SURG

## 2019-06-04 RX ORDER — KETOROLAC TROMETHAMINE 30 MG/ML
INJECTION, SOLUTION INTRAMUSCULAR; INTRAVENOUS PRN
Status: DISCONTINUED | OUTPATIENT
Start: 2019-06-04 | End: 2019-06-04 | Stop reason: SURG

## 2019-06-04 RX ORDER — PROMETHAZINE HYDROCHLORIDE 25 MG/1
12.5 SUPPOSITORY RECTAL EVERY 4 HOURS PRN
Status: DISCONTINUED | OUTPATIENT
Start: 2019-06-04 | End: 2019-06-04 | Stop reason: HOSPADM

## 2019-06-04 RX ORDER — ONDANSETRON 2 MG/ML
INJECTION INTRAMUSCULAR; INTRAVENOUS PRN
Status: DISCONTINUED | OUTPATIENT
Start: 2019-06-04 | End: 2019-06-04 | Stop reason: SURG

## 2019-06-04 RX ORDER — HALOPERIDOL 5 MG/ML
1 INJECTION INTRAMUSCULAR
Status: DISCONTINUED | OUTPATIENT
Start: 2019-06-04 | End: 2019-06-04 | Stop reason: HOSPADM

## 2019-06-04 RX ORDER — BUPIVACAINE HYDROCHLORIDE AND EPINEPHRINE 2.5; 5 MG/ML; UG/ML
INJECTION, SOLUTION INFILTRATION; PERINEURAL
Status: DISCONTINUED | OUTPATIENT
Start: 2019-06-04 | End: 2019-06-04 | Stop reason: HOSPADM

## 2019-06-04 RX ORDER — ONDANSETRON 2 MG/ML
4 INJECTION INTRAMUSCULAR; INTRAVENOUS
Status: DISCONTINUED | OUTPATIENT
Start: 2019-06-04 | End: 2019-06-04 | Stop reason: HOSPADM

## 2019-06-04 RX ORDER — OXYCODONE HCL 5 MG/5 ML
5 SOLUTION, ORAL ORAL
Status: COMPLETED | OUTPATIENT
Start: 2019-06-04 | End: 2019-06-04

## 2019-06-04 RX ORDER — CEFAZOLIN SODIUM 1 G/3ML
INJECTION, POWDER, FOR SOLUTION INTRAMUSCULAR; INTRAVENOUS PRN
Status: DISCONTINUED | OUTPATIENT
Start: 2019-06-04 | End: 2019-06-04 | Stop reason: SURG

## 2019-06-04 RX ORDER — BUPIVACAINE HYDROCHLORIDE AND EPINEPHRINE 2.5; 5 MG/ML; UG/ML
INJECTION, SOLUTION EPIDURAL; INFILTRATION; INTRACAUDAL; PERINEURAL
Status: DISCONTINUED
Start: 2019-06-04 | End: 2019-06-04 | Stop reason: HOSPADM

## 2019-06-04 RX ORDER — SODIUM CHLORIDE, SODIUM LACTATE, POTASSIUM CHLORIDE, CALCIUM CHLORIDE 600; 310; 30; 20 MG/100ML; MG/100ML; MG/100ML; MG/100ML
INJECTION, SOLUTION INTRAVENOUS CONTINUOUS
Status: DISCONTINUED | OUTPATIENT
Start: 2019-06-04 | End: 2019-06-04 | Stop reason: HOSPADM

## 2019-06-04 RX ADMIN — DEXAMETHASONE SODIUM PHOSPHATE 8 MG: 4 INJECTION, SOLUTION INTRA-ARTICULAR; INTRALESIONAL; INTRAMUSCULAR; INTRAVENOUS; SOFT TISSUE at 09:54

## 2019-06-04 RX ADMIN — PROPOFOL 200 MG: 10 INJECTION, EMULSION INTRAVENOUS at 09:54

## 2019-06-04 RX ADMIN — LIDOCAINE HYDROCHLORIDE 50 MG: 20 INJECTION, SOLUTION INFILTRATION; PERINEURAL at 09:54

## 2019-06-04 RX ADMIN — FENTANYL CITRATE 250 MCG: 50 INJECTION, SOLUTION INTRAMUSCULAR; INTRAVENOUS at 09:45

## 2019-06-04 RX ADMIN — FENTANYL CITRATE 50 MCG: 50 INJECTION INTRAMUSCULAR; INTRAVENOUS at 13:43

## 2019-06-04 RX ADMIN — CEFAZOLIN 2 G: 330 INJECTION, POWDER, FOR SOLUTION INTRAMUSCULAR; INTRAVENOUS at 09:52

## 2019-06-04 RX ADMIN — FENTANYL CITRATE 100 MCG: 50 INJECTION, SOLUTION INTRAMUSCULAR; INTRAVENOUS at 11:00

## 2019-06-04 RX ADMIN — SODIUM CHLORIDE, POTASSIUM CHLORIDE, SODIUM LACTATE AND CALCIUM CHLORIDE: 600; 310; 30; 20 INJECTION, SOLUTION INTRAVENOUS at 09:54

## 2019-06-04 RX ADMIN — FENTANYL CITRATE 25 MCG: 50 INJECTION, SOLUTION INTRAMUSCULAR; INTRAVENOUS at 15:37

## 2019-06-04 RX ADMIN — KETOROLAC TROMETHAMINE 30 MG: 30 INJECTION, SOLUTION INTRAMUSCULAR at 11:15

## 2019-06-04 RX ADMIN — ONDANSETRON 8 MG: 2 INJECTION INTRAMUSCULAR; INTRAVENOUS at 09:54

## 2019-06-04 RX ADMIN — SODIUM CHLORIDE, POTASSIUM CHLORIDE, SODIUM LACTATE AND CALCIUM CHLORIDE: 600; 310; 30; 20 INJECTION, SOLUTION INTRAVENOUS at 09:10

## 2019-06-04 RX ADMIN — FENTANYL CITRATE 50 MCG: 50 INJECTION INTRAMUSCULAR; INTRAVENOUS at 13:19

## 2019-06-04 RX ADMIN — SUGAMMADEX 200 MG: 100 INJECTION, SOLUTION INTRAVENOUS at 11:15

## 2019-06-04 RX ADMIN — ROCURONIUM BROMIDE 50 MG: 10 INJECTION, SOLUTION INTRAVENOUS at 09:54

## 2019-06-04 RX ADMIN — OXYCODONE HYDROCHLORIDE 10 MG: 5 SOLUTION ORAL at 12:09

## 2019-06-04 NOTE — OR NURSING
1147 Patient arrived from OR on [facility]. Report received from RN and Anesthesia. Patient's VS WNL, patient arousable, stable, breathing even/non labored. Band-aids on abdomen, crawford in place.   1209 pt very drowsy, requesting for pain medicine, tolerated water, oxycodone PO given.   1319 pt crying, c/o pain, medicated with fentanyl IV.   1344 hand-off to RN Zaynab for lunch coverage  1442 Pt dressed, got very dizzy after, back on [facility], denies nausea, pt resting.   1537 pt states pain is increasing again, medicated.   1620  at bedside.  Patient verbalized readiness to go home. Discharge instructions, including crawford care discussed with patient and family, copy given. Patient verbalized understanding to instructions.Belongings with patient.  1625 Discharge criteria met. PIV out, Discharged via wheelchair.

## 2019-06-04 NOTE — OP REPORT
DATE OF SERVICE:  6/4/2019     PREOPERATIVE DIAGNOSES:  Menometrorrhagia, dysmenorrhea, dyspareunia, symptomaticpelvic floor relaxation, type 2 stress urinary incontinence as noted on   urodynamic studies.       POSTOPERATIVE DIAGNOSES:  Same     FINAL PATHOLOGY:  Pending.     PROCEDURES PERFORMED:  Procedure(s):  HYSTERECTOMY, TOTAL, VAGINAL, LAPAROSCOPY-ASSISTED - Wound Class: Clean Contaminated  SALPINGECTOMY - Wound Class: Clean Contaminated  COLPORRHAPHY, COMBINED ANTEROPOSTERIOR - Wound Class: Clean Contaminated  REPAIR, ENTEROCELE- PERINEOPLASTY - Wound Class: Clean Contaminated  BLADDER SLING, FEMALE- TOT - Wound Class: Clean Contaminated  COLPOPEXY- SACROSPINOUS VAULT SUSPENSION - Wound Class: Clean Contaminated     SURGEON:  Robbie Carney MD.     ASSISTANT:  Michael Oviedo MD.     ANESTHESIA:  General     ANESTHESIOLOGIST:  Anesthesiologist: Yuriy Willis M.D.     ESTIMATED BLOOD LOSS FOR THE PROCEDURE:  100 mL.     FINDINGS:  Bulbous uterus with normal appearing adnexa and pelvis.  Good support of the anterior and posterior vaginal walls in addition to apical vaginal tissue without any undue tension in the suture sites.  Cystoscopy revealed normal urinary bladder, bilateral ureteral    efflux, and no undue tension noted at the level of the mid urethra after a sling placement on cystoscopic evaluation.     DESCRIPTION OF PROCEDURE:  The patient was taken to the operating room, where  general anesthesia was performed without difficulty.  The patient was then    prepped and draped in usual sterile fashion with lower extremities placed in    Santi stirrups.  Attention was first turned to the perineum, where a weighted    speculum was placed with the use of Garcia retractor.  Single-toothed tenaculum    was placed on the anterior lip of the cervix.  Hulka uterine manipulator was    then placed.  Single-toothed tenaculum and retractors were then removed.Attention was then   turned to the umbilicus, where a 1 cm  incision was made.  A Veress needle was   placed, 3.5 L of carboperitoneum were then obtained.  A 10 mm trocar port was   then placed.  Two separate ports were placed approximately one-third of the    way from the anterior-superior iliac spine lateral to the rectus muscle under    direct laparoscopic visualization.  Attention was then turned to the distal    portion of the fallopian tubes, which were grasped just below the fallopian    tube above the ovary.  This area was grasped, cauterized, and transected on    each side.  The mesosalpinx underneath the fallopian tube was then grasped,    cauterized, and transected all the way to the level of the uterus, freeing up    the fallopian tube on each side.  The uteroovarian, broad, and round ligaments   were individually isolated, cauterized, and transected.  The vesicouterine    peritoneum was grasped, incised, and the bladder flap was created.  Uterine    vessels were then clamped, cauterized, and transected  on each side.  The    vesicouterine peritoneum was then completed.  Attention was then turned to the   perineum, where a weighted speculum was placed with the use of Garcia    retractor.  A thyroid tenaculum was placed on the anterior lip, one on the    posterior lips of the cervix.  Cervix was then injected with 10 mL of 0.25%    Marcaine with epinephrine.  Incision was made starting anteriorly, proceeding    posterior, proceeding back anterior once again.  With the use of Bovie    electrocautery, after injecting 10 mL of 0.25% Marcaine with epinephrine, the    anterior cul-de-sac was entered sharply.  Right angle Kodak retractor was    placed upon sharp entry in the anterior cul-de-sac.  Large duck billed    speculum was placed upon sharp entry in the posterior cul-de-sac.  Uterosacral   cardinal complexes were then grasped with ZED clamps, transected, and suture    ligated with 0 Vicryl suture bilaterally.  Attention was then turned to the    perineum, where  the uterus and both fallopian tubes were then handed off the    field as specimen.  Excellent hemostasis noted at all vascular pedicles.  The    anterior and posterior leaf of the peritoneum in addition to the uterosacral    cardinal complexes were reapproximated in the midline with a pursestring    suture using 2-0 Vicryl.  The vaginal cuff was then reapproximated with    figure-of-eight sutures using 0 Vicryl reapproximating both uterosacral    cardinal complex and respective vaginal apices.  The anterior vaginal mucosa    was then injected with 10 mL of 0.25% Marcaine with epinephrine.  The vaginal    mucosa was then dissected off the underlying pubocervical fascia adonis until    the levator muscles were then reached.  Horizontal mattress sutures were then    used to reapproximate the pubocervical fascia in midline in a double 0    closure, thereby reducing the midline cystocele.  A 10 mL of 0.25% Marcaine    with epinephrine were injected with 5 mL on the right and 5 mL the left    lateral to the clitoris in the labial crural fold followed by stab incision    made with the use of #11 blade scalpel followed by placement of Obtryx halo    needles through the labial crural incision sites through the obturator    membrane out through the vaginal mucosal incision at the level of the mid    urethra.  The sling was then applied to the respective Obtryx halo needles and   it was taken back out through their original track.  Tensioning and position    was then performed.  Turner catheter was removed, which had been placed at the    beginning of the case for placement of a 30-degree cystoscope, which revealed    normal urinary bladder,  bilateral ureteral efflux, and no undue tension noted   at the level of the mid urethra.  The sling had been released under direct    cystoscopic evaluation.  Tensioning position was then finalized.  Cystoscope    removed.  Turner catheter replaced.  All excess vaginal mucosa and sling     material were then excised.  The vaginal mucosa was then reapproximated with    2-0 Vicryl in a running interlocking fashion in one segment.  Posterior    vaginal mucosa was then injected with 10 mL of 0.25% Marcaine with    epinephrine.  The vaginal mucosa was then dissected off the underlying    rectovaginal fascia adonis until the levator muscles were then reached.     Dissection of the sacrospinous process and ischial spine was then performed on   the right, 3 cm medial along the sacrospinous ligament with straight    catheterization needle device, 0 Ethibond suture was taken through the    sacrospinous ligament and taken out through the right apical portion of the    vaginal cuff on the underlying vaginal mucosa.  The rectovaginal septum was    then reapproximated in the posterior aspect of the vaginal cuff and a double    pursestring suture, thereby reducing a large enterocele located at the    superior aspect of the dissection.  Horizontal mattress suture was then used    to reapproximate the rectovaginal fascia in the midline in a double 0 closure,   thereby reducing the midline rectocele.  Excess vaginal mucosa was then    trimmed.  The vaginal mucosa was then reapproximated with 2-0 Vicryl in a    running interlocking fashion in one segment for a perineoplasty on the    perineum.  Attention was then turned back to the abdomen and pelvis.  Pelvis    was inspected and noted to be hemostatic, 3.5 L of carboperitoneum removed    followed by removal of the ports under direct laparoscopic visualization.  The   incisions were then reapproximated with single interrupted sutures using 4-0    Vicryl, injected with 20 mL of 0.25% Marcaine with epinephrine.  The patient    tolerated the procedure well and was awoken from general anesthesia, was taken   to the recovery room in stable condition.        ____________________________________     CHEIKH BASS MD

## 2019-06-04 NOTE — OR SURGEON
Immediate Post OP Note    PreOp Diagnosis: Menometrorrhagia, dysmenorrhea, dyspareunia, symptomatic   pelvic floor relaxation, type 2 stress urinary incontinence as noted on   urodynamic studies.      PostOp Diagnosis: Same; final pathology pending    Procedure(s):  HYSTERECTOMY, TOTAL, VAGINAL, LAPAROSCOPY-ASSISTED - Wound Class: Clean Contaminated  SALPINGECTOMY - Wound Class: Clean Contaminated  COLPORRHAPHY, COMBINED ANTEROPOSTERIOR - Wound Class: Clean Contaminated  REPAIR, ENTEROCELE- PERINEOPLASTY - Wound Class: Clean Contaminated  BLADDER SLING, FEMALE- TOT - Wound Class: Clean Contaminated  COLPOPEXY- SACROSPINOUS VAULT SUSPENSION - Wound Class: Clean Contaminated    Surgeon(s):  TANIKA Aguilar M.D.    Anesthesiologist/Type of Anesthesia:  Anesthesiologist: Yuriy Willis M.D./General    Surgical Staff:  Circulator: Nathaly Perez R.N.; Dory Mendez R.N.  Scrub Person: Starr Escoto    Specimens removed if any:  ID Type Source Tests Collected by Time Destination   A : uterus, cervix, left fallopian tube Tissue Uterus PATHOLOGY SPECIMEN Robbie Carney M.D. 6/4/2019 10:20 AM        Estimated Blood Loss: 100ccs    Findings: Bulbous uterus with normal appearing adnexa and pelvis, good support of the anterior and the posterior and the apical vaginal tissue without any undue tension at any suture sites, cystoscopy revealed bilateral ureteral efflux, normal bladder and no undue tension at the mid urethra after sling placement.    Complications: None        6/4/2019 11:38 AM Robbie Carney M.D.

## 2019-06-04 NOTE — ANESTHESIA PREPROCEDURE EVALUATION
Relevant Problems   No relevant active problems       Physical Exam    Airway   Mallampati: II  TM distance: >3 FB  Neck ROM: full       Cardiovascular - normal exam  Rhythm: regular  Rate: normal  (-) murmur     Dental - normal exam           Pulmonary - normal exam  Breath sounds clear to auscultation     Abdominal    Neurological - normal exam                 Anesthesia Plan    ASA 2       Plan - general       Airway plan will be ETT        Induction: intravenous    Postoperative Plan: Postoperative administration of opioids is intended.    Pertinent diagnostic labs and testing reviewed    Informed Consent:    Anesthetic plan and risks discussed with patient.    Use of blood products discussed with: patient whom consented to blood products.

## 2019-06-04 NOTE — ANESTHESIA POSTPROCEDURE EVALUATION
Patient: Kaela Ballesteros    Procedure Summary     Date:  06/04/19 Room / Location:  [facility] ROOM 23 / SURGERY SAME DAY [facility]    Anesthesia Start:  0952 Anesthesia Stop:  1323    Procedures:       HYSTERECTOMY, TOTAL, VAGINAL, LAPAROSCOPY-ASSISTED (Vagina )      SALPINGECTOMY (Left Vagina )      COLPORRHAPHY, COMBINED ANTEROPOSTERIOR (Vagina )      REPAIR, ENTEROCELE- PERINEOPLASTY (Vagina )      BLADDER SLING, FEMALE- TOT (Bladder)      COLPOPEXY- SACROSPINOUS VAULT SUSPENSION (Vagina ) Diagnosis:  (INCOMPLETE UTEROVAGINAL PROLAPSE, IRREGULAR BLEEDING, PELVIC PAIN, STRESS URINARY INCONTINENCE, CYSTOCELE)    Surgeon:  Robbie Carney M.D. Responsible Provider:  Yuriy Willis M.D.    Anesthesia Type:  general ASA Status:  2          Final Anesthesia Type: general  Last vitals  BP   Blood Pressure: 136/87, NIBP: 127/93    Temp   36.4 °C (97.5 °F)    Pulse   Pulse: 82, Heart Rate (Monitored): 82   Resp   20    SpO2   100 %      Anesthesia Post Evaluation    Patient location during evaluation: PACU  Patient participation: complete - patient participated  Level of consciousness: awake and alert    Airway patency: patent  Anesthetic complications: no  Cardiovascular status: hemodynamically stable  Respiratory status: acceptable  Hydration status: euvolemic    PONV: none           Nurse Pain Score: 0 (NPRS)

## 2019-06-04 NOTE — ANESTHESIA TIME REPORT
Anesthesia Start and Stop Event Times     Date Time Event    6/4/2019 0952 Anesthesia Start     1149 Anesthesia Stop        Responsible Staff  06/04/19    Name Role Begin End    Yuriy Willis M.D. Anesth 1023 [id number]        Preop Diagnosis (Free Text):  Pre-op Diagnosis     INCOMPLETE UTEROVAGINAL PROLAPSE, IRREGULAR BLEEDING, PELVIC PAIN, STRESS URINARY INCONTINENCE, CYSTOCELE        Preop Diagnosis (Codes):  Diagnosis Information     Diagnosis Code(s):         Post op Diagnosis  Abnormal uterine bleeding      Premium Reason  Non-Premium    Comments:

## 2019-06-04 NOTE — DISCHARGE INSTRUCTIONS
ACTIVITY: Rest and take it easy for the first 24 hours.  A responsible adult is recommended to remain with you during that time.  It is normal to feel sleepy.  We encourage you to not do anything that requires balance, judgment or coordination.    MILD FLU-LIKE SYMPTOMS ARE NORMAL. YOU MAY EXPERIENCE GENERALIZED MUSCLE ACHES, THROAT IRRITATION, HEADACHE AND/OR SOME NAUSEA.    FOR 24 HOURS DO NOT:  Drive, operate machinery or run household appliances.  Drink beer or alcoholic beverages.   Make important decisions or sign legal documents.    SPECIAL INSTRUCTIONS: Call Dr. Carney's office tomorrow morning to get your catheter removed. See hand-out.    DIET: To avoid nausea, slowly advance diet as tolerated, avoiding spicy or greasy foods for the first day.  Add more substantial food to your diet according to your physician's instructions.  Babies can be fed formula or breast milk as soon as they are hungry.  INCREASE FLUIDS AND FIBER TO AVOID CONSTIPATION.    SURGICAL DRESSING/BATHING: May remove band-aids tomorrow. May shower tomorrow once catheter is out, no tub bath/hot tubs/swimming.     FOLLOW-UP APPOINTMENT:  A follow-up appointment should be arranged with your doctor; call to schedule.    You should CALL YOUR PHYSICIAN if you develop:  Fever greater than 101 degrees F.  Pain not relieved by medication, or persistent nausea or vomiting.  Excessive bleeding (blood soaking through dressing) or unexpected drainage from the wound.  Extreme redness or swelling around the incision site, drainage of pus or foul smelling drainage.  Inability to urinate or empty your bladder within 8 hours.  Problems with breathing or chest pain.    You should call 911 if you develop problems with breathing or chest pain.  If you are unable to contact your doctor or surgical center, you should go to the nearest emergency room or urgent care center.  Physician's telephone #: DR. Carney [phone]    If any questions arise, call your  doctor.  If your doctor is not available, please feel free to call the Surgical Center at [phone].  The Center is open Monday through Friday from 7AM to 7PM.  You can also call the HEALTH HOTLINE open 24 hours/day, 7 days/week and speak to a nurse at [phone], or toll free at [phone].    A registered nurse may call you a few days after your surgery to see how you are doing after your procedure.    MEDICATIONS: Resume taking daily medication.  Take prescribed pain medication with food.  If no medication is prescribed, you may take non-aspirin pain medication if needed.  PAIN MEDICATION CAN BE VERY CONSTIPATING.  Take a stool softener or laxative such as senokot, pericolace, or milk of magnesia if needed.    Prescription given for given preop.  Last pain medication given: toradol (similar to ibuprofen/motirn) at 11:15 am, oxycodone at 12:09 PM.    If your physician has prescribed pain medication that includes Acetaminophen (Tylenol), do not take additional Acetaminophen (Tylenol) while taking the prescribed medication.    Depression / Suicide Risk    As you are discharged from this [facility] facility, it is important to learn how to keep safe from harming yourself.    Recognize the warning signs:  · Abrupt changes in personality, positive or negative- including increase in energy   · Giving away possessions  · Change in eating patterns- significant weight changes-  positive or negative  · Change in sleeping patterns- unable to sleep or sleeping all the time   · Unwillingness or inability to communicate  · Depression  · Unusual sadness, discouragement and loneliness  · Talk of wanting to die  · Neglect of personal appearance   · Rebelliousness- reckless behavior  · Withdrawal from people/activities they love  · Confusion- inability to concentrate     If you or a loved one observes any of these behaviors or has concerns about self-harm, here's what you can do:  · Talk about it- your feelings and  reasons for harming yourself  · Remove any means that you might use to hurt yourself (examples: pills, rope, extension cords, firearm)  · Get professional help from the community (Mental Health, Substance Abuse, psychological counseling)  · Do not be alone:Call your Safe Contact- someone whom you trust who will be there for you.  · Call your local CRISIS HOTLINE 697-4138 or 220-562-1310  · Call your local Children's Mobile Crisis Response Team [place] (071) 257-4351 or www.Wevod  · Call the toll free National Suicide Prevention Hotlines   · National Suicide Prevention Lifeline 114-011-ICQQ (3705)  · National Hope Line Network 800-SUICIDE (981-8364)

## 2019-06-04 NOTE — ANESTHESIA PROCEDURE NOTES
Airway  Date/Time: 6/4/2019 9:56 AM  Performed by: WON YORK  Authorized by: WON YORK     Location:  OR  Urgency:  Elective  Indications for Airway Management:  Anesthesia  Spontaneous Ventilation: absent    Sedation Level:  Deep  Preoxygenated: Yes    Patient Position:  Sniffing  Final Airway Type:  Endotracheal airway  Final Endotracheal Airway:  ETT  Cuffed: Yes    Technique Used for Successful ETT Placement:  Direct laryngoscopy  Insertion Site:  Oral  Blade Type:  Elias  Laryngoscope Blade/Videolaryngoscope Blade Size:  3  ETT Size (mm):  6.5  Measured from:  Teeth  ETT to Teeth (cm):  22  Placement Verified by: auscultation and capnometry    Cormack-Lehane Classification:  Grade I - full view of glottis  Number of Attempts at Approach:  1

## 2019-06-10 ENCOUNTER — APPOINTMENT (OUTPATIENT)
Dept: RADIOLOGY | Facility: MEDICAL CENTER | Age: 39
End: 2019-06-10
Attending: EMERGENCY MEDICINE
Payer: MEDICAID

## 2019-06-10 ENCOUNTER — HOSPITAL ENCOUNTER (EMERGENCY)
Facility: MEDICAL CENTER | Age: 39
End: 2019-06-10
Attending: EMERGENCY MEDICINE
Payer: MEDICAID

## 2019-06-10 VITALS
RESPIRATION RATE: 14 BRPM | DIASTOLIC BLOOD PRESSURE: 85 MMHG | TEMPERATURE: 97.8 F | OXYGEN SATURATION: 98 % | WEIGHT: 227.74 LBS | HEIGHT: 71 IN | SYSTOLIC BLOOD PRESSURE: 126 MMHG | BODY MASS INDEX: 31.88 KG/M2 | HEART RATE: 85 BPM

## 2019-06-10 DIAGNOSIS — K62.89 RECTAL PAIN: ICD-10-CM

## 2019-06-10 DIAGNOSIS — K59.00 CONSTIPATION, UNSPECIFIED CONSTIPATION TYPE: ICD-10-CM

## 2019-06-10 DIAGNOSIS — I10 HYPERTENSION, UNSPECIFIED TYPE: ICD-10-CM

## 2019-06-10 LAB
ALBUMIN SERPL BCP-MCNC: 4.2 G/DL (ref 3.2–4.9)
ALBUMIN/GLOB SERPL: 1.2 G/DL
ALP SERPL-CCNC: 56 U/L (ref 30–99)
ALT SERPL-CCNC: 16 U/L (ref 2–50)
ANION GAP SERPL CALC-SCNC: 10 MMOL/L (ref 0–11.9)
AST SERPL-CCNC: 10 U/L (ref 12–45)
BASOPHILS # BLD AUTO: 0.3 % (ref 0–1.8)
BASOPHILS # BLD: 0.03 K/UL (ref 0–0.12)
BILIRUB SERPL-MCNC: 0.6 MG/DL (ref 0.1–1.5)
BUN SERPL-MCNC: 8 MG/DL (ref 8–22)
CALCIUM SERPL-MCNC: 10.2 MG/DL (ref 8.5–10.5)
CHLORIDE SERPL-SCNC: 104 MMOL/L (ref 96–112)
CO2 SERPL-SCNC: 24 MMOL/L (ref 20–33)
CREAT SERPL-MCNC: 0.76 MG/DL (ref 0.5–1.4)
EOSINOPHIL # BLD AUTO: 0.18 K/UL (ref 0–0.51)
EOSINOPHIL NFR BLD: 1.8 % (ref 0–6.9)
ERYTHROCYTE [DISTWIDTH] IN BLOOD BY AUTOMATED COUNT: 39 FL (ref 35.9–50)
GLOBULIN SER CALC-MCNC: 3.6 G/DL (ref 1.9–3.5)
GLUCOSE SERPL-MCNC: 98 MG/DL (ref 65–99)
HCT VFR BLD AUTO: 35.2 % (ref 37–47)
HGB BLD-MCNC: 11.5 G/DL (ref 12–16)
IMM GRANULOCYTES # BLD AUTO: 0.05 K/UL (ref 0–0.11)
IMM GRANULOCYTES NFR BLD AUTO: 0.5 % (ref 0–0.9)
LYMPHOCYTES # BLD AUTO: 1.45 K/UL (ref 1–4.8)
LYMPHOCYTES NFR BLD: 14.1 % (ref 22–41)
MCH RBC QN AUTO: 27.1 PG (ref 27–33)
MCHC RBC AUTO-ENTMCNC: 32.7 G/DL (ref 33.6–35)
MCV RBC AUTO: 83 FL (ref 81.4–97.8)
MONOCYTES # BLD AUTO: 0.56 K/UL (ref 0–0.85)
MONOCYTES NFR BLD AUTO: 5.5 % (ref 0–13.4)
NEUTROPHILS # BLD AUTO: 7.99 K/UL (ref 2–7.15)
NEUTROPHILS NFR BLD: 77.8 % (ref 44–72)
NRBC # BLD AUTO: 0 K/UL
NRBC BLD-RTO: 0 /100 WBC
PLATELET # BLD AUTO: 284 K/UL (ref 164–446)
PMV BLD AUTO: 11 FL (ref 9–12.9)
POTASSIUM SERPL-SCNC: 3.8 MMOL/L (ref 3.6–5.5)
PROT SERPL-MCNC: 7.8 G/DL (ref 6–8.2)
RBC # BLD AUTO: 4.24 M/UL (ref 4.2–5.4)
SODIUM SERPL-SCNC: 138 MMOL/L (ref 135–145)
WBC # BLD AUTO: 10.3 K/UL (ref 4.8–10.8)

## 2019-06-10 PROCEDURE — 99285 EMERGENCY DEPT VISIT HI MDM: CPT

## 2019-06-10 PROCEDURE — 700101 HCHG RX REV CODE 250: Performed by: EMERGENCY MEDICINE

## 2019-06-10 PROCEDURE — 74018 RADEX ABDOMEN 1 VIEW: CPT

## 2019-06-10 PROCEDURE — 99284 EMERGENCY DEPT VISIT MOD MDM: CPT

## 2019-06-10 PROCEDURE — 85025 COMPLETE CBC W/AUTO DIFF WBC: CPT

## 2019-06-10 PROCEDURE — 80053 COMPREHEN METABOLIC PANEL: CPT

## 2019-06-10 RX ORDER — ENEMA 19; 7 G/133ML; G/133ML
1 ENEMA RECTAL ONCE
Status: COMPLETED | OUTPATIENT
Start: 2019-06-10 | End: 2019-06-10

## 2019-06-10 RX ORDER — POLYETHYLENE GLYCOL 3350 17 G/17G
17 POWDER, FOR SOLUTION ORAL DAILY
Qty: 1 BOTTLE | Refills: 3 | Status: SHIPPED | OUTPATIENT
Start: 2019-06-10 | End: 2020-09-18

## 2019-06-10 RX ORDER — HYDROCODONE BITARTRATE AND ACETAMINOPHEN 5; 325 MG/1; MG/1
1-2 TABLET ORAL EVERY 4 HOURS PRN
Status: ON HOLD | COMMUNITY
End: 2019-06-12

## 2019-06-10 RX ORDER — DOCUSATE SODIUM 100 MG/1
100 CAPSULE, LIQUID FILLED ORAL 2 TIMES DAILY
COMMUNITY
End: 2020-09-18

## 2019-06-10 RX ADMIN — SODIUM PHOSPHATE 133 ML: 7; 19 ENEMA RECTAL at 14:53
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
## 2019-06-10 NOTE — DISCHARGE INSTRUCTIONS
Follow-up with your surgeon for recheck as scheduled    See your primary care doctor soon as possible for blood pressure recheck    Return for fever, worsening pain, vomiting, any concerns    Stop taking Norco for pain, this may be contributing to your problem.  Consider instead Motrin or Tylenol

## 2019-06-10 NOTE — ADDENDUM NOTE
Encounter addended by: Ava Lewis R.N. on: 6/10/2019 10:03 AM<BR>    Actions taken: Visit Navigator Flowsheet section accepted

## 2019-06-10 NOTE — ED NOTES
Pt to  asking how many people ahead of them I explained triage process pt became angry and started to cuss, I apologized for the wait

## 2019-06-10 NOTE — ED TRIAGE NOTES
"39 y/o female ambulatory to triage with c/o severe pain with bowel movements. Pt states she had a hysterectomy on Tuesday of last week. She has had bowel movements since, but experiences severe pain with them which is not normal for her. She does state that the stool is either \"whole\" or comes out in pieces. Reports severe gas pains as well.   "

## 2019-06-11 ENCOUNTER — HOSPITAL ENCOUNTER (OUTPATIENT)
Facility: MEDICAL CENTER | Age: 39
End: 2019-06-12
Attending: EMERGENCY MEDICINE | Admitting: INTERNAL MEDICINE
Payer: MEDICAID

## 2019-06-11 ENCOUNTER — APPOINTMENT (OUTPATIENT)
Dept: RADIOLOGY | Facility: MEDICAL CENTER | Age: 39
End: 2019-06-11
Attending: STUDENT IN AN ORGANIZED HEALTH CARE EDUCATION/TRAINING PROGRAM
Payer: MEDICAID

## 2019-06-11 ENCOUNTER — APPOINTMENT (OUTPATIENT)
Dept: RADIOLOGY | Facility: MEDICAL CENTER | Age: 39
End: 2019-06-11
Attending: EMERGENCY MEDICINE
Payer: MEDICAID

## 2019-06-11 ENCOUNTER — APPOINTMENT (OUTPATIENT)
Dept: RADIOLOGY | Facility: MEDICAL CENTER | Age: 39
End: 2019-06-11
Payer: MEDICAID

## 2019-06-11 DIAGNOSIS — K62.89 RECTAL PAIN: ICD-10-CM

## 2019-06-11 DIAGNOSIS — R10.84 GENERALIZED ABDOMINAL PAIN: ICD-10-CM

## 2019-06-11 DIAGNOSIS — Z87.59: Chronic | ICD-10-CM

## 2019-06-11 DIAGNOSIS — N30.90 CYSTITIS: ICD-10-CM

## 2019-06-11 DIAGNOSIS — K62.89 PROCTITIS: ICD-10-CM

## 2019-06-11 PROBLEM — E01.0 THYROMEGALY: Status: ACTIVE | Noted: 2019-06-11

## 2019-06-11 PROBLEM — R82.71 ASYMPTOMATIC BACTERIURIA: Status: ACTIVE | Noted: 2019-06-11

## 2019-06-11 LAB
ALBUMIN SERPL BCP-MCNC: 4.3 G/DL (ref 3.2–4.9)
ALBUMIN/GLOB SERPL: 1.1 G/DL
ALP SERPL-CCNC: 55 U/L (ref 30–99)
ALT SERPL-CCNC: 15 U/L (ref 2–50)
ANION GAP SERPL CALC-SCNC: 12 MMOL/L (ref 0–11.9)
APPEARANCE UR: ABNORMAL
AST SERPL-CCNC: 12 U/L (ref 12–45)
BACTERIA #/AREA URNS HPF: ABNORMAL /HPF
BACTERIA GENITAL QL WET PREP: NORMAL
BASOPHILS # BLD AUTO: 0.5 % (ref 0–1.8)
BASOPHILS # BLD: 0.05 K/UL (ref 0–0.12)
BILIRUB SERPL-MCNC: 0.6 MG/DL (ref 0.1–1.5)
BILIRUB UR QL STRIP.AUTO: NEGATIVE
BUN SERPL-MCNC: 9 MG/DL (ref 8–22)
C TRACH DNA SPEC QL NAA+PROBE: NEGATIVE
CALCIUM SERPL-MCNC: 10.6 MG/DL (ref 8.5–10.5)
CHLORIDE SERPL-SCNC: 102 MMOL/L (ref 96–112)
CO2 SERPL-SCNC: 25 MMOL/L (ref 20–33)
COLOR UR: YELLOW
CREAT SERPL-MCNC: 0.87 MG/DL (ref 0.5–1.4)
EKG IMPRESSION: NORMAL
EOSINOPHIL # BLD AUTO: 0.08 K/UL (ref 0–0.51)
EOSINOPHIL NFR BLD: 0.8 % (ref 0–6.9)
EPI CELLS #/AREA URNS HPF: ABNORMAL /HPF
ERYTHROCYTE [DISTWIDTH] IN BLOOD BY AUTOMATED COUNT: 39.2 FL (ref 35.9–50)
GLOBULIN SER CALC-MCNC: 4 G/DL (ref 1.9–3.5)
GLUCOSE SERPL-MCNC: 134 MG/DL (ref 65–99)
GLUCOSE UR STRIP.AUTO-MCNC: NEGATIVE MG/DL
HCT VFR BLD AUTO: 37.1 % (ref 37–47)
HGB BLD-MCNC: 11.9 G/DL (ref 12–16)
IMM GRANULOCYTES # BLD AUTO: 0.06 K/UL (ref 0–0.11)
IMM GRANULOCYTES NFR BLD AUTO: 0.6 % (ref 0–0.9)
KETONES UR STRIP.AUTO-MCNC: ABNORMAL MG/DL
LACTATE BLD-SCNC: 1.3 MMOL/L (ref 0.5–2)
LEUKOCYTE ESTERASE UR QL STRIP.AUTO: ABNORMAL
LIPASE SERPL-CCNC: 11 U/L (ref 11–82)
LYMPHOCYTES # BLD AUTO: 1.06 K/UL (ref 1–4.8)
LYMPHOCYTES NFR BLD: 10.1 % (ref 22–41)
MAGNESIUM SERPL-MCNC: 2 MG/DL (ref 1.5–2.5)
MCH RBC QN AUTO: 26.6 PG (ref 27–33)
MCHC RBC AUTO-ENTMCNC: 32.1 G/DL (ref 33.6–35)
MCV RBC AUTO: 82.8 FL (ref 81.4–97.8)
MICRO URNS: ABNORMAL
MONOCYTES # BLD AUTO: 0.51 K/UL (ref 0–0.85)
MONOCYTES NFR BLD AUTO: 4.8 % (ref 0–13.4)
N GONORRHOEA DNA SPEC QL NAA+PROBE: NEGATIVE
NEUTROPHILS # BLD AUTO: 8.78 K/UL (ref 2–7.15)
NEUTROPHILS NFR BLD: 83.2 % (ref 44–72)
NITRITE UR QL STRIP.AUTO: NEGATIVE
NRBC # BLD AUTO: 0 K/UL
NRBC BLD-RTO: 0 /100 WBC
PH UR STRIP.AUTO: 6.5 [PH]
PLATELET # BLD AUTO: 328 K/UL (ref 164–446)
PMV BLD AUTO: 10.9 FL (ref 9–12.9)
POTASSIUM SERPL-SCNC: 3.9 MMOL/L (ref 3.6–5.5)
PROT SERPL-MCNC: 8.3 G/DL (ref 6–8.2)
PROT UR QL STRIP: 100 MG/DL
RBC # BLD AUTO: 4.48 M/UL (ref 4.2–5.4)
RBC # URNS HPF: ABNORMAL /HPF
RBC UR QL AUTO: ABNORMAL
SIGNIFICANT IND 70042: NORMAL
SITE SITE: NORMAL
SODIUM SERPL-SCNC: 139 MMOL/L (ref 135–145)
SOURCE SOURCE: NORMAL
SP GR UR STRIP.AUTO: 1.02
SPECIMEN SOURCE: NORMAL
UROBILINOGEN UR STRIP.AUTO-MCNC: 1 MG/DL
WBC # BLD AUTO: 10.5 K/UL (ref 4.8–10.8)
WBC #/AREA URNS HPF: ABNORMAL /HPF

## 2019-06-11 PROCEDURE — 700102 HCHG RX REV CODE 250 W/ 637 OVERRIDE(OP): Performed by: STUDENT IN AN ORGANIZED HEALTH CARE EDUCATION/TRAINING PROGRAM

## 2019-06-11 PROCEDURE — 87591 N.GONORRHOEAE DNA AMP PROB: CPT

## 2019-06-11 PROCEDURE — 700111 HCHG RX REV CODE 636 W/ 250 OVERRIDE (IP): Performed by: STUDENT IN AN ORGANIZED HEALTH CARE EDUCATION/TRAINING PROGRAM

## 2019-06-11 PROCEDURE — 96372 THER/PROPH/DIAG INJ SC/IM: CPT

## 2019-06-11 PROCEDURE — 93005 ELECTROCARDIOGRAM TRACING: CPT | Performed by: STUDENT IN AN ORGANIZED HEALTH CARE EDUCATION/TRAINING PROGRAM

## 2019-06-11 PROCEDURE — 96375 TX/PRO/DX INJ NEW DRUG ADDON: CPT

## 2019-06-11 PROCEDURE — 306588 SLEEVE,VASO CALF MED: Performed by: INTERNAL MEDICINE

## 2019-06-11 PROCEDURE — 87086 URINE CULTURE/COLONY COUNT: CPT

## 2019-06-11 PROCEDURE — 36415 COLL VENOUS BLD VENIPUNCTURE: CPT

## 2019-06-11 PROCEDURE — 96376 TX/PRO/DX INJ SAME DRUG ADON: CPT

## 2019-06-11 PROCEDURE — A9270 NON-COVERED ITEM OR SERVICE: HCPCS | Performed by: STUDENT IN AN ORGANIZED HEALTH CARE EDUCATION/TRAINING PROGRAM

## 2019-06-11 PROCEDURE — 81001 URINALYSIS AUTO W/SCOPE: CPT

## 2019-06-11 PROCEDURE — 87077 CULTURE AEROBIC IDENTIFY: CPT

## 2019-06-11 PROCEDURE — 85025 COMPLETE CBC W/AUTO DIFF WBC: CPT

## 2019-06-11 PROCEDURE — 80053 COMPREHEN METABOLIC PANEL: CPT

## 2019-06-11 PROCEDURE — 76937 US GUIDE VASCULAR ACCESS: CPT

## 2019-06-11 PROCEDURE — 74176 CT ABD & PELVIS W/O CONTRAST: CPT

## 2019-06-11 PROCEDURE — 83690 ASSAY OF LIPASE: CPT

## 2019-06-11 PROCEDURE — 87491 CHLMYD TRACH DNA AMP PROBE: CPT

## 2019-06-11 PROCEDURE — 700111 HCHG RX REV CODE 636 W/ 250 OVERRIDE (IP): Performed by: EMERGENCY MEDICINE

## 2019-06-11 PROCEDURE — 96374 THER/PROPH/DIAG INJ IV PUSH: CPT

## 2019-06-11 PROCEDURE — 302152 K-PAD 12X17: Performed by: INTERNAL MEDICINE

## 2019-06-11 PROCEDURE — 87186 SC STD MICRODIL/AGAR DIL: CPT

## 2019-06-11 PROCEDURE — 83735 ASSAY OF MAGNESIUM: CPT

## 2019-06-11 PROCEDURE — 99285 EMERGENCY DEPT VISIT HI MDM: CPT

## 2019-06-11 PROCEDURE — 83605 ASSAY OF LACTIC ACID: CPT

## 2019-06-11 PROCEDURE — G0378 HOSPITAL OBSERVATION PER HR: HCPCS

## 2019-06-11 PROCEDURE — 700105 HCHG RX REV CODE 258: Performed by: STUDENT IN AN ORGANIZED HEALTH CARE EDUCATION/TRAINING PROGRAM

## 2019-06-11 PROCEDURE — 99220 PR INITIAL OBSERVATION CARE,LEVL III: CPT | Mod: GC | Performed by: INTERNAL MEDICINE

## 2019-06-11 PROCEDURE — 700102 HCHG RX REV CODE 250 W/ 637 OVERRIDE(OP): Performed by: EMERGENCY MEDICINE

## 2019-06-11 PROCEDURE — A9270 NON-COVERED ITEM OR SERVICE: HCPCS | Performed by: EMERGENCY MEDICINE

## 2019-06-11 RX ORDER — PROMETHAZINE HYDROCHLORIDE 25 MG/1
12.5-25 TABLET ORAL EVERY 4 HOURS PRN
Status: DISCONTINUED | OUTPATIENT
Start: 2019-06-11 | End: 2019-06-11

## 2019-06-11 RX ORDER — ONDANSETRON 2 MG/ML
4 INJECTION INTRAMUSCULAR; INTRAVENOUS ONCE
Status: DISCONTINUED | OUTPATIENT
Start: 2019-06-11 | End: 2019-06-11

## 2019-06-11 RX ORDER — PROMETHAZINE HYDROCHLORIDE 25 MG/1
25 TABLET ORAL ONCE
Status: COMPLETED | OUTPATIENT
Start: 2019-06-11 | End: 2019-06-11

## 2019-06-11 RX ORDER — CIPROFLOXACIN 500 MG/1
500 TABLET, FILM COATED ORAL 2 TIMES DAILY
Qty: 20 TAB | Refills: 0 | Status: SHIPPED | OUTPATIENT
Start: 2019-06-11 | End: 2019-06-21

## 2019-06-11 RX ORDER — LORAZEPAM 2 MG/ML
0.5 INJECTION INTRAMUSCULAR EVERY 4 HOURS PRN
Status: DISCONTINUED | OUTPATIENT
Start: 2019-06-11 | End: 2019-06-12 | Stop reason: HOSPADM

## 2019-06-11 RX ORDER — HEPARIN SODIUM 5000 [USP'U]/ML
5000 INJECTION, SOLUTION INTRAVENOUS; SUBCUTANEOUS EVERY 8 HOURS
Status: DISCONTINUED | OUTPATIENT
Start: 2019-06-11 | End: 2019-06-12 | Stop reason: HOSPADM

## 2019-06-11 RX ORDER — MORPHINE SULFATE 4 MG/ML
4 INJECTION, SOLUTION INTRAMUSCULAR; INTRAVENOUS ONCE
Status: DISCONTINUED | OUTPATIENT
Start: 2019-06-11 | End: 2019-06-11

## 2019-06-11 RX ORDER — SODIUM CHLORIDE, SODIUM LACTATE, POTASSIUM CHLORIDE, CALCIUM CHLORIDE 600; 310; 30; 20 MG/100ML; MG/100ML; MG/100ML; MG/100ML
INJECTION, SOLUTION INTRAVENOUS CONTINUOUS
Status: DISCONTINUED | OUTPATIENT
Start: 2019-06-11 | End: 2019-06-12 | Stop reason: HOSPADM

## 2019-06-11 RX ORDER — CIPROFLOXACIN 2 MG/ML
400 INJECTION, SOLUTION INTRAVENOUS ONCE
Status: DISCONTINUED | OUTPATIENT
Start: 2019-06-11 | End: 2019-06-11

## 2019-06-11 RX ORDER — MORPHINE SULFATE 4 MG/ML
2 INJECTION, SOLUTION INTRAMUSCULAR; INTRAVENOUS EVERY 4 HOURS PRN
Status: DISCONTINUED | OUTPATIENT
Start: 2019-06-11 | End: 2019-06-12 | Stop reason: HOSPADM

## 2019-06-11 RX ORDER — ONDANSETRON 4 MG/1
4 TABLET, ORALLY DISINTEGRATING ORAL EVERY 4 HOURS PRN
Status: DISCONTINUED | OUTPATIENT
Start: 2019-06-11 | End: 2019-06-12 | Stop reason: HOSPADM

## 2019-06-11 RX ORDER — ONDANSETRON 4 MG/1
4 TABLET, ORALLY DISINTEGRATING ORAL ONCE
Status: COMPLETED | OUTPATIENT
Start: 2019-06-11 | End: 2019-06-11

## 2019-06-11 RX ORDER — ATROPA BELLADONNA AND OPIUM 16.2; 6 MG/1; MG/1
60 SUPPOSITORY RECTAL EVERY 8 HOURS PRN
Status: DISCONTINUED | OUTPATIENT
Start: 2019-06-11 | End: 2019-06-11

## 2019-06-11 RX ORDER — MORPHINE SULFATE 4 MG/ML
2 INJECTION, SOLUTION INTRAMUSCULAR; INTRAVENOUS ONCE
Status: COMPLETED | OUTPATIENT
Start: 2019-06-11 | End: 2019-06-11

## 2019-06-11 RX ORDER — ONDANSETRON 2 MG/ML
4 INJECTION INTRAMUSCULAR; INTRAVENOUS EVERY 4 HOURS PRN
Status: DISCONTINUED | OUTPATIENT
Start: 2019-06-11 | End: 2019-06-12 | Stop reason: HOSPADM

## 2019-06-11 RX ORDER — POLYETHYLENE GLYCOL 3350 17 G/17G
1 POWDER, FOR SOLUTION ORAL
Status: DISCONTINUED | OUTPATIENT
Start: 2019-06-11 | End: 2019-06-12 | Stop reason: HOSPADM

## 2019-06-11 RX ORDER — PROMETHAZINE HYDROCHLORIDE 12.5 MG/1
12.5-25 SUPPOSITORY RECTAL EVERY 4 HOURS PRN
Status: DISCONTINUED | OUTPATIENT
Start: 2019-06-11 | End: 2019-06-11

## 2019-06-11 RX ORDER — MORPHINE SULFATE 4 MG/ML
4 INJECTION, SOLUTION INTRAMUSCULAR; INTRAVENOUS ONCE
Status: COMPLETED | OUTPATIENT
Start: 2019-06-11 | End: 2019-06-11

## 2019-06-11 RX ORDER — ACETAMINOPHEN 325 MG/1
650 TABLET ORAL EVERY 6 HOURS PRN
Status: DISCONTINUED | OUTPATIENT
Start: 2019-06-11 | End: 2019-06-12 | Stop reason: HOSPADM

## 2019-06-11 RX ORDER — BISACODYL 10 MG
10 SUPPOSITORY, RECTAL RECTAL
Status: DISCONTINUED | OUTPATIENT
Start: 2019-06-11 | End: 2019-06-12 | Stop reason: HOSPADM

## 2019-06-11 RX ORDER — AMOXICILLIN 250 MG
2 CAPSULE ORAL 2 TIMES DAILY
Status: DISCONTINUED | OUTPATIENT
Start: 2019-06-11 | End: 2019-06-12 | Stop reason: HOSPADM

## 2019-06-11 RX ADMIN — MORPHINE SULFATE 2 MG: 4 INJECTION INTRAVENOUS at 17:24

## 2019-06-11 RX ADMIN — PROMETHAZINE HYDROCHLORIDE 25 MG: 25 TABLET ORAL at 04:20

## 2019-06-11 RX ADMIN — ATROPA BELLADONNA AND OPIUM 15 MG: 16.2; 3 SUPPOSITORY RECTAL at 11:45

## 2019-06-11 RX ADMIN — ONDANSETRON 4 MG: 4 TABLET, ORALLY DISINTEGRATING ORAL at 03:10

## 2019-06-11 RX ADMIN — PROCHLORPERAZINE EDISYLATE 10 MG: 5 INJECTION INTRAMUSCULAR; INTRAVENOUS at 17:25

## 2019-06-11 RX ADMIN — MORPHINE SULFATE 2 MG: 4 INJECTION INTRAVENOUS at 22:51

## 2019-06-11 RX ADMIN — LORAZEPAM 0.5 MG: 2 INJECTION INTRAMUSCULAR; INTRAVENOUS at 14:23

## 2019-06-11 RX ADMIN — HEPARIN SODIUM 5000 UNITS: 5000 INJECTION, SOLUTION INTRAVENOUS; SUBCUTANEOUS at 22:51

## 2019-06-11 RX ADMIN — SODIUM CHLORIDE, POTASSIUM CHLORIDE, SODIUM LACTATE AND CALCIUM CHLORIDE 1000 ML: 600; 310; 30; 20 INJECTION, SOLUTION INTRAVENOUS at 09:38

## 2019-06-11 RX ADMIN — ONDANSETRON 4 MG: 2 INJECTION INTRAMUSCULAR; INTRAVENOUS at 09:31

## 2019-06-11 RX ADMIN — SENNOSIDES,DOCUSATE SODIUM 2 TABLET: 8.6; 5 TABLET, FILM COATED ORAL at 17:30

## 2019-06-11 RX ADMIN — MORPHINE SULFATE 2 MG: 4 INJECTION INTRAVENOUS at 09:31

## 2019-06-11 RX ADMIN — HEPARIN SODIUM 5000 UNITS: 5000 INJECTION, SOLUTION INTRAVENOUS; SUBCUTANEOUS at 14:27

## 2019-06-11 RX ADMIN — MORPHINE SULFATE 2 MG: 4 INJECTION INTRAVENOUS at 13:13

## 2019-06-11 RX ADMIN — HEPARIN SODIUM 5000 UNITS: 5000 INJECTION, SOLUTION INTRAVENOUS; SUBCUTANEOUS at 10:16

## 2019-06-11 RX ADMIN — ONDANSETRON 4 MG: 2 INJECTION INTRAMUSCULAR; INTRAVENOUS at 13:13

## 2019-06-11 RX ADMIN — SODIUM CHLORIDE, POTASSIUM CHLORIDE, SODIUM LACTATE AND CALCIUM CHLORIDE: 600; 310; 30; 20 INJECTION, SOLUTION INTRAVENOUS at 22:57

## 2019-06-11 RX ADMIN — SODIUM CHLORIDE, POTASSIUM CHLORIDE, SODIUM LACTATE AND CALCIUM CHLORIDE: 600; 310; 30; 20 INJECTION, SOLUTION INTRAVENOUS at 14:27

## 2019-06-11 RX ADMIN — MORPHINE SULFATE 4 MG: 4 INJECTION INTRAVENOUS at 03:15

## 2019-06-11 ASSESSMENT — ENCOUNTER SYMPTOMS
DIARRHEA: 0
DIZZINESS: 0
SORE THROAT: 0
SHORTNESS OF BREATH: 0
NERVOUS/ANXIOUS: 0
CHILLS: 0
FEVER: 0
ORTHOPNEA: 0
NAUSEA: 1
CONSTIPATION: 0
BRUISES/BLEEDS EASILY: 0
ABDOMINAL PAIN: 1
HEARTBURN: 0
BLOOD IN STOOL: 0
SEIZURES: 0
DOUBLE VISION: 0
COUGH: 0
FALLS: 0
BLURRED VISION: 0
INSOMNIA: 0
BACK PAIN: 0
MYALGIAS: 0
VOMITING: 1
SPUTUM PRODUCTION: 0
PALPITATIONS: 0
WEAKNESS: 0
HEADACHES: 0
PHOTOPHOBIA: 0

## 2019-06-11 ASSESSMENT — PATIENT HEALTH QUESTIONNAIRE - PHQ9
SUM OF ALL RESPONSES TO PHQ9 QUESTIONS 1 AND 2: 0
1. LITTLE INTEREST OR PLEASURE IN DOING THINGS: NOT AT ALL
2. FEELING DOWN, DEPRESSED, IRRITABLE, OR HOPELESS: NOT AT ALL

## 2019-06-11 ASSESSMENT — LIFESTYLE VARIABLES
ALCOHOL_USE: NO
DO YOU DRINK ALCOHOL: NO

## 2019-06-11 NOTE — ASSESSMENT & PLAN NOTE
Severe rectal pain since vaginal hysterectomy 6/4/2019 done for menometrorrhagia, dysmenorrhea, dyspareunia, fibroids  Vaginal discharge, whitish-brown discharge postop, expected per OB/GYN consult  No surgical intervention indicated at this time as no evidence of collection of pus or fluid in pelvis  Per CT distal rectal wall thickening, suggestive of proctocolitis  This is possibly a postsurgical change given that instrumentation was done at the rectovaginal plane  She is symptomatically improved today without rectal pain and mild abdominal tenderness to palpation, nausea and vomiting is also resolved  Plan  Medically clear for discharge  Endosol hydrocortisone ointment  Lidocaine topical  Stool softener bowel regimen  Follow-up with OB/GYN

## 2019-06-11 NOTE — ED NOTES
PICC RN called & notified of PICC order. States will be reviewing orders & be in route soon. Pt updated.

## 2019-06-11 NOTE — PROCEDURES
Vascular Access Team    Date of Insertion: 6/11/09  Arm Circumference: n/a  Line Length: 10cm  Line Size: 18g  Vein Occupancy %: 36  Reason for Midline: Access  Labs: WBC 10.5, , BUN 9, Cr 0.87, GFR >60, INR n/a    Orders confirmed, vessel patency confirmed with ultrasound. Risks and benefits of procedure explained to patient and education regarding line associated bloodstream infections provided. Questions answered.     PowerGlide Midline placed in LUE per MD order with ultrasound guidance. 18g, 10 cm line placed in brachial vein after 1 attempt(s).  Catheter inserted with good blood return. Secured with 0cm external from insertion site.  Flushed without resistance with 10 mL 0.9% normal saline. Midline secured with Biopatch and Tegaderm.     Midline placement is confirmed by nurse using ultrasound and ability to flush and draw blood. Midline is appropriate for use at this time.  No X-ray is needed for placement confirmation. Pt tolerated procedure well.  Patient condition relayed to unit RN or ordering physician via this post procedure note in the EMR.    Ultrasound images uploaded to PACS and viewable in the EMR - yes  Ultrasound imaged printed and placed in paper chart - no      BARD PowerGlide Midline ref # [id number], Lot # [id number]

## 2019-06-11 NOTE — ED NOTES
20 minutes after morphine & zofran administration, pt states pain & nausea back rating 10/10. Pt moaning, restless in bed, extremely anxious. VSS. Asking to speak to MD again for stronger pain medications. Will notify admitting MD.

## 2019-06-11 NOTE — ED TRIAGE NOTES
Chief Complaint   Patient presents with   • Post-Op Complications     hysterectomy on 6/4/19   • Rectal Pain   • N/V     onset this afternoon after miralax from ED visit earlier today.     Patient seen in ED today, gradually worsening rectal pain and since discharge earlier today.  Patient A&O, tearful in triage.      Explained wait time and triage process to pt. Pt placed back out in lobby, told to notify ED tech or triage RN of any changes, verbalized understanding.

## 2019-06-11 NOTE — PROGRESS NOTES
Patient is A/O x4 and co abd pain rated 10/10, undescribable because of tearfulness and agitation. Call light education provided. Patient education on need to not yell in an uncontrolled manner, patient verbalized understanding, no evidence of learning. Family at bedside.

## 2019-06-11 NOTE — ASSESSMENT & PLAN NOTE
Significant history of obstetric pathologies and surgical interventions including dysmenorrhea, menometrorrhagia, dyspareunia, fibroids for which vaginal hysterectomy was done 2019 following which she has had rectal pain and abdominal pain  In the past , 2017 lower segment  sections done for suspected abruption  Endometriosis and right-sided endometrioma, status post right oophorectomy, left ovarian chocolate cyst, status post cystectomy   History of vaginitis/UTI

## 2019-06-11 NOTE — ED NOTES
Successful vascular access by PICC team. Admitting MD to order morphine for continued pt discomfort. To give med now along with previously ordered medications as compatible.

## 2019-06-11 NOTE — ASSESSMENT & PLAN NOTE
Resolved  History of multiple surgeries on the abdomen including  x2, hysterectomy, oophorectomy, cystectomy and lysis of adhesions  Possibility of subacute bowel obstruction since abdominal pain is associated with nausea and bilious vomiting  No evidence of obstruction on CT and no significant stool burden  Plan  PRN Tylenol  Return if worsening symptoms  Follow-up with OB/GYN as symptoms may be postsurgical

## 2019-06-11 NOTE — CARE PLAN
Problem: Safety  Goal: Will remain free from falls  Outcome: PROGRESSING AS EXPECTED  Call light education provide, patient completed return demonstration successfully. Re-enforced use of call light to ensure patient safety and decrease risk of fall.    Problem: Venous Thromboembolism (VTW)/Deep Vein Thrombosis (DVT) Prevention:  Goal: Patient will participate in Venous Thrombosis (VTE)/Deep Vein Thrombosis (DVT)Prevention Measures  Outcome: PROGRESSING AS EXPECTED  Provided education on prophylactic interventions and include the following: SCDs and heparin. Pt verbalized understanding.

## 2019-06-11 NOTE — H&P
Internal Medicine Admitting History and Physical    Note Author: Carlos Enrique Bull M.D.       Name Kaela Ballesteros     1980   Age/Sex 38 y.o. female   MRN [id number]   Code Status Full Code     After 5PM or if no immediate response to page, please call for cross-coverage  Attending/Team: Dr Gardner/ Tesha See Patient List for primary contact information  Call [phone] to page    1st Call - Day Intern (R1):   Dr Bull 2nd Call - Day Sr. Resident (R2/R3):   Dr Glez       Chief Complaint:   Rectal pain, abdominal pain x 1 week    HPI:  38-year-old female with significant past medical and surgical history including dysmenorrhea, endometrioma, endometriosis, pelvic and omental adhesions status post lysis, ovarian cystectomy, cholecystectomy, right oophorectomy, pregnancies in ,  with C-sections done for deliveries, with menometrorrhagia, dysmenorrhea, dyspareunia, fibroids for which hysterectomy via vaginal approach on 2019 was done and was presented to the ER multiple times for complaints of abdominal pain presents with rectal pain of 7 days duration as well as diffuse abdominal pain.  Pain has been present since the surgery and although constant is more noticed during passage of bowel movements, she has not had constipation or hard stools and has been having regular bowel movements without any blood in them or any passage of tomasz blood/dark blood through the anus.  She also complains of reddish-brown foul-smelling vaginal discharge since the surgery, she has follow-up with Dr. Carney [OB/GYN] next week.  She also complains of severe nausea and vomiting and has not had multiple episodes of vomiting, including twice a during the course of the patient interview, vomitus is bilious and moderate in quantity, projectile, not bloodstained.    ER course: Administered IV morphine for 2 doses early a.m. .  Administered additional morphine at 9 AM , morphine every 4 hours as  needed.  CT abdomen pelvis showed distal rectal wall thickening, suggesting proctocolitis, fluid and foci of air in the pelvis expected for recent postop history, left nephrolithiasis, nonobstructive.    PMHx: Significant OB/GYN history as enumerated above, nondiabetic, non-hypertensive, non-smoker.      Review of Systems   Constitutional: Negative for chills, fever and malaise/fatigue.   HENT: Negative for congestion and sore throat.    Eyes: Negative for blurred vision, double vision and photophobia.   Respiratory: Negative for cough, sputum production and shortness of breath.    Cardiovascular: Negative for chest pain, palpitations, orthopnea and leg swelling.   Gastrointestinal: Positive for abdominal pain, nausea and vomiting. Negative for blood in stool, constipation, diarrhea, heartburn and melena.   Genitourinary: Negative for dysuria, frequency and urgency.   Musculoskeletal: Negative for back pain, falls, joint pain and myalgias.   Skin: Negative for itching and rash.   Neurological: Negative for dizziness, seizures, weakness and headaches.   Endo/Heme/Allergies: Negative for environmental allergies. Does not bruise/bleed easily.   Psychiatric/Behavioral: The patient is not nervous/anxious and does not have insomnia.              Past Medical History (Chronic medical problem, known complications and current treatment)    Dysmenorrhea, left ovarian chocolate cyst, status post cystectomy, status post right oophorectomy, endometriosis, pelvic and omental adhesions status post lysis, right endometrioma, vaginitis/UTI, pancreatitis, cholelithiasis,  x2, colitis 2019, epiploic appendagitis, hysterectomy 2019, menometrorrhagia, dysmenorrhea, dyspareunia, fibroids, sickle cell trait, beta thalassemia.    Past Surgical History:  Past Surgical History:   Procedure Laterality Date   • VAGINAL HYSTERECTOMY SCOPE TOTAL  2019    Procedure: HYSTERECTOMY, TOTAL, VAGINAL, LAPAROSCOPY-ASSISTED;   Surgeon: Robbie Carney M.D.;  Location: SURGERY SAME DAY [facility];  Service: Gynecology   • SALPINGECTOMY Left 6/4/2019    Procedure: SALPINGECTOMY;  Surgeon: Robbie Carney M.D.;  Location: SURGERY SAME DAY [facility];  Service: Gynecology   • ANTERIOR AND POSTERIOR REPAIR  6/4/2019    Procedure: COLPORRHAPHY, COMBINED ANTEROPOSTERIOR;  Surgeon: Robbie Carney M.D.;  Location: SURGERY SAME DAY [facility];  Service: Gynecology   • ENTEROCELE REPAIR  6/4/2019    Procedure: REPAIR, ENTEROCELE- PERINEOPLASTY;  Surgeon: Robbie Carney M.D.;  Location: SURGERY SAME DAY [facility];  Service: Gynecology   • BLADDER SLING FEMALE  6/4/2019    Procedure: BLADDER SLING, FEMALE- TOT;  Surgeon: Robbie Carney M.D.;  Location: SURGERY SAME DAY [facility];  Service: Gynecology   • VAGINAL SUSPENSION  6/4/2019    Procedure: COLPOPEXY- SACROSPINOUS VAULT SUSPENSION;  Surgeon: Robbie Carney M.D.;  Location: SURGERY SAME DAY [facility];  Service: Gynecology   • PRIMARY C SECTION WITH TUBAL LIGATION  5/2/2017    Procedure: REPEAT C SECTION WITH TUBAL LIGATION;  Surgeon: Kirti Echavarria M.D.;  Location: LABOR AND DELIVERY;  Service:    • ABDOMINAL EXPLORATION  2016   • PRIMARY C SECTION  9/30/2015    Procedure: PRIMARY C SECTION;  Surgeon: Yuriy Garay M.D.;  Location: LABOR AND DELIVERY;  Service:    • EGD W/ENDOSCOPIC ULTRASOUND  1/26/2015    Performed by Ricky Stewart M.D. at [facility]   • GASTROSCOPY WITH BIOPSY  1/26/2015    Performed by Ricky Stewart M.D. at [facility]   • DILATION AND CURETTAGE  12/3/2012    Performed by Deborah Keyes M.D. at LABOR AND DELIVERY   • PELVISCOPY  1/13/2012    Performed by DEBORAH FLYNN at [facility]   • CHOLECYSTECTOMY         Current Outpatient Medications:  Home Medications     Reviewed by Sandy Tobias (Pharmacy Tech) on 06/11/19 at 0843  Med List Status: Complete   Medication Last Dose Status  "  docusate sodium (COLACE) 100 MG Cap 6/10/2019 Active   HYDROcodone-acetaminophen (NORCO) 5-325 MG Tab per tablet 6/10/2019 Active   polyethylene glycol 3350 (MIRALAX) Powder 6/10/2019 Active                Medication Allergy/Sensitivities:  Allergies   Allergen Reactions   • Nkda [No Known Drug Allergy]          Family History (mandatory)   Family History   Problem Relation Age of Onset   • Diabetes Father    • Diabetes Brother    • Diabetes Paternal Grandmother        Social History (mandatory)   Social History     Social History   • Marital status:      Spouse name: N/A   • Number of children: N/A   • Years of education: N/A     Occupational History   • Not on file.     Social History Main Topics   • Smoking status: Former Smoker     Packs/day: 0.50     Years: 15.00     Types: Cigarettes     Quit date: 1/1/2011   • Smokeless tobacco: Never Used      Comment: occasional   • Alcohol use No      Comment: occasionally   • Drug use: Yes     Types: Marijuana, Inhaled      Comment: marijuana    • Sexual activity: Yes     Partners: Male      Comment: BTL     Other Topics Concern   • Not on file     Social History Narrative   • No narrative on file     Living situation: Lives with  and children  PCP : Pcp Pt States None    Physical Exam     Vitals:    06/11/19 1130 06/11/19 1230 06/11/19 1344 06/11/19 1656   BP: 137/90 131/85 146/107 137/85   Pulse: 80 75 90 99   Resp: 18 18 18 20   Temp: 37.1 °C (98.8 °F)  36.9 °C (98.5 °F) 36.7 °C (98 °F)   TempSrc: Temporal  Temporal Temporal   SpO2:   97% 99%   Weight:   99.5 kg (219 lb 5.7 oz)    Height:         Body mass index is 30.59 kg/m².  /85   Pulse 99   Temp 36.7 °C (98 °F) (Temporal)   Resp 20   Ht 1.803 m (5' 11\")   Wt 99.5 kg (219 lb 5.7 oz)   LMP 04/26/2019   SpO2 99%   Breastfeeding? No   BMI 30.59 kg/m²   O2 therapy: Pulse Oximetry: 99 %, O2 (LPM): 0, O2 Delivery: None (Room Air)    Physical Exam   Constitutional: She is oriented to person, " place, and time. She appears distressed.   HENT:   Head: Normocephalic and atraumatic.   Right Ear: External ear normal.   Left Ear: External ear normal.   Nose: Nose normal.   Mouth/Throat: Oropharynx is clear and moist. No oropharyngeal exudate.   Eyes: Conjunctivae and EOM are normal. Right eye exhibits no discharge. Left eye exhibits no discharge. No scleral icterus.   Neck: Normal range of motion. Thyromegaly present.   Cardiovascular: Normal rate, regular rhythm and normal heart sounds.    No murmur heard.  Pulmonary/Chest: Breath sounds normal. No respiratory distress. She has no rales.   Abdominal: Soft. Bowel sounds are normal. She exhibits no distension. There is tenderness. There is no rebound and no guarding.   Diffuse abdominal tenderness to light palpation, laparoscopy scars, transverse scar of  section   Genitourinary: Rectal exam shows guaiac negative stool. Vaginal discharge found.   Genitourinary Comments: Brownish-red discharge, foul-smelling, swabs taken, sutures visible in vaginal vault/cervical external os, extreme tenderness on speculum examination, bimanual tenderness not elicited   Musculoskeletal: Normal range of motion. She exhibits no tenderness.   Lymphadenopathy:     She has no cervical adenopathy.   Neurological: She is alert and oriented to person, place, and time. She exhibits normal muscle tone. GCS score is 15.   Skin: Skin is warm and dry. No rash noted. She is not diaphoretic. No erythema.   Psychiatric: Mood, memory, affect and judgment normal.   Vitals reviewed.        Data Review       Old Records Request:   Completed  Current Records review/summary: Completed    Lab Data Review:  Recent Results (from the past 24 hour(s))   URINALYSIS CULTURE, IF INDICATED    Collection Time: 19  2:50 AM   Result Value Ref Range    Color Yellow     Character Turbid (A)     Specific Gravity 1.019 <1.035    Ph 6.5 5.0 - 8.0    Glucose Negative Negative mg/dL    Ketones Trace (A)  Negative mg/dL    Protein 100 (A) Negative mg/dL    Bilirubin Negative Negative    Urobilinogen, Urine 1.0 Negative    Nitrite Negative Negative    Leukocyte Esterase Large (A) Negative    Occult Blood Large (A) Negative    Micro Urine Req Microscopic    URINE MICROSCOPIC (W/UA)    Collection Time: 06/11/19  2:50 AM   Result Value Ref Range    WBC Packed (A) /hpf    RBC  (A) /hpf    Bacteria Many (A) None /hpf    Epithelial Cells Many (A) /hpf   CBC WITH DIFFERENTIAL    Collection Time: 06/11/19  4:11 AM   Result Value Ref Range    WBC 10.5 4.8 - 10.8 K/uL    RBC 4.48 4.20 - 5.40 M/uL    Hemoglobin 11.9 (L) 12.0 - 16.0 g/dL    Hematocrit 37.1 37.0 - 47.0 %    MCV 82.8 81.4 - 97.8 fL    MCH 26.6 (L) 27.0 - 33.0 pg    MCHC 32.1 (L) 33.6 - 35.0 g/dL    RDW 39.2 35.9 - 50.0 fL    Platelet Count 328 164 - 446 K/uL    MPV 10.9 9.0 - 12.9 fL    Neutrophils-Polys 83.20 (H) 44.00 - 72.00 %    Lymphocytes 10.10 (L) 22.00 - 41.00 %    Monocytes 4.80 0.00 - 13.40 %    Eosinophils 0.80 0.00 - 6.90 %    Basophils 0.50 0.00 - 1.80 %    Immature Granulocytes 0.60 0.00 - 0.90 %    Nucleated RBC 0.00 /100 WBC    Neutrophils (Absolute) 8.78 (H) 2.00 - 7.15 K/uL    Lymphs (Absolute) 1.06 1.00 - 4.80 K/uL    Monos (Absolute) 0.51 0.00 - 0.85 K/uL    Eos (Absolute) 0.08 0.00 - 0.51 K/uL    Baso (Absolute) 0.05 0.00 - 0.12 K/uL    Immature Granulocytes (abs) 0.06 0.00 - 0.11 K/uL    NRBC (Absolute) 0.00 K/uL   COMP METABOLIC PANEL    Collection Time: 06/11/19  4:11 AM   Result Value Ref Range    Sodium 139 135 - 145 mmol/L    Potassium 3.9 3.6 - 5.5 mmol/L    Chloride 102 96 - 112 mmol/L    Co2 25 20 - 33 mmol/L    Anion Gap 12.0 (H) 0.0 - 11.9    Glucose 134 (H) 65 - 99 mg/dL    Bun 9 8 - 22 mg/dL    Creatinine 0.87 0.50 - 1.40 mg/dL    Calcium 10.6 (H) 8.5 - 10.5 mg/dL    AST(SGOT) 12 12 - 45 U/L    ALT(SGPT) 15 2 - 50 U/L    Alkaline Phosphatase 55 30 - 99 U/L    Total Bilirubin 0.6 0.1 - 1.5 mg/dL    Albumin 4.3 3.2 - 4.9 g/dL     Total Protein 8.3 (H) 6.0 - 8.2 g/dL    Globulin 4.0 (H) 1.9 - 3.5 g/dL    A-G Ratio 1.1 g/dL   LIPASE    Collection Time: 19  4:11 AM   Result Value Ref Range    Lipase 11 11 - 82 U/L   LACTIC ACID    Collection Time: 19  4:11 AM   Result Value Ref Range    Lactic Acid 1.3 0.5 - 2.0 mmol/L   ESTIMATED GFR    Collection Time: 19  4:11 AM   Result Value Ref Range    GFR If African American >60 >60 mL/min/1.73 m 2    GFR If Non African American >60 >60 mL/min/1.73 m 2   MAGNESIUM    Collection Time: 19  4:11 AM   Result Value Ref Range    Magnesium 2.0 1.5 - 2.5 mg/dL   EKG    Collection Time: 19  9:44 AM   Result Value Ref Range    Report       [facility]    Test Date:  2019  Pt Name:    SIMON HICKS            Department: ER  MRN:        [id number]                      Room:       [facility]  Gender:     Female                       Technician: [id number]  :        1980                   Requested By:CHEY BALL  Order #:    [id number]                    Reading MD:    Measurements  Intervals                                Axis  Rate:       102                          P:          66  IA:         192                          QRS:        40  QRSD:       78                           T:          45  QT:         352  QTc:        459    Interpretive Statements  SINUS TACHYCARDIA  Compared to ECG 2019 08:05:56  Sinus rhythm no longer present     Chlamydia/GC PCR Urine Or Swab    Collection Time: 19 11:23 AM   Result Value Ref Range    Source Urine    WET PREP    Collection Time: 19 11:23 AM   Result Value Ref Range    Significant Indicator NEG     Source GEN     Site -     Wet Prep For Parasites       No yeast.  No motile Trichomonas seen.  No clue cells seen.         Imaging/Procedures Review:    Independant Imaging Review: Completed  IR-MIDLINE CATHETER INSERTION WO GUIDANCE > AGE 3   Final Result                   Ultrasound-guided midline placement performed by qualified nursing staff    as above.          CT-ABDOMEN-PELVIS W/O   Final Result         1.  Distal rectal wall thickening, appearance suggests component of proctocolitis.   2.  Fluid and small foci of air in the pelvis adjacent to the vaginal cuff, within expected limits for recent postop history.   3.  Left ovarian lesion, recommend follow-up pelvic sonogram in 6 weeks for evaluation of size stability and further characterization.   4.  Left nephrolithiasis without visualized obstructive change.   5.  Fat-containing umbilical hernia           EKG:   EKG Independent Review: Completed  QTc:459, HR: 102, Sinus tachycardia    Records reviewed and summarized in current documentation :  Yes  [employer] teaching service handout given to patient:  No         Assessment/Plan     * Rectal pain   Assessment & Plan    Severe rectal pain since vaginal hysterectomy 6/4/2019 done for menometrorrhagia, dysmenorrhea, dyspareunia, fibroids  Vaginal discharge, whitish-brown discharge postop, expected per OB/GYN consult  No surgical intervention indicated at this time as no evidence of collection of pus or fluid in pelvis  Per CT distal rectal wall thickening, suggestive of proctocolitis  No constipation/diarrhea, although history of pelvic and omental adhesions in the past status post lysis, and has been having bilious vomiting as well as diffusely tender abdomen  Plan  Admit to medical service  Belladonna suppository every 12 hours as needed  Bowel protocol with stool softeners to ensure no hard stools and aggravation of rectal pain     Thyromegaly   Assessment & Plan    Enlarged thyroid, moves with deglutition  Plan  - TSH, free T4 in AM     Asymptomatic bacteriuria   Assessment & Plan    Dirty urine, numerous WBCs and bacteria  Hx of recent vaginal hysterectomy with instrumentation of the bladder during the procedure  Asymptomatic  Plan  - Urine culture, treat if positive     History  of obstetric problem   Assessment & Plan    Significant history of obstetric pathologies and surgical interventions including dysmenorrhea, menometrorrhagia, dyspareunia, fibroids for which vaginal hysterectomy was done 2019 following which she has had rectal pain and abdominal pain  In the past , 2017 lower segment  sections done for suspected abruption  Endometriosis and right-sided endometrioma, status post right oophorectomy, left ovarian chocolate cyst, status post cystectomy   History of vaginitis/UTI     Sickle cell carrier- (present on admission)   Assessment & Plan    Continue to monitor, spleen unremarkable on CT done 2019  Does not appear to be in acute chest syndrome/pain crisis, is heterozygous for sickle cell trait     H/O oophorectomy   Assessment & Plan    History of right oophorectomy, status post left cystectomy for chocolate cyst      Generalized abdominal pain- (present on admission)   Assessment & Plan    Generalized abdominal pain, to mild palpation  History of multiple surgeries on the abdomen including  x2, hysterectomy, oophorectomy, cystectomy and lysis of adhesions  Possibility of subacute bowel obstruction since abdominal pain is associated with nausea and bilious vomiting  No evidence of obstruction on CT and no significant stool burden  Plan  Pain medication- morphine 2 mg every 4 hours as needed  Tylenol staggered with morphine for around-the-clock pain relief  Watch for signs of obstruction such as guarding and delayed bowel movements/constipation  Zofran IV as needed for nausea/vomiting         Anticipated Hospital stay: Observation admit        Quality Measures  Quality-Core Measures   Reviewed items::  EKG reviewed, Labs reviewed, Medications reviewed and Radiology images reviewed  Turner catheter::  No Turner  DVT prophylaxis pharmacological::  Enoxaparin (Lovenox)  Ulcer Prophylaxis::  No    PCP: Pcp Pt States None

## 2019-06-11 NOTE — ED NOTES
"Pt continues yelling out down ty for help- attempted repositioning to assist with pain control. Dimmed lights and provided extra pillows. Pt has refused PO medication administration from MD offer. Pt states she is too nauseous to try anything. Pt continues requesting dilaudid which \"works better\" for her. Admitting physician aware but no new orders placed. VSS. Will continue to monitor.   "

## 2019-06-11 NOTE — ED NOTES
Report given to Abram SPENCER. Pt transported to [facility] T[facility] with ED tech. Pt medicated with PRN meds for continued uncontrolled pain. Surgeon MD saw pt & mother now at bedside.

## 2019-06-11 NOTE — PROGRESS NOTES
Patient educated regarding pain management and nausea control. Education provided to patient and Melissa (grandmother).

## 2019-06-11 NOTE — DISCHARGE PLANNING
Care Transition Team Assessment    Spoke with patient at bedside. Anticipate no needs @ present time. Family will be ride @ D/C.    Information Source  Orientation : Oriented x 4  Information Given By: Patient    Readmission Evaluation  Is this a readmission?: No    Interdisciplinary Discharge Planning  Does Admitting Nurse Feel This Could be a Complex Discharge?: No  Primary Care Physician: [facility]  Lives with - Patient's Self Care Capacity: Spouse  Support Systems: Children, Spouse / Significant Other  Housing / Facility: 1 Story Apartment / Condo  Do You Take your Prescribed Medications Regularly: No  Reasons Why Not Taking Medications :  (No Rx's)  Able to Return to Previous ADL's: Yes  Mobility Issues: No  Prior Services: Home-Independent  Patient Expects to be Discharged to:: Home  Assistance Needed: No  Durable Medical Equipment: Not Applicable    Discharge Preparedness  What are your discharge supports?: Child, Spouse, Grandparent  Prior Functional Level: Ambulatory    Functional Assesment  Prior Functional Level: Ambulatory    Finances  Prescription Coverage: Yes    Anticipated Discharge Information  Anticipated discharge disposition: Home  Discharge Address: [address] [place] [address]   Discharge Contact Phone Number: [phone]

## 2019-06-11 NOTE — ED NOTES
Report received from Naseem SPENCER. Assuming care of pt at this time. PT is restless & anxious- stating pain is 10/10 unbearable- MD to be notified since there are no PRN orders for pain. Per RN report pt has had up to 7 attempts by RN's & MD with US. All attempts unsuccessful. PICC insertion ordered by MD. Phone message was left for PICC team by Naseem SPENCER at [phone] since they are not in hospital yet. IV antibiotics & IV medications ordered delayed due to no vascular access. Pt updated on plan of care. VSS. Will continue to monitor.

## 2019-06-11 NOTE — ED PROVIDER NOTES
ED Provider Note    Scribed for Leo Fatiam M.D. by John Emmanuel. 6/11/2019, 1:51 AM.    Primary care provider: Pcp Pt States None  Means of arrival: walk in  History obtained from: Patient  History limited by: none    CHIEF COMPLAINT  Chief Complaint   Patient presents with   • Post-Op Complications     hysterectomy on 6/4/19   • Rectal Pain   • N/V     onset this afternoon after miralax from ED visit earlier today.       HPI  Kaela Ballesteros is a 38 y.o. female who presents to the Emergency Department complaining of abdominal pain, onset ealier today. She describes her pain as cramping, and it feels like she is having contractions. The patient states that she had a hysterectomy one week ago due to a history of endometriosis and fibroids that was preformed by Dr. Carney. She talked to him earlier today, were she was told to stop taking her pain medication due to constipation. She is also complaining of associated gas, nausea, vomiting, and anal fissues secondary to constipation. She has used stool softeners and ashu lax with no relief. She states that she was in the ED earlier today. They did an enema and she was sent home with ashu lax. She is not allergic to medications and has no other medical problems. She adds that Dr. Carney will see her Wednesday.      REVIEW OF SYSTEMS  See HPI for further details. All other systems are negative.     PAST MEDICAL HISTORY   has a past medical history of Anemia (2017); Backpain (2001); Diabetes (HCC) (2017); Heart burn; Hypertension (2017); Indigestion (2006); Infectious disease (1995); Other specified symptom associated with female genital organs (2012); Pain (03/03/15); Pancreatitis (2011/2014); Sickle cell trait syndrome; Urinary incontinence; and Urinary tract infection.    SURGICAL HISTORY   has a past surgical history that includes pelviscopy (1/13/2012); dilation and curettage (12/3/2012); egd w/endoscopic ultrasound (1/26/2015); gastroscopy with biopsy  "(1/26/2015); primary c section (9/30/2015); primary c section with tubal ligation (5/2/2017); abdominal exploration (2016); cholecystectomy; vaginal hysterectomy scope total (6/4/2019); salpingectomy (Left, 6/4/2019); anterior and posterior repair (6/4/2019); enterocele repair (6/4/2019); bladder sling female (6/4/2019); and vaginal suspension (6/4/2019).    SOCIAL HISTORY  Social History   Substance Use Topics   • Smoking status: Former Smoker     Packs/day: 0.50     Years: 15.00     Types: Cigarettes     Quit date: 1/1/2011   • Smokeless tobacco: Never Used      Comment: occasional   • Alcohol use No      Comment: occasionally      History   Drug Use   • Types: Marijuana, Inhaled     Comment: marijuana        FAMILY HISTORY  Family History   Problem Relation Age of Onset   • Diabetes Father    • Diabetes Brother    • Diabetes Paternal Grandmother        CURRENT MEDICATIONS  Reviewed.  See Encounter Summary.     ALLERGIES  Allergies   Allergen Reactions   • Nkda [No Known Drug Allergy]        PHYSICAL EXAM  VITAL SIGNS: /76   Pulse (!) 118   Temp 37.2 °C (99 °F) (Temporal)   Resp 20   Ht 1.803 m (5' 11\")   Wt 100.4 kg (221 lb 5.5 oz)   LMP 04/26/2019   SpO2 99%   BMI 30.87 kg/m²     Constitutional: Moderate pain distress. Alert.   HENT: No signs of trauma, Bilateral external ears normal, Nose normal.   Eyes: Pupils are equal and reactive, Conjunctiva normal, Non-icteric.   Neck: Normal range of motion, No tenderness, Supple, No stridor.   Cardiovascular: Regular rate and rhythm, no murmurs.   Thorax & Lungs: Normal breath sounds, No respiratory distress, No wheezing, No chest tenderness.   Abdomen: Suprapubic tenderness and bandages over laparoscopic hysteromyotomy surgical sights. Abdomen soft. Hypoactive bowel sounds.No masses, No pulsatile masses. No peritoneal signs.  Skin: Warm, Dry, No erythema, No rash.   Back: No bony tenderness, No CVA tenderness.   Extremities: Intact distal pulses, No " edema, No tenderness, No cyanosis  Musculoskeletal: Good range of motion in all major joints. No tenderness to palpation or major deformities noted.   Neurologic: Alert , Normal motor function, Normal sensory function, No focal deficits noted.   Psychiatric: Affect normal, Judgment normal, Mood normal.     DIAGNOSTIC STUDIES / PROCEDURES     LABS  Results for orders placed or performed during the hospital encounter of 06/11/19   CBC WITH DIFFERENTIAL   Result Value Ref Range    WBC 10.5 4.8 - 10.8 K/uL    RBC 4.48 4.20 - 5.40 M/uL    Hemoglobin 11.9 (L) 12.0 - 16.0 g/dL    Hematocrit 37.1 37.0 - 47.0 %    MCV 82.8 81.4 - 97.8 fL    MCH 26.6 (L) 27.0 - 33.0 pg    MCHC 32.1 (L) 33.6 - 35.0 g/dL    RDW 39.2 35.9 - 50.0 fL    Platelet Count 328 164 - 446 K/uL    MPV 10.9 9.0 - 12.9 fL    Neutrophils-Polys 83.20 (H) 44.00 - 72.00 %    Lymphocytes 10.10 (L) 22.00 - 41.00 %    Monocytes 4.80 0.00 - 13.40 %    Eosinophils 0.80 0.00 - 6.90 %    Basophils 0.50 0.00 - 1.80 %    Immature Granulocytes 0.60 0.00 - 0.90 %    Nucleated RBC 0.00 /100 WBC    Neutrophils (Absolute) 8.78 (H) 2.00 - 7.15 K/uL    Lymphs (Absolute) 1.06 1.00 - 4.80 K/uL    Monos (Absolute) 0.51 0.00 - 0.85 K/uL    Eos (Absolute) 0.08 0.00 - 0.51 K/uL    Baso (Absolute) 0.05 0.00 - 0.12 K/uL    Immature Granulocytes (abs) 0.06 0.00 - 0.11 K/uL    NRBC (Absolute) 0.00 K/uL   COMP METABOLIC PANEL   Result Value Ref Range    Sodium 139 135 - 145 mmol/L    Potassium 3.9 3.6 - 5.5 mmol/L    Chloride 102 96 - 112 mmol/L    Co2 25 20 - 33 mmol/L    Anion Gap 12.0 (H) 0.0 - 11.9    Glucose 134 (H) 65 - 99 mg/dL    Bun 9 8 - 22 mg/dL    Creatinine 0.87 0.50 - 1.40 mg/dL    Calcium 10.6 (H) 8.5 - 10.5 mg/dL    AST(SGOT) 12 12 - 45 U/L    ALT(SGPT) 15 2 - 50 U/L    Alkaline Phosphatase 55 30 - 99 U/L    Total Bilirubin 0.6 0.1 - 1.5 mg/dL    Albumin 4.3 3.2 - 4.9 g/dL    Total Protein 8.3 (H) 6.0 - 8.2 g/dL    Globulin 4.0 (H) 1.9 - 3.5 g/dL    A-G Ratio 1.1 g/dL    LIPASE   Result Value Ref Range    Lipase 11 11 - 82 U/L   LACTIC ACID   Result Value Ref Range    Lactic Acid 1.3 0.5 - 2.0 mmol/L   URINALYSIS CULTURE, IF INDICATED   Result Value Ref Range    Color Yellow     Character Turbid (A)     Specific Gravity 1.019 <1.035    Ph 6.5 5.0 - 8.0    Glucose Negative Negative mg/dL    Ketones Trace (A) Negative mg/dL    Protein 100 (A) Negative mg/dL    Bilirubin Negative Negative    Urobilinogen, Urine 1.0 Negative    Nitrite Negative Negative    Leukocyte Esterase Large (A) Negative    Occult Blood Large (A) Negative    Micro Urine Req Microscopic    URINE MICROSCOPIC (W/UA)   Result Value Ref Range    WBC Packed (A) /hpf    RBC  (A) /hpf    Bacteria Many (A) None /hpf    Epithelial Cells Many (A) /hpf   ESTIMATED GFR   Result Value Ref Range    GFR If African American >60 >60 mL/min/1.73 m 2    GFR If Non African American >60 >60 mL/min/1.73 m 2      All labs were reviewed by me.    RADIOLOGY  CT-ABDOMEN-PELVIS W/O   Final Result         1.  Distal rectal wall thickening, appearance suggests component of proctocolitis.   2.  Fluid and small foci of air in the pelvis adjacent to the vaginal cuff, within expected limits for recent postop history.   3.  Left ovarian lesion, recommend follow-up pelvic sonogram in 6 weeks for evaluation of size stability and further characterization.   4.  Left nephrolithiasis without visualized obstructive change.   5.  Fat-containing umbilical hernia      IR-PICC LINE PLACEMENT W/ GUIDANCE > AGE 5    (Results Pending)     The radiologist's interpretation of all radiological studies and images have been reviewed by me.    COURSE & MEDICAL DECISION MAKING  Pertinent Labs & Imaging studies reviewed. (See chart for details)    1:51 AM - Patient seen and examined at bedside. Patient will be treated with Zofran 4mg and morphine 4 mg/ml. Ordered urinalysis culture, lactic acid, lipase, CMP, and CBC with differential to evaluate her  symptoms.    2:46 AM Per nurse there were 4 missed IV attempts with ultrasound.    5:18 AM Patient was reevaluated at bedside. Discussed lab and radiology results with the patient and informed them that she has a bladder infection, along with inflammation of the colon. I spoke with her that that the infection of the bladder and rectal inflammation could account for the pain. I discussed that I will be prescribing antibiotics. I advised that she seen Dr. Rodriguez today. I will be order PO challenge now.     6:22 AM I discussed the patient's case and the above findings with [facility] internal medicine who accepts the patient for admission.     Decision Making:  This is a 38 y.o. year old female who presents with above complaint.  Patient chart review revealing multiple chronic recurrent complaints of abdominal pain.  Furthermore chronic documentation of patient requests of narcotics and also leaving hospital and may be due to being disgruntled.  Similar affect today.  She has been seen now twice in the ER today.  Additional imaging today shows a proctitis.  Also urinalysis showing positive for cystitis.  While she does have a kidney stone I do not believe that there is evidence of a ureterolithiasis nor obstruction.  I have started her on antibiotics and discussed the case with you and our service which will agree to ongoing inpatient care at this time.    DISPOSITION:  Patient will be admitted to [facility] internal medicine in guarded condition.     FINAL IMPRESSION  1. Proctitis    2. Cystitis          John LERNER (Glenn), am scribing for, and in the presence of, Leo Fatima M.D..    Electronically signed by: John Polo), 6/11/2019    Leo LERNER M.D. personally performed the services described in this documentation, as scribed by John Emmanuel in my presence, and it is both accurate and complete.  C   The note accurately reflects work and decisions made by me.  Leo Fatima  6/11/2019  7:01 AM

## 2019-06-11 NOTE — ASSESSMENT & PLAN NOTE
Continue to monitor, spleen unremarkable on CT done 6/11/2019  Does not appear to be in acute chest syndrome/pain crisis, is heterozygous for sickle cell trait

## 2019-06-11 NOTE — ED NOTES
"This RN assisted Dr. Glez with pelvic exam- cultures obtained & sent to lab for analysis. Pt continues to yell out to MD & RN \"please help me, I need more pain meds I am in excruciating pain!\" Help!\" MD spoke to pt. Dr. Glez to order suppository for pain relief & to continue 2 mg morphine IV q 4hrs prn. Pt updated.   "

## 2019-06-11 NOTE — ED NOTES
"Pt lying in bed crying, states \"I need more pain meds to make all this pain just go away\", I had an extensive discussion with pt concerning her status/plan of care/hx of abd pains/admits/surgeries attempting to educate pt on normal post-surgical issues and to inquire as to the pt's expectations during this visit, pt began crying and states \"I just want all the pain to go away\",  Pt showed no signs of pain/nausea during discussion but became tearful when informed of potential discharge from ED, will notify er md of pt status  "

## 2019-06-12 ENCOUNTER — PATIENT OUTREACH (OUTPATIENT)
Dept: HEALTH INFORMATION MANAGEMENT | Facility: OTHER | Age: 39
End: 2019-06-12

## 2019-06-12 VITALS
DIASTOLIC BLOOD PRESSURE: 89 MMHG | RESPIRATION RATE: 18 BRPM | HEART RATE: 91 BPM | SYSTOLIC BLOOD PRESSURE: 124 MMHG | TEMPERATURE: 98.7 F | HEIGHT: 71 IN | WEIGHT: 219.36 LBS | BODY MASS INDEX: 30.71 KG/M2 | OXYGEN SATURATION: 100 %

## 2019-06-12 LAB
ALBUMIN SERPL BCP-MCNC: 3.9 G/DL (ref 3.2–4.9)
ALBUMIN/GLOB SERPL: 1.1 G/DL
ALP SERPL-CCNC: 53 U/L (ref 30–99)
ALT SERPL-CCNC: 10 U/L (ref 2–50)
ANION GAP SERPL CALC-SCNC: 7 MMOL/L (ref 0–11.9)
AST SERPL-CCNC: 9 U/L (ref 12–45)
BASOPHILS # BLD AUTO: 0.4 % (ref 0–1.8)
BASOPHILS # BLD: 0.05 K/UL (ref 0–0.12)
BILIRUB SERPL-MCNC: 0.5 MG/DL (ref 0.1–1.5)
BUN SERPL-MCNC: 9 MG/DL (ref 8–22)
CALCIUM SERPL-MCNC: 10.1 MG/DL (ref 8.5–10.5)
CHLORIDE SERPL-SCNC: 101 MMOL/L (ref 96–112)
CO2 SERPL-SCNC: 28 MMOL/L (ref 20–33)
CREAT SERPL-MCNC: 0.9 MG/DL (ref 0.5–1.4)
EOSINOPHIL # BLD AUTO: 0.09 K/UL (ref 0–0.51)
EOSINOPHIL NFR BLD: 0.7 % (ref 0–6.9)
ERYTHROCYTE [DISTWIDTH] IN BLOOD BY AUTOMATED COUNT: 39.6 FL (ref 35.9–50)
ERYTHROCYTE [SEDIMENTATION RATE] IN BLOOD BY WESTERGREN METHOD: 89 MM/HOUR (ref 0–20)
GLOBULIN SER CALC-MCNC: 3.6 G/DL (ref 1.9–3.5)
GLUCOSE SERPL-MCNC: 137 MG/DL (ref 65–99)
HCT VFR BLD AUTO: 34.8 % (ref 37–47)
HGB BLD-MCNC: 11.3 G/DL (ref 12–16)
IMM GRANULOCYTES # BLD AUTO: 0.07 K/UL (ref 0–0.11)
IMM GRANULOCYTES NFR BLD AUTO: 0.5 % (ref 0–0.9)
LYMPHOCYTES # BLD AUTO: 1.85 K/UL (ref 1–4.8)
LYMPHOCYTES NFR BLD: 14.3 % (ref 22–41)
MCH RBC QN AUTO: 27.2 PG (ref 27–33)
MCHC RBC AUTO-ENTMCNC: 32.5 G/DL (ref 33.6–35)
MCV RBC AUTO: 83.9 FL (ref 81.4–97.8)
MONOCYTES # BLD AUTO: 0.7 K/UL (ref 0–0.85)
MONOCYTES NFR BLD AUTO: 5.4 % (ref 0–13.4)
NEUTROPHILS # BLD AUTO: 10.19 K/UL (ref 2–7.15)
NEUTROPHILS NFR BLD: 78.7 % (ref 44–72)
NRBC # BLD AUTO: 0 K/UL
NRBC BLD-RTO: 0 /100 WBC
PLATELET # BLD AUTO: 306 K/UL (ref 164–446)
PMV BLD AUTO: 10.6 FL (ref 9–12.9)
POTASSIUM SERPL-SCNC: 3.7 MMOL/L (ref 3.6–5.5)
PROT SERPL-MCNC: 7.5 G/DL (ref 6–8.2)
RBC # BLD AUTO: 4.15 M/UL (ref 4.2–5.4)
SODIUM SERPL-SCNC: 136 MMOL/L (ref 135–145)
T4 FREE SERPL-MCNC: 1.03 NG/DL (ref 0.53–1.43)
TSH SERPL DL<=0.005 MIU/L-ACNC: 2.39 UIU/ML (ref 0.38–5.33)
WBC # BLD AUTO: 13 K/UL (ref 4.8–10.8)

## 2019-06-12 PROCEDURE — 80053 COMPREHEN METABOLIC PANEL: CPT

## 2019-06-12 PROCEDURE — A9270 NON-COVERED ITEM OR SERVICE: HCPCS | Performed by: STUDENT IN AN ORGANIZED HEALTH CARE EDUCATION/TRAINING PROGRAM

## 2019-06-12 PROCEDURE — 84439 ASSAY OF FREE THYROXINE: CPT

## 2019-06-12 PROCEDURE — 700102 HCHG RX REV CODE 250 W/ 637 OVERRIDE(OP): Performed by: STUDENT IN AN ORGANIZED HEALTH CARE EDUCATION/TRAINING PROGRAM

## 2019-06-12 PROCEDURE — 700111 HCHG RX REV CODE 636 W/ 250 OVERRIDE (IP): Performed by: STUDENT IN AN ORGANIZED HEALTH CARE EDUCATION/TRAINING PROGRAM

## 2019-06-12 PROCEDURE — G0378 HOSPITAL OBSERVATION PER HR: HCPCS

## 2019-06-12 PROCEDURE — 99217 PR OBSERVATION CARE DISCHARGE: CPT | Mod: GC | Performed by: INTERNAL MEDICINE

## 2019-06-12 PROCEDURE — 85652 RBC SED RATE AUTOMATED: CPT

## 2019-06-12 PROCEDURE — 84443 ASSAY THYROID STIM HORMONE: CPT

## 2019-06-12 PROCEDURE — 85025 COMPLETE CBC W/AUTO DIFF WBC: CPT

## 2019-06-12 PROCEDURE — 700105 HCHG RX REV CODE 258: Performed by: STUDENT IN AN ORGANIZED HEALTH CARE EDUCATION/TRAINING PROGRAM

## 2019-06-12 RX ORDER — ONDANSETRON 4 MG/1
4 TABLET, ORALLY DISINTEGRATING ORAL EVERY 4 HOURS PRN
Qty: 30 TAB | Refills: 0 | Status: SHIPPED | OUTPATIENT
Start: 2019-06-12 | End: 2019-10-01 | Stop reason: SDUPTHER

## 2019-06-12 RX ORDER — HYDROCORTISONE ACETATE 25 MG/1
25 SUPPOSITORY RECTAL EVERY 12 HOURS
Status: DISCONTINUED | OUTPATIENT
Start: 2019-06-12 | End: 2019-06-12 | Stop reason: HOSPADM

## 2019-06-12 RX ORDER — ACETAMINOPHEN 325 MG/1
650 TABLET ORAL EVERY 6 HOURS PRN
Qty: 30 TAB | Refills: 0 | Status: ON HOLD | OUTPATIENT
Start: 2019-06-12 | End: 2020-10-01

## 2019-06-12 RX ORDER — HYDROCORTISONE ACETATE 25 MG/1
25 SUPPOSITORY RECTAL EVERY 12 HOURS
Qty: 30 SUPPOSITORY | Refills: 2 | Status: SHIPPED | OUTPATIENT
Start: 2019-06-12 | End: 2020-09-18

## 2019-06-12 RX ADMIN — SODIUM CHLORIDE, POTASSIUM CHLORIDE, SODIUM LACTATE AND CALCIUM CHLORIDE: 600; 310; 30; 20 INJECTION, SOLUTION INTRAVENOUS at 08:40

## 2019-06-12 RX ADMIN — HEPARIN SODIUM 5000 UNITS: 5000 INJECTION, SOLUTION INTRAVENOUS; SUBCUTANEOUS at 06:43

## 2019-06-12 RX ADMIN — SENNOSIDES,DOCUSATE SODIUM 2 TABLET: 8.6; 5 TABLET, FILM COATED ORAL at 06:42

## 2019-06-12 RX ADMIN — MORPHINE SULFATE 2 MG: 4 INJECTION INTRAVENOUS at 03:20

## 2019-06-12 ASSESSMENT — ENCOUNTER SYMPTOMS
BLURRED VISION: 0
ABDOMINAL PAIN: 1
MYALGIAS: 0
INSOMNIA: 0
BLOOD IN STOOL: 0
DEPRESSION: 0
CONSTIPATION: 0
HEADACHES: 0
COUGH: 0
SHORTNESS OF BREATH: 0
BRUISES/BLEEDS EASILY: 0
VOMITING: 1
FOCAL WEAKNESS: 0
CHILLS: 0
NERVOUS/ANXIOUS: 0
DOUBLE VISION: 0
NAUSEA: 1
ORTHOPNEA: 0
DIARRHEA: 0
PHOTOPHOBIA: 0
SPUTUM PRODUCTION: 0
SORE THROAT: 0
BACK PAIN: 0
SEIZURES: 0
FEVER: 0

## 2019-06-12 ASSESSMENT — PATIENT HEALTH QUESTIONNAIRE - PHQ9
1. LITTLE INTEREST OR PLEASURE IN DOING THINGS: NOT AT ALL
SUM OF ALL RESPONSES TO PHQ9 QUESTIONS 1 AND 2: 0
2. FEELING DOWN, DEPRESSED, IRRITABLE, OR HOPELESS: NOT AT ALL

## 2019-06-12 ASSESSMENT — LIFESTYLE VARIABLES: SUBSTANCE_ABUSE: 0

## 2019-06-12 NOTE — DISCHARGE INSTRUCTIONS
Discharge Instructions    Discharged to home by car with relative. Discharged via walking, hospital escort: Yes.  Special equipment needed: Not Applicable    Be sure to schedule a follow-up appointment with your primary care doctor or any specialists as instructed.     Discharge Plan:   Diet Plan: Discussed  Activity Level: Discussed  Confirmed Follow up Appointment: Appointment Scheduled  Confirmed Symptoms Management: Discussed  Medication Reconciliation Updated: Yes  Influenza Vaccine Indication: Not indicated: Previously immunized this influenza season and > 8 years of age    I understand that a diet low in cholesterol, fat, and sodium is recommended for good health. Unless I have been given specific instructions below for another diet, I accept this instruction as my diet prescription.   Other diet: reg    Special Instructions: None    · Is patient discharged on Warfarin / Coumadin?   No     Depression / Suicide Risk    As you are discharged from this [facility] facility, it is important to learn how to keep safe from harming yourself.    Recognize the warning signs:  · Abrupt changes in personality, positive or negative- including increase in energy   · Giving away possessions  · Change in eating patterns- significant weight changes-  positive or negative  · Change in sleeping patterns- unable to sleep or sleeping all the time   · Unwillingness or inability to communicate  · Depression  · Unusual sadness, discouragement and loneliness  · Talk of wanting to die  · Neglect of personal appearance   · Rebelliousness- reckless behavior  · Withdrawal from people/activities they love  · Confusion- inability to concentrate     If you or a loved one observes any of these behaviors or has concerns about self-harm, here's what you can do:  · Talk about it- your feelings and reasons for harming yourself  · Remove any means that you might use to hurt yourself (examples: pills, rope, extension cords, firearm)  · Get  professional help from the community (Mental Health, Substance Abuse, psychological counseling)  · Do not be alone:Call your Safe Contact- someone whom you trust who will be there for you.  · Call your local CRISIS HOTLINE 006-4960 or 488-253-4011  · Call your local Children's Mobile Crisis Response Team [place] (111) 402-0975 or www.[facility]  · Call the toll free National Suicide Prevention Hotlines   · National Suicide Prevention Lifeline 423-581-PLGW (0119)  · National Hope Line Network 800-SUICIDE (028-9822)

## 2019-06-12 NOTE — DISCHARGE SUMMARY
Internal Medicine Discharge Summary  Note Author: Carlos Enrique Bull M.D.       Name Kaela Ballesteros     1980   Age/Sex 38 y.o. female   MRN [id number]         Admit Date:  2019       Discharge Date:   2019    Service:   [facility] Internal Medicine White Team  Attending Physician(s):   Dr Gardner       Senior Resident(s):   Dr Glez  Blue Resident(s):   Dr Bull  PCP: Pcp Pt States None      Primary Diagnosis:   Proctocolitis    Secondary Diagnoses:                Principal Problem:    Rectal pain POA: Unknown  Active Problems:    Sickle cell carrier POA: Yes      Overview: Mother has disease, brother and daughter are carriers. Patient is a       carrier. FOB never tested.       Recommended by PANN to be on aspirin 81mg daily and 24 hr urine protien.    History of obstetric problem (Chronic) POA: Unknown    Asymptomatic bacteriuria POA: Unknown    Thyromegaly POA: Unknown  Resolved Problems:    Generalized abdominal pain POA: Yes      Hospital Summary (Brief Narrative):         Ms. Ballesteros is a 38-year-old female with significant obstetrical history including of dysmenorrhea, ovarian cyst, endometriosis, dyspareunia, fibroids with 2 past  sections, oophorectomy and ovarian cystectomy as well as recent 2019 vaginal hysterectomy who presented with severe rectal pain and diffuse abdominal pain.  She had been having normal bowel movements since her surgery and had no history of hard stools but had cramping abdominal pain and tenesmus on passage of bowel movements.  She was given a prescription for Norco postoperatively for 7 days.  Of note she has been evaluated in the emergency room multiple times for abdominal pain and was evaluated the day before admission as well.  CT scan done of the abdomen and pelvis showed diffuse thickening of the wall of the rectum suggestive of proctocolitis and a nonobstructing left renal stone as well as normal postoperative changes in the  pelvis.  She was in 10 out of 10 severe pain and in severe distress as well as having nausea and multiple episodes of bilious non-bloodstained projectile vomiting.  In the emergency room she was given intravenous Zofran and intravenous morphine which reduced her symptoms.  In the past 24 hours she received 5 doses of intravenous morphine.  She also received 1 dose of Ativan intravenously for anxiety as well as nausea.  She also additionally received 1 dose of Compazine and Phenergan.  Obstetric surgeon Dr. Carney who performed her vaginal hysterectomy was consulted who recommended no additional surgical intervention at this time.  Thyromegaly was noted on her physical examination, on biochemical testing her thyroid profile was within normal limits.  She was also noted to have bacteriuria which is asymptomatic, urine cultures are awaited.  The following morning after admission 6/12/2019 she feels better, is able to tolerate an oral diet, and her nausea and abdominal pain have subsided with only mild tenderness to palpation.  Her rectal pain has also subsided.  She has been advised to refrain from taking opiates as they may cause constipation as well as to remain on all bowel regimen including stool softeners on a daily basis to aid passage of soft stools.  She has been prescribed topical lidocaine as well as Anusol hydrocortisone ointment and a short course of Zofran.  She is being discharged in stable condition with advice to follow-up with her OB/GYN and primary care provider.      Patient /Hospital Summary (Details -- Problem Oriented) :          Thyromegaly   Assessment & Plan    Enlarged thyroid, moves with deglutition  - TSH, free T4 WNL, CTM     Asymptomatic bacteriuria   Assessment & Plan    Dirty urine, numerous WBCs and bacteria  Hx of recent vaginal hysterectomy with instrumentation of the bladder during the procedure  Asymptomatic  Plan  - Urine culture, treat if positive     History of obstetric problem    Assessment & Plan    Significant history of obstetric pathologies and surgical interventions including dysmenorrhea, menometrorrhagia, dyspareunia, fibroids for which vaginal hysterectomy was done 2019 following which she has had rectal pain and abdominal pain  In the past , 2017 lower segment  sections done for suspected abruption  Endometriosis and right-sided endometrioma, status post right oophorectomy, left ovarian chocolate cyst, status post cystectomy   History of vaginitis/UTI     Sickle cell carrier   Assessment & Plan    Continue to monitor, spleen unremarkable on CT done 2019  Does not appear to be in acute chest syndrome/pain crisis, is heterozygous for sickle cell trait     H/O oophorectomy   Assessment & Plan    History of right oophorectomy, status post left cystectomy for chocolate cyst      * Rectal pain   Assessment & Plan    Severe rectal pain since vaginal hysterectomy 2019 done for menometrorrhagia, dysmenorrhea, dyspareunia, fibroids  Vaginal discharge, whitish-brown discharge postop, expected per OB/GYN consult  No surgical intervention indicated at this time as no evidence of collection of pus or fluid in pelvis  Per CT distal rectal wall thickening, suggestive of proctocolitis  This is possibly a postsurgical change given that instrumentation was done at the rectovaginal plane  She is symptomatically improved today without rectal pain and mild abdominal tenderness to palpation, nausea and vomiting is also resolved  Plan  Medically clear for discharge  Endosol hydrocortisone ointment  Lidocaine topical  Stool softener bowel regimen  Follow-up with OB/GYN     Generalized abdominal pain-resolved as of 2019   Assessment & Plan    Resolved  History of multiple surgeries on the abdomen including  x2, hysterectomy, oophorectomy, cystectomy and lysis of adhesions  Possibility of subacute bowel obstruction since abdominal pain is associated with nausea  and bilious vomiting  No evidence of obstruction on CT and no significant stool burden  Plan  PRN Tylenol  Return if worsening symptoms  Follow-up with OB/GYN as symptoms may be postsurgical         Consultants:     OB/GYN    Procedures:        N/A    Imaging/ Testing:      IR-MIDLINE CATHETER INSERTION WO GUIDANCE > AGE 3   Final Result                  Ultrasound-guided midline placement performed by qualified nursing staff    as above.          CT-ABDOMEN-PELVIS W/O   Final Result         1.  Distal rectal wall thickening, appearance suggests component of proctocolitis.   2.  Fluid and small foci of air in the pelvis adjacent to the vaginal cuff, within expected limits for recent postop history.   3.  Left ovarian lesion, recommend follow-up pelvic sonogram in 6 weeks for evaluation of size stability and further characterization.   4.  Left nephrolithiasis without visualized obstructive change.   5.  Fat-containing umbilical hernia            Discharge Medications:         Medication Reconciliation: Completed       Medication List      START taking these medications      Instructions   acetaminophen 325 MG Tabs  Commonly known as:  TYLENOL   Take 2 Tabs by mouth every 6 hours as needed (Mild Pain; (Pain scale 1-3); Temp greater than 100.5 F).  Dose:  650 mg     ciprofloxacin 500 MG Tabs  Commonly known as:  CIPRO   Take 1 Tab by mouth 2 times a day for 10 days.  Dose:  500 mg     hydrocortisone 25 MG Supp  Commonly known as:  ANUSOL-HC   Insert 1 Suppository in rectum every 12 hours.  Dose:  25 mg     lidocaine 2 % Gel  Commonly known as:  XYLOCAINE   Apply 1 Tube to affected area(s) Once PRN (hard stools and painful passage of stool).  Dose:  1 Tube     ondansetron 4 MG Tbdp  Commonly known as:  ZOFRAN ODT   Take 1 Tab by mouth every four hours as needed for Nausea (give PO if no IV route available).  Dose:  4 mg        CONTINUE taking these medications      Instructions   docusate sodium 100 MG Caps  Commonly  known as:  COLACE   Take 100 mg by mouth 2 times a day.  Dose:  100 mg     polyethylene glycol 3350 Powd  Commonly known as:  MIRALAX   Take 17 g by mouth every day.  Dose:  17 g        STOP taking these medications    HYDROcodone-acetaminophen 5-325 MG Tabs per tablet  Commonly known as:  NORCO            Disposition:   To home    Diet:   Soft diet as tolerated    Activity:   As tolerated    Instructions:         The patient was instructed to return to the ER in the event of worsening symptoms. I have counseled the patient on the importance of compliance and the patient has agreed to proceed with all medical recommendations and follow up plan indicated above.   The patient understands that all medications come with benefits and risks. Risks may include permanent injury or death and these risks can be minimized with close reassessment and monitoring.        Primary Care Provider:    No PCP on file, appointment arranged  Discharge summary faxed to primary care provider:  Deferred  Copy of discharge summary given to the patient: Deferred      Follow up appointment details :      No future appointments.  Robbie Carney M.D.  [address]  [place] [address]  [phone]    On 6/20/2019  As scheduled.  please start and finih antibiotics. please continue stool softeners      Pending Studies:        N/A    Time spent on discharge day patient visit, preparing discharge paperwork and arranging for patient follow up.    Summary of follow up issues:   Urine culture    Discharge Time (Minutes) :    30 min  Hospital Course Type: Observation Stay      Condition on Discharge    Stable, euthymic, AO x4  ______________________________________________________________________    Interval history/exam for day of discharge:    Patient seen and examined at bedside, stable, NAD, no acute events overnight.  Patient required 5 doses of morphine -10 mg in past 24 hours.  Also required multiple doses of Zofran, 1 dose of Phenergan and  Compazine as well as Ativan each.  This morning patient is comfortable, no rectal pain, mild abdominal tenderness to palpation, no nausea or vomiting, able to tolerate full liquid diet.  Try to advance to soft.  Patient medically clear for discharge.  Discharged on Anusol Hydrocort ointment, lidocaine topical, stool softener regimen, advised to take Tylenol as needed and avoid opiates as they may cause constipation and to follow-up with OB/GYN as indicated.      Most Recent Labs:    Lab Results   Component Value Date/Time    WBC 13.0 (H) 06/12/2019 03:22 AM    RBC 4.15 (L) 06/12/2019 03:22 AM    HEMOGLOBIN 11.3 (L) 06/12/2019 03:22 AM    HEMATOCRIT 34.8 (L) 06/12/2019 03:22 AM    MCV 83.9 06/12/2019 03:22 AM    MCH 27.2 06/12/2019 03:22 AM    MCHC 32.5 (L) 06/12/2019 03:22 AM    MPV 10.6 06/12/2019 03:22 AM    NEUTSPOLYS 78.70 (H) 06/12/2019 03:22 AM    LYMPHOCYTES 14.30 (L) 06/12/2019 03:22 AM    MONOCYTES 5.40 06/12/2019 03:22 AM    EOSINOPHILS 0.70 06/12/2019 03:22 AM    BASOPHILS 0.40 06/12/2019 03:22 AM    HYPOCHROMIA 1+ 06/29/2014 04:35 PM      Lab Results   Component Value Date/Time    SODIUM 136 06/12/2019 03:22 AM    POTASSIUM 3.7 06/12/2019 03:22 AM    CHLORIDE 101 06/12/2019 03:22 AM    CO2 28 06/12/2019 03:22 AM    GLUCOSE 137 (H) 06/12/2019 03:22 AM    BUN 9 06/12/2019 03:22 AM    CREATININE 0.90 06/12/2019 03:22 AM    CREATININE 1.0 12/29/2007 05:14 PM      Lab Results   Component Value Date/Time    ALTSGPT 10 06/12/2019 03:22 AM    ASTSGOT 9 (L) 06/12/2019 03:22 AM    ALKPHOSPHAT 53 06/12/2019 03:22 AM    TBILIRUBIN 0.5 06/12/2019 03:22 AM    LIPASE 11 06/11/2019 04:11 AM    ALBUMIN 3.9 06/12/2019 03:22 AM    GLOBULIN 3.6 (H) 06/12/2019 03:22 AM    INR 0.89 09/30/2015 12:53 PM     Lab Results   Component Value Date/Time    PROTHROMBTM 12.1 09/30/2015 12:53 PM    INR 0.89 09/30/2015 12:53 PM

## 2019-06-12 NOTE — CONSULTS
DATE OF SERVICE:  2019    REASON FOR CONSULTATION:  Nausea, vomiting, pelvic pain.    HISTORY OF PRESENT ILLNESS:  This is a 38-year-old woman,  2, para 1,   spontaneous  1, who was seen in the emergency room on 06/10/2019 for   evaluation of abdominal pain with an earlier onset with complaints of   cramping, feeling like contractions.  The patient had been seen and evaluated.    An enema was given.  As the pain had improved, she did have imaging done,   which was unremarkable and the patient was sent home with the plan to proceed   forward with starting MiraLax.  The patient returned back to the emergency   room with complaints of increased pain and cramping, nausea and vomiting, and   wished to proceed forward with an additional workup.  Of note, the patient did   undergo a laparoscopic-assisted vaginal hysterectomy, bilateral   salpingectomy, anterior and posterior vaginal repair, enterocele repair,   perineoplasty, sacrospinous vault suspension, transobturator tape midurethral   sling procedure on 2019 at [facility] with the findings of a bulbous uterus   and normal-appearing adnexa and pelvis, with good support of the anterior and   posterior vaginal walls in addition to apical vaginal tissue without any undue   tension to suture sites.  Cystoscopy revealed normal urinary bladder,   bilateral ureteral efflux, and no new tension noted at the level of the mid   urethra.  The patient's pathology did come back and showed a benign cervix   demonstrating ecto and endocervical mucosa with focal squamous metaplasia.  No   dysplasia or malignancy was appreciated.  She did have benign proliferative   endometrium with focal tubal metaplasia.  No hyperplasia or malignancy was   appreciated.  Myometrium demonstrated uterine fibroids, an area of scar within   the uterus associated with mild chronic inflammation, scattered foreign body   and giant cells, a benign left fimbriated fallopian tube, no  malignancy was   appreciated.  She had undergone an unremarkable postoperative recovery period   of time until more recently.  The patient did note pelvic pain, which she felt   was normal postoperative pain up until the day prior to presentation when she   began having increasing pain and discomfort.  She continues to have vaginal   discharge with a slight odor, which is very typical from the normal   postoperative pelvic floor procedures.  She describes no fevers or chills.    She does report a difficult time going to the bathroom, but with the use of   suppositories and constipation medications, she did have a bowel movement, has   had bowel movements daily up until the day prior to admission.  She did   undergo a workup in the emergency room, which demonstrated a white blood cell   count of 10.5, which was not appreciably changed from a white blood cell count   done 2 months prior, for which she had been admitted and discharged on   04/15/2019 for complaints of nausea, vomiting, and abdominal pain.  Back in   04/2019, the patient did undergo a workup after being admitted for the nausea,   vomiting, and abdominal pain.  She received IV fluids, had an imaging study,   which demonstrated 2 small focal areas of soft tissue density or induration   unchanged in the anterior to distal end of the left colon, could possibly   represent small areas of inflammation.  She did have a small left ovarian   cyst, a 4 cm hiatal hernia and was status post a cholecystectomy.  Her   laboratory results were unremarkable.  The patient was felt to be appropriate   for discharge at that time.    The patient subsequently presented to our office with complaints of abnormal   uterine bleeding, longstanding, most likely secondary to a fibroid seen on her   previous ultrasound study that was felt to be submucosal in nature, resulting   in the menometrorrhagia, dysmenorrhea, and presumed pelvic pain.  She also   had symptomatic pelvic floor  relaxation, type 2 stress urinary incontinence,   which was demonstrated and confirmed on urodynamic studies.  She was also   complaining of overactive bladder symptoms and had tried overactive bladder   medications in the past.  She was interested in proceeding forward with a   workup after undergoing endometrial biopsy, which showed secretory phase   endometrium with stromal breakdown, no evidence of hyperplasia, adenomyosis or   malignancy.  Pap smear was within normal limits.  She had had 2 ultrasounds,   one done through the emergency room, one done through our office, which did   demonstrate and confirm the above findings and she was interested in   proceeding forward with surgery as planned.  As stated above, she did undergo   the above procedure with an estimated blood loss of 100 mL and the findings   are as stated above.    PAST MEDICAL HISTORY:  Remarkable for an endometrioma noted previously, for   which she had a right salpingo-oophorectomy, endometriosis.  She reported   pelvic and omental adhesions, had had lysis of adhesions, pancreatitis,   vaginitis with a urinary tract infection, colitis that she was treated for in   April as discussed above, epiploic appendagitis, sickle cell trait, beta   thalassemia.  Otherwise, the ____ are positive as stated above.    PAST SURGICAL HISTORY:  The procedure as described above in addition to   history of 2 previous  sections.  She also underwent a workup for the   nausea and vomiting noted previously by Dr. Stewart.  She has also undergone a   previous diagnostic laparoscopy in addition to a cholecystectomy.  At the time   of her  section, she also had a tubal sterilization procedure, as she   had previously completed her family.    OBSTETRICAL HISTORY:  Previous delivery via  section with placental   abruption.    GYNECOLOGIC HISTORY:  The patient had a longstanding history of   menometrorrhagia, which she describes excessive bleeding with  passage of large   clots, incapacitating dysmenorrhea, dyspareunia, pelvic pain, symptomatic   pelvic floor relaxation, type 2 stress urinary incontinence, all of which were   noted prior to her surgery.  She denies any previous history of any sexually   transmitted diseases or pelvic infections.    FAMILY HISTORY:  Her family history was noncontributory.    REVIEW OF SYSTEMS:  Otherwise unremarkable.    SOCIAL HISTORY:  She is .  She reports rare use of alcohol.  Denies any   tobacco.  She does report a longstanding history of marijuana usage daily.    She denies any other drug use.    MEDICATIONS:  Ibuprofen and Percocet for longstanding history of pelvic pain   and low back pain.    ALLERGIES:  No known drug allergies.    PHYSICAL EXAMINATION:  VITAL SIGNS:  She is afebrile, hemodynamically stable with temperature 98.5,   respirations 18, and pulse of 90.  Her weight is 219 pounds.  Pulse oximetry   was 97% on room air.  The remaining portion of her vital signs can be seen in   the electronic medical record.  HEART:  Regular rate and rhythm.  ABDOMEN:  Soft.  Bowel sounds were present.  No abdominal distention   appreciated.  There was tenderness to palpation not only at the incisions, but   in bilateral lower quadrants.  PELVIC:  As she had had already 3 pelvic examinations, she declined any   further pelvic examination.  EXTREMITIES:  Nontender.    DIAGNOSTICS:  1.  CT scan shows distal rectal wall thickening, appearance suggests a   component of proctocolitis.  2.  Fluid and small foci of air in the pelvis, not adjacent to the area of the   stranding seen on the rectosigmoid colon, but the area of the vaginal cuff   expected to be normal from her recent surgery.  Left nephrolithiasis and   umbilical hernia, which had been demonstrated previously.  EKG showed sinus   tachycardia.    ASSESSMENT AND PLAN:  A 38-year-old woman status post a hysterectomy,   salpingectomy, pelvic floor repair, sling procedure  on 06/04/2019, who now   presents for the third time in 1 year with complaints of nausea, vomiting, and   diarrhea, now status post surgery with evidence of what appears to be   proctocolitis, which may be related to dissection around the rectosigmoid   colon during surgery.  There is no significant free air, free fluid, no   significant elevation in the white blood cell count to suggest compromise of   the sigmoid colon or diverticulitis.  We would plan on proceeding forward with   a clear liquid diet, antiemetic therapy, pain management, treatment of her   suspected urinary tract infection, and close observation.  Should she have any   impending changes in pain, nausea, vomiting, or worsening white blood cell   count, would consider reimaging.  We will closely monitor the patient during   this course of hospitalization.  However, given the gastroenterologists have   been involved in her care in the past, this may be prudent to involve them   during the course of this hospitalization.  No antibiotics are currently   indicated at this time other than for her urinary tract infection.       ____________________________________     MD LILA DALTON / NTS    DD:  06/11/2019 17:01:23  DT:  06/11/2019 17:43:54    D#:  [id number]  Job#:  [id number]

## 2019-06-12 NOTE — PROGRESS NOTES
Rounded on patient. Patient resting comfortably in bed and appears to be asleep with equal chest rise and fall noted. No distress noted at this time.

## 2019-06-12 NOTE — SENIOR ADMIT NOTE
", 38 year old female with past medical history significant for endometriosis, fibroids s/p hysterectomy on 6/4/19 , adhesiolysis, ovarian cystectomy, cholecystectomy, right oophorectomy presents to the ED with c/o diffuse abdominal pain with severe cramping, intractable nausea and vomiting for the past 2 days. Reports that patient has some symptoms since the hysterectomy, they have been worsening since yesterday morning. Patient has had multiple admissions for abdominal pain in the past year, however states that the nature of pain is different from the pain she has now and has only started after her surgery. ROS positive for nausea, vomiting, poor PO intake, \" light pink \" vaginal discharge. Does not smoke, does not drink alcohol, does use marijuana every day, however has not tolerated it in the past few days. Last bowel movement was with enema in the ER. Denies fever, chills, dysuria, hematuria, dizziness, chest pain, shortness of breath.     Vitals in the ED - temp 99 F and tachycardic 118 initially, later stabilized.   Gen : In distress due to pain, had an episode of bilious emesis  HEENT : NC, AT, PERRLA, dry mucus membranes. Enlarged thyroid gland  CVS : regular rate and rhythm, S1 S2 heard. No m/r/g.  Lungs : Clear to auscultation bilaterally.  Abdomen : Soft, generalized tenderness, hypoactive bowel sounds. Surgical ports appear uninfected.  Pelvic : External genitalia normal, tenderness on speculum exam, pinkish brown thick discharge noted at the external os, sutures noted. Bimanual exam limited due to pain.    Labs : WBC 10.5 with 83% neutrophils, AG 12, lactic acid 1.3. UA nitrite negative, large LE, packed WBC with many bacteria.    Imaging : CT abdomen suggestive of proctocolitis, small focus of air and fluid besides the vagina.    Assessment :  # Acute on chronic abdominal pain, differential includes post-surgical complication vs. Proctocolitis vs infectious causes  # Intractable nausea and " vomiting  # Enlarged thyroid gland    Plan :  - admit to observation   - aspiration, fall and seizure precautions  - prn antiemetics, pain control with IV morphine  - IV fluids LR @ 100 ml/hr  - opium belladona suppository added for spasmodic pain  - will hold off on antibiotics for now, follow vaginal swabs, urine cultures.  - TSH and free T4 pending, stool wbc pending  - discussed with , patient's surgeon, awaiting recommendations  - on heparin for DVT prophylaxis  - Patient is FULL CODE    Please refer to 's H&P for more details.

## 2019-06-12 NOTE — PROGRESS NOTES
Internal Medicine Interval Note  Note Author: Carlos Enrique Bull M.D.     Name Kaela Ballesteros     1980   Age/Sex 38 y.o. female   MRN [id number]   Code Status Full Code     After 5PM or if no immediate response to page, please call for cross-coverage  Attending/Team: Dr Gardner See Patient List for primary contact information  Call [phone] to page    1st Call - Day Intern (R1):   Dr Bull 2nd Call - Day Sr. Resident (R2/R3):   Dr Glez         Reason for interval visit  (Principal Problem)   Proctocolitis      Interval Problem Daily Status Update  (24 hours, problem oriented, brief subjective history, new lab/imaging data pertinent to that problem)   Patient seen and examined at bedside, stable, NAD, no acute events overnight.  Patient required 5 doses of morphine -10 mg in past 24 hours.  Also required multiple doses of Zofran, 1 dose of Phenergan and Compazine as well as Ativan each.  This morning patient is comfortable, no rectal pain, mild abdominal tenderness to palpation, no nausea or vomiting, able to tolerate full liquid diet.  Try to advance to soft.  Patient medically clear for discharge.  Discharged on Anusol Hydrocort ointment, lidocaine topical, stool softener regimen, advised to take Tylenol as needed and avoid opiates as they may cause constipation and to follow-up with OB/GYN as indicated.           Review of Systems   Constitutional: Negative for chills, fever and malaise/fatigue.   HENT: Negative for congestion and sore throat.    Eyes: Negative for blurred vision, double vision and photophobia.   Respiratory: Negative for cough, sputum production and shortness of breath.    Cardiovascular: Negative for chest pain, orthopnea and leg swelling.   Gastrointestinal: Positive for abdominal pain, nausea and vomiting. Negative for blood in stool, constipation, diarrhea and melena.   Genitourinary: Negative for dysuria, frequency and urgency.   Musculoskeletal: Negative for  back pain, joint pain and myalgias.   Skin: Negative for itching and rash.   Neurological: Negative for focal weakness, seizures and headaches.   Endo/Heme/Allergies: Negative for environmental allergies. Does not bruise/bleed easily.   Psychiatric/Behavioral: Negative for depression, substance abuse and suicidal ideas. The patient is not nervous/anxious and does not have insomnia.        Disposition/Barriers to discharge:   Medically clear for discharge    Consultants/Specialty  OB-GYN  PCP: Pcp Pt States None      Quality Measures  Quality-Core Measures   Reviewed items::  Labs reviewed, EKG reviewed, Medications reviewed and Radiology images reviewed  Turner catheter::  No Turner  DVT prophylaxis pharmacological::  Heparin  Ulcer Prophylaxis::  No          Physical Exam       Vitals:    06/11/19 2027 06/11/19 2354 06/12/19 0404 06/12/19 0855   BP: 135/86 136/87 118/77 124/89   Pulse: 99 96 95 91   Resp: 18 18 18 18   Temp: 37.3 °C (99.1 °F) 37.5 °C (99.5 °F) 36.6 °C (97.9 °F) 37.1 °C (98.7 °F)   TempSrc: Temporal Temporal Temporal Temporal   SpO2: 96% 100% 98% 100%   Weight:       Height:         Body mass index is 30.59 kg/m². Weight: 99.5 kg (219 lb 5.7 oz)  Oxygen Therapy:  Pulse Oximetry: 100 %, O2 (LPM): 0, O2 Delivery: None (Room Air)    Physical Exam   Constitutional: She is oriented to person, place, and time and well-developed, well-nourished, and in no distress. No distress.   HENT:   Head: Normocephalic and atraumatic.   Right Ear: External ear normal.   Left Ear: External ear normal.   Mouth/Throat: Oropharynx is clear and moist. No oropharyngeal exudate.   Eyes: Conjunctivae and EOM are normal. Right eye exhibits no discharge. Left eye exhibits no discharge. No scleral icterus.   Neck: Normal range of motion. Neck supple. Thyromegaly present.   Cardiovascular: Normal rate, regular rhythm and normal heart sounds.    No murmur heard.  Pulmonary/Chest: Effort normal and breath sounds normal. No  respiratory distress. She has no rales.   Abdominal: Soft. Bowel sounds are normal. She exhibits no distension. There is no tenderness.   Genitourinary:   Genitourinary Comments: deferred   Musculoskeletal: Normal range of motion. She exhibits no tenderness.   Lymphadenopathy:     She has no cervical adenopathy.   Neurological: She is alert and oriented to person, place, and time. She exhibits normal muscle tone. GCS score is 15.   Skin: Skin is warm and dry. She is not diaphoretic.   Psychiatric: Mood, memory, affect and judgment normal.   Nursing note and vitals reviewed.            Assessment/Plan     * Rectal pain   Assessment & Plan    Severe rectal pain since vaginal hysterectomy 6/4/2019 done for menometrorrhagia, dysmenorrhea, dyspareunia, fibroids  Vaginal discharge, whitish-brown discharge postop, expected per OB/GYN consult  No surgical intervention indicated at this time as no evidence of collection of pus or fluid in pelvis  Per CT distal rectal wall thickening, suggestive of proctocolitis  This is possibly a postsurgical change given that instrumentation was done at the rectovaginal plane  She is symptomatically improved today without rectal pain and mild abdominal tenderness to palpation, nausea and vomiting is also resolved  Plan  Medically clear for discharge  Endosol hydrocortisone ointment  Lidocaine topical  Stool softener bowel regimen  Follow-up with OB/GYN     Thyromegaly   Assessment & Plan    Enlarged thyroid, moves with deglutition  - TSH, free T4 WNL, CTM     Asymptomatic bacteriuria   Assessment & Plan    Dirty urine, numerous WBCs and bacteria  Hx of recent vaginal hysterectomy with instrumentation of the bladder during the procedure  Asymptomatic  Plan  - Urine culture, treat if positive     History of obstetric problem   Assessment & Plan    Significant history of obstetric pathologies and surgical interventions including dysmenorrhea, menometrorrhagia, dyspareunia, fibroids for which  vaginal hysterectomy was done 2019 following which she has had rectal pain and abdominal pain  In the past , 2017 lower segment  sections done for suspected abruption  Endometriosis and right-sided endometrioma, status post right oophorectomy, left ovarian chocolate cyst, status post cystectomy   History of vaginitis/UTI     Sickle cell carrier- (present on admission)   Assessment & Plan    Continue to monitor, spleen unremarkable on CT done 2019  Does not appear to be in acute chest syndrome/pain crisis, is heterozygous for sickle cell trait     H/O oophorectomy   Assessment & Plan    History of right oophorectomy, status post left cystectomy for chocolate cyst

## 2019-06-12 NOTE — ASSESSMENT & PLAN NOTE
Dirty urine, numerous WBCs and bacteria  Hx of recent vaginal hysterectomy with instrumentation of the bladder during the procedure  Asymptomatic  Plan  - Urine culture, treat if positive

## 2019-06-13 LAB
BACTERIA UR CULT: ABNORMAL
BACTERIA UR CULT: ABNORMAL
SIGNIFICANT IND 70042: ABNORMAL
SITE SITE: ABNORMAL
SOURCE SOURCE: ABNORMAL

## 2019-08-08 ENCOUNTER — HOSPITAL ENCOUNTER (EMERGENCY)
Facility: MEDICAL CENTER | Age: 39
End: 2019-08-09
Attending: EMERGENCY MEDICINE
Payer: MEDICAID

## 2019-08-08 ENCOUNTER — APPOINTMENT (OUTPATIENT)
Dept: RADIOLOGY | Facility: MEDICAL CENTER | Age: 39
End: 2019-08-08
Attending: EMERGENCY MEDICINE
Payer: MEDICAID

## 2019-08-08 DIAGNOSIS — R11.2 NON-INTRACTABLE VOMITING WITH NAUSEA, UNSPECIFIED VOMITING TYPE: ICD-10-CM

## 2019-08-08 DIAGNOSIS — R10.13 EPIGASTRIC PAIN: ICD-10-CM

## 2019-08-08 LAB — LACTATE BLD-SCNC: 1.4 MMOL/L (ref 0.5–2)

## 2019-08-08 PROCEDURE — 99285 EMERGENCY DEPT VISIT HI MDM: CPT

## 2019-08-08 PROCEDURE — 85025 COMPLETE CBC W/AUTO DIFF WBC: CPT

## 2019-08-08 PROCEDURE — 74022 RADEX COMPL AQT ABD SERIES: CPT

## 2019-08-08 PROCEDURE — 80053 COMPREHEN METABOLIC PANEL: CPT

## 2019-08-08 PROCEDURE — A9270 NON-COVERED ITEM OR SERVICE: HCPCS | Performed by: EMERGENCY MEDICINE

## 2019-08-08 PROCEDURE — 83690 ASSAY OF LIPASE: CPT

## 2019-08-08 PROCEDURE — 83605 ASSAY OF LACTIC ACID: CPT

## 2019-08-08 PROCEDURE — 700102 HCHG RX REV CODE 250 W/ 637 OVERRIDE(OP): Performed by: EMERGENCY MEDICINE

## 2019-08-08 PROCEDURE — 96375 TX/PRO/DX INJ NEW DRUG ADDON: CPT

## 2019-08-08 PROCEDURE — 96374 THER/PROPH/DIAG INJ IV PUSH: CPT

## 2019-08-08 PROCEDURE — 700111 HCHG RX REV CODE 636 W/ 250 OVERRIDE (IP): Performed by: EMERGENCY MEDICINE

## 2019-08-08 PROCEDURE — 84484 ASSAY OF TROPONIN QUANT: CPT

## 2019-08-08 PROCEDURE — 700105 HCHG RX REV CODE 258: Performed by: EMERGENCY MEDICINE

## 2019-08-08 PROCEDURE — 93005 ELECTROCARDIOGRAM TRACING: CPT | Performed by: EMERGENCY MEDICINE

## 2019-08-08 PROCEDURE — 93005 ELECTROCARDIOGRAM TRACING: CPT

## 2019-08-08 RX ORDER — ONDANSETRON 2 MG/ML
4 INJECTION INTRAMUSCULAR; INTRAVENOUS ONCE
Status: COMPLETED | OUTPATIENT
Start: 2019-08-08 | End: 2019-08-08

## 2019-08-08 RX ORDER — SODIUM CHLORIDE 9 MG/ML
1000 INJECTION, SOLUTION INTRAVENOUS ONCE
Status: COMPLETED | OUTPATIENT
Start: 2019-08-08 | End: 2019-08-09

## 2019-08-08 RX ORDER — MORPHINE SULFATE 4 MG/ML
4 INJECTION, SOLUTION INTRAMUSCULAR; INTRAVENOUS ONCE
Status: COMPLETED | OUTPATIENT
Start: 2019-08-08 | End: 2019-08-08

## 2019-08-08 RX ORDER — METOCLOPRAMIDE HYDROCHLORIDE 5 MG/ML
10 INJECTION INTRAMUSCULAR; INTRAVENOUS ONCE
Status: COMPLETED | OUTPATIENT
Start: 2019-08-08 | End: 2019-08-08

## 2019-08-08 RX ORDER — LORAZEPAM 2 MG/ML
0.5 INJECTION INTRAMUSCULAR ONCE
Status: COMPLETED | OUTPATIENT
Start: 2019-08-08 | End: 2019-08-08

## 2019-08-08 RX ADMIN — MORPHINE SULFATE 4 MG: 4 INJECTION INTRAVENOUS at 22:51

## 2019-08-08 RX ADMIN — LIDOCAINE HYDROCHLORIDE 30 ML: 20 SOLUTION OROPHARYNGEAL at 22:50

## 2019-08-08 RX ADMIN — LORAZEPAM 0.5 MG: 2 INJECTION INTRAMUSCULAR; INTRAVENOUS at 22:51

## 2019-08-08 RX ADMIN — FAMOTIDINE 20 MG: 10 INJECTION INTRAVENOUS at 22:51

## 2019-08-08 RX ADMIN — SODIUM CHLORIDE 1000 ML: 9 INJECTION, SOLUTION INTRAVENOUS at 22:53

## 2019-08-08 RX ADMIN — ONDANSETRON 4 MG: 2 INJECTION INTRAMUSCULAR; INTRAVENOUS at 22:50

## 2019-08-08 RX ADMIN — METOCLOPRAMIDE 10 MG: 5 INJECTION, SOLUTION INTRAMUSCULAR; INTRAVENOUS at 22:51

## 2019-08-09 VITALS
BODY MASS INDEX: 30.8 KG/M2 | OXYGEN SATURATION: 97 % | SYSTOLIC BLOOD PRESSURE: 111 MMHG | TEMPERATURE: 97.9 F | RESPIRATION RATE: 19 BRPM | WEIGHT: 220.02 LBS | DIASTOLIC BLOOD PRESSURE: 86 MMHG | HEIGHT: 71 IN | HEART RATE: 80 BPM

## 2019-08-09 LAB
ALBUMIN SERPL BCP-MCNC: 4.4 G/DL (ref 3.2–4.9)
ALBUMIN/GLOB SERPL: 1.4 G/DL
ALP SERPL-CCNC: 57 U/L (ref 30–99)
ALT SERPL-CCNC: 16 U/L (ref 2–50)
ANION GAP SERPL CALC-SCNC: 10 MMOL/L (ref 0–11.9)
APPEARANCE UR: CLEAR
AST SERPL-CCNC: 13 U/L (ref 12–45)
BASOPHILS # BLD AUTO: 0.5 % (ref 0–1.8)
BASOPHILS # BLD: 0.03 K/UL (ref 0–0.12)
BILIRUB SERPL-MCNC: 0.6 MG/DL (ref 0.1–1.5)
BILIRUB UR QL STRIP.AUTO: NEGATIVE
BUN SERPL-MCNC: 7 MG/DL (ref 8–22)
CALCIUM SERPL-MCNC: 9.8 MG/DL (ref 8.5–10.5)
CHLORIDE SERPL-SCNC: 108 MMOL/L (ref 96–112)
CO2 SERPL-SCNC: 24 MMOL/L (ref 20–33)
COLOR UR: YELLOW
CREAT SERPL-MCNC: 0.85 MG/DL (ref 0.5–1.4)
EKG IMPRESSION: NORMAL
EOSINOPHIL # BLD AUTO: 0.02 K/UL (ref 0–0.51)
EOSINOPHIL NFR BLD: 0.4 % (ref 0–6.9)
ERYTHROCYTE [DISTWIDTH] IN BLOOD BY AUTOMATED COUNT: 41.1 FL (ref 35.9–50)
GLOBULIN SER CALC-MCNC: 3.1 G/DL (ref 1.9–3.5)
GLUCOSE SERPL-MCNC: 124 MG/DL (ref 65–99)
GLUCOSE UR STRIP.AUTO-MCNC: NEGATIVE MG/DL
HCT VFR BLD AUTO: 40.3 % (ref 37–47)
HGB BLD-MCNC: 13 G/DL (ref 12–16)
IMM GRANULOCYTES # BLD AUTO: 0.01 K/UL (ref 0–0.11)
IMM GRANULOCYTES NFR BLD AUTO: 0.2 % (ref 0–0.9)
KETONES UR STRIP.AUTO-MCNC: NEGATIVE MG/DL
LEUKOCYTE ESTERASE UR QL STRIP.AUTO: NEGATIVE
LIPASE SERPL-CCNC: 14 U/L (ref 11–82)
LYMPHOCYTES # BLD AUTO: 1.12 K/UL (ref 1–4.8)
LYMPHOCYTES NFR BLD: 19.7 % (ref 22–41)
MCH RBC QN AUTO: 26.5 PG (ref 27–33)
MCHC RBC AUTO-ENTMCNC: 32.3 G/DL (ref 33.6–35)
MCV RBC AUTO: 82.1 FL (ref 81.4–97.8)
MICRO URNS: NORMAL
MONOCYTES # BLD AUTO: 0.41 K/UL (ref 0–0.85)
MONOCYTES NFR BLD AUTO: 7.2 % (ref 0–13.4)
NEUTROPHILS # BLD AUTO: 4.1 K/UL (ref 2–7.15)
NEUTROPHILS NFR BLD: 72 % (ref 44–72)
NITRITE UR QL STRIP.AUTO: NEGATIVE
NRBC # BLD AUTO: 0 K/UL
NRBC BLD-RTO: 0 /100 WBC
PH UR STRIP.AUTO: 7 [PH] (ref 5–8)
PLATELET # BLD AUTO: 223 K/UL (ref 164–446)
PMV BLD AUTO: 11.6 FL (ref 9–12.9)
POTASSIUM SERPL-SCNC: 3.4 MMOL/L (ref 3.6–5.5)
PROT SERPL-MCNC: 7.5 G/DL (ref 6–8.2)
PROT UR QL STRIP: NEGATIVE MG/DL
RBC # BLD AUTO: 4.91 M/UL (ref 4.2–5.4)
RBC UR QL AUTO: NEGATIVE
SODIUM SERPL-SCNC: 142 MMOL/L (ref 135–145)
SP GR UR STRIP.AUTO: 1.02
TROPONIN T SERPL-MCNC: <6 NG/L (ref 6–19)
UROBILINOGEN UR STRIP.AUTO-MCNC: 0.2 MG/DL
WBC # BLD AUTO: 5.7 K/UL (ref 4.8–10.8)

## 2019-08-09 PROCEDURE — 81003 URINALYSIS AUTO W/O SCOPE: CPT

## 2019-08-09 RX ORDER — DICYCLOMINE HCL 20 MG
20 TABLET ORAL EVERY 6 HOURS
Qty: 40 TAB | Refills: 0 | Status: SHIPPED | OUTPATIENT
Start: 2019-08-09 | End: 2020-09-18

## 2019-08-09 RX ORDER — ONDANSETRON 4 MG/1
4 TABLET, ORALLY DISINTEGRATING ORAL EVERY 8 HOURS PRN
Qty: 10 TAB | Refills: 0 | Status: SHIPPED | OUTPATIENT
Start: 2019-08-09 | End: 2019-10-01

## 2019-08-09 RX ORDER — OMEPRAZOLE 20 MG/1
20 CAPSULE, DELAYED RELEASE ORAL DAILY
Qty: 30 CAP | Refills: 0 | Status: SHIPPED | OUTPATIENT
Start: 2019-08-09 | End: 2020-09-18

## 2019-08-09 NOTE — ED PROVIDER NOTES
ED Provider Note    Scribed for Reynaldo Foreman M.D. by Lexi Blackman. 8/8/2019, 9:49 PM.    Primary care provider: Pcp Pt States None  Means of arrival: Walk-in  History obtained from: Patient  History limited by: None    CHIEF COMPLAINT  Chief Complaint   Patient presents with   • Abdominal Pain     reports severe abd pain, stabbing, mostly in RLQ but radiating to entire abd   • N/V     all day       HPI  Kaela Ballesteros is a 38 y.o. female who presents to the Emergency Department for evaluation of sudden abdominal pain onset this morning. Patient described pain as localized to her upper quadrant and non radiating.     At bedside, she endorses associated nausea and vomiting, as well as diarrhea that has since resolved. She denies any melena, hematemesis, dysuria,  fevers, chills, and shortness of breath. Patient took suppositories earlier this evening with no relief. She reports no similar symptoms in the past. She denies any recent ibuprofen use.     The patient reports marijuana use yesterday. She notes history of cholecystectomy.     Review of past medical records shows the patient had a hysterectomy in June 2019, and also received a proctitis diagnosis then. I saw the patient here in the ED in April 2019 on the 12th, 13th, and 14th.    REVIEW OF SYSTEMS  Pertinent positives include abdominal pain, nausea, vomiting, diarrhea (resolved). Pertinent negatives include melena, hematemesis, fevers, chills, shortness of breath, dysuria. All other systems negative.    PAST MEDICAL HISTORY   has a past medical history of Anemia (2017), Backpain (2001), Diabetes (HCC) (2017), Heart burn, Hypertension (2017), Indigestion (2006), Infectious disease (1995), Other specified symptom associated with female genital organs (2012), Pain (03/03/15), Pancreatitis (2011/2014), Sickle cell trait syndrome, Urinary incontinence, and Urinary tract infection.    SURGICAL HISTORY   has a past surgical history that  "includes pelviscopy (2012); dilation and curettage (12/3/2012); egd w/endoscopic ultrasound (2015); gastroscopy with biopsy (2015); primary c section (2015); primary c section with tubal ligation (2017); abdominal exploration (); cholecystectomy; vaginal hysterectomy scope total (2019); salpingectomy (Left, 2019); anterior and posterior repair (2019); enterocele repair (2019); bladder sling female (2019); and vaginal suspension (2019).    SOCIAL HISTORY  Social History     Tobacco Use   • Smoking status: Former Smoker     Packs/day: 0.50     Years: 15.00     Pack years: 7.50     Types: Cigarettes     Last attempt to quit: 2011     Years since quittin.6   • Smokeless tobacco: Never Used   • Tobacco comment: occasional   Substance Use Topics   • Alcohol use: No     Comment: occasionally   • Drug use: Yes     Types: Marijuana, Inhaled     Comment: marijuana       Social History     Substance and Sexual Activity   Drug Use Yes   • Types: Marijuana, Inhaled    Comment: marijuana        FAMILY HISTORY  Family History   Problem Relation Age of Onset   • Diabetes Father    • Diabetes Brother    • Diabetes Paternal Grandmother        CURRENT MEDICATIONS  Home Medications     Reviewed by Dago Champion R.N. (Registered Nurse) on 19 at 2131  Med List Status: <None>   Medication Last Dose Status   acetaminophen (TYLENOL) 325 MG Tab  Active   docusate sodium (COLACE) 100 MG Cap  Active   hydrocortisone (ANUSOL-HC) 25 MG Suppos  Active   lidocaine (XYLOCAINE) 2 % Gel  Active   ondansetron (ZOFRAN ODT) 4 MG TABLET DISPERSIBLE  Active   polyethylene glycol 3350 (MIRALAX) Powder  Active                ALLERGIES  Allergies   Allergen Reactions   • Nkda [No Known Drug Allergy]        PHYSICAL EXAM  VITAL SIGNS: /95   Pulse (!) 110   Temp 36.6 °C (97.9 °F) (Temporal)   Resp 16   Ht 1.803 m (5' 11\")   Wt 99.8 kg (220 lb 0.3 oz)   LMP 2019   " SpO2 97%   BMI 30.69 kg/m²     Constitutional: Well developed, Well nourished, moderate distress.   Eyes: Conjunctiva normal, No discharge. No scleral icterus   Cardiovascular: Tachycardic heart rate, Normal rhythm, No murmurs, equal pulses.   Pulmonary: Normal breath sounds, No respiratory distress, No wheezing, No rales, No rhonchi.  Chest: No chest wall tenderness or deformity.   Abdomen: Tenderness to epigastric region. Soft, No masses, no rebound, no guarding.  Negative Alexandre sign, negative McBurney's point tenderness  Back: No CVA tenderness.   Musculoskeletal: No major deformities noted, No tenderness.   Skin: Warm, Dry, No erythema, No rash.   Neurologic: Alert & oriented x 3, Normal motor function,  No focal deficits noted.   Psychiatric: Affect normal, Judgment normal, Mood normal.     LABS  Results for orders placed or performed during the hospital encounter of 08/08/19   CBC WITH DIFFERENTIAL   Result Value Ref Range    WBC 5.7 4.8 - 10.8 K/uL    RBC 4.91 4.20 - 5.40 M/uL    Hemoglobin 13.0 12.0 - 16.0 g/dL    Hematocrit 40.3 37.0 - 47.0 %    MCV 82.1 81.4 - 97.8 fL    MCH 26.5 (L) 27.0 - 33.0 pg    MCHC 32.3 (L) 33.6 - 35.0 g/dL    RDW 41.1 35.9 - 50.0 fL    Platelet Count 223 164 - 446 K/uL    MPV 11.6 9.0 - 12.9 fL    Neutrophils-Polys 72.00 44.00 - 72.00 %    Lymphocytes 19.70 (L) 22.00 - 41.00 %    Monocytes 7.20 0.00 - 13.40 %    Eosinophils 0.40 0.00 - 6.90 %    Basophils 0.50 0.00 - 1.80 %    Immature Granulocytes 0.20 0.00 - 0.90 %    Nucleated RBC 0.00 /100 WBC    Neutrophils (Absolute) 4.10 2.00 - 7.15 K/uL    Lymphs (Absolute) 1.12 1.00 - 4.80 K/uL    Monos (Absolute) 0.41 0.00 - 0.85 K/uL    Eos (Absolute) 0.02 0.00 - 0.51 K/uL    Baso (Absolute) 0.03 0.00 - 0.12 K/uL    Immature Granulocytes (abs) 0.01 0.00 - 0.11 K/uL    NRBC (Absolute) 0.00 K/uL   COMP METABOLIC PANEL   Result Value Ref Range    Sodium 142 135 - 145 mmol/L    Potassium 3.4 (L) 3.6 - 5.5 mmol/L    Chloride 108 96 - 112  mmol/L    Co2 24 20 - 33 mmol/L    Anion Gap 10.0 0.0 - 11.9    Glucose 124 (H) 65 - 99 mg/dL    Bun 7 (L) 8 - 22 mg/dL    Creatinine 0.85 0.50 - 1.40 mg/dL    Calcium 9.8 8.5 - 10.5 mg/dL    AST(SGOT) 13 12 - 45 U/L    ALT(SGPT) 16 2 - 50 U/L    Alkaline Phosphatase 57 30 - 99 U/L    Total Bilirubin 0.6 0.1 - 1.5 mg/dL    Albumin 4.4 3.2 - 4.9 g/dL    Total Protein 7.5 6.0 - 8.2 g/dL    Globulin 3.1 1.9 - 3.5 g/dL    A-G Ratio 1.4 g/dL   LIPASE   Result Value Ref Range    Lipase 14 11 - 82 U/L   TROPONIN   Result Value Ref Range    Troponin T <6 6 - 19 ng/L   LACTIC ACID   Result Value Ref Range    Lactic Acid 1.4 0.5 - 2.0 mmol/L   URINALYSIS (UA)   Result Value Ref Range    Color Yellow     Character Clear     Specific Gravity 1.017 <1.035    Ph 7.0 5.0 - 8.0    Glucose Negative Negative mg/dL    Ketones Negative Negative mg/dL    Protein Negative Negative mg/dL    Bilirubin Negative Negative    Urobilinogen, Urine 0.2 Negative    Nitrite Negative Negative    Leukocyte Esterase Negative Negative    Occult Blood Negative Negative    Micro Urine Req see below    ESTIMATED GFR   Result Value Ref Range    GFR If African American >60 >60 mL/min/1.73 m 2    GFR If Non African American >60 >60 mL/min/1.73 m 2   EKG (NOW)   Result Value Ref Range    Report       [facility] Emergency Dept.    Test Date:  2019  Pt Name:    SIMON HICKS            Department: ER  MRN:        [id number]                      Room:       RD 02  Gender:     Female                       Technician: TIFF  :        1980                   Requested By:HEATH DOMÍNGUEZ  Order #:    [id number]                    Reading MD: HEATH DOMÍNGUEZ MD    Measurements  Intervals                                Axis  Rate:       74                           P:          59  KY:         188                          QRS:        22  QRSD:       78                           T:          45  QT:         376  QTc:         418    Interpretive Statements  SINUS RHYTHM  BASELINE WANDER IN LEAD(S) V3  Compared to ECG 06/11/2019 09:44:25  Sinus tachycardia no longer present    Electronically Signed On 8-9-2019 0:49:19 PDT by HEATH DOMÍNGUEZ MD       All labs reviewed by me.    EKG  12 Lead EKG interpreted by me as above.     RADIOLOGY  DX-ABDOMEN COMPLETE WITH AP OR PA CXR   Final Result      1.  No acute cardiopulmonary abnormality.   2.  No dilated loops of bowel or free air.   3. Tiny left renal stone.        The radiologist's interpretation of all radiological studies have been reviewed by me.    COURSE & MEDICAL DECISION MAKING  Pertinent Labs & Imaging studies reviewed. (See chart for details)    9:49 PM - Reviewed past medical records of the patient.     10:04 PM - Patient seen and examined at bedside. The differential diagnoses include but are not limited to: gastroparesis, gastritis, pancreatits, MI.      10:09 PM - Patient will be treated with Zofran injection 4 mg, Reglan injection 10 mg, GI cocktail, Pepcid injection 20 mg, morphine injection 4 mg, Ativan injection 0.5 mg, and NS infusion 1 L. The patient will receive IV fluids for vomiting. Ordered DX abdomen complete, lactic acid, UA, CBC with differential, CMP, lipase, troponin, EKG, and old EKG to evaluate her symptoms.     12:47 AM - Patient was reevaluated at bedside. Patient is sleeping with the lights off and resting comfortably. She notes pain has significantly improved. There was a patient felt better response to IV fluids after patient reevaluation. Discussed lab and radiology results with the patient and informed them that they are reassuring. Also discussed discharge plan to which the patient verbalizes her understanding and agrees to discharge. Patient will be discharged home after interventions.       Medical Decision Making: At this point time I think the patient's abdominal pain may be more gastritis or hyperemesis cannabinoid syndrome secondary to her  marijuana use patient's labs are unremarkable as well as her x-ray of her.  Patient felt much better after GI cocktail and antinausea medication.  At this point time patient does not have any pain or tenderness of the right lower quadrant do not think she has appendicitis.        she has the patient will return for new or worsening symptoms and is stable at the time of discharge.    The patient is referred to a primary physician for blood pressure management, diabetic screening, and for all other preventative health concerns.        DISPOSITION:  Patient will be discharged home in stable condition.    FOLLOW UP:  No follow-up provider specified.    OUTPATIENT MEDICATIONS:  Discharge Medication List as of 8/9/2019 12:58 AM      START taking these medications    Details   !! ondansetron (ZOFRAN ODT) 4 MG TABLET DISPERSIBLE Take 1 Tab by mouth every 8 hours as needed., Disp-10 Tab, R-0, Print Rx Paper      omeprazole (PRILOSEC) 20 MG delayed-release capsule Take 1 Cap by mouth every day., Disp-30 Cap, R-0, Print Rx Paper      dicyclomine (BENTYL) 20 MG Tab Take 1 Tab by mouth every 6 hours., Disp-40 Tab, R-0, Print Rx Paper       !! - Potential duplicate medications found. Please discuss with provider.             FINAL IMPRESSION  1. Non-intractable vomiting with nausea, unspecified vomiting type    2. Epigastric pain          Lexi LERNER (Glenn), am scribing for, and in the presence of, Reynaldo Foreman M.D.    Electronically signed by: Lexi Polo), 8/8/2019    Reynaldo LERNER M.D. personally performed the services described in this documentation, as scribed by Lexi Blackman in my presence, and it is both accurate and complete. C.     The note accurately reflects work and decisions made by me.  Reynaldo Foreman  8/9/2019  1:30 AM

## 2019-08-09 NOTE — ED TRIAGE NOTES
"Kaela Penny Favian   38 y.o. female   Chief Complaint   Patient presents with   • Abdominal Pain     reports severe abd pain, stabbing, mostly in RLQ but radiating to entire abd   • N/V     all day      Pt amb to triage with steady gait for above complaint. ot visibly uncomfortable in triage, reports that she has been getting hot and cold flashes as well, history of hysterectomy.   Took a nausea suppository around 1900 today with some relief.      Pt is alert and oriented, follows commands and responds appropriately to questions.. Resp are even and unlabored.   Pt placed in lobby. Pt educated on triage process. Pt encouraged to alert staff for any changes.    /95   Pulse (!) 110   Temp 36.6 °C (97.9 °F) (Temporal)   Resp 16   Ht 1.803 m (5' 11\")   Wt 99.8 kg (220 lb 0.3 oz)   LMP 04/26/2019   SpO2 97%   BMI 30.69 kg/m²     "

## 2019-08-09 NOTE — DISCHARGE INSTRUCTIONS
Return if you have increasing pain, fever, blood in vomit, blood in stool, or pain moves to the right lower quadrant pain.

## 2019-08-09 NOTE — ED NOTES
Patient cleared for surgery. Pt ambulated to restroom with steady gait. No assistance required.    Discharged

## 2019-08-11 ENCOUNTER — HOSPITAL ENCOUNTER (EMERGENCY)
Facility: MEDICAL CENTER | Age: 39
End: 2019-08-11
Attending: EMERGENCY MEDICINE
Payer: MEDICAID

## 2019-08-11 VITALS
HEART RATE: 106 BPM | BODY MASS INDEX: 31.36 KG/M2 | RESPIRATION RATE: 20 BRPM | DIASTOLIC BLOOD PRESSURE: 60 MMHG | SYSTOLIC BLOOD PRESSURE: 114 MMHG | WEIGHT: 223.99 LBS | HEIGHT: 71 IN | OXYGEN SATURATION: 99 % | TEMPERATURE: 97.9 F

## 2019-08-11 DIAGNOSIS — R11.2 CANNABINOID HYPEREMESIS SYNDROME: ICD-10-CM

## 2019-08-11 DIAGNOSIS — R11.15 NON-INTRACTABLE CYCLICAL VOMITING WITH NAUSEA: ICD-10-CM

## 2019-08-11 DIAGNOSIS — F12.90 CANNABINOID HYPEREMESIS SYNDROME: ICD-10-CM

## 2019-08-11 LAB
ALBUMIN SERPL BCP-MCNC: 4.3 G/DL (ref 3.2–4.9)
ALBUMIN/GLOB SERPL: 1.4 G/DL
ALP SERPL-CCNC: 59 U/L (ref 30–99)
ALT SERPL-CCNC: 17 U/L (ref 2–50)
ANION GAP SERPL CALC-SCNC: 8 MMOL/L (ref 0–11.9)
AST SERPL-CCNC: 14 U/L (ref 12–45)
BASOPHILS # BLD AUTO: 0.6 % (ref 0–1.8)
BASOPHILS # BLD: 0.03 K/UL (ref 0–0.12)
BILIRUB SERPL-MCNC: 0.6 MG/DL (ref 0.1–1.5)
BUN SERPL-MCNC: 5 MG/DL (ref 8–22)
CALCIUM SERPL-MCNC: 10.1 MG/DL (ref 8.5–10.5)
CHLORIDE SERPL-SCNC: 104 MMOL/L (ref 96–112)
CO2 SERPL-SCNC: 25 MMOL/L (ref 20–33)
CREAT SERPL-MCNC: 0.78 MG/DL (ref 0.5–1.4)
EOSINOPHIL # BLD AUTO: 0.04 K/UL (ref 0–0.51)
EOSINOPHIL NFR BLD: 0.8 % (ref 0–6.9)
ERYTHROCYTE [DISTWIDTH] IN BLOOD BY AUTOMATED COUNT: 40.9 FL (ref 35.9–50)
GLOBULIN SER CALC-MCNC: 3.1 G/DL (ref 1.9–3.5)
GLUCOSE SERPL-MCNC: 96 MG/DL (ref 65–99)
HCT VFR BLD AUTO: 41.3 % (ref 37–47)
HGB BLD-MCNC: 12.9 G/DL (ref 12–16)
IMM GRANULOCYTES # BLD AUTO: 0.01 K/UL (ref 0–0.11)
IMM GRANULOCYTES NFR BLD AUTO: 0.2 % (ref 0–0.9)
LIPASE SERPL-CCNC: 7 U/L (ref 11–82)
LYMPHOCYTES # BLD AUTO: 1.51 K/UL (ref 1–4.8)
LYMPHOCYTES NFR BLD: 29.4 % (ref 22–41)
MCH RBC QN AUTO: 26 PG (ref 27–33)
MCHC RBC AUTO-ENTMCNC: 31.2 G/DL (ref 33.6–35)
MCV RBC AUTO: 83.3 FL (ref 81.4–97.8)
MONOCYTES # BLD AUTO: 0.33 K/UL (ref 0–0.85)
MONOCYTES NFR BLD AUTO: 6.4 % (ref 0–13.4)
NEUTROPHILS # BLD AUTO: 3.22 K/UL (ref 2–7.15)
NEUTROPHILS NFR BLD: 62.6 % (ref 44–72)
NRBC # BLD AUTO: 0 K/UL
NRBC BLD-RTO: 0 /100 WBC
PLATELET # BLD AUTO: 229 K/UL (ref 164–446)
PMV BLD AUTO: 10.9 FL (ref 9–12.9)
POTASSIUM SERPL-SCNC: 3.7 MMOL/L (ref 3.6–5.5)
PROT SERPL-MCNC: 7.4 G/DL (ref 6–8.2)
RBC # BLD AUTO: 4.96 M/UL (ref 4.2–5.4)
SODIUM SERPL-SCNC: 137 MMOL/L (ref 135–145)
WBC # BLD AUTO: 5.1 K/UL (ref 4.8–10.8)

## 2019-08-11 PROCEDURE — 700105 HCHG RX REV CODE 258: Performed by: EMERGENCY MEDICINE

## 2019-08-11 PROCEDURE — 80053 COMPREHEN METABOLIC PANEL: CPT

## 2019-08-11 PROCEDURE — 700102 HCHG RX REV CODE 250 W/ 637 OVERRIDE(OP): Performed by: EMERGENCY MEDICINE

## 2019-08-11 PROCEDURE — 700111 HCHG RX REV CODE 636 W/ 250 OVERRIDE (IP): Performed by: EMERGENCY MEDICINE

## 2019-08-11 PROCEDURE — 96374 THER/PROPH/DIAG INJ IV PUSH: CPT

## 2019-08-11 PROCEDURE — 96375 TX/PRO/DX INJ NEW DRUG ADDON: CPT

## 2019-08-11 PROCEDURE — 99284 EMERGENCY DEPT VISIT MOD MDM: CPT

## 2019-08-11 PROCEDURE — 85025 COMPLETE CBC W/AUTO DIFF WBC: CPT

## 2019-08-11 PROCEDURE — 36415 COLL VENOUS BLD VENIPUNCTURE: CPT

## 2019-08-11 PROCEDURE — 83690 ASSAY OF LIPASE: CPT

## 2019-08-11 RX ORDER — CAPSAICIN 0.025 %
CREAM (GRAM) TOPICAL ONCE
Status: COMPLETED | OUTPATIENT
Start: 2019-08-11 | End: 2019-08-11

## 2019-08-11 RX ORDER — SODIUM CHLORIDE 9 MG/ML
1000 INJECTION, SOLUTION INTRAVENOUS ONCE
Status: COMPLETED | OUTPATIENT
Start: 2019-08-11 | End: 2019-08-11

## 2019-08-11 RX ORDER — DIPHENHYDRAMINE HYDROCHLORIDE 50 MG/ML
50 INJECTION INTRAMUSCULAR; INTRAVENOUS ONCE
Status: COMPLETED | OUTPATIENT
Start: 2019-08-11 | End: 2019-08-11

## 2019-08-11 RX ORDER — HALOPERIDOL 5 MG/ML
5 INJECTION INTRAMUSCULAR ONCE
Status: COMPLETED | OUTPATIENT
Start: 2019-08-11 | End: 2019-08-11

## 2019-08-11 RX ORDER — ONDANSETRON 2 MG/ML
4 INJECTION INTRAMUSCULAR; INTRAVENOUS ONCE
Status: COMPLETED | OUTPATIENT
Start: 2019-08-11 | End: 2019-08-11

## 2019-08-11 RX ADMIN — DIPHENHYDRAMINE HYDROCHLORIDE 50 MG: 50 INJECTION INTRAMUSCULAR; INTRAVENOUS at 14:44

## 2019-08-11 RX ADMIN — SODIUM CHLORIDE 1000 ML: 9 INJECTION, SOLUTION INTRAVENOUS at 14:41

## 2019-08-11 RX ADMIN — ONDANSETRON 4 MG: 2 INJECTION INTRAMUSCULAR; INTRAVENOUS at 14:17

## 2019-08-11 RX ADMIN — CAPSAICIN 1 APPLICATION: 0.25 CREAM TOPICAL at 15:15

## 2019-08-11 RX ADMIN — HALOPERIDOL LACTATE 5 MG: 5 INJECTION, SOLUTION INTRAMUSCULAR at 14:40

## 2019-08-11 ASSESSMENT — ENCOUNTER SYMPTOMS
ROS GI COMMENTS: POSITIVE FOR DRY HEAVING.
VOMITING: 1
CHILLS: 0
ABDOMINAL PAIN: 1
FEVER: 0
NAUSEA: 1
DIARRHEA: 0

## 2019-08-11 NOTE — ED NOTES
Patient leaves with steady gait and  as a ride home. Reports that anxiety has resolved. Understands discharge instructions.

## 2019-08-11 NOTE — ED TRIAGE NOTES
Ambulatory to triage with   Chief Complaint   Patient presents with   • N/V   Ambulatory to triage with above complaint x several days. Seen recently and diagnosed with gastritis and hyperemesis r/t cannabis. Has not taken any of the medication prescribed to her. States complaints are the same.

## 2019-08-11 NOTE — ED PROVIDER NOTES
ED Provider Note    Scribed for Freddie Cisse M.D. by Tawnya Davis. 8/11/2019, 1:12 PM.    Primary care provider: None  Means of arrival: Walk in  History obtained from: Patient  History limited by: None    CHIEF COMPLAINT  •  Vomiting    HPI  Kaela Ballesteros is a 38 y.o. female who presents to the Emergency Department for evaluation of persistent nausea and vomiting onset this morning. She has a history of chronic abdominal pain and recurrent vomiting. She has been seen multiple times in the ER for these complaints and was last seen 08/08/19. She was prescribed Zofran, Prilosec and Bentyl upon discharge. She has not started taking these medications however. She woke up this morning feeling nauseous and reports experiencing multiple episodes of non-bloody emesis. Associated symptoms of  abdominal pain and dry heaving. She denies having any fevers, chills or diarrhea. This episode is similar to her prior episodes which occur on a monthly basis. Patient has a history of daily marijuana use since the age of 11. She smokes to alleviate her anxiety. No prior diagnosis of cyclic vomiting syndrome. Her symptoms resolved after she stopped smoking marijuana in 2018 when she was pregnant with her second daughter. She began smoking again starting late November 2018, and her symptoms returned. She had a hysterectomy in June 2019 which did not resolve her chronic abdominal pain or vomiting. She has had an endoscopy completed at [facility] and cholecystomy in 2014.       REVIEW OF SYSTEMS  Review of Systems   Constitutional: Negative for chills and fever.   Gastrointestinal: Positive for abdominal pain, nausea and vomiting (non-bloody emesis). Negative for diarrhea.        Positive for dry heaving.   All other systems reviewed and are negative.    See HPI for further details.      PAST MEDICAL HISTORY  Patient has a past medical history of Anemia (2017), Backpain (2001), Diabetes (HCC) (2017), Heart burn,  Hypertension (), Indigestion (), Infectious disease (), Other specified symptom associated with female genital organs (), Pain (03/03/15), Pancreatitis (), Sickle cell trait syndrome, Urinary incontinence, and Urinary tract infection.      SURGICAL HISTORY  Patient has a past surgical history that includes pelviscopy (2012); dilation and curettage (12/3/2012); egd w/endoscopic ultrasound (2015); gastroscopy with biopsy (2015); primary c section (2015); primary c section with tubal ligation (2017); abdominal exploration (); cholecystectomy; vaginal hysterectomy scope total (2019); salpingectomy (Left, 2019); anterior and posterior repair (2019); enterocele repair (2019); bladder sling female (2019); and vaginal suspension (2019).      SOCIAL HISTORY  Social History     Tobacco Use   • Smoking status: Former Smoker     Packs/day: 0.50     Years: 15.00     Pack years: 7.50     Types: Cigarettes     Last attempt to quit: 2011     Years since quittin.6   • Smokeless tobacco: Never Used   • Tobacco comment: occasional   Substance Use Topics   • Alcohol use: No     Comment: occasionally   • Drug use: Yes     Types: Marijuana, Inhaled     Comment: marijuana       Social History     Substance and Sexual Activity   Drug Use Yes   • Types: Marijuana, Inhaled    Comment: marijuana        FAMILY HISTORY  Family History   Problem Relation Age of Onset   • Diabetes Father    • Diabetes Brother    • Diabetes Paternal Grandmother        CURRENT MEDICATIONS  No current facility-administered medications for this encounter.     Current Outpatient Medications:   •  ondansetron (ZOFRAN ODT) 4 MG TABLET DISPERSIBLE, Take 1 Tab by mouth every 8 hours as needed., Disp: 10 Tab, Rfl: 0  •  omeprazole (PRILOSEC) 20 MG delayed-release capsule, Take 1 Cap by mouth every day., Disp: 30 Cap, Rfl: 0  •  dicyclomine (BENTYL) 20 MG Tab, Take 1 Tab by mouth every 6  "hours., Disp: 40 Tab, Rfl: 0  •  acetaminophen (TYLENOL) 325 MG Tab, Take 2 Tabs by mouth every 6 hours as needed (Mild Pain; (Pain scale 1-3); Temp greater than 100.5 F)., Disp: 30 Tab, Rfl: 0  •  hydrocortisone (ANUSOL-HC) 25 MG Suppos, Insert 1 Suppository in rectum every 12 hours., Disp: 30 Suppository, Rfl: 2  •  ondansetron (ZOFRAN ODT) 4 MG TABLET DISPERSIBLE, Take 1 Tab by mouth every four hours as needed for Nausea (give PO if no IV route available)., Disp: 30 Tab, Rfl: 0  •  lidocaine (XYLOCAINE) 2 % Gel, Apply 1 Tube to affected area(s) Once PRN (hard stools and painful passage of stool)., Disp: 1 Bottle, Rfl: 1  •  docusate sodium (COLACE) 100 MG Cap, Take 100 mg by mouth 2 times a day., Disp: , Rfl:   •  polyethylene glycol 3350 (MIRALAX) Powder, Take 17 g by mouth every day., Disp: 1 Bottle, Rfl: 3       ALLERGIES  None    PHYSICAL EXAM  VITAL SIGNS: /87   Pulse 82   Temp 36.6 °C (97.9 °F) (Temporal)   Resp 18   Ht 1.803 m (5' 11\")   Wt 101.6 kg (223 lb 15.8 oz)   LMP 04/26/2019   SpO2 96%   BMI 31.24 kg/m²     Constitutional: Well developed, Well nourished, No acute distress, Non-toxic appearance.   HENT: Normocephalic, Atraumatic, Bilateral external ears normal, oropharynx dry, No oral exudates, Nose normal.   Eyes:conjunctiva is normal, there are no signs of exudate.   Neck: Supple, no meningeal signs.  Lymphatic: No lymphadenopathy noted.   Cardiovascular: Regular rate and rhythm without murmurs gallops or rubs.   Thorax & Lungs: No respiratory distress. Breathing comfortably. Lungs are clear to auscultation bilaterally, there are no wheezes no rales. Chest wall is nontender.  Abdomen: Soft, Diffuse tenderness, nondistended. Bowel sounds are present.   Skin: Warm, Dry, No erythema  Back: No tenderness, No CVA tenderness.  Musculoskeletal: Good range of motion in all major joints. No tenderness to palpation or major deformities noted. Intact distal pulses, no clubbing, no cyanosis, no " edema,   Neurologic: Alert & oriented x 3, Moving all extremities. No gross abnormalities.    Psychiatric: Affect normal, Judgment normal, Mood normal.       LABS  Results for orders placed or performed during the hospital encounter of 08/11/19   CBC WITH DIFFERENTIAL   Result Value Ref Range    WBC 5.1 4.8 - 10.8 K/uL    RBC 4.96 4.20 - 5.40 M/uL    Hemoglobin 12.9 12.0 - 16.0 g/dL    Hematocrit 41.3 37.0 - 47.0 %    MCV 83.3 81.4 - 97.8 fL    MCH 26.0 (L) 27.0 - 33.0 pg    MCHC 31.2 (L) 33.6 - 35.0 g/dL    RDW 40.9 35.9 - 50.0 fL    Platelet Count 229 164 - 446 K/uL    MPV 10.9 9.0 - 12.9 fL    Neutrophils-Polys 62.60 44.00 - 72.00 %    Lymphocytes 29.40 22.00 - 41.00 %    Monocytes 6.40 0.00 - 13.40 %    Eosinophils 0.80 0.00 - 6.90 %    Basophils 0.60 0.00 - 1.80 %    Immature Granulocytes 0.20 0.00 - 0.90 %    Nucleated RBC 0.00 /100 WBC    Neutrophils (Absolute) 3.22 2.00 - 7.15 K/uL    Lymphs (Absolute) 1.51 1.00 - 4.80 K/uL    Monos (Absolute) 0.33 0.00 - 0.85 K/uL    Eos (Absolute) 0.04 0.00 - 0.51 K/uL    Baso (Absolute) 0.03 0.00 - 0.12 K/uL    Immature Granulocytes (abs) 0.01 0.00 - 0.11 K/uL    NRBC (Absolute) 0.00 K/uL   COMP METABOLIC PANEL   Result Value Ref Range    Sodium 137 135 - 145 mmol/L    Potassium 3.7 3.6 - 5.5 mmol/L    Chloride 104 96 - 112 mmol/L    Co2 25 20 - 33 mmol/L    Anion Gap 8.0 0.0 - 11.9    Glucose 96 65 - 99 mg/dL    Bun 5 (L) 8 - 22 mg/dL    Creatinine 0.78 0.50 - 1.40 mg/dL    Calcium 10.1 8.5 - 10.5 mg/dL    AST(SGOT) 14 12 - 45 U/L    ALT(SGPT) 17 2 - 50 U/L    Alkaline Phosphatase 59 30 - 99 U/L    Total Bilirubin 0.6 0.1 - 1.5 mg/dL    Albumin 4.3 3.2 - 4.9 g/dL    Total Protein 7.4 6.0 - 8.2 g/dL    Globulin 3.1 1.9 - 3.5 g/dL    A-G Ratio 1.4 g/dL   LIPASE   Result Value Ref Range    Lipase 7 (L) 11 - 82 U/L   ESTIMATED GFR   Result Value Ref Range    GFR If African American >60 >60 mL/min/1.73 m 2    GFR If Non African American >60 >60 mL/min/1.73 m 2       All labs  "reviewed by me.      COURSE & MEDICAL DECISION MAKING  Pertinent Labs & Imaging studies reviewed. (See chart for details)    Differential Diagnoses include but are not limited to: Cannabinoid hyperemesis syndrome    1:20 PM Obtained and reviewed patient's electronic medical records which indicate multiple ED visits for similar complaints.    1:12 PM Patient seen and examined at bedside for vomiting.     Initial orders in the Emergency Department included and laboratory testing: Estimated GFR, Lipase, CBC and CMP.  Initial treatment in the Emergency Department included 100 Zostrix 0.025% topical cream, Haldol 5 mg IV, Benadryl 50 mg IV and Zofran 4 mg. She was resuscitated with intravenous fluids for multiple episodes of vomiting, dry mucous membranes and dehydration.    3:07 PM On repeat evaluation, patient is feeling improved with the administered medications. She feels better hydrated with the administered fluids. Vital signs are stable.     Discharge plan was discussed with the patient and includes establishing with a PCP. She was encouraged to fill the prescriptions received in her last ED visits. She was asked to remain hydrated with plenty of fluids.     Return to the ED for new or persisting symptoms including worsened abdominal pain, if unable to tolerate fluids, fever or any additional concerns.  The patient verbalizes understanding and will comply.  Patient is stable at the time of discharge.  Vital signs were reviewed: /60   Pulse (!) 106 Comment: dr carlisle aware  Temp 36.6 °C (97.9 °F) (Temporal)   Resp 20   Ht 1.803 m (5' 11\")   Wt 101.6 kg (223 lb 15.8 oz)   LMP 04/26/2019   SpO2 99%   BMI 31.24 kg/m²        Decision Making:  Since ED for evaluation.  Clinically the patient does appear to be dehydrated.  Based on her history I do feel is most likely cannabinoid hyperemesis syndrome.  I start the patient IV fluids 1 L bolus Haldol Benadryl.  I was unable to use the capsaicin cream " because it did not arrive early enough.  Patient was feeling significant improved after the liter bolus.  And at this point laboratory studies were again normal.  I explained the patient about cannabinoid hyperemesis syndrome I recommend she stop smoking marijuana.  She currently has medications for nausea medications.  I recommend for her to follow the primary care person for further outpatient treatment care return as needed.      DISPOSITION  Patient will be discharged home in stable condition.      FOLLOW UP  [address] [address]  [phone]        [address] [id number] [phone]          The patient is referred to a primary physician for blood pressure management, diabetic screening, and for all other preventative health concerns.      DIAGNOSIS  1. Non-intractable cyclical vomiting with nausea    2. Cannabinoid hyperemesis syndrome (HCC)               Tawnya LERNER (Scribe), am scribing for, and in the presence of, Freddie Cisse M.D.    Electronically signed by: Tawnya Davis (Jordanibe), 8/11/2019    IFreddie M.D. personally performed the services described in this documentation, as scribed by Tawnya Davis in my presence, and it is both accurate and complete. C.    The note accurately reflects work and decisions made by me.  Freddie Cisse  8/11/2019  4:22 PM

## 2019-08-11 NOTE — ED NOTES
Patient calls out reporting feeling anxious but states that it is starting to get better. Requesting that I take out her IV and let her go home.     Notified MD.

## 2019-08-11 NOTE — ED NOTES
Patient ambulatory to room. Patient c/o continued nausea and vomiting since Thursday. Reports she never filled her prescriptions and she continues to feel nausea and like her stomach is in knots. No vomiting today, just dry heaving.

## 2019-08-11 NOTE — ED NOTES
Patient upset and reports fear of throwing up. Reassured patient that ERP will see her soon. Provided patient with additional warm blanket.

## 2019-08-11 NOTE — ED NOTES
MD at bedside with this RN. Patient reports anxiety has passed but she is just ready to go home. Removed IV, notified MD that patient only received 500ml of fluid.     Patient given water for PO challenge while she waits for her ride.

## 2019-09-17 ENCOUNTER — HOSPITAL ENCOUNTER (OUTPATIENT)
Dept: RADIOLOGY | Facility: MEDICAL CENTER | Age: 39
End: 2019-09-17
Attending: FAMILY MEDICINE
Payer: MEDICAID

## 2019-09-17 DIAGNOSIS — R10.11 RIGHT UPPER QUADRANT ABDOMINAL PAIN: ICD-10-CM

## 2019-09-17 PROCEDURE — 76705 ECHO EXAM OF ABDOMEN: CPT

## 2019-10-01 ENCOUNTER — HOSPITAL ENCOUNTER (EMERGENCY)
Facility: MEDICAL CENTER | Age: 39
End: 2019-10-01
Attending: EMERGENCY MEDICINE
Payer: MEDICAID

## 2019-10-01 VITALS
TEMPERATURE: 97.9 F | BODY MASS INDEX: 31.27 KG/M2 | OXYGEN SATURATION: 98 % | DIASTOLIC BLOOD PRESSURE: 95 MMHG | SYSTOLIC BLOOD PRESSURE: 165 MMHG | HEIGHT: 71 IN | WEIGHT: 223.33 LBS | HEART RATE: 99 BPM | RESPIRATION RATE: 19 BRPM

## 2019-10-01 DIAGNOSIS — R10.13 EPIGASTRIC PAIN: ICD-10-CM

## 2019-10-01 DIAGNOSIS — R11.2 NAUSEA AND VOMITING, INTRACTABILITY OF VOMITING NOT SPECIFIED, UNSPECIFIED VOMITING TYPE: ICD-10-CM

## 2019-10-01 LAB
ALBUMIN SERPL BCP-MCNC: 4.9 G/DL (ref 3.2–4.9)
ALBUMIN/GLOB SERPL: 1.5 G/DL
ALP SERPL-CCNC: 65 U/L (ref 30–99)
ALT SERPL-CCNC: 22 U/L (ref 2–50)
ANION GAP SERPL CALC-SCNC: 10 MMOL/L (ref 0–11.9)
AST SERPL-CCNC: 18 U/L (ref 12–45)
BASOPHILS # BLD AUTO: 0.6 % (ref 0–1.8)
BASOPHILS # BLD: 0.03 K/UL (ref 0–0.12)
BILIRUB SERPL-MCNC: 0.6 MG/DL (ref 0.1–1.5)
BUN SERPL-MCNC: 9 MG/DL (ref 8–22)
CALCIUM SERPL-MCNC: 10.6 MG/DL (ref 8.5–10.5)
CHLORIDE SERPL-SCNC: 104 MMOL/L (ref 96–112)
CO2 SERPL-SCNC: 22 MMOL/L (ref 20–33)
CREAT SERPL-MCNC: 0.8 MG/DL (ref 0.5–1.4)
EOSINOPHIL # BLD AUTO: 0.04 K/UL (ref 0–0.51)
EOSINOPHIL NFR BLD: 0.8 % (ref 0–6.9)
ERYTHROCYTE [DISTWIDTH] IN BLOOD BY AUTOMATED COUNT: 40.9 FL (ref 35.9–50)
GLOBULIN SER CALC-MCNC: 3.3 G/DL (ref 1.9–3.5)
GLUCOSE SERPL-MCNC: 94 MG/DL (ref 65–99)
HCT VFR BLD AUTO: 44.6 % (ref 37–47)
HGB BLD-MCNC: 14.5 G/DL (ref 12–16)
IMM GRANULOCYTES # BLD AUTO: 0.01 K/UL (ref 0–0.11)
IMM GRANULOCYTES NFR BLD AUTO: 0.2 % (ref 0–0.9)
LIPASE SERPL-CCNC: 8 U/L (ref 11–82)
LYMPHOCYTES # BLD AUTO: 1.14 K/UL (ref 1–4.8)
LYMPHOCYTES NFR BLD: 23.3 % (ref 22–41)
MCH RBC QN AUTO: 26.9 PG (ref 27–33)
MCHC RBC AUTO-ENTMCNC: 32.5 G/DL (ref 33.6–35)
MCV RBC AUTO: 82.7 FL (ref 81.4–97.8)
MONOCYTES # BLD AUTO: 0.21 K/UL (ref 0–0.85)
MONOCYTES NFR BLD AUTO: 4.3 % (ref 0–13.4)
NEUTROPHILS # BLD AUTO: 3.47 K/UL (ref 2–7.15)
NEUTROPHILS NFR BLD: 70.8 % (ref 44–72)
NRBC # BLD AUTO: 0 K/UL
NRBC BLD-RTO: 0 /100 WBC
PLATELET # BLD AUTO: 236 K/UL (ref 164–446)
PMV BLD AUTO: 10.7 FL (ref 9–12.9)
POTASSIUM SERPL-SCNC: 3.8 MMOL/L (ref 3.6–5.5)
PROT SERPL-MCNC: 8.2 G/DL (ref 6–8.2)
RBC # BLD AUTO: 5.39 M/UL (ref 4.2–5.4)
SODIUM SERPL-SCNC: 136 MMOL/L (ref 135–145)
WBC # BLD AUTO: 4.9 K/UL (ref 4.8–10.8)

## 2019-10-01 PROCEDURE — 80053 COMPREHEN METABOLIC PANEL: CPT

## 2019-10-01 PROCEDURE — 83690 ASSAY OF LIPASE: CPT

## 2019-10-01 PROCEDURE — A9270 NON-COVERED ITEM OR SERVICE: HCPCS | Performed by: EMERGENCY MEDICINE

## 2019-10-01 PROCEDURE — 99284 EMERGENCY DEPT VISIT MOD MDM: CPT

## 2019-10-01 PROCEDURE — 96372 THER/PROPH/DIAG INJ SC/IM: CPT

## 2019-10-01 PROCEDURE — 700102 HCHG RX REV CODE 250 W/ 637 OVERRIDE(OP): Performed by: EMERGENCY MEDICINE

## 2019-10-01 PROCEDURE — 85025 COMPLETE CBC W/AUTO DIFF WBC: CPT

## 2019-10-01 PROCEDURE — 700111 HCHG RX REV CODE 636 W/ 250 OVERRIDE (IP): Performed by: EMERGENCY MEDICINE

## 2019-10-01 RX ORDER — HALOPERIDOL 5 MG/ML
2 INJECTION INTRAMUSCULAR ONCE
Status: COMPLETED | OUTPATIENT
Start: 2019-10-01 | End: 2019-10-01

## 2019-10-01 RX ORDER — HALOPERIDOL 5 MG/ML
2 INJECTION INTRAMUSCULAR ONCE
Status: DISCONTINUED | OUTPATIENT
Start: 2019-10-01 | End: 2019-10-01

## 2019-10-01 RX ORDER — ONDANSETRON 4 MG/1
4 TABLET, ORALLY DISINTEGRATING ORAL EVERY 4 HOURS PRN
Qty: 30 TAB | Refills: 0 | Status: SHIPPED | OUTPATIENT
Start: 2019-10-01 | End: 2019-12-12

## 2019-10-01 RX ORDER — DIPHENHYDRAMINE HCL 25 MG
25 TABLET ORAL ONCE
Status: COMPLETED | OUTPATIENT
Start: 2019-10-01 | End: 2019-10-01

## 2019-10-01 RX ADMIN — HALOPERIDOL LACTATE 2 MG: 5 INJECTION, SOLUTION INTRAMUSCULAR at 17:29

## 2019-10-01 RX ADMIN — DIPHENHYDRAMINE HCL 25 MG: 25 TABLET ORAL at 17:28

## 2019-10-01 ASSESSMENT — ENCOUNTER SYMPTOMS
NAUSEA: 1
ABDOMINAL PAIN: 1
VOMITING: 1

## 2019-10-01 NOTE — ED PROVIDER NOTES
ED Provider Note    CHIEF COMPLAINT  Chief Complaint   Patient presents with   • Vomiting     onset approx 2 hours ago.  Pt reports she took Pepto-Bismol and Bhargavi earlier ASA earlier today for a headache and vomited after taking it.   • Abdominal Pain     onset also approx 2 hours ago.         HPI  Kaela Ballesteros is a 39 y.o. female who returns to the emergency department for recurrent ongoing abdominal pain.  Patient has been evaluated multiple times for abdominal pain, nausea and vomiting in the emergency department over the last year.  She has had most recently a unremarkable ultrasound aside from mild hepatomegaly, she is status post cholecystectomy years ago.  She is also had 2 CTs in the last year one with and without contrast both of which showed some questionable inflammation in the colon that is stable.  She has an appointment with GI in November.  Patient reports that she does smoke a fair amount of marijuana, she reports that she takes hot showers for her symptoms.  Patient reports that her abdominal pain comes and goes, she reports it feels like her stomach is in the knot and then she gets forceful vomiting shortly thereafter.  She reports that vomiting is very loud.  She denies any changes in bowel movements, she continues to pass stool flatus.  She denies any associated chest pain.  Patient reports that she did not have any symptoms while she was pregnant, she also does not have any symptoms shortly after pregnancy but once she started smoking marijuana again she developed significant vomiting.  Emesis is nonbloody nonbilious per    REVIEW OF SYSTEMS  Review of Systems   Gastrointestinal: Positive for abdominal pain, nausea and vomiting.       See HPI for further details. All other systems are negative.     PAST MEDICAL HISTORY   has a past medical history of Anemia (2017), Backpain (2001), Diabetes (HCC) (2017), Heart burn, Hypertension (2017), Indigestion (2006), Infectious disease  (), Other specified symptom associated with female genital organs (), Pain (03/03/15), Pancreatitis (), Sickle cell trait syndrome, Urinary incontinence, and Urinary tract infection.    SOCIAL HISTORY  Social History     Tobacco Use   • Smoking status: Former Smoker     Packs/day: 0.50     Years: 15.00     Pack years: 7.50     Types: Cigarettes     Last attempt to quit: 2011     Years since quittin.7   • Smokeless tobacco: Never Used   • Tobacco comment: occasional   Substance and Sexual Activity   • Alcohol use: No     Comment: occasionally   • Drug use: Yes     Types: Marijuana, Inhaled     Comment: marijuana    • Sexual activity: Yes     Partners: Male     Comment: BTL       SURGICAL HISTORY   has a past surgical history that includes pelviscopy (2012); dilation and curettage (12/3/2012); egd w/endoscopic ultrasound (2015); gastroscopy with biopsy (2015); primary c section (2015); primary c section with tubal ligation (2017); abdominal exploration (); cholecystectomy; vaginal hysterectomy scope total (2019); salpingectomy (Left, 2019); anterior and posterior repair (2019); enterocele repair (2019); bladder sling female (2019); and vaginal suspension (2019).    CURRENT MEDICATIONS  Home Medications    **Home medications have not yet been reviewed for this encounter**         ALLERGIES  Allergies   Allergen Reactions   • Nkda [No Known Drug Allergy]        PHYSICAL EXAM  Physical Exam   Constitutional: She is oriented to person, place, and time. She appears well-developed and well-nourished.   HENT:   Head: Normocephalic and atraumatic.   Eyes: Conjunctivae are normal.   Neck: Normal range of motion. Neck supple.   Cardiovascular: Normal rate and regular rhythm.   Pulmonary/Chest: Effort normal and breath sounds normal.   Abdominal: Soft. Bowel sounds are normal. She exhibits no distension. There is no tenderness. There is no rebound.    Neurological: She is alert and oriented to person, place, and time.   Skin: Skin is warm and dry. No rash noted.   Psychiatric: She has a normal mood and affect. Her behavior is normal.         DIAGNOSTIC STUDIES / PROCEDURES      LABS  Results for orders placed or performed during the hospital encounter of 10/01/19   CBC WITH DIFFERENTIAL   Result Value Ref Range    WBC 4.9 4.8 - 10.8 K/uL    RBC 5.39 4.20 - 5.40 M/uL    Hemoglobin 14.5 12.0 - 16.0 g/dL    Hematocrit 44.6 37.0 - 47.0 %    MCV 82.7 81.4 - 97.8 fL    MCH 26.9 (L) 27.0 - 33.0 pg    MCHC 32.5 (L) 33.6 - 35.0 g/dL    RDW 40.9 35.9 - 50.0 fL    Platelet Count 236 164 - 446 K/uL    MPV 10.7 9.0 - 12.9 fL    Neutrophils-Polys 70.80 44.00 - 72.00 %    Lymphocytes 23.30 22.00 - 41.00 %    Monocytes 4.30 0.00 - 13.40 %    Eosinophils 0.80 0.00 - 6.90 %    Basophils 0.60 0.00 - 1.80 %    Immature Granulocytes 0.20 0.00 - 0.90 %    Nucleated RBC 0.00 /100 WBC    Neutrophils (Absolute) 3.47 2.00 - 7.15 K/uL    Lymphs (Absolute) 1.14 1.00 - 4.80 K/uL    Monos (Absolute) 0.21 0.00 - 0.85 K/uL    Eos (Absolute) 0.04 0.00 - 0.51 K/uL    Baso (Absolute) 0.03 0.00 - 0.12 K/uL    Immature Granulocytes (abs) 0.01 0.00 - 0.11 K/uL    NRBC (Absolute) 0.00 K/uL   COMP METABOLIC PANEL   Result Value Ref Range    Sodium 136 135 - 145 mmol/L    Potassium 3.8 3.6 - 5.5 mmol/L    Chloride 104 96 - 112 mmol/L    Co2 22 20 - 33 mmol/L    Anion Gap 10.0 0.0 - 11.9    Glucose 94 65 - 99 mg/dL    Bun 9 8 - 22 mg/dL    Creatinine 0.80 0.50 - 1.40 mg/dL    Calcium 10.6 (H) 8.5 - 10.5 mg/dL    AST(SGOT) 18 12 - 45 U/L    ALT(SGPT) 22 2 - 50 U/L    Alkaline Phosphatase 65 30 - 99 U/L    Total Bilirubin 0.6 0.1 - 1.5 mg/dL    Albumin 4.9 3.2 - 4.9 g/dL    Total Protein 8.2 6.0 - 8.2 g/dL    Globulin 3.3 1.9 - 3.5 g/dL    A-G Ratio 1.5 g/dL   LIPASE   Result Value Ref Range    Lipase 8 (L) 11 - 82 U/L   ESTIMATED GFR   Result Value Ref Range    GFR If  >60 >60  mL/min/1.73 m 2    GFR If Non African American >60 >60 mL/min/1.73 m 2         RADIOLOGY  No orders to display           COURSE & MEDICAL DECISION MAKING  Pertinent Labs & Imaging studies reviewed. (See chart for details)  Patient here with symptoms most consistent with cannabinoid hyperemesis syndrome.  Her abdominal exam is entirely benign at this point, we believe a surgical pathology is highly unlikely especially in the setting of a patient with multiple studies that failed to reveal any identifiable pathology.  Patient's labs which were checked in triage were all normal including her lipase.  Patient without any chest pain or shortness of breath to suggest a cardiopulmonary etiology.  Patient will follow-up with GI.  I counseled patient that she must stop smoking weed for at least 6 weeks.  Will give patient Zofran to go home with.  I discussed treating her symptoms with Haldol and giving Benadryl, I discussed the side effects of Haldol and patient is comfortable with receiving this medicine.  Patient's symptoms entirely resolved after receiving Haldol.  Patient left prior to receiving her paperwork, therefore I called patient and discussed the return precautions with her and verbalized the instructions I wrote in the discharge portion her chart.  The patient will return for worsening symptoms and is stable at the time of discharge. The patient verbalizes understanding and will comply.    FINAL IMPRESSION  1.   1. Epigastric pain    2. Nausea and vomiting, intractability of vomiting not specified, unspecified vomiting type               Electronically signed by: Joshua Mackey, 10/1/2019 4:45 PM

## 2019-10-01 NOTE — ED TRIAGE NOTES
"Kaela Ballesteros 39 y.o. female ambulatory to triage with mother for     Chief Complaint   Patient presents with   • Vomiting     onset approx 2 hours ago.  Pt reports she took Pepto-Bismol and Bhargavi earlier ASA earlier today for a headache and vomited after taking it.   • Abdominal Pain     onset also approx 2 hours ago.       Pt reports frequent visits for abd pain \"and no one knows what is going on\".  Pt had US approx 2 weeks ago at [facility] and has not received results.  Pt reports she has been referred to GI but does not have an appointment until November.  Pt anxious, crying in triage.  BP (!) 167/101   Pulse (!) 102   Temp 36.6 °C (97.9 °F) (Temporal)   Resp 20   Ht 1.803 m (5' 11\")   Wt 101.3 kg (223 lb 5.2 oz)   LMP 04/26/2019   SpO2 97%   BMI 31.15 kg/m²   Protocol orders placed.  Pt placed in the lobby to await bed assignment.  Advised to return to the triage desk for any changes/concerns.  "

## 2019-10-02 NOTE — ED NOTES
Patient leaves without informing RN. Leaves paper prescription and discharge paperwork behind. Notified MD.

## 2019-10-02 NOTE — DISCHARGE INSTRUCTIONS
Your laboratory results today were entirely normal, your liver function and kidney function and blood counts were all within normal limits.  You will need to follow-up with your gastroenterologist, you have had some mild inflammation seen on prior CTs in your colon near your rectum and you should have a colonoscopy to evaluate this further, please discuss this with your gastroenterologist.  I believe your symptoms are from cannabinoid hyperemesis syndrome.  This is a very rare syndrome that affects some people who smoke pot.  It causes the stomach to go into spasm which is very painful and causes vomiting.  In order to stop the symptoms he will need to stop smoking marijuana for at least 6 weeks or more.  This will also mean you have to stop any use of CBD oil or other potential THC containing products.

## 2019-10-02 NOTE — ED NOTES
Patient reports epigastric pain that started today. Reports eating a bite of a sandwich and immediately vomited.

## 2019-12-11 ENCOUNTER — APPOINTMENT (OUTPATIENT)
Dept: RADIOLOGY | Facility: MEDICAL CENTER | Age: 39
End: 2019-12-11
Attending: EMERGENCY MEDICINE
Payer: MEDICAID

## 2019-12-11 ENCOUNTER — HOSPITAL ENCOUNTER (EMERGENCY)
Facility: MEDICAL CENTER | Age: 39
End: 2019-12-12
Attending: EMERGENCY MEDICINE
Payer: MEDICAID

## 2019-12-11 DIAGNOSIS — R19.7 DIARRHEA, UNSPECIFIED TYPE: ICD-10-CM

## 2019-12-11 DIAGNOSIS — R11.2 NON-INTRACTABLE VOMITING WITH NAUSEA, UNSPECIFIED VOMITING TYPE: ICD-10-CM

## 2019-12-11 LAB
ALBUMIN SERPL BCP-MCNC: 4.5 G/DL (ref 3.2–4.9)
ALBUMIN/GLOB SERPL: 1.6 G/DL
ALP SERPL-CCNC: 50 U/L (ref 30–99)
ALT SERPL-CCNC: 23 U/L (ref 2–50)
ANION GAP SERPL CALC-SCNC: 9 MMOL/L (ref 0–11.9)
APPEARANCE UR: ABNORMAL
AST SERPL-CCNC: 14 U/L (ref 12–45)
BACTERIA #/AREA URNS HPF: ABNORMAL /HPF
BASOPHILS # BLD AUTO: 0.3 % (ref 0–1.8)
BASOPHILS # BLD: 0.02 K/UL (ref 0–0.12)
BILIRUB SERPL-MCNC: 0.8 MG/DL (ref 0.1–1.5)
BILIRUB UR QL STRIP.AUTO: NEGATIVE
BUN SERPL-MCNC: 10 MG/DL (ref 8–22)
CALCIUM SERPL-MCNC: 10 MG/DL (ref 8.5–10.5)
CHLORIDE SERPL-SCNC: 106 MMOL/L (ref 96–112)
CO2 SERPL-SCNC: 22 MMOL/L (ref 20–33)
COLOR UR: YELLOW
CREAT SERPL-MCNC: 0.82 MG/DL (ref 0.5–1.4)
EOSINOPHIL # BLD AUTO: 0.01 K/UL (ref 0–0.51)
EOSINOPHIL NFR BLD: 0.2 % (ref 0–6.9)
EPI CELLS #/AREA URNS HPF: ABNORMAL /HPF
ERYTHROCYTE [DISTWIDTH] IN BLOOD BY AUTOMATED COUNT: 39.9 FL (ref 35.9–50)
GLOBULIN SER CALC-MCNC: 2.9 G/DL (ref 1.9–3.5)
GLUCOSE SERPL-MCNC: 123 MG/DL (ref 65–99)
GLUCOSE UR STRIP.AUTO-MCNC: NEGATIVE MG/DL
HCG SERPL QL: NEGATIVE
HCT VFR BLD AUTO: 41.9 % (ref 37–47)
HGB BLD-MCNC: 14 G/DL (ref 12–16)
HYALINE CASTS #/AREA URNS LPF: ABNORMAL /LPF
IMM GRANULOCYTES # BLD AUTO: 0.01 K/UL (ref 0–0.11)
IMM GRANULOCYTES NFR BLD AUTO: 0.2 % (ref 0–0.9)
KETONES UR STRIP.AUTO-MCNC: NEGATIVE MG/DL
LEUKOCYTE ESTERASE UR QL STRIP.AUTO: NEGATIVE
LIPASE SERPL-CCNC: 4 U/L (ref 11–82)
LYMPHOCYTES # BLD AUTO: 1.01 K/UL (ref 1–4.8)
LYMPHOCYTES NFR BLD: 16.1 % (ref 22–41)
MCH RBC QN AUTO: 28.3 PG (ref 27–33)
MCHC RBC AUTO-ENTMCNC: 33.4 G/DL (ref 33.6–35)
MCV RBC AUTO: 84.6 FL (ref 81.4–97.8)
MICRO URNS: ABNORMAL
MONOCYTES # BLD AUTO: 0.34 K/UL (ref 0–0.85)
MONOCYTES NFR BLD AUTO: 5.4 % (ref 0–13.4)
NEUTROPHILS # BLD AUTO: 4.87 K/UL (ref 2–7.15)
NEUTROPHILS NFR BLD: 77.8 % (ref 44–72)
NITRITE UR QL STRIP.AUTO: NEGATIVE
NRBC # BLD AUTO: 0 K/UL
NRBC BLD-RTO: 0 /100 WBC
PH UR STRIP.AUTO: 7 [PH] (ref 5–8)
PLATELET # BLD AUTO: 205 K/UL (ref 164–446)
PMV BLD AUTO: 11.2 FL (ref 9–12.9)
POTASSIUM SERPL-SCNC: 3.7 MMOL/L (ref 3.6–5.5)
PROT SERPL-MCNC: 7.4 G/DL (ref 6–8.2)
PROT UR QL STRIP: NEGATIVE MG/DL
RBC # BLD AUTO: 4.95 M/UL (ref 4.2–5.4)
RBC # URNS HPF: ABNORMAL /HPF
RBC UR QL AUTO: ABNORMAL
SODIUM SERPL-SCNC: 137 MMOL/L (ref 135–145)
SP GR UR STRIP.AUTO: 1.02
UROBILINOGEN UR STRIP.AUTO-MCNC: 1 MG/DL
WBC # BLD AUTO: 6.3 K/UL (ref 4.8–10.8)
WBC #/AREA URNS HPF: ABNORMAL /HPF

## 2019-12-11 PROCEDURE — 96374 THER/PROPH/DIAG INJ IV PUSH: CPT

## 2019-12-11 PROCEDURE — 81001 URINALYSIS AUTO W/SCOPE: CPT

## 2019-12-11 PROCEDURE — 99285 EMERGENCY DEPT VISIT HI MDM: CPT

## 2019-12-11 PROCEDURE — 700111 HCHG RX REV CODE 636 W/ 250 OVERRIDE (IP): Performed by: EMERGENCY MEDICINE

## 2019-12-11 PROCEDURE — 96375 TX/PRO/DX INJ NEW DRUG ADDON: CPT

## 2019-12-11 PROCEDURE — 96376 TX/PRO/DX INJ SAME DRUG ADON: CPT

## 2019-12-11 PROCEDURE — 700105 HCHG RX REV CODE 258: Performed by: EMERGENCY MEDICINE

## 2019-12-11 PROCEDURE — 83690 ASSAY OF LIPASE: CPT

## 2019-12-11 PROCEDURE — 85025 COMPLETE CBC W/AUTO DIFF WBC: CPT

## 2019-12-11 PROCEDURE — 80053 COMPREHEN METABOLIC PANEL: CPT

## 2019-12-11 PROCEDURE — 84703 CHORIONIC GONADOTROPIN ASSAY: CPT

## 2019-12-11 RX ORDER — ONDANSETRON 2 MG/ML
4 INJECTION INTRAMUSCULAR; INTRAVENOUS ONCE
Status: COMPLETED | OUTPATIENT
Start: 2019-12-11 | End: 2019-12-11

## 2019-12-11 RX ORDER — HALOPERIDOL 5 MG/ML
2 INJECTION INTRAMUSCULAR ONCE
Status: COMPLETED | OUTPATIENT
Start: 2019-12-11 | End: 2019-12-11

## 2019-12-11 RX ORDER — SODIUM CHLORIDE 9 MG/ML
1000 INJECTION, SOLUTION INTRAVENOUS ONCE
Status: COMPLETED | OUTPATIENT
Start: 2019-12-11 | End: 2019-12-12

## 2019-12-11 RX ORDER — MORPHINE SULFATE 4 MG/ML
4 INJECTION, SOLUTION INTRAMUSCULAR; INTRAVENOUS ONCE
Status: COMPLETED | OUTPATIENT
Start: 2019-12-11 | End: 2019-12-11

## 2019-12-11 RX ORDER — DIPHENHYDRAMINE HYDROCHLORIDE 50 MG/ML
25 INJECTION INTRAMUSCULAR; INTRAVENOUS ONCE
Status: COMPLETED | OUTPATIENT
Start: 2019-12-11 | End: 2019-12-11

## 2019-12-11 RX ADMIN — MORPHINE SULFATE 4 MG: 4 INJECTION INTRAVENOUS at 23:03

## 2019-12-11 RX ADMIN — DIPHENHYDRAMINE HYDROCHLORIDE 25 MG: 50 INJECTION INTRAMUSCULAR; INTRAVENOUS at 23:18

## 2019-12-11 RX ADMIN — SODIUM CHLORIDE 1000 ML: 9 INJECTION, SOLUTION INTRAVENOUS at 21:00

## 2019-12-11 RX ADMIN — HALOPERIDOL LACTATE 2 MG: 5 INJECTION, SOLUTION INTRAMUSCULAR at 23:17

## 2019-12-11 RX ADMIN — ONDANSETRON 4 MG: 2 INJECTION INTRAMUSCULAR; INTRAVENOUS at 21:01

## 2019-12-11 RX ADMIN — MORPHINE SULFATE 4 MG: 4 INJECTION INTRAVENOUS at 21:01

## 2019-12-12 ENCOUNTER — HOSPITAL ENCOUNTER (EMERGENCY)
Facility: MEDICAL CENTER | Age: 39
End: 2019-12-12
Attending: EMERGENCY MEDICINE
Payer: MEDICAID

## 2019-12-12 ENCOUNTER — APPOINTMENT (OUTPATIENT)
Dept: RADIOLOGY | Facility: MEDICAL CENTER | Age: 39
End: 2019-12-12
Attending: EMERGENCY MEDICINE
Payer: MEDICAID

## 2019-12-12 ENCOUNTER — HOSPITAL ENCOUNTER (EMERGENCY)
Facility: MEDICAL CENTER | Age: 39
End: 2019-12-13
Payer: MEDICAID

## 2019-12-12 VITALS
DIASTOLIC BLOOD PRESSURE: 72 MMHG | RESPIRATION RATE: 16 BRPM | SYSTOLIC BLOOD PRESSURE: 129 MMHG | BODY MASS INDEX: 29.5 KG/M2 | WEIGHT: 217.81 LBS | TEMPERATURE: 96.9 F | HEART RATE: 89 BPM | HEIGHT: 72 IN | OXYGEN SATURATION: 94 %

## 2019-12-12 VITALS
HEART RATE: 77 BPM | TEMPERATURE: 97.2 F | SYSTOLIC BLOOD PRESSURE: 129 MMHG | OXYGEN SATURATION: 95 % | DIASTOLIC BLOOD PRESSURE: 80 MMHG | BODY MASS INDEX: 29.56 KG/M2 | RESPIRATION RATE: 18 BRPM | HEIGHT: 72 IN | WEIGHT: 218.26 LBS

## 2019-12-12 DIAGNOSIS — R11.2 NON-INTRACTABLE VOMITING WITH NAUSEA, UNSPECIFIED VOMITING TYPE: ICD-10-CM

## 2019-12-12 DIAGNOSIS — R10.84 GENERALIZED ABDOMINAL PAIN: ICD-10-CM

## 2019-12-12 LAB
ALBUMIN SERPL BCP-MCNC: 4.5 G/DL (ref 3.2–4.9)
ALBUMIN/GLOB SERPL: 1.7 G/DL
ALP SERPL-CCNC: 49 U/L (ref 30–99)
ALT SERPL-CCNC: 21 U/L (ref 2–50)
ANION GAP SERPL CALC-SCNC: 9 MMOL/L (ref 0–11.9)
AST SERPL-CCNC: 14 U/L (ref 12–45)
BASOPHILS # BLD AUTO: 0.2 % (ref 0–1.8)
BASOPHILS # BLD: 0.01 K/UL (ref 0–0.12)
BILIRUB SERPL-MCNC: 0.8 MG/DL (ref 0.1–1.5)
BUN SERPL-MCNC: 11 MG/DL (ref 8–22)
CALCIUM SERPL-MCNC: 9.9 MG/DL (ref 8.5–10.5)
CHLORIDE SERPL-SCNC: 105 MMOL/L (ref 96–112)
CO2 SERPL-SCNC: 25 MMOL/L (ref 20–33)
CREAT SERPL-MCNC: 0.87 MG/DL (ref 0.5–1.4)
EOSINOPHIL # BLD AUTO: 0 K/UL (ref 0–0.51)
EOSINOPHIL NFR BLD: 0 % (ref 0–6.9)
ERYTHROCYTE [DISTWIDTH] IN BLOOD BY AUTOMATED COUNT: 39.1 FL (ref 35.9–50)
GLOBULIN SER CALC-MCNC: 2.7 G/DL (ref 1.9–3.5)
GLUCOSE SERPL-MCNC: 103 MG/DL (ref 65–99)
HCT VFR BLD AUTO: 40.8 % (ref 37–47)
HGB BLD-MCNC: 13.9 G/DL (ref 12–16)
IMM GRANULOCYTES # BLD AUTO: 0.01 K/UL (ref 0–0.11)
IMM GRANULOCYTES NFR BLD AUTO: 0.2 % (ref 0–0.9)
LIPASE SERPL-CCNC: 7 U/L (ref 11–82)
LYMPHOCYTES # BLD AUTO: 1.02 K/UL (ref 1–4.8)
LYMPHOCYTES NFR BLD: 15.3 % (ref 22–41)
MCH RBC QN AUTO: 28.7 PG (ref 27–33)
MCHC RBC AUTO-ENTMCNC: 34.1 G/DL (ref 33.6–35)
MCV RBC AUTO: 84.3 FL (ref 81.4–97.8)
MONOCYTES # BLD AUTO: 0.47 K/UL (ref 0–0.85)
MONOCYTES NFR BLD AUTO: 7.1 % (ref 0–13.4)
NEUTROPHILS # BLD AUTO: 5.14 K/UL (ref 2–7.15)
NEUTROPHILS NFR BLD: 77.2 % (ref 44–72)
NRBC # BLD AUTO: 0 K/UL
NRBC BLD-RTO: 0 /100 WBC
PLATELET # BLD AUTO: 205 K/UL (ref 164–446)
PMV BLD AUTO: 10.9 FL (ref 9–12.9)
POTASSIUM SERPL-SCNC: 3.4 MMOL/L (ref 3.6–5.5)
PROT SERPL-MCNC: 7.2 G/DL (ref 6–8.2)
RBC # BLD AUTO: 4.84 M/UL (ref 4.2–5.4)
SODIUM SERPL-SCNC: 139 MMOL/L (ref 135–145)
WBC # BLD AUTO: 6.7 K/UL (ref 4.8–10.8)

## 2019-12-12 PROCEDURE — 74177 CT ABD & PELVIS W/CONTRAST: CPT

## 2019-12-12 PROCEDURE — 99284 EMERGENCY DEPT VISIT MOD MDM: CPT

## 2019-12-12 PROCEDURE — 83690 ASSAY OF LIPASE: CPT | Mod: 91

## 2019-12-12 PROCEDURE — 96375 TX/PRO/DX INJ NEW DRUG ADDON: CPT

## 2019-12-12 PROCEDURE — 99285 EMERGENCY DEPT VISIT HI MDM: CPT

## 2019-12-12 PROCEDURE — 700111 HCHG RX REV CODE 636 W/ 250 OVERRIDE (IP)

## 2019-12-12 PROCEDURE — 85025 COMPLETE CBC W/AUTO DIFF WBC: CPT | Mod: 91

## 2019-12-12 PROCEDURE — 85025 COMPLETE CBC W/AUTO DIFF WBC: CPT

## 2019-12-12 PROCEDURE — 96374 THER/PROPH/DIAG INJ IV PUSH: CPT

## 2019-12-12 PROCEDURE — 700105 HCHG RX REV CODE 258: Performed by: EMERGENCY MEDICINE

## 2019-12-12 PROCEDURE — 700117 HCHG RX CONTRAST REV CODE 255: Performed by: EMERGENCY MEDICINE

## 2019-12-12 PROCEDURE — 700111 HCHG RX REV CODE 636 W/ 250 OVERRIDE (IP): Performed by: EMERGENCY MEDICINE

## 2019-12-12 PROCEDURE — 80053 COMPREHEN METABOLIC PANEL: CPT

## 2019-12-12 PROCEDURE — 83690 ASSAY OF LIPASE: CPT

## 2019-12-12 PROCEDURE — 80053 COMPREHEN METABOLIC PANEL: CPT | Mod: 91

## 2019-12-12 PROCEDURE — 700105 HCHG RX REV CODE 258

## 2019-12-12 RX ORDER — SODIUM CHLORIDE 9 MG/ML
1000 INJECTION, SOLUTION INTRAVENOUS ONCE
Status: COMPLETED | OUTPATIENT
Start: 2019-12-12 | End: 2019-12-12

## 2019-12-12 RX ORDER — ONDANSETRON 4 MG/1
4 TABLET, ORALLY DISINTEGRATING ORAL EVERY 8 HOURS PRN
Qty: 9 TAB | Refills: 0 | Status: SHIPPED | OUTPATIENT
Start: 2019-12-12 | End: 2019-12-12

## 2019-12-12 RX ORDER — ONDANSETRON 4 MG/1
4 TABLET, ORALLY DISINTEGRATING ORAL ONCE
Status: COMPLETED | OUTPATIENT
Start: 2019-12-12 | End: 2019-12-12

## 2019-12-12 RX ORDER — MORPHINE SULFATE 4 MG/ML
4 INJECTION, SOLUTION INTRAMUSCULAR; INTRAVENOUS ONCE
Status: COMPLETED | OUTPATIENT
Start: 2019-12-12 | End: 2019-12-12

## 2019-12-12 RX ORDER — ONDANSETRON 8 MG/1
8 TABLET, ORALLY DISINTEGRATING ORAL EVERY 8 HOURS PRN
Qty: 10 TAB | Refills: 0 | Status: ON HOLD | OUTPATIENT
Start: 2019-12-12 | End: 2019-12-17

## 2019-12-12 RX ORDER — METOCLOPRAMIDE HYDROCHLORIDE 5 MG/ML
10 INJECTION INTRAMUSCULAR; INTRAVENOUS ONCE
Status: COMPLETED | OUTPATIENT
Start: 2019-12-12 | End: 2019-12-12

## 2019-12-12 RX ADMIN — MORPHINE SULFATE 4 MG: 4 INJECTION INTRAVENOUS at 12:59

## 2019-12-12 RX ADMIN — SODIUM CHLORIDE 1000 ML: 9 INJECTION, SOLUTION INTRAVENOUS at 12:58

## 2019-12-12 RX ADMIN — METOCLOPRAMIDE 10 MG: 5 INJECTION, SOLUTION INTRAMUSCULAR; INTRAVENOUS at 12:57

## 2019-12-12 RX ADMIN — IOHEXOL 100 ML: 350 INJECTION, SOLUTION INTRAVENOUS at 01:28

## 2019-12-12 RX ADMIN — ONDANSETRON 4 MG: 4 TABLET, ORALLY DISINTEGRATING ORAL at 11:41

## 2019-12-12 NOTE — ED PROVIDER NOTES
ED Provider Note    Scribed for Rosas Correia M.D. by Tonia Foley. 12/11/2019  8:39 PM    Primary care provider: Pcp Not In Computer  Means of arrival: Walk-in  History obtained from: Patient  History limited by: None    CHIEF COMPLAINT  Chief Complaint   Patient presents with   • Nausea/Vomiting/Diarrhea   • Abdominal Pain       HPI  Kaela Ballesteros is a 39 y.o. female who presents to the Emergency Department diarrhea onset 2 days. The patient endorses having an associated symptoms of fever, abdominal pain, vomiting, boy aches, and hot flashes, but denies any dysuria and hematuria. She states that her vomiting began on her way to the ED. She reports 4 episodes of water-like diarrhea today. She states that she is not on any antibiotics, and has not recently traveled. She states she has a history of 3 abdominal surgeries. She denies any sick contacts.    REVIEW OF SYSTEMS  Pertinent positives include diarrhea,  fever, abdominal pain, vomiting, boy aches, and hot flashes. Pertinent negatives include no dysuria and hematuria.  All other systems reviewed and negative.    PAST MEDICAL HISTORY   has a past medical history of Anemia (2017), Backpain (2001), Diabetes (HCC) (2017), Heart burn, Hypertension (2017), Indigestion (2006), Infectious disease (1995), Other specified symptom associated with female genital organs (2012), Pain (03/03/15), Pancreatitis (2011/2014), Sickle cell trait syndrome, Urinary incontinence, and Urinary tract infection.    SURGICAL HISTORY   has a past surgical history that includes pelviscopy (1/13/2012); dilation and curettage (12/3/2012); egd w/endoscopic ultrasound (1/26/2015); gastroscopy with biopsy (1/26/2015); primary c section (9/30/2015); primary c section with tubal ligation (5/2/2017); abdominal exploration (2016); cholecystectomy; vaginal hysterectomy scope total (6/4/2019); salpingectomy (Left, 6/4/2019); anterior and posterior repair (6/4/2019); enterocele repair (6/4/2019);  bladder sling female (2019); and vaginal suspension (2019).    SOCIAL HISTORY  Social History     Tobacco Use   • Smoking status: Former Smoker     Packs/day: 0.50     Years: 15.00     Pack years: 7.50     Types: Cigarettes     Last attempt to quit: 2011     Years since quittin.9   • Smokeless tobacco: Never Used   • Tobacco comment: occasional   Substance Use Topics   • Alcohol use: No     Comment: occasionally   • Drug use: Yes     Types: Marijuana, Inhaled     Comment: marijuana       Social History     Substance and Sexual Activity   Drug Use Yes   • Types: Marijuana, Inhaled    Comment: marijuana        FAMILY HISTORY  Family History   Problem Relation Age of Onset   • Diabetes Father    • Diabetes Brother    • Diabetes Paternal Grandmother        CURRENT MEDICATIONS  Home Medications    **Home medications have not yet been reviewed for this encounter**         ALLERGIES  Allergies   Allergen Reactions   • Nkda [No Known Drug Allergy]        PHYSICAL EXAM  VITAL SIGNS: /99   Pulse 76   Temp 36.1 °C (96.9 °F) (Temporal)   Resp 16   Ht 1.829 m (6')   Wt 98.8 kg (217 lb 13 oz)   LMP 2019   SpO2 92%   BMI 29.54 kg/m²   Pulse ox interpretation: Normal  Constitutional: Well developed, No acute distress, Non-toxic appearance.   HENT: Normocephalic, Atraumatic, Bilateral external ears normal, Oropharynx moist, No oral exudates, Nose normal.   Eyes: PERRLA, EOMI, Conjunctiva normal, No discharge.   Neck: Normal range of motion, No tenderness, Supple, No stridor.   Cardiovascular: Normal heart rate, Normal rhythm, No murmurs, No rubs, No gallops.   Thorax & Lungs: Normal breath sounds, No respiratory distress, No wheezing, No chest tenderness.   Abdomen: mild diffused abdominal tenderness, morbidly obese, Soft,  No masses, No pulsatile masses.   Skin: Warm, Dry, No erythema, No rash.   Back: No tenderness, No CVA tenderness.   Extremities: Intact distal pulses, No edema, No tenderness,  No cyanosis, No clubbing.   Musculoskeletal: Good range of motion in all major joints. No tenderness to palpation or major deformities noted.   Neurologic: Alert & oriented x 3, Normal motor function, Normal sensory function, No focal deficits noted.       LABS  Labs Reviewed   CBC WITH DIFFERENTIAL - Abnormal; Notable for the following components:       Result Value    MCHC 33.4 (*)     Neutrophils-Polys 77.80 (*)     Lymphocytes 16.10 (*)     All other components within normal limits   COMP METABOLIC PANEL - Abnormal; Notable for the following components:    Glucose 123 (*)     All other components within normal limits   LIPASE - Abnormal; Notable for the following components:    Lipase 4 (*)     All other components within normal limits   URINALYSIS,CULTURE IF INDICATED - Abnormal; Notable for the following components:    Character Cloudy (*)     Occult Blood Trace (*)     All other components within normal limits   URINE MICROSCOPIC (W/UA) - Abnormal; Notable for the following components:    RBC 2-5 (*)     Bacteria Few (*)     Epithelial Cells Moderate (*)     All other components within normal limits   HCG QUAL SERUM   ESTIMATED GFR     All labs reviewed by me.    RADIOLOGY  CT-ABDOMEN-PELVIS WITH   Final Result         1.  No acute abnormality.   2.  Heterogeneous enlargement of the left ovary, similar prior study. Follow-up pelvic sonogram as clinically appropriate for further characterization.        The radiologist's interpretation of all radiological studies have been reviewed by me.    COURSE & MEDICAL DECISION MAKING  Pertinent Labs & Imaging studies reviewed. (See chart for details)    8:39 PM - Patient seen and examined at bedside. Patient will be treated with Zofran 4 mg, Morphine 4 MG/ML, and IV fluids. Ordered Beta-HCG qualitative, UA, Cbc with differential, CMP, Lipase, and Urinalysis to evaluate her symptoms.     9:10 PM - Ordered Ct-abdomen for further evaluation.     10:38 PM - Patient will be  medicated with morphine 4 mg.     11:07 PM - Patient will be medicated with Haldol 4 mg and Benadryl 25 mg.     HYDRATION: Based on the patient's presentation of Dehydration the patient was given IV fluids. IV Hydration was used because oral hydration was not adequate alone. Upon recheck following hydration, the patient was improved.      Decision Making:  This is a 39 y.o. year old female who presents with Diarrhea, vomiting, severe diffuse abdominal pain.  She is in mild distress secondary to the pain.  She was given IV analgesia without much improvement.  She was then given Haldol and Benadryl.    No leukocytosis.  No significant metabolic abnormalities.  No evidence of pancreatitis.  Patient had continued pain however and so further imaging was performed including CT abdomen pelvis.  She does have risk factors for bowel obstruction given prior abdominal surgeries in the past.  Diverticulitis is also a possibility.  May also have nephrolithiasis.  More benign process such as gastroenteritis is also a possibility however more of a diagnosis of exclusion in the setting.    CT abdomen pelvis was ordered for further evaluation of the patient's symptoms.  Found to have heterogeneous enlargement of the left ovary that is similar to prior study.  Symptoms of diffuse pain, vomiting and diarrhea, are not consistent with torsion.    Patient was reevaluated at bedside and reports feeling much improved.  Tolerating oral intake and reports feeling comfortable with discharge in her current state.  She was informed regarding the CT abnormality of the left ovary which will require prompt outpatient follow-up with primary care or OB/GYN for further characterization.  Overall, suspect gastroenteritis-like syndrome of a viral etiology as the root cause of the patient's discomfort, vomiting, diarrhea.  No signs of an acute abdominal process.  Benign abdominal exam prior to discharge.    /88   Pulse 89   Temp 36.1 °C (96.9  °F) (Temporal)   Resp 16   Ht 1.829 m (6')   Wt 98.8 kg (217 lb 13 oz)   LMP 04/26/2019   SpO2 97%   BMI 29.54 kg/m²     The patient was referred to primary care where they will receive further BP management.      [facility], Emergency Dept  [address] [facility] [address] [address] [phone]    As needed, If symptoms worsen    Primary care doctor    Schedule an appointment as soon as possible for a visit         FINAL IMPRESSION  1. Non-intractable vomiting with nausea, unspecified vomiting type    2. Diarrhea, unspecified type         This dictation has been created using voice recognition software and/or scribes. The accuracy of the dictation is limited by the abilities of the software and the expertise of the scribes. I expect there may be some errors of grammar and possibly content. I made every attempt to manually correct the errors within my dictation. However, errors related to voice recognition software and/or scribes may still exist and should be interpreted within the appropriate context. C.    The note accurately reflects work and decisions made by me.  Rosas Correia  12/12/2019  2:08 AM

## 2019-12-12 NOTE — DISCHARGE INSTRUCTIONS
You should follow-up with a gynecologist regarding the abnormality to your left ovary.  Please obtain an outpatient ultrasound of the left ovary through your primary care physician or OB/GYN  for further evaluation.

## 2019-12-12 NOTE — ED NOTES
Pt laying prone on stomach, stating it feels best this way. The pt states her stomach pain comes in waves, cramping/stabbing. The pt has been n/v but denies any blood in vomit or stool. Agree w/ rest of triage note. Erp to see.
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
## 2019-12-12 NOTE — ED NOTES
Pt was being escorted to triage room to lab draw and developed dizziness and vomiting. Unable to obtain labs. Phleb contacted

## 2019-12-12 NOTE — ED TRIAGE NOTES
Pt ambulated to triage with   Chief Complaint   Patient presents with   • N/V     seen here last night for the same, told she has a virus, not sent home with any medications   • Abdominal Pain     CT scan completed yesterday     Pt actively vomiting in triage, no blood, will medicate per protocol.  Pt Informed regarding triage process and verbalized understanding to inform triage tech or RN for any changes in condition. Placed in lobby.

## 2019-12-13 ENCOUNTER — HOSPITAL ENCOUNTER (EMERGENCY)
Facility: MEDICAL CENTER | Age: 39
End: 2019-12-14
Attending: EMERGENCY MEDICINE
Payer: MEDICAID

## 2019-12-13 DIAGNOSIS — R10.84 GENERALIZED ABDOMINAL PAIN: ICD-10-CM

## 2019-12-13 DIAGNOSIS — F12.188 CANNABIS HYPEREMESIS SYNDROME CONCURRENT WITH AND DUE TO CANNABIS ABUSE (HCC): ICD-10-CM

## 2019-12-13 DIAGNOSIS — R11.10 HYPEREMESIS: ICD-10-CM

## 2019-12-13 LAB
ALBUMIN SERPL BCP-MCNC: 4.9 G/DL (ref 3.2–4.9)
ALBUMIN SERPL BCP-MCNC: 5.3 G/DL (ref 3.2–4.9)
ALBUMIN/GLOB SERPL: 1.5 G/DL
ALBUMIN/GLOB SERPL: 1.5 G/DL
ALP SERPL-CCNC: 56 U/L (ref 30–99)
ALP SERPL-CCNC: 59 U/L (ref 30–99)
ALT SERPL-CCNC: 23 U/L (ref 2–50)
ALT SERPL-CCNC: 23 U/L (ref 2–50)
ANION GAP SERPL CALC-SCNC: 11 MMOL/L (ref 0–11.9)
ANION GAP SERPL CALC-SCNC: 12 MMOL/L (ref 0–11.9)
APPEARANCE UR: CLEAR
AST SERPL-CCNC: 17 U/L (ref 12–45)
AST SERPL-CCNC: 19 U/L (ref 12–45)
BACTERIA #/AREA URNS HPF: NEGATIVE /HPF
BASOPHILS # BLD AUTO: 0.1 % (ref 0–1.8)
BASOPHILS # BLD AUTO: 0.5 % (ref 0–1.8)
BASOPHILS # BLD: 0.01 K/UL (ref 0–0.12)
BASOPHILS # BLD: 0.03 K/UL (ref 0–0.12)
BILIRUB SERPL-MCNC: 0.9 MG/DL (ref 0.1–1.5)
BILIRUB SERPL-MCNC: 0.9 MG/DL (ref 0.1–1.5)
BILIRUB UR QL STRIP.AUTO: NEGATIVE
BUN SERPL-MCNC: 9 MG/DL (ref 8–22)
BUN SERPL-MCNC: 9 MG/DL (ref 8–22)
CALCIUM SERPL-MCNC: 10 MG/DL (ref 8.5–10.5)
CALCIUM SERPL-MCNC: 11.4 MG/DL (ref 8.5–10.5)
CHLORIDE SERPL-SCNC: 101 MMOL/L (ref 96–112)
CHLORIDE SERPL-SCNC: 104 MMOL/L (ref 96–112)
CO2 SERPL-SCNC: 23 MMOL/L (ref 20–33)
CO2 SERPL-SCNC: 25 MMOL/L (ref 20–33)
COLOR UR: YELLOW
CREAT SERPL-MCNC: 0.94 MG/DL (ref 0.5–1.4)
CREAT SERPL-MCNC: 0.96 MG/DL (ref 0.5–1.4)
EOSINOPHIL # BLD AUTO: 0 K/UL (ref 0–0.51)
EOSINOPHIL # BLD AUTO: 0.01 K/UL (ref 0–0.51)
EOSINOPHIL NFR BLD: 0 % (ref 0–6.9)
EOSINOPHIL NFR BLD: 0.2 % (ref 0–6.9)
EPI CELLS #/AREA URNS HPF: ABNORMAL /HPF
ERYTHROCYTE [DISTWIDTH] IN BLOOD BY AUTOMATED COUNT: 38.2 FL (ref 35.9–50)
ERYTHROCYTE [DISTWIDTH] IN BLOOD BY AUTOMATED COUNT: 38.6 FL (ref 35.9–50)
GLOBULIN SER CALC-MCNC: 3.2 G/DL (ref 1.9–3.5)
GLOBULIN SER CALC-MCNC: 3.5 G/DL (ref 1.9–3.5)
GLUCOSE SERPL-MCNC: 116 MG/DL (ref 65–99)
GLUCOSE SERPL-MCNC: 139 MG/DL (ref 65–99)
GLUCOSE UR STRIP.AUTO-MCNC: NEGATIVE MG/DL
HCT VFR BLD AUTO: 42.6 % (ref 37–47)
HCT VFR BLD AUTO: 45.1 % (ref 37–47)
HGB BLD-MCNC: 14.1 G/DL (ref 12–16)
HGB BLD-MCNC: 14.9 G/DL (ref 12–16)
HYALINE CASTS #/AREA URNS LPF: ABNORMAL /LPF
IMM GRANULOCYTES # BLD AUTO: 0.01 K/UL (ref 0–0.11)
IMM GRANULOCYTES # BLD AUTO: 0.02 K/UL (ref 0–0.11)
IMM GRANULOCYTES NFR BLD AUTO: 0.2 % (ref 0–0.9)
IMM GRANULOCYTES NFR BLD AUTO: 0.2 % (ref 0–0.9)
KETONES UR STRIP.AUTO-MCNC: NEGATIVE MG/DL
LEUKOCYTE ESTERASE UR QL STRIP.AUTO: NEGATIVE
LIPASE SERPL-CCNC: 71 U/L (ref 11–82)
LIPASE SERPL-CCNC: 9 U/L (ref 11–82)
LYMPHOCYTES # BLD AUTO: 1.01 K/UL (ref 1–4.8)
LYMPHOCYTES # BLD AUTO: 1.1 K/UL (ref 1–4.8)
LYMPHOCYTES NFR BLD: 13.3 % (ref 22–41)
LYMPHOCYTES NFR BLD: 16.3 % (ref 22–41)
MCH RBC QN AUTO: 27.6 PG (ref 27–33)
MCH RBC QN AUTO: 27.9 PG (ref 27–33)
MCHC RBC AUTO-ENTMCNC: 33 G/DL (ref 33.6–35)
MCHC RBC AUTO-ENTMCNC: 33.1 G/DL (ref 33.6–35)
MCV RBC AUTO: 83.7 FL (ref 81.4–97.8)
MCV RBC AUTO: 84.2 FL (ref 81.4–97.8)
MICRO URNS: ABNORMAL
MONOCYTES # BLD AUTO: 0.3 K/UL (ref 0–0.85)
MONOCYTES # BLD AUTO: 0.53 K/UL (ref 0–0.85)
MONOCYTES NFR BLD AUTO: 4.9 % (ref 0–13.4)
MONOCYTES NFR BLD AUTO: 6.4 % (ref 0–13.4)
NEUTROPHILS # BLD AUTO: 4.82 K/UL (ref 2–7.15)
NEUTROPHILS # BLD AUTO: 6.63 K/UL (ref 2–7.15)
NEUTROPHILS NFR BLD: 77.9 % (ref 44–72)
NEUTROPHILS NFR BLD: 80 % (ref 44–72)
NITRITE UR QL STRIP.AUTO: NEGATIVE
NRBC # BLD AUTO: 0 K/UL
NRBC # BLD AUTO: 0 K/UL
NRBC BLD-RTO: 0 /100 WBC
NRBC BLD-RTO: 0 /100 WBC
PH UR STRIP.AUTO: 6.5 [PH] (ref 5–8)
PLATELET # BLD AUTO: 233 K/UL (ref 164–446)
PLATELET # BLD AUTO: 251 K/UL (ref 164–446)
PMV BLD AUTO: 10.6 FL (ref 9–12.9)
PMV BLD AUTO: 10.9 FL (ref 9–12.9)
POTASSIUM SERPL-SCNC: 3.4 MMOL/L (ref 3.6–5.5)
POTASSIUM SERPL-SCNC: 3.5 MMOL/L (ref 3.6–5.5)
PROT SERPL-MCNC: 8.1 G/DL (ref 6–8.2)
PROT SERPL-MCNC: 8.8 G/DL (ref 6–8.2)
PROT UR QL STRIP: 100 MG/DL
RBC # BLD AUTO: 5.06 M/UL (ref 4.2–5.4)
RBC # BLD AUTO: 5.39 M/UL (ref 4.2–5.4)
RBC # URNS HPF: ABNORMAL /HPF
RBC UR QL AUTO: ABNORMAL
SODIUM SERPL-SCNC: 137 MMOL/L (ref 135–145)
SODIUM SERPL-SCNC: 139 MMOL/L (ref 135–145)
SP GR UR STRIP.AUTO: 1.02
UROBILINOGEN UR STRIP.AUTO-MCNC: 2 MG/DL
WBC # BLD AUTO: 6.2 K/UL (ref 4.8–10.8)
WBC # BLD AUTO: 8.3 K/UL (ref 4.8–10.8)
WBC #/AREA URNS HPF: ABNORMAL /HPF

## 2019-12-13 PROCEDURE — 81001 URINALYSIS AUTO W/SCOPE: CPT

## 2019-12-13 PROCEDURE — 80307 DRUG TEST PRSMV CHEM ANLYZR: CPT

## 2019-12-13 PROCEDURE — 85025 COMPLETE CBC W/AUTO DIFF WBC: CPT

## 2019-12-13 PROCEDURE — 99285 EMERGENCY DEPT VISIT HI MDM: CPT

## 2019-12-13 PROCEDURE — C9113 INJ PANTOPRAZOLE SODIUM, VIA: HCPCS | Performed by: EMERGENCY MEDICINE

## 2019-12-13 PROCEDURE — 80053 COMPREHEN METABOLIC PANEL: CPT

## 2019-12-13 PROCEDURE — 83690 ASSAY OF LIPASE: CPT

## 2019-12-13 PROCEDURE — 96372 THER/PROPH/DIAG INJ SC/IM: CPT

## 2019-12-13 PROCEDURE — 700111 HCHG RX REV CODE 636 W/ 250 OVERRIDE (IP): Performed by: EMERGENCY MEDICINE

## 2019-12-13 RX ORDER — HALOPERIDOL 5 MG/ML
5 INJECTION INTRAMUSCULAR ONCE
Status: COMPLETED | OUTPATIENT
Start: 2019-12-13 | End: 2019-12-13

## 2019-12-13 RX ORDER — ONDANSETRON 2 MG/ML
4 INJECTION INTRAMUSCULAR; INTRAVENOUS
Status: DISCONTINUED | OUTPATIENT
Start: 2019-12-13 | End: 2019-12-14 | Stop reason: HOSPADM

## 2019-12-13 RX ORDER — PANTOPRAZOLE SODIUM 40 MG/10ML
40 INJECTION, POWDER, LYOPHILIZED, FOR SOLUTION INTRAVENOUS ONCE
Status: DISCONTINUED | OUTPATIENT
Start: 2019-12-13 | End: 2019-12-14 | Stop reason: HOSPADM

## 2019-12-13 RX ORDER — MORPHINE SULFATE 4 MG/ML
4 INJECTION, SOLUTION INTRAMUSCULAR; INTRAVENOUS ONCE
Status: DISCONTINUED | OUTPATIENT
Start: 2019-12-13 | End: 2019-12-14 | Stop reason: HOSPADM

## 2019-12-13 RX ORDER — DIPHENHYDRAMINE HYDROCHLORIDE 50 MG/ML
25 INJECTION INTRAMUSCULAR; INTRAVENOUS ONCE
Status: COMPLETED | OUTPATIENT
Start: 2019-12-13 | End: 2019-12-13

## 2019-12-13 RX ADMIN — HALOPERIDOL LACTATE 5 MG: 5 INJECTION, SOLUTION INTRAMUSCULAR at 21:28

## 2019-12-13 RX ADMIN — DIPHENHYDRAMINE HYDROCHLORIDE 25 MG: 50 INJECTION INTRAMUSCULAR; INTRAVENOUS at 21:29

## 2019-12-14 VITALS
WEIGHT: 215.61 LBS | TEMPERATURE: 98.6 F | HEART RATE: 77 BPM | BODY MASS INDEX: 29.2 KG/M2 | OXYGEN SATURATION: 95 % | RESPIRATION RATE: 16 BRPM | HEIGHT: 72 IN | SYSTOLIC BLOOD PRESSURE: 114 MMHG | DIASTOLIC BLOOD PRESSURE: 85 MMHG

## 2019-12-14 LAB
AMPHET UR QL SCN: NEGATIVE
BARBITURATES UR QL SCN: NEGATIVE
BENZODIAZ UR QL SCN: NEGATIVE
BZE UR QL SCN: NEGATIVE
CANNABINOIDS UR QL SCN: POSITIVE
METHADONE UR QL SCN: NEGATIVE
OPIATES UR QL SCN: NEGATIVE
OXYCODONE UR QL SCN: NEGATIVE
PCP UR QL SCN: NEGATIVE
PROPOXYPH UR QL SCN: NEGATIVE

## 2019-12-14 RX ORDER — DICYCLOMINE HCL 20 MG
20 TABLET ORAL EVERY 6 HOURS
Qty: 120 TAB | Refills: 0 | Status: SHIPPED
Start: 2019-12-14 | End: 2020-09-18

## 2019-12-14 RX ORDER — PROCHLORPERAZINE MALEATE 10 MG
10 TABLET ORAL EVERY 6 HOURS PRN
Qty: 15 TAB | Refills: 3 | Status: SHIPPED
Start: 2019-12-14 | End: 2020-09-18

## 2019-12-14 NOTE — ED TRIAGE NOTES
Pt comes in complaining of N/V and generalized abd pain since Monday. Pt seen here 2 times with no diagnosis
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
## 2019-12-14 NOTE — ED NOTES
Patient verbalized understanding of discharge instructions, provided with discharge paperwork, gait steady, ambulated independently to DARCY rodriguez.

## 2019-12-14 NOTE — ED PROVIDER NOTES
ED Provider Note    Patient signed out to me by Dr. Blue,     She has tolerated p.o. fluids, she has not had any vomiting since I took over her care, she is resting comfortably, and will be discharged home.  She has not had any further needs while under my care.  I will write her for some Bentyl and compazine. She is nontoxic-appearing, afebrile, and comfortable with the plan.  Her  will be coming to pick her up.

## 2019-12-14 NOTE — ED NOTES
Pt sleeping quietly on stretcher. Pt in no acute distress at this time. RN will continue to monitor pt.

## 2019-12-14 NOTE — ED PROVIDER NOTES
ED Provider Note    CHIEF COMPLAINT  No chief complaint on file.      HPI  Kaela Ballesteros is a 39 y.o. female. Patient is here for intermittent generalized abdominal pain pain onset 4 days ago that is identical to prior episodes for which patient goes to the Er frequently.  Patient states she was evaluated earlier this week on Wednesday and her labs and CT scan at that time were unremarkable. She has been admitted in the past for vomiting and abdominal pain. Vomiting exacerbates her abdominal pain. Emesis is NBNB. She takes showers for her symptoms and continues to smoke MJ though she has been told by [facility] that his may be causing her symptoms.  Denies any hematochezia or melena. Patient has a history of cholecystectomy, hysterectomy, right ovarian removal. Patient states she has been evaluated by GI for same symtoms and they asked her to stop smoking MJ.    REVIEW OF SYSTEMS  ROS    See HPI for further details. All other systems are negative.     PAST MEDICAL HISTORY   has a past medical history of Anemia (), Backpain (), Diabetes (HCC) (), Heart burn, Hypertension (), Indigestion (), Infectious disease (), Other specified symptom associated with female genital organs (), Pain (03/03/15), Pancreatitis (), Sickle cell trait syndrome, Urinary incontinence, and Urinary tract infection.    SOCIAL HISTORY  Social History     Tobacco Use   • Smoking status: Former Smoker     Packs/day: 0.50     Years: 15.00     Pack years: 7.50     Types: Cigarettes     Last attempt to quit: 2011     Years since quittin.9   • Smokeless tobacco: Never Used   • Tobacco comment: occasional   Substance and Sexual Activity   • Alcohol use: No     Comment: occasionally   • Drug use: Yes     Types: Marijuana, Inhaled     Comment: marijuana    • Sexual activity: Yes     Partners: Male     Comment: BTL       SURGICAL HISTORY   has a past surgical history that includes pelviscopy  (1/13/2012); dilation and curettage (12/3/2012); egd w/endoscopic ultrasound (1/26/2015); gastroscopy with biopsy (1/26/2015); primary c section (9/30/2015); primary c section with tubal ligation (5/2/2017); abdominal exploration (2016); cholecystectomy; vaginal hysterectomy scope total (6/4/2019); salpingectomy (Left, 6/4/2019); anterior and posterior repair (6/4/2019); enterocele repair (6/4/2019); bladder sling female (6/4/2019); and vaginal suspension (6/4/2019).    CURRENT MEDICATIONS  Home Medications    **Home medications have not yet been reviewed for this encounter**         ALLERGIES  Allergies   Allergen Reactions   • Nkda [No Known Drug Allergy]        PHYSICAL EXAM  Physical Exam   Constitutional: She is oriented to person, place, and time. She appears well-developed and well-nourished.   HENT:   Head: Normocephalic and atraumatic.   Eyes: Conjunctivae are normal.   Neck: Normal range of motion. Neck supple.   Cardiovascular: Normal rate and regular rhythm.   Pulmonary/Chest: Effort normal and breath sounds normal.   Abdominal: Soft. Bowel sounds are normal. She exhibits no distension. There is no tenderness. There is no rebound.   Neurological: She is alert and oriented to person, place, and time.   Skin: Skin is warm and dry. No rash noted.   Psychiatric: She has a normal mood and affect. Her behavior is normal.         DIAGNOSTIC STUDIES / PROCEDURES      LABS  Results for orders placed or performed during the hospital encounter of 12/13/19   Comp Metabolic Panel   Result Value Ref Range    Sodium 139 135 - 145 mmol/L    Potassium 3.5 (L) 3.6 - 5.5 mmol/L    Chloride 104 96 - 112 mmol/L    Co2 23 20 - 33 mmol/L    Anion Gap 12.0 (H) 0.0 - 11.9    Glucose 139 (H) 65 - 99 mg/dL    Bun 9 8 - 22 mg/dL    Creatinine 0.96 0.50 - 1.40 mg/dL    Calcium 10.0 8.5 - 10.5 mg/dL    AST(SGOT) 17 12 - 45 U/L    ALT(SGPT) 23 2 - 50 U/L    Alkaline Phosphatase 56 30 - 99 U/L    Total Bilirubin 0.9 0.1 - 1.5 mg/dL     Albumin 4.9 3.2 - 4.9 g/dL    Total Protein 8.1 6.0 - 8.2 g/dL    Globulin 3.2 1.9 - 3.5 g/dL    A-G Ratio 1.5 g/dL   LIPASE   Result Value Ref Range    Lipase 9 (L) 11 - 82 U/L   CBC WITH DIFFERENTIAL   Result Value Ref Range    WBC 6.2 4.8 - 10.8 K/uL    RBC 5.06 4.20 - 5.40 M/uL    Hemoglobin 14.1 12.0 - 16.0 g/dL    Hematocrit 42.6 37.0 - 47.0 %    MCV 84.2 81.4 - 97.8 fL    MCH 27.9 27.0 - 33.0 pg    MCHC 33.1 (L) 33.6 - 35.0 g/dL    RDW 38.6 35.9 - 50.0 fL    Platelet Count 233 164 - 446 K/uL    MPV 10.9 9.0 - 12.9 fL    Neutrophils-Polys 77.90 (H) 44.00 - 72.00 %    Lymphocytes 16.30 (L) 22.00 - 41.00 %    Monocytes 4.90 0.00 - 13.40 %    Eosinophils 0.20 0.00 - 6.90 %    Basophils 0.50 0.00 - 1.80 %    Immature Granulocytes 0.20 0.00 - 0.90 %    Nucleated RBC 0.00 /100 WBC    Neutrophils (Absolute) 4.82 2.00 - 7.15 K/uL    Lymphs (Absolute) 1.01 1.00 - 4.80 K/uL    Monos (Absolute) 0.30 0.00 - 0.85 K/uL    Eos (Absolute) 0.01 0.00 - 0.51 K/uL    Baso (Absolute) 0.03 0.00 - 0.12 K/uL    Immature Granulocytes (abs) 0.01 0.00 - 0.11 K/uL    NRBC (Absolute) 0.00 K/uL   ESTIMATED GFR   Result Value Ref Range    GFR If African American >60 >60 mL/min/1.73 m 2    GFR If Non African American >60 >60 mL/min/1.73 m 2           COURSE & MEDICAL DECISION MAKING  Pertinent Labs & Imaging studies reviewed. (See chart for details)  Pt with reassuring abd exam, recent CT and multiple prior CTs in similar context makes surgical process unlikely. Checking basic labs and lipase  Labs are reassuring  Pt given haldol, dilaudid and benadryl  She was given IVF for dehydration  Following treatment pt is sleeping and eating without issue. Her abdominal exam is normal  Upon discharging the patient she began screaming profanely at the nurses saying she was still in pain.  She was reexamined and again her exam was unremarkable. Pt seeping after I left the room, thi does not appear to be mesenteric ischemia given that she only has pain  and sxs when discharge is discussed.  She was given ketamine low dose and sxs resolved  She was sent home after drinking fluids in the ED  Asked to f/u with GI  Home with reglan    The patient will return for worsening symptoms and is stable at the time of discharge. The patient verbalizes understanding and will comply.    FINAL IMPRESSION  1. Cyclic vomiting, marijuana use, abdominal pain         Electronically signed by: Joshua Mackey, 12/14/2019 9:55 AM

## 2019-12-16 ENCOUNTER — HOSPITAL ENCOUNTER (OUTPATIENT)
Facility: MEDICAL CENTER | Age: 39
End: 2019-12-17
Attending: EMERGENCY MEDICINE | Admitting: INTERNAL MEDICINE
Payer: MEDICAID

## 2019-12-16 DIAGNOSIS — R11.2 INTRACTABLE VOMITING WITH NAUSEA, UNSPECIFIED VOMITING TYPE: ICD-10-CM

## 2019-12-16 DIAGNOSIS — R10.13 EPIGASTRIC PAIN: ICD-10-CM

## 2019-12-16 DIAGNOSIS — E87.6 HYPOKALEMIA: ICD-10-CM

## 2019-12-16 PROBLEM — F12.90 CANNABINOID HYPEREMESIS SYNDROME: Status: ACTIVE | Noted: 2019-12-16

## 2019-12-16 LAB
ALBUMIN SERPL BCP-MCNC: 5.2 G/DL (ref 3.2–4.9)
ALBUMIN/GLOB SERPL: 1.6 G/DL
ALP SERPL-CCNC: 68 U/L (ref 30–99)
ALT SERPL-CCNC: 45 U/L (ref 2–50)
ANION GAP SERPL CALC-SCNC: 13 MMOL/L (ref 0–11.9)
APPEARANCE UR: ABNORMAL
APTT PPP: 26.5 SEC (ref 24.7–36)
AST SERPL-CCNC: 27 U/L (ref 12–45)
BACTERIA #/AREA URNS HPF: ABNORMAL /HPF
BASOPHILS # BLD AUTO: 0.5 % (ref 0–1.8)
BASOPHILS # BLD: 0.03 K/UL (ref 0–0.12)
BILIRUB SERPL-MCNC: 1.6 MG/DL (ref 0.1–1.5)
BILIRUB UR QL STRIP.AUTO: NEGATIVE
BLOOD CULTURE HOLD CXBCH: NORMAL
BUN SERPL-MCNC: 13 MG/DL (ref 8–22)
CALCIUM SERPL-MCNC: 11.2 MG/DL (ref 8.5–10.5)
CHLORIDE SERPL-SCNC: 96 MMOL/L (ref 96–112)
CO2 SERPL-SCNC: 27 MMOL/L (ref 20–33)
COLOR UR: YELLOW
CORTIS SERPL-MCNC: 24.9 UG/DL (ref 0–23)
CREAT SERPL-MCNC: 1.2 MG/DL (ref 0.5–1.4)
CRP SERPL HS-MCNC: 0.04 MG/DL (ref 0–0.75)
EKG IMPRESSION: NORMAL
EOSINOPHIL # BLD AUTO: 0.02 K/UL (ref 0–0.51)
EOSINOPHIL NFR BLD: 0.3 % (ref 0–6.9)
EPI CELLS #/AREA URNS HPF: ABNORMAL /HPF
ERYTHROCYTE [DISTWIDTH] IN BLOOD BY AUTOMATED COUNT: 37.2 FL (ref 35.9–50)
ERYTHROCYTE [SEDIMENTATION RATE] IN BLOOD BY WESTERGREN METHOD: 30 MM/HOUR (ref 0–20)
GLOBULIN SER CALC-MCNC: 3.3 G/DL (ref 1.9–3.5)
GLUCOSE SERPL-MCNC: 119 MG/DL (ref 65–99)
GLUCOSE UR STRIP.AUTO-MCNC: NEGATIVE MG/DL
HCT VFR BLD AUTO: 48.9 % (ref 37–47)
HGB BLD-MCNC: 16.3 G/DL (ref 12–16)
HYALINE CASTS #/AREA URNS LPF: ABNORMAL /LPF
IMM GRANULOCYTES # BLD AUTO: 0.02 K/UL (ref 0–0.11)
IMM GRANULOCYTES NFR BLD AUTO: 0.3 % (ref 0–0.9)
INR PPP: 1.05 (ref 0.87–1.13)
KETONES UR STRIP.AUTO-MCNC: ABNORMAL MG/DL
LEUKOCYTE ESTERASE UR QL STRIP.AUTO: NEGATIVE
LIPASE SERPL-CCNC: 6 U/L (ref 11–82)
LYMPHOCYTES # BLD AUTO: 1.26 K/UL (ref 1–4.8)
LYMPHOCYTES NFR BLD: 20.8 % (ref 22–41)
MAGNESIUM SERPL-MCNC: 2.4 MG/DL (ref 1.5–2.5)
MCH RBC QN AUTO: 27.9 PG (ref 27–33)
MCHC RBC AUTO-ENTMCNC: 33.3 G/DL (ref 33.6–35)
MCV RBC AUTO: 83.6 FL (ref 81.4–97.8)
MICRO URNS: ABNORMAL
MONOCYTES # BLD AUTO: 0.55 K/UL (ref 0–0.85)
MONOCYTES NFR BLD AUTO: 9.1 % (ref 0–13.4)
NEUTROPHILS # BLD AUTO: 4.19 K/UL (ref 2–7.15)
NEUTROPHILS NFR BLD: 69 % (ref 44–72)
NITRITE UR QL STRIP.AUTO: NEGATIVE
NRBC # BLD AUTO: 0 K/UL
NRBC BLD-RTO: 0 /100 WBC
PH UR STRIP.AUTO: 6.5 [PH] (ref 5–8)
PHOSPHATE SERPL-MCNC: 2.8 MG/DL (ref 2.5–4.5)
PLATELET # BLD AUTO: 248 K/UL (ref 164–446)
PMV BLD AUTO: 10.8 FL (ref 9–12.9)
POTASSIUM SERPL-SCNC: 3.2 MMOL/L (ref 3.6–5.5)
PROT SERPL-MCNC: 8.5 G/DL (ref 6–8.2)
PROT UR QL STRIP: 30 MG/DL
PROTHROMBIN TIME: 13.9 SEC (ref 12–14.6)
RBC # BLD AUTO: 5.85 M/UL (ref 4.2–5.4)
RBC # URNS HPF: ABNORMAL /HPF
RBC UR QL AUTO: ABNORMAL
SODIUM SERPL-SCNC: 136 MMOL/L (ref 135–145)
SP GR UR STRIP.AUTO: 1.02
TROPONIN T SERPL-MCNC: <6 NG/L (ref 6–19)
TSH SERPL DL<=0.005 MIU/L-ACNC: 2.06 UIU/ML (ref 0.38–5.33)
UROBILINOGEN UR STRIP.AUTO-MCNC: 1 MG/DL
WBC # BLD AUTO: 6.1 K/UL (ref 4.8–10.8)
WBC #/AREA URNS HPF: ABNORMAL /HPF

## 2019-12-16 PROCEDURE — 700111 HCHG RX REV CODE 636 W/ 250 OVERRIDE (IP): Performed by: INTERNAL MEDICINE

## 2019-12-16 PROCEDURE — 80053 COMPREHEN METABOLIC PANEL: CPT

## 2019-12-16 PROCEDURE — 84484 ASSAY OF TROPONIN QUANT: CPT

## 2019-12-16 PROCEDURE — 96375 TX/PRO/DX INJ NEW DRUG ADDON: CPT

## 2019-12-16 PROCEDURE — 83690 ASSAY OF LIPASE: CPT

## 2019-12-16 PROCEDURE — A9270 NON-COVERED ITEM OR SERVICE: HCPCS | Performed by: INTERNAL MEDICINE

## 2019-12-16 PROCEDURE — 84100 ASSAY OF PHOSPHORUS: CPT

## 2019-12-16 PROCEDURE — 99220 PR INITIAL OBSERVATION CARE,LEVL III: CPT | Performed by: INTERNAL MEDICINE

## 2019-12-16 PROCEDURE — 90686 IIV4 VACC NO PRSV 0.5 ML IM: CPT | Performed by: INTERNAL MEDICINE

## 2019-12-16 PROCEDURE — 90471 IMMUNIZATION ADMIN: CPT

## 2019-12-16 PROCEDURE — 700105 HCHG RX REV CODE 258: Performed by: INTERNAL MEDICINE

## 2019-12-16 PROCEDURE — 94760 N-INVAS EAR/PLS OXIMETRY 1: CPT

## 2019-12-16 PROCEDURE — 700111 HCHG RX REV CODE 636 W/ 250 OVERRIDE (IP): Performed by: EMERGENCY MEDICINE

## 2019-12-16 PROCEDURE — 700102 HCHG RX REV CODE 250 W/ 637 OVERRIDE(OP): Performed by: INTERNAL MEDICINE

## 2019-12-16 PROCEDURE — 83735 ASSAY OF MAGNESIUM: CPT

## 2019-12-16 PROCEDURE — G0378 HOSPITAL OBSERVATION PER HR: HCPCS

## 2019-12-16 PROCEDURE — A9270 NON-COVERED ITEM OR SERVICE: HCPCS | Performed by: EMERGENCY MEDICINE

## 2019-12-16 PROCEDURE — 96376 TX/PRO/DX INJ SAME DRUG ADON: CPT

## 2019-12-16 PROCEDURE — 81001 URINALYSIS AUTO W/SCOPE: CPT

## 2019-12-16 PROCEDURE — 85652 RBC SED RATE AUTOMATED: CPT

## 2019-12-16 PROCEDURE — 700105 HCHG RX REV CODE 258: Performed by: EMERGENCY MEDICINE

## 2019-12-16 PROCEDURE — 99285 EMERGENCY DEPT VISIT HI MDM: CPT

## 2019-12-16 PROCEDURE — 93005 ELECTROCARDIOGRAM TRACING: CPT | Performed by: EMERGENCY MEDICINE

## 2019-12-16 PROCEDURE — 82533 TOTAL CORTISOL: CPT

## 2019-12-16 PROCEDURE — 96365 THER/PROPH/DIAG IV INF INIT: CPT

## 2019-12-16 PROCEDURE — 86140 C-REACTIVE PROTEIN: CPT

## 2019-12-16 PROCEDURE — 85610 PROTHROMBIN TIME: CPT

## 2019-12-16 PROCEDURE — 85730 THROMBOPLASTIN TIME PARTIAL: CPT

## 2019-12-16 PROCEDURE — 85025 COMPLETE CBC W/AUTO DIFF WBC: CPT

## 2019-12-16 PROCEDURE — 84443 ASSAY THYROID STIM HORMONE: CPT

## 2019-12-16 PROCEDURE — 700102 HCHG RX REV CODE 250 W/ 637 OVERRIDE(OP): Performed by: EMERGENCY MEDICINE

## 2019-12-16 RX ORDER — MORPHINE SULFATE 4 MG/ML
2 INJECTION, SOLUTION INTRAMUSCULAR; INTRAVENOUS
Status: DISCONTINUED | OUTPATIENT
Start: 2019-12-16 | End: 2019-12-17 | Stop reason: HOSPADM

## 2019-12-16 RX ORDER — METOCLOPRAMIDE 10 MG/1
10 TABLET ORAL ONCE
Status: COMPLETED | OUTPATIENT
Start: 2019-12-16 | End: 2019-12-16

## 2019-12-16 RX ORDER — SODIUM CHLORIDE 9 MG/ML
1000 INJECTION, SOLUTION INTRAVENOUS ONCE
Status: COMPLETED | OUTPATIENT
Start: 2019-12-16 | End: 2019-12-16

## 2019-12-16 RX ORDER — POTASSIUM CHLORIDE 20 MEQ/1
40 TABLET, EXTENDED RELEASE ORAL EVERY 4 HOURS
Status: COMPLETED | OUTPATIENT
Start: 2019-12-16 | End: 2019-12-16

## 2019-12-16 RX ORDER — OXYCODONE HYDROCHLORIDE 5 MG/1
5 TABLET ORAL
Status: DISCONTINUED | OUTPATIENT
Start: 2019-12-16 | End: 2019-12-17 | Stop reason: HOSPADM

## 2019-12-16 RX ORDER — ONDANSETRON 2 MG/ML
4 INJECTION INTRAMUSCULAR; INTRAVENOUS EVERY 6 HOURS
Status: DISCONTINUED | OUTPATIENT
Start: 2019-12-16 | End: 2019-12-17 | Stop reason: HOSPADM

## 2019-12-16 RX ORDER — DIPHENHYDRAMINE HYDROCHLORIDE 50 MG/ML
25 INJECTION INTRAMUSCULAR; INTRAVENOUS ONCE
Status: COMPLETED | OUTPATIENT
Start: 2019-12-16 | End: 2019-12-16

## 2019-12-16 RX ORDER — MORPHINE SULFATE 4 MG/ML
4 INJECTION, SOLUTION INTRAMUSCULAR; INTRAVENOUS ONCE
Status: COMPLETED | OUTPATIENT
Start: 2019-12-16 | End: 2019-12-16

## 2019-12-16 RX ORDER — ACETAMINOPHEN 325 MG/1
650 TABLET ORAL EVERY 6 HOURS PRN
Status: DISCONTINUED | OUTPATIENT
Start: 2019-12-16 | End: 2019-12-17 | Stop reason: HOSPADM

## 2019-12-16 RX ORDER — OXYCODONE HYDROCHLORIDE 5 MG/1
2.5 TABLET ORAL
Status: DISCONTINUED | OUTPATIENT
Start: 2019-12-16 | End: 2019-12-17 | Stop reason: HOSPADM

## 2019-12-16 RX ORDER — CAPSAICIN 0.025 %
CREAM (GRAM) TOPICAL 3 TIMES DAILY
Status: DISCONTINUED | OUTPATIENT
Start: 2019-12-16 | End: 2019-12-17 | Stop reason: HOSPADM

## 2019-12-16 RX ORDER — ENALAPRILAT 1.25 MG/ML
1.25 INJECTION INTRAVENOUS EVERY 6 HOURS PRN
Status: DISCONTINUED | OUTPATIENT
Start: 2019-12-16 | End: 2019-12-17 | Stop reason: HOSPADM

## 2019-12-16 RX ADMIN — SODIUM CHLORIDE 1000 ML: 9 INJECTION, SOLUTION INTRAVENOUS at 14:50

## 2019-12-16 RX ADMIN — CAPSAICIN: 0.25 CREAM TOPICAL at 18:00

## 2019-12-16 RX ADMIN — OXYCODONE HYDROCHLORIDE 5 MG: 5 TABLET ORAL at 19:20

## 2019-12-16 RX ADMIN — THIAMINE HYDROCHLORIDE 250 MG: 100 INJECTION, SOLUTION INTRAMUSCULAR; INTRAVENOUS at 15:48

## 2019-12-16 RX ADMIN — DOXYLAMINE SUCCINATE 25 MG: 25 TABLET ORAL at 15:48

## 2019-12-16 RX ADMIN — METOCLOPRAMIDE HYDROCHLORIDE 10 MG: 10 TABLET ORAL at 08:19

## 2019-12-16 RX ADMIN — FAMOTIDINE 20 MG: 10 INJECTION INTRAVENOUS at 19:21

## 2019-12-16 RX ADMIN — POTASSIUM CHLORIDE 40 MEQ: 1500 TABLET, EXTENDED RELEASE ORAL at 15:48

## 2019-12-16 RX ADMIN — DIPHENHYDRAMINE HYDROCHLORIDE 25 MG: 50 INJECTION INTRAMUSCULAR; INTRAVENOUS at 08:55

## 2019-12-16 RX ADMIN — ONDANSETRON 4 MG: 2 INJECTION INTRAMUSCULAR; INTRAVENOUS at 19:21

## 2019-12-16 RX ADMIN — DOXYLAMINE SUCCINATE 25 MG: 25 TABLET ORAL at 20:04

## 2019-12-16 RX ADMIN — ONDANSETRON 4 MG: 2 INJECTION INTRAMUSCULAR; INTRAVENOUS at 14:41

## 2019-12-16 RX ADMIN — CAPSAICIN: 0.25 CREAM TOPICAL at 16:27

## 2019-12-16 RX ADMIN — SODIUM CHLORIDE 1000 ML: 9 INJECTION, SOLUTION INTRAVENOUS at 08:52

## 2019-12-16 RX ADMIN — INFLUENZA A VIRUS A/BRISBANE/02/2018 IVR-190 (H1N1) ANTIGEN (FORMALDEHYDE INACTIVATED), INFLUENZA A VIRUS A/KANSAS/14/2017 X-327 (H3N2) ANTIGEN (FORMALDEHYDE INACTIVATED), INFLUENZA B VIRUS B/PHUKET/3073/2013 ANTIGEN (FORMALDEHYDE INACTIVATED), AND INFLUENZA B VIRUS B/MARYLAND/15/2016 BX-69A ANTIGEN (FORMALDEHYDE INACTIVATED) 0.5 ML: 15; 15; 15; 15 INJECTION, SUSPENSION INTRAMUSCULAR at 14:52

## 2019-12-16 RX ADMIN — POTASSIUM CHLORIDE 40 MEQ: 1500 TABLET, EXTENDED RELEASE ORAL at 19:21

## 2019-12-16 RX ADMIN — OXYCODONE HYDROCHLORIDE 5 MG: 5 TABLET ORAL at 14:57

## 2019-12-16 RX ADMIN — POTASSIUM CHLORIDE: 149 INJECTION, SOLUTION, CONCENTRATE INTRAVENOUS at 15:48

## 2019-12-16 RX ADMIN — MORPHINE SULFATE 4 MG: 4 INJECTION INTRAVENOUS at 08:55

## 2019-12-16 ASSESSMENT — ENCOUNTER SYMPTOMS
NERVOUS/ANXIOUS: 1
ABDOMINAL PAIN: 1
PALPITATIONS: 0
BLURRED VISION: 0
DOUBLE VISION: 0
COUGH: 0
PND: 0
SHORTNESS OF BREATH: 0
FLANK PAIN: 0
BACK PAIN: 0
SPUTUM PRODUCTION: 0
DIZZINESS: 1
NAUSEA: 1
DEPRESSION: 0
DIARRHEA: 0
FEVER: 0
FALLS: 0
CLAUDICATION: 0
CONSTIPATION: 0
NECK PAIN: 0
WHEEZING: 0
DIAPHORESIS: 0
BLOOD IN STOOL: 0
CHILLS: 1
HEARTBURN: 0
HEADACHES: 0
ORTHOPNEA: 0
VOMITING: 1
MYALGIAS: 0

## 2019-12-16 ASSESSMENT — LIFESTYLE VARIABLES
ALCOHOL_USE: NO
CONSUMPTION TOTAL: NEGATIVE
TOTAL SCORE: 0
EVER HAD A DRINK FIRST THING IN THE MORNING TO STEADY YOUR NERVES TO GET RID OF A HANGOVER: NO
TOTAL SCORE: 0
DOES PATIENT WANT TO STOP DRINKING: NO
HAVE PEOPLE ANNOYED YOU BY CRITICIZING YOUR DRINKING: NO
HOW MANY TIMES IN THE PAST YEAR HAVE YOU HAD 5 OR MORE DRINKS IN A DAY: 0
EVER_SMOKED: NEVER
TOTAL SCORE: 0
AVERAGE NUMBER OF DAYS PER WEEK YOU HAVE A DRINK CONTAINING ALCOHOL: 0
ON A TYPICAL DAY WHEN YOU DRINK ALCOHOL HOW MANY DRINKS DO YOU HAVE: 0
EVER_SMOKED: YES
EVER FELT BAD OR GUILTY ABOUT YOUR DRINKING: NO
HAVE YOU EVER FELT YOU SHOULD CUT DOWN ON YOUR DRINKING: NO

## 2019-12-16 ASSESSMENT — PAIN SCALES - WONG BAKER: WONGBAKER_NUMERICALRESPONSE: HURTS EVEN MORE

## 2019-12-16 ASSESSMENT — COPD QUESTIONNAIRES
HAVE YOU SMOKED AT LEAST 100 CIGARETTES IN YOUR ENTIRE LIFE: YES
DURING THE PAST 4 WEEKS HOW MUCH DID YOU FEEL SHORT OF BREATH: NONE/LITTLE OF THE TIME
DO YOU EVER COUGH UP ANY MUCUS OR PHLEGM?: NO/ONLY WITH OCCASIONAL COLDS OR INFECTIONS
COPD SCREENING SCORE: 2

## 2019-12-16 NOTE — PROGRESS NOTES
PT ARRIVED TO THE  UNIT  ORIENTED TO ROOM DISCUSSED PLAN OF CARE  UPDATED ON PATIENT ARRIVAL TO ROOM BED IN LOW POSITION CALL LIGHT WITHIN REACH NURSING HISTORY AND ASSESSMENT DONE CONDITION STABLE

## 2019-12-16 NOTE — CARE PLAN
Problem: Safety  Goal: Free from accidental injury  Outcome: PROGRESSING AS EXPECTED     Problem: Knowledge Deficit  Goal: Patient/Significant other demonstrates understanding of disease process, treatment plan, medications and discharge instructions  Outcome: PROGRESSING AS EXPECTED     Problem: Pain  Goal: Alleviation of Pain or a reduction in pain to the patient's comfort goal  Outcome: PROGRESSING AS EXPECTED

## 2019-12-16 NOTE — ED TRIAGE NOTES
Patient comes in stating she has been n/v for the last week, vomited three times today already, concerns for cannabis hyperemesis per her last note in chart, seen four times here in the ed last week.

## 2019-12-16 NOTE — ASSESSMENT & PLAN NOTE
Presenting with cannabinoid hyperemesis, cyclical vomiting syndrome with intractable nausea and vomiting resulting in dehydration, polycythemia secondary to dehydration, with associated acute kidney injury/prerenal azotemia, hypokalemia associated with vomiting, hypercalcemia associated with hemoconcentration/dehydration and other associated chemistry panel abnormalities.    Patient at this time will be admitted to the CDU for observation, she will be monitored on telemetry    Aggressive IV fluid hydration will be initiated  Subsequently patient will be on potassium containing IV fluid hydration, LR with 20 M EQ of potassium at 100 mL's an hour  Oral potassium replacement already given    For symptom control, patient was initiated on scheduled Zofran, QTC acceptable today, interval EKG tomorrow  In addition doxylamine will be initiated for added sedative/antiemetic effect  IV thiamine x1 250 mg  Capsaicin cream to the belly, 3 times a day  Warm showers 3 times a day    We will check TSH, cortisol, ESR, CRP to rule out other alternative etiologies  Check magnesium and phosphorus and replace as clinically appropriate    CBC/CMP tomorrow    Associated minimal abdominal discomfort, okay for opiates for now    IV Pepcid    Full liquid diet, will advance as tolerated    Patient extensively counseled and educated regarding cessation of marijuana, cannabinoid products.  She is in agreement at this time.

## 2019-12-16 NOTE — H&P
Hospital Medicine History & Physical Note    Date of Service  12/16/2019    Primary Care Physician  Pcp Not In Computer    Consultants  None    Code Status  FULL CODE     Chief Complaint  N/V/abdominal pain    History of Presenting Illness  39 y.o. female who presented 12/16/2019 with above complaints.     Patient presents to the emergency department complaining of above issues.  She has been into the emergency department recurrently for similar issues which arise from cannabinoid hyperemesis syndrome/cyclical vomiting.  Patient reported that she has continued to consume marijuana, last used 2 days ago.  Reports that usually her vomiting starts a day after marijuana consumption.  She wants to quit marijuana at this time.  Reports that she has been vomiting profusely, unable to stop vomiting and has been taking significant amount of warm showers at home.  Otherwise has been feeling cold, slight chills.  Denies fevers.  No blood in vomitus, no chest pain or shortness of breath.  No cough.  Has a diffuse crampy abdominal pain, 5 out of 10, nonradiating.  Has not noticed any improving or worsening factors of this pain.  X1 episode of slightly loose bowel movement, no blood in bowel movements or melena.  No genitourinary complaints.    Review of Systems  Review of Systems   Constitutional: Positive for chills and malaise/fatigue. Negative for diaphoresis and fever.   HENT: Negative for hearing loss and tinnitus.    Eyes: Negative for blurred vision and double vision.   Respiratory: Negative for cough, sputum production, shortness of breath and wheezing.    Cardiovascular: Negative for chest pain, palpitations, orthopnea, claudication, leg swelling and PND.   Gastrointestinal: Positive for abdominal pain, nausea and vomiting. Negative for blood in stool, constipation, diarrhea, heartburn and melena.   Genitourinary: Negative for dysuria, flank pain, frequency, hematuria and urgency.   Musculoskeletal: Negative for back  pain, falls, joint pain, myalgias and neck pain.   Skin: Negative for itching and rash.   Neurological: Positive for dizziness. Negative for headaches.   Psychiatric/Behavioral: Negative for depression and suicidal ideas. The patient is nervous/anxious.        Past Medical History   has a past medical history of Anemia (2017), Backpain (2001), Diabetes (HCC) (2017), Heart burn, Hypertension (2017), Indigestion (2006), Infectious disease (1995), Other specified symptom associated with female genital organs (2012), Pain (03/03/15), Pancreatitis (2011/2014), Sickle cell trait syndrome, Urinary incontinence, and Urinary tract infection.    Surgical History   has a past surgical history that includes pelviscopy (1/13/2012); dilation and curettage (12/3/2012); egd w/endoscopic ultrasound (1/26/2015); gastroscopy with biopsy (1/26/2015); primary c section (9/30/2015); primary c section with tubal ligation (5/2/2017); abdominal exploration (2016); cholecystectomy; vaginal hysterectomy scope total (6/4/2019); salpingectomy (Left, 6/4/2019); anterior and posterior repair (6/4/2019); enterocele repair (6/4/2019); bladder sling female (6/4/2019); and vaginal suspension (6/4/2019).     Family History  family history includes Diabetes in her brother, father, and paternal grandmother.     Social History   reports that she quit smoking about 8 years ago. Her smoking use included cigarettes. She has a 7.50 pack-year smoking history. She has never used smokeless tobacco. She reports current drug use. Drugs: Marijuana and Inhaled. She reports that she does not drink alcohol.    Allergies  Allergies   Allergen Reactions   • Nkda [No Known Drug Allergy]        Medications  Prior to Admission Medications   Prescriptions Last Dose Informant Patient Reported? Taking?   acetaminophen (TYLENOL) 325 MG Tab   No No   Sig: Take 2 Tabs by mouth every 6 hours as needed (Mild Pain; (Pain scale 1-3); Temp greater than 100.5 F).   dicyclomine  (BENTYL) 20 MG Tab   No No   Sig: Take 1 Tab by mouth every 6 hours.   dicyclomine (BENTYL) 20 MG Tab   No No   Sig: Take 1 Tab by mouth every 6 hours.   docusate sodium (COLACE) 100 MG Cap  Patient Yes No   Sig: Take 100 mg by mouth 2 times a day.   hydrocortisone (ANUSOL-HC) 25 MG Suppos   No No   Sig: Insert 1 Suppository in rectum every 12 hours.   lidocaine (XYLOCAINE) 2 % Gel   No No   Sig: Apply 1 Tube to affected area(s) Once PRN (hard stools and painful passage of stool).   omeprazole (PRILOSEC) 20 MG delayed-release capsule   No No   Sig: Take 1 Cap by mouth every day.   ondansetron (ZOFRAN ODT) 8 MG TABLET DISPERSIBLE   No No   Sig: Take 1 Tab by mouth every 8 hours as needed for Nausea.   polyethylene glycol 3350 (MIRALAX) Powder  Patient No No   Sig: Take 17 g by mouth every day.   prochlorperazine (COMPAZINE) 10 MG Tab   No No   Sig: Take 1 Tab by mouth every 6 hours as needed.      Facility-Administered Medications: None       Physical Exam  Temp:  [36.3 °C (97.4 °F)-37.1 °C (98.8 °F)] 37.1 °C (98.8 °F)  Pulse:  [81-99] 99  Resp:  [16-18] 18  BP: (140-149)/() 149/104  SpO2:  [95 %-97 %] 97 %    Physical Exam  HENT:      Head: Normocephalic and atraumatic.      Right Ear: External ear normal.      Left Ear: External ear normal.      Nose: Nose normal.      Mouth/Throat:      Mouth: Mucous membranes are dry.   Eyes:      Extraocular Movements: Extraocular movements intact.      Conjunctiva/sclera: Conjunctivae normal.      Pupils: Pupils are equal, round, and reactive to light.   Neck:      Musculoskeletal: Normal range of motion.   Cardiovascular:      Rate and Rhythm: Normal rate and regular rhythm.      Pulses: Normal pulses.      Heart sounds: No murmur. No friction rub. No gallop.    Pulmonary:      Effort: Pulmonary effort is normal. No respiratory distress.      Breath sounds: Normal breath sounds. No stridor. No wheezing, rhonchi or rales.   Chest:      Chest wall: No tenderness.    Abdominal:      General: Abdomen is flat. Bowel sounds are normal. There is no distension.      Palpations: Abdomen is soft. There is no mass.      Tenderness: There is no tenderness. There is no right CVA tenderness, left CVA tenderness, guarding or rebound.      Hernia: No hernia is present.   Musculoskeletal: Normal range of motion.      Right lower leg: No edema.      Left lower leg: No edema.   Skin:     General: Skin is warm and dry.      Capillary Refill: Capillary refill takes less than 2 seconds.      Comments: Poor skin turgidity   Neurological:      General: No focal deficit present.      Mental Status: She is alert. Mental status is at baseline. She is disoriented.      Cranial Nerves: No cranial nerve deficit.      Sensory: No sensory deficit.      Motor: No weakness.      Coordination: Coordination normal.      Gait: Gait normal.      Deep Tendon Reflexes: Reflexes normal.   Psychiatric:         Mood and Affect: Mood normal.         Behavior: Behavior normal.         Thought Content: Thought content normal.         Judgment: Judgment normal.         Laboratory:  Recent Labs     12/13/19  1851 12/16/19  0852   WBC 8.3 6.1   RBC 5.39 5.85*   HEMOGLOBIN 14.9 16.3*   HEMATOCRIT 45.1 48.9*   MCV 83.7 83.6   MCH 27.6 27.9   MCHC 33.0* 33.3*   RDW 38.2 37.2   PLATELETCT 251 248   MPV 10.6 10.8     Recent Labs     12/13/19  1851 12/16/19  0852   SODIUM 137 136   POTASSIUM 3.4* 3.2*   CHLORIDE 101 96   CO2 25 27   GLUCOSE 116* 119*   BUN 9 13   CREATININE 0.94 1.20   CALCIUM 11.4* 11.2*     Recent Labs     12/13/19  1851 12/16/19  0852   ALTSGPT 23 45   ASTSGOT 19 27   ALKPHOSPHAT 59 68   TBILIRUBIN 0.9 1.6*   LIPASE 71 6*   GLUCOSE 116* 119*         No results for input(s): NTPROBNP in the last 72 hours.      Recent Labs     12/16/19  0852   TROPONINT <6       Urinalysis:    Recent Labs     12/16/19  0930   SPECGRAVITY 1.017   GLUCOSEUR Negative   KETONES Trace*   NITRITE Negative   LEUKESTERAS Negative    WBCURINE 0-2   RBCURINE 2-5*   BACTERIA Moderate*   EPITHELCELL Moderate*        Imaging:  No orders to display         Assessment/Plan:  I anticipate this patient is appropriate for observation status at this time.    Cannabinoid hyperemesis syndrome (HCC)- (present on admission)  Assessment & Plan  Presenting with cannabinoid hyperemesis, cyclical vomiting syndrome with intractable nausea and vomiting resulting in dehydration, polycythemia secondary to dehydration, with associated acute kidney injury/prerenal azotemia, hypokalemia associated with vomiting, hypercalcemia associated with hemoconcentration/dehydration and other associated chemistry panel abnormalities.    Patient at this time will be admitted to the CDU for observation, she will be monitored on telemetry    Aggressive IV fluid hydration will be initiated  Subsequently patient will be on potassium containing IV fluid hydration, LR with 20 M EQ of potassium at 100 mL's an hour  Oral potassium replacement already given    For symptom control, patient was initiated on scheduled Zofran, QTC acceptable today, interval EKG tomorrow  In addition doxylamine will be initiated for added sedative/antiemetic effect  IV thiamine x1 250 mg  Capsaicin cream to the belly, 3 times a day  Warm showers 3 times a day    We will check TSH, cortisol, ESR, CRP to rule out other alternative etiologies  Check magnesium and phosphorus and replace as clinically appropriate    CBC/CMP tomorrow    Associated minimal abdominal discomfort, okay for opiates for now    IV Pepcid    Full liquid diet, will advance as tolerated    Patient extensively counseled and educated regarding cessation of marijuana, cannabinoid products.  She is in agreement at this time.      VTE prophylaxis: SC lovenox
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
## 2019-12-17 ENCOUNTER — PATIENT OUTREACH (OUTPATIENT)
Dept: HEALTH INFORMATION MANAGEMENT | Facility: OTHER | Age: 39
End: 2019-12-17

## 2019-12-17 VITALS
HEIGHT: 72 IN | SYSTOLIC BLOOD PRESSURE: 123 MMHG | WEIGHT: 209.66 LBS | BODY MASS INDEX: 28.4 KG/M2 | DIASTOLIC BLOOD PRESSURE: 81 MMHG | HEART RATE: 76 BPM | OXYGEN SATURATION: 97 % | TEMPERATURE: 97.9 F | RESPIRATION RATE: 17 BRPM

## 2019-12-17 LAB
ALBUMIN SERPL BCP-MCNC: 4.3 G/DL (ref 3.2–4.9)
ALBUMIN/GLOB SERPL: 1.7 G/DL
ALP SERPL-CCNC: 58 U/L (ref 30–99)
ALT SERPL-CCNC: 39 U/L (ref 2–50)
ANION GAP SERPL CALC-SCNC: 6 MMOL/L (ref 0–11.9)
AST SERPL-CCNC: 19 U/L (ref 12–45)
BASOPHILS # BLD AUTO: 0.7 % (ref 0–1.8)
BASOPHILS # BLD: 0.04 K/UL (ref 0–0.12)
BILIRUB SERPL-MCNC: 1.2 MG/DL (ref 0.1–1.5)
BUN SERPL-MCNC: 7 MG/DL (ref 8–22)
CALCIUM SERPL-MCNC: 9.4 MG/DL (ref 8.5–10.5)
CHLORIDE SERPL-SCNC: 101 MMOL/L (ref 96–112)
CO2 SERPL-SCNC: 28 MMOL/L (ref 20–33)
CREAT SERPL-MCNC: 0.91 MG/DL (ref 0.5–1.4)
EKG IMPRESSION: NORMAL
EOSINOPHIL # BLD AUTO: 0.04 K/UL (ref 0–0.51)
EOSINOPHIL NFR BLD: 0.7 % (ref 0–6.9)
ERYTHROCYTE [DISTWIDTH] IN BLOOD BY AUTOMATED COUNT: 38 FL (ref 35.9–50)
GLOBULIN SER CALC-MCNC: 2.6 G/DL (ref 1.9–3.5)
GLUCOSE SERPL-MCNC: 114 MG/DL (ref 65–99)
HCT VFR BLD AUTO: 44.4 % (ref 37–47)
HGB BLD-MCNC: 14.5 G/DL (ref 12–16)
IMM GRANULOCYTES # BLD AUTO: 0.01 K/UL (ref 0–0.11)
IMM GRANULOCYTES NFR BLD AUTO: 0.2 % (ref 0–0.9)
LYMPHOCYTES # BLD AUTO: 1.55 K/UL (ref 1–4.8)
LYMPHOCYTES NFR BLD: 26.5 % (ref 22–41)
MCH RBC QN AUTO: 27.7 PG (ref 27–33)
MCHC RBC AUTO-ENTMCNC: 32.7 G/DL (ref 33.6–35)
MCV RBC AUTO: 84.7 FL (ref 81.4–97.8)
MONOCYTES # BLD AUTO: 0.6 K/UL (ref 0–0.85)
MONOCYTES NFR BLD AUTO: 10.3 % (ref 0–13.4)
NEUTROPHILS # BLD AUTO: 3.61 K/UL (ref 2–7.15)
NEUTROPHILS NFR BLD: 61.6 % (ref 44–72)
NRBC # BLD AUTO: 0 K/UL
NRBC BLD-RTO: 0 /100 WBC
PLATELET # BLD AUTO: 227 K/UL (ref 164–446)
PMV BLD AUTO: 10.8 FL (ref 9–12.9)
POTASSIUM SERPL-SCNC: 3.8 MMOL/L (ref 3.6–5.5)
PROT SERPL-MCNC: 6.9 G/DL (ref 6–8.2)
RBC # BLD AUTO: 5.24 M/UL (ref 4.2–5.4)
SODIUM SERPL-SCNC: 135 MMOL/L (ref 135–145)
WBC # BLD AUTO: 5.9 K/UL (ref 4.8–10.8)

## 2019-12-17 PROCEDURE — 99217 PR OBSERVATION CARE DISCHARGE: CPT | Performed by: HOSPITALIST

## 2019-12-17 PROCEDURE — 700105 HCHG RX REV CODE 258: Performed by: INTERNAL MEDICINE

## 2019-12-17 PROCEDURE — 96376 TX/PRO/DX INJ SAME DRUG ADON: CPT

## 2019-12-17 PROCEDURE — 80053 COMPREHEN METABOLIC PANEL: CPT

## 2019-12-17 PROCEDURE — 85025 COMPLETE CBC W/AUTO DIFF WBC: CPT

## 2019-12-17 PROCEDURE — 96372 THER/PROPH/DIAG INJ SC/IM: CPT

## 2019-12-17 PROCEDURE — 93010 ELECTROCARDIOGRAM REPORT: CPT | Performed by: INTERNAL MEDICINE

## 2019-12-17 PROCEDURE — 700111 HCHG RX REV CODE 636 W/ 250 OVERRIDE (IP): Performed by: INTERNAL MEDICINE

## 2019-12-17 PROCEDURE — 93005 ELECTROCARDIOGRAM TRACING: CPT | Performed by: INTERNAL MEDICINE

## 2019-12-17 PROCEDURE — G0378 HOSPITAL OBSERVATION PER HR: HCPCS

## 2019-12-17 RX ORDER — ONDANSETRON 4 MG/1
4 TABLET, ORALLY DISINTEGRATING ORAL EVERY 6 HOURS PRN
Qty: 15 TAB | Refills: 0 | Status: SHIPPED | OUTPATIENT
Start: 2019-12-17 | End: 2020-09-18

## 2019-12-17 RX ADMIN — POTASSIUM CHLORIDE: 149 INJECTION, SOLUTION, CONCENTRATE INTRAVENOUS at 01:17

## 2019-12-17 RX ADMIN — FAMOTIDINE 20 MG: 10 INJECTION INTRAVENOUS at 05:57

## 2019-12-17 RX ADMIN — ENOXAPARIN SODIUM 40 MG: 100 INJECTION SUBCUTANEOUS at 05:57

## 2019-12-17 RX ADMIN — MORPHINE SULFATE 2 MG: 4 INJECTION INTRAVENOUS at 01:16

## 2019-12-17 RX ADMIN — ONDANSETRON 4 MG: 2 INJECTION INTRAMUSCULAR; INTRAVENOUS at 01:10

## 2019-12-17 RX ADMIN — CAPSAICIN: 0.25 CREAM TOPICAL at 05:57

## 2019-12-17 RX ADMIN — ONDANSETRON 4 MG: 2 INJECTION INTRAMUSCULAR; INTRAVENOUS at 05:57

## 2019-12-17 ASSESSMENT — PAIN SCALES - WONG BAKER
WONGBAKER_NUMERICALRESPONSE: DOESN'T HURT AT ALL
WONGBAKER_NUMERICALRESPONSE: HURTS AS MUCH AS POSSIBLE

## 2019-12-17 NOTE — DISCHARGE SUMMARY
Discharge Summary    CHIEF COMPLAINT ON ADMISSION  Chief Complaint   Patient presents with   • N/V       Reason for Admission  Abdominal Pain; Nausea/Vomiting     Admission Date  12/16/2019    CODE STATUS  Full Code    HPI & HOSPITAL COURSE  This is a 39 y.o. female here with nausea, vomiting, abdominal pain.  Patient has known medical history of anemia, back pain, diabetes, heartburn, hypertension, indigestion, previous pancreatitis, and sickle cell trait syndrome.  Patient presented to the emergency room with complaints of nausea, vomiting and abdominal pain.  Patient was seen previously for similar issues due to cannabinoid hyperemesis syndrome or cyclic vomiting.  Patient stated she had continued to consume marijuana and last used 2 days prior to admission.  Patient was admitted to CDU for further work-up and monitoring.  Patient was rated symptom management with antiemetics and analgesics as needed.  Patient was initially on IV fluid hydration and electrolytes were replaced as needed.  Patient was given significant education regarding the need to cease using cannabinoid products to which she agreed and states she is going to abstain from this point on.  On exam this morning patient states she feels significantly better and only has occasional nausea but no further vomiting.  Patient again encouraged to avoid marijuana and cannabinoid use.  Patient will need to follow-up with her PCP.  Patient given antiemetics to use at home.  Patient's vital signs are stable.  Patient is tolerating p.o. intake at this time.  Patient is adequately able to hydrate herself.  Patient is up ambulating independently.  Patient states she is ready for discharge at this time.  Work note was provided.       Therefore, she is discharged in good and stable condition to home with close outpatient follow-up.        Discharge Date  12/17/2019    FOLLOW UP ITEMS POST DISCHARGE  Please follow up with PCP   Stop using marijuana     DISCHARGE  DIAGNOSES  Active Problems:    Cannabinoid hyperemesis syndrome (HCC) POA: Yes  Resolved Problems:    * No resolved hospital problems. *      FOLLOW UP    [address] [address] [phone]    Please call or walk in to establish care with a new primary care provider. Thank you.      MEDICATIONS ON DISCHARGE     Medication List      CHANGE how you take these medications      Instructions   ondansetron 4 MG Tbdp  What changed:    · medication strength  · how much to take  · when to take this  Commonly known as:  ZOFRAN ODT   Take 1 Tab by mouth every 6 hours as needed for Nausea.  Dose:  4 mg        CONTINUE taking these medications      Instructions   acetaminophen 325 MG Tabs  Commonly known as:  TYLENOL   Take 2 Tabs by mouth every 6 hours as needed (Mild Pain; (Pain scale 1-3); Temp greater than 100.5 F).  Dose:  650 mg     * dicyclomine 20 MG Tabs  Commonly known as:  BENTYL   Take 1 Tab by mouth every 6 hours.  Dose:  20 mg     * dicyclomine 20 MG Tabs  Commonly known as:  BENTYL   Take 1 Tab by mouth every 6 hours.  Dose:  20 mg     docusate sodium 100 MG Caps  Commonly known as:  COLACE   Take 100 mg by mouth 2 times a day.  Dose:  100 mg     hydrocortisone 25 MG Supp  Commonly known as:  ANUSOL-HC   Insert 1 Suppository in rectum every 12 hours.  Dose:  25 mg     lidocaine 2% Gel  Commonly known as:  XYLOCAINE   Apply 1 Tube to affected area(s) Once PRN (hard stools and painful passage of stool).  Dose:  1 Tube     omeprazole 20 MG delayed-release capsule  Commonly known as:  PRILOSEC   Take 1 Cap by mouth every day.  Dose:  20 mg     polyethylene glycol 3350 Powd  Commonly known as:  MIRALAX   Take 17 g by mouth every day.  Dose:  17 g     prochlorperazine 10 MG Tabs  Commonly known as:  COMPAZINE   Take 1 Tab by mouth every 6 hours as needed.  Dose:  10 mg         * This list has 2 medication(s) that are the same as other medications prescribed for you.  Read the directions carefully, and ask your doctor or other care provider to review them with you.                Allergies  Allergies   Allergen Reactions   • Nkda [No Known Drug Allergy]        DIET  Orders Placed This Encounter   Procedures   • Diet Order Full Liquid     Standing Status:   Standing     Number of Occurrences:   1     Order Specific Question:   Diet:     Answer:   Full Liquid [11]       ACTIVITY  As tolerated.  Weight bearing as tolerated    CONSULTATIONS  None     PROCEDURES  None     LABORATORY  Lab Results   Component Value Date    SODIUM 135 12/17/2019    POTASSIUM 3.8 12/17/2019    CHLORIDE 101 12/17/2019    CO2 28 12/17/2019    GLUCOSE 114 (H) 12/17/2019    BUN 7 (L) 12/17/2019    CREATININE 0.91 12/17/2019    CREATININE 1.0 12/29/2007        Lab Results   Component Value Date    WBC 5.9 12/17/2019    HEMOGLOBIN 14.5 12/17/2019    HEMATOCRIT 44.4 12/17/2019    PLATELETCT 227 12/17/2019

## 2019-12-17 NOTE — PROGRESS NOTES
Resting in bed, no distress, no nausea or vomiting, sr- no ectopy. Call light within reach. To continue to monitor.

## 2019-12-17 NOTE — DISCHARGE INSTRUCTIONS
Discharge Instructions    Discharged to home by car with self. Discharged via walking, hospital escort: Yes.  Special equipment needed: Not Applicable    Be sure to schedule a follow-up appointment with your primary care doctor or any specialists as instructed.     Discharge Plan:   Diet Plan: Discussed  Activity Level: Discussed  Confirmed Follow up Appointment: Patient to Call and Schedule Appointment  Confirmed Symptoms Management: Discussed  Medication Reconciliation Updated: Yes  Influenza Vaccine Indication: Not indicated: Previously immunized this influenza season and > 8 years of age  Influenza Vaccine Given - only chart on this line when given: Influenza Vaccine Given (See MAR)    I understand that a diet low in cholesterol, fat, and sodium is recommended for good health. Unless I have been given specific instructions below for another diet, I accept this instruction as my diet prescription.   Other diet: Regular Diet    Special Instructions: None    · Is patient discharged on Warfarin / Coumadin?   No     Depression / Suicide Risk    As you are discharged from this [facility] facility, it is important to learn how to keep safe from harming yourself.    Recognize the warning signs:  · Abrupt changes in personality, positive or negative- including increase in energy   · Giving away possessions  · Change in eating patterns- significant weight changes-  positive or negative  · Change in sleeping patterns- unable to sleep or sleeping all the time   · Unwillingness or inability to communicate  · Depression  · Unusual sadness, discouragement and loneliness  · Talk of wanting to die  · Neglect of personal appearance   · Rebelliousness- reckless behavior  · Withdrawal from people/activities they love  · Confusion- inability to concentrate     If you or a loved one observes any of these behaviors or has concerns about self-harm, here's what you can do:  · Talk about it- your feelings and reasons for harming  yourself  · Remove any means that you might use to hurt yourself (examples: pills, rope, extension cords, firearm)  · Get professional help from the community (Mental Health, Substance Abuse, psychological counseling)  · Do not be alone:Call your Safe Contact- someone whom you trust who will be there for you.  · Call your local CRISIS HOTLINE 950-2531 or 108-527-4304  · Call your local Children's Mobile Crisis Response Team [place] (950) 328-7930 or wwwCitizenside  · Call the toll free National Suicide Prevention Hotlines   · National Suicide Prevention Lifeline 727-160-IGQW (8676)  · National Omm[employer] Line Network 800-SUICIDE (301-6459)          Discharge Instructions per Katrin Adam, A.P.R.N.    Stop using marijuana and associated products, take warm showers as needed, nausea medication. Stay well hydrated   DIET: As tolerated   ACTIVITY: As tolerated   DIAGNOSIS: Cannabinoid induced Hyperemesis syndrome  Return to ER if increased nausea or vomiting, inability to maintain adequate intake by mouth, or high fevers.

## 2019-12-17 NOTE — CARE PLAN
Problem: Pain  Goal: Alleviation of Pain or a reduction in pain to the patient's comfort goal  Outcome: PROGRESSING AS EXPECTED  Intervention: Pain Management--Medications  Note:   Administer prescribed pain meds.     Problem: Fluid Volume:  Goal: Will maintain balanced intake and output  Outcome: PROGRESSING AS EXPECTED  Intervention: Monitor, educate, and encourage compliance with therapeutic intake of liquids  Note:   Encourage fluid intake. Advance diet as tolerated.

## 2019-12-17 NOTE — CARE PLAN
Problem: Safety  Goal: Free from accidental injury  Outcome: MET     Problem: Pain  Goal: Alleviation of Pain or a reduction in pain to the patient's comfort goal  Outcome: MET

## 2019-12-18 ENCOUNTER — PATIENT OUTREACH (OUTPATIENT)
Dept: HEALTH INFORMATION MANAGEMENT | Facility: OTHER | Age: 39
End: 2019-12-18

## 2019-12-18 NOTE — DISCHARGE PLANNING
Medication reconcilliation completed. Patient left room before meds arrived. Meds delivered to patient at [facility].     Kaela Ballesteros   Home Medication Instructions MADDISON:[id number]    Printed on:12/17/19 8180   Medication Information                      ondansetron (ZOFRAN ODT) 4 MG TABLET DISPERSIBLE  Take 1 Tab by mouth every 6 hours as needed for Nausea.

## 2019-12-22 NOTE — H&P
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 infection, bleeding, scarring, damage to other organs in the area of operation. Specifically organs that can be damaged are bowel, bladder, ureters. I also discussed with the patient the risk of wound infection and wound breakdown. We discussed that these risks are greater in people that have diabetes or obesity. I also discussed the risk of emergency blood transfusion during procedure as well as emergency hysterectomy during procedure.    Discussed today with a risks of tubal ligation. I discussed that the tubal is considered a permanent procedure and the patient will no longer be able to bear children following a tubal. I discussed other forms of birth control which include pills, barrier methods, Depo-Provera, IUD or male sterilization. We discussed  medications or procedures are nonpermanent nor are they surgical. I also discussed the risk of tubal failure with the patient which is one in 200 procedures. We discussed that should the tubal fail there is a risk for ectopic pregnancy  which may require surgical intervention.    Patient had the opportunity to ask questions regarding procedures. All questions answered to the patient's satisfaction.    Proceed with  delivery and tubal ligation                   Yes

## 2019-12-23 NOTE — PROGRESS NOTES
12/23. Made 3x follow up call attempts after d/c. No return calls. Kaela will be d/c from [facility] services due to loss of contact.

## 2020-07-24 ENCOUNTER — HOSPITAL ENCOUNTER (OUTPATIENT)
Dept: RADIOLOGY | Facility: MEDICAL CENTER | Age: 40
End: 2020-07-24
Attending: STUDENT IN AN ORGANIZED HEALTH CARE EDUCATION/TRAINING PROGRAM
Payer: MEDICAID

## 2020-07-24 DIAGNOSIS — M79.641 PAIN OF RIGHT HAND: ICD-10-CM

## 2020-07-24 PROCEDURE — 73221 MRI JOINT UPR EXTREM W/O DYE: CPT | Mod: RT

## 2020-07-24 PROCEDURE — 73218 MRI UPPER EXTREMITY W/O DYE: CPT | Mod: RT

## 2020-09-18 ENCOUNTER — HOSPITAL ENCOUNTER (EMERGENCY)
Facility: MEDICAL CENTER | Age: 40
End: 2020-09-18
Attending: EMERGENCY MEDICINE
Payer: MEDICAID

## 2020-09-18 VITALS
SYSTOLIC BLOOD PRESSURE: 112 MMHG | BODY MASS INDEX: 33.74 KG/M2 | RESPIRATION RATE: 18 BRPM | HEIGHT: 72 IN | HEART RATE: 87 BPM | OXYGEN SATURATION: 95 % | WEIGHT: 249.12 LBS | TEMPERATURE: 97.9 F | DIASTOLIC BLOOD PRESSURE: 81 MMHG

## 2020-09-18 DIAGNOSIS — R11.2 NON-INTRACTABLE VOMITING WITH NAUSEA, UNSPECIFIED VOMITING TYPE: ICD-10-CM

## 2020-09-18 DIAGNOSIS — R19.7 DIARRHEA, UNSPECIFIED TYPE: ICD-10-CM

## 2020-09-18 LAB
ALBUMIN SERPL BCP-MCNC: 4.4 G/DL (ref 3.2–4.9)
ALBUMIN/GLOB SERPL: 1.4 G/DL
ALP SERPL-CCNC: 60 U/L (ref 30–99)
ALT SERPL-CCNC: 30 U/L (ref 2–50)
ANION GAP SERPL CALC-SCNC: 13 MMOL/L (ref 7–16)
AST SERPL-CCNC: 15 U/L (ref 12–45)
BASOPHILS # BLD AUTO: 0.3 % (ref 0–1.8)
BASOPHILS # BLD: 0.02 K/UL (ref 0–0.12)
BILIRUB SERPL-MCNC: 0.5 MG/DL (ref 0.1–1.5)
BUN SERPL-MCNC: 9 MG/DL (ref 8–22)
CALCIUM SERPL-MCNC: 9.6 MG/DL (ref 8.5–10.5)
CHLORIDE SERPL-SCNC: 102 MMOL/L (ref 96–112)
CO2 SERPL-SCNC: 22 MMOL/L (ref 20–33)
CREAT SERPL-MCNC: 0.79 MG/DL (ref 0.5–1.4)
EOSINOPHIL # BLD AUTO: 0.02 K/UL (ref 0–0.51)
EOSINOPHIL NFR BLD: 0.3 % (ref 0–6.9)
ERYTHROCYTE [DISTWIDTH] IN BLOOD BY AUTOMATED COUNT: 37.9 FL (ref 35.9–50)
GLOBULIN SER CALC-MCNC: 3.1 G/DL (ref 1.9–3.5)
GLUCOSE SERPL-MCNC: 139 MG/DL (ref 65–99)
HCT VFR BLD AUTO: 43.3 % (ref 37–47)
HGB BLD-MCNC: 14.3 G/DL (ref 12–16)
IMM GRANULOCYTES # BLD AUTO: 0.02 K/UL (ref 0–0.11)
IMM GRANULOCYTES NFR BLD AUTO: 0.3 % (ref 0–0.9)
LIPASE SERPL-CCNC: 19 U/L (ref 11–82)
LYMPHOCYTES # BLD AUTO: 0.38 K/UL (ref 1–4.8)
LYMPHOCYTES NFR BLD: 6.4 % (ref 22–41)
MCH RBC QN AUTO: 27.8 PG (ref 27–33)
MCHC RBC AUTO-ENTMCNC: 33 G/DL (ref 33.6–35)
MCV RBC AUTO: 84.1 FL (ref 81.4–97.8)
MONOCYTES # BLD AUTO: 0.29 K/UL (ref 0–0.85)
MONOCYTES NFR BLD AUTO: 4.9 % (ref 0–13.4)
NEUTROPHILS # BLD AUTO: 5.18 K/UL (ref 2–7.15)
NEUTROPHILS NFR BLD: 87.8 % (ref 44–72)
NRBC # BLD AUTO: 0 K/UL
NRBC BLD-RTO: 0 /100 WBC
PLATELET # BLD AUTO: 212 K/UL (ref 164–446)
PMV BLD AUTO: 10.4 FL (ref 9–12.9)
POTASSIUM SERPL-SCNC: 4.2 MMOL/L (ref 3.6–5.5)
PROT SERPL-MCNC: 7.5 G/DL (ref 6–8.2)
RBC # BLD AUTO: 5.15 M/UL (ref 4.2–5.4)
SODIUM SERPL-SCNC: 137 MMOL/L (ref 135–145)
WBC # BLD AUTO: 5.9 K/UL (ref 4.8–10.8)

## 2020-09-18 PROCEDURE — 83690 ASSAY OF LIPASE: CPT

## 2020-09-18 PROCEDURE — 99284 EMERGENCY DEPT VISIT MOD MDM: CPT

## 2020-09-18 PROCEDURE — 96374 THER/PROPH/DIAG INJ IV PUSH: CPT

## 2020-09-18 PROCEDURE — 80053 COMPREHEN METABOLIC PANEL: CPT

## 2020-09-18 PROCEDURE — 700105 HCHG RX REV CODE 258: Performed by: EMERGENCY MEDICINE

## 2020-09-18 PROCEDURE — 700111 HCHG RX REV CODE 636 W/ 250 OVERRIDE (IP): Performed by: EMERGENCY MEDICINE

## 2020-09-18 PROCEDURE — 85025 COMPLETE CBC W/AUTO DIFF WBC: CPT

## 2020-09-18 PROCEDURE — 96375 TX/PRO/DX INJ NEW DRUG ADDON: CPT

## 2020-09-18 RX ORDER — ONDANSETRON 2 MG/ML
4 INJECTION INTRAMUSCULAR; INTRAVENOUS ONCE
Status: COMPLETED | OUTPATIENT
Start: 2020-09-18 | End: 2020-09-18

## 2020-09-18 RX ORDER — KETOROLAC TROMETHAMINE 30 MG/ML
30 INJECTION, SOLUTION INTRAMUSCULAR; INTRAVENOUS ONCE
Status: COMPLETED | OUTPATIENT
Start: 2020-09-18 | End: 2020-09-18

## 2020-09-18 RX ORDER — GABAPENTIN 100 MG/1
100 CAPSULE ORAL
Status: SHIPPED | COMMUNITY
End: 2022-03-10

## 2020-09-18 RX ORDER — SODIUM CHLORIDE, SODIUM LACTATE, POTASSIUM CHLORIDE, CALCIUM CHLORIDE 600; 310; 30; 20 MG/100ML; MG/100ML; MG/100ML; MG/100ML
1000 INJECTION, SOLUTION INTRAVENOUS ONCE
Status: COMPLETED | OUTPATIENT
Start: 2020-09-18 | End: 2020-09-18

## 2020-09-18 RX ORDER — ONDANSETRON 4 MG/1
4 TABLET, ORALLY DISINTEGRATING ORAL EVERY 6 HOURS PRN
Qty: 10 TAB | Refills: 0 | Status: SHIPPED | OUTPATIENT
Start: 2020-09-18 | End: 2022-03-10

## 2020-09-18 RX ADMIN — SODIUM CHLORIDE, POTASSIUM CHLORIDE, SODIUM LACTATE AND CALCIUM CHLORIDE 1000 ML: 600; 310; 30; 20 INJECTION, SOLUTION INTRAVENOUS at 06:37

## 2020-09-18 RX ADMIN — KETOROLAC TROMETHAMINE 30 MG: 30 INJECTION, SOLUTION INTRAMUSCULAR at 06:37

## 2020-09-18 RX ADMIN — ONDANSETRON HYDROCHLORIDE 4 MG: 2 SOLUTION INTRAMUSCULAR; INTRAVENOUS at 06:37

## 2020-09-18 ASSESSMENT — FIBROSIS 4 INDEX: FIB4 SCORE: 0.55

## 2020-09-18 ASSESSMENT — PAIN DESCRIPTION - DESCRIPTORS: DESCRIPTORS: CRAMPING;SHARP

## 2020-09-18 NOTE — Clinical Note
Kaela Ballesteros was seen and treated in our emergency department on 9/18/2020.  She may return to work on 09/20/2020.       If you have any questions or concerns, please don't hesitate to call.      Dhara Huffman M.D.

## 2020-09-18 NOTE — ED TRIAGE NOTES
"Chief Complaint   Patient presents with   • Abdominal Pain     Pt complaining of 8/10 cramping BUQ ab pain starting after she ate \"ceviche\" and took her gabapentin 200 mg last night at 2100. Pt stated she tried to go to sleep but she began vomitting at 0100 and was unable to sleep. Denies blood in vomit, urine or stool.      Pt amb to triage with steady gait for above complaint.   Pt is alert and oriented, speaking in full sentences, follows commands and responds appropriately to questions. Resp are even and unlabored.   Patient vomited green liquid in triage       Pt placed in lobby. Pt educated on triage process. Pt encouraged to alert staff for any changes.    "

## 2020-09-18 NOTE — ED PROVIDER NOTES
"ED Provider Note    Scribed for Dhara Huffman M.D. by Halina Chávez. 9/18/2020  6:14 AM    Primary care provider: Darnell Torres M.D.  Means of arrival: walk in   History obtained from: patient   History limited by: none     CHIEF COMPLAINT  Chief Complaint   Patient presents with   • Abdominal Pain     Pt complaining of 8/10 cramping BUQ ab pain starting after she ate \"ceviche\" and took her gabapentin 200 mg last night at 2100. Pt stated she tried to go to sleep but she began vomitting at 0100 and was unable to sleep. Denies blood in vomit, urine or stool.        HPI  Kaela Ballesteros is a 39 y.o. female who presents to the Emergency Department for evaluation of consistent abdominal pain onset yesterday after eating ceviche and shrimp pasta. Patient has associated vomiting, diarrhea, swelling in wrists, shoulder pain, and neck pain. Patient states that her pain feels \" a tightening feeling and like someone is stabbing me\". She reports 2 episodes of vomiting and 3 episodes of diarrhea. Patient denies any blood in stool, mucous in stool, dysuria, blood present in urine. She denies any history of diabetes, hypertension, or cardiac disease. She denies any known history of allergies. Patient reports a surgical history of hysterectomy and cholecystectomy.     REVIEW OF SYSTEMS  GI: vomiting, diarrhea, abdominal pain.   : no dysuria, back pain, or hematuria. No mucous in stool, no blood in stool.  Neuro: no weakness, numbness, aphasia, or headache  Musculoskeletal: swelling, neck pain and shoulder pain. No deformity, pain, or joint swelling    See history of present illness. All other systems are negative. C.    PAST MEDICAL HISTORY   has a past medical history of Anemia (2017), Backpain (2001), Diabetes (HCC) (2017), Heart burn, Hypertension (2017), Indigestion (2006), Infectious disease (1995), Other specified symptom associated with female genital organs (2012), Pain (03/03/15), Pancreatitis " (), Sickle cell trait syndrome, Urinary incontinence, and Urinary tract infection.    SURGICAL HISTORY   has a past surgical history that includes pelviscopy (2012); dilation and curettage (12/3/2012); egd w/endoscopic ultrasound (2015); gastroscopy with biopsy (2015); primary c section (2015); primary c section with tubal ligation (2017); abdominal exploration (); cholecystectomy; vaginal hysterectomy scope total (2019); salpingectomy (Left, 2019); anterior and posterior repair (2019); enterocele repair (2019); bladder sling female (2019); and vaginal suspension (2019).    SOCIAL HISTORY  Social History     Tobacco Use   • Smoking status: Former Smoker     Packs/day: 0.50     Years: 15.00     Pack years: 7.50     Types: Cigarettes     Quit date: 2011     Years since quittin.7   • Smokeless tobacco: Never Used   • Tobacco comment: occasional   Substance Use Topics   • Alcohol use: Yes     Comment: occasionally   • Drug use: Not Currently     Types: Marijuana, Inhaled     Comment: marijuana       Social History     Substance and Sexual Activity   Drug Use Not Currently   • Types: Marijuana, Inhaled    Comment: marijuana        FAMILY HISTORY  Family History   Problem Relation Age of Onset   • Diabetes Father    • Diabetes Brother    • Diabetes Paternal Grandmother        CURRENT MEDICATIONS  No current facility-administered medications for this encounter.         ALLERGIES  Allergies   Allergen Reactions   • Nkda [No Known Drug Allergy]        PHYSICAL EXAM  VITAL SIGNS: /87   Pulse 94   Temp 36.4 °C (97.5 °F) (Temporal)   Resp 16   Ht 1.829 m (6')   Wt 113 kg (249 lb 1.9 oz)   LMP 2019   SpO2 97%   BMI 33.79 kg/m²     Constitutional: Well developed, Well nourished, No acute distress, Non-toxic appearance. Appears uncomfortable.   HEENT: Normocephalic, Atraumatic,  external ears normal, pharynx pink,  Mucous  Membranes moist, No  rhinorrhea or mucosal edema  Eyes: PERRL, EOMI, Conjunctiva normal, No discharge.   Neck: Normal range of motion, No tenderness, Supple, No stridor.   Lymphatic: No lymphadenopathy    Cardiovascular: Regular Rate and Rhythm, No murmurs,  rubs, or gallops.   Thorax & Lungs: Lungs clear to auscultation bilaterally, No respiratory distress, No wheezes, rhales or rhonchi, No chest wall tenderness.   Abdomen: Bowel sounds normal, Soft, non tender, non distended,  No pulsatile masses., no rebound guarding or peritoneal signs. General soreness from vomiting, no reproducible tenderness.   Skin: Warm, Dry, No erythema, No rash,   Back:  No CVA tenderness,  No spinal tenderness, bony crepitance, step offs, or instability.   Neurologic: Alert & oriented x 3, Normal motor function, Normal sensory function, No focal deficits noted. Normal reflexes. Normal Cranial Nerves.  Extremities: Equal, intact distal pulses, No cyanosis, clubbing or edema,  No tenderness.   Musculoskeletal: Good range of motion in all major joints. No tenderness to palpation or major deformities noted.     DIAGNOSTIC STUDIES / PROCEDURES    LABS  Results for orders placed or performed during the hospital encounter of 09/18/20   CBC WITH DIFFERENTIAL   Result Value Ref Range    WBC 5.9 4.8 - 10.8 K/uL    RBC 5.15 4.20 - 5.40 M/uL    Hemoglobin 14.3 12.0 - 16.0 g/dL    Hematocrit 43.3 37.0 - 47.0 %    MCV 84.1 81.4 - 97.8 fL    MCH 27.8 27.0 - 33.0 pg    MCHC 33.0 (L) 33.6 - 35.0 g/dL    RDW 37.9 35.9 - 50.0 fL    Platelet Count 212 164 - 446 K/uL    MPV 10.4 9.0 - 12.9 fL    Neutrophils-Polys 87.80 (H) 44.00 - 72.00 %    Lymphocytes 6.40 (L) 22.00 - 41.00 %    Monocytes 4.90 0.00 - 13.40 %    Eosinophils 0.30 0.00 - 6.90 %    Basophils 0.30 0.00 - 1.80 %    Immature Granulocytes 0.30 0.00 - 0.90 %    Nucleated RBC 0.00 /100 WBC    Neutrophils (Absolute) 5.18 2.00 - 7.15 K/uL    Lymphs (Absolute) 0.38 (L) 1.00 - 4.80 K/uL    Monos (Absolute) 0.29 0.00 - 0.85  K/uL    Eos (Absolute) 0.02 0.00 - 0.51 K/uL    Baso (Absolute) 0.02 0.00 - 0.12 K/uL    Immature Granulocytes (abs) 0.02 0.00 - 0.11 K/uL    NRBC (Absolute) 0.00 K/uL   COMP METABOLIC PANEL   Result Value Ref Range    Sodium 137 135 - 145 mmol/L    Potassium 4.2 3.6 - 5.5 mmol/L    Chloride 102 96 - 112 mmol/L    Co2 22 20 - 33 mmol/L    Anion Gap 13.0 7.0 - 16.0    Glucose 139 (H) 65 - 99 mg/dL    Bun 9 8 - 22 mg/dL    Creatinine 0.79 0.50 - 1.40 mg/dL    Calcium 9.6 8.5 - 10.5 mg/dL    AST(SGOT) 15 12 - 45 U/L    ALT(SGPT) 30 2 - 50 U/L    Alkaline Phosphatase 60 30 - 99 U/L    Total Bilirubin 0.5 0.1 - 1.5 mg/dL    Albumin 4.4 3.2 - 4.9 g/dL    Total Protein 7.5 6.0 - 8.2 g/dL    Globulin 3.1 1.9 - 3.5 g/dL    A-G Ratio 1.4 g/dL   LIPASE   Result Value Ref Range    Lipase 19 11 - 82 U/L   ESTIMATED GFR   Result Value Ref Range    GFR If African American >60 >60 mL/min/1.73 m 2    GFR If Non African American >60 >60 mL/min/1.73 m 2      All labs reviewed by me.    COURSE & MEDICAL DECISION MAKING  Nursing notes, VS, PMSFHx reviewed in chart.    6:14 AM Patient seen and examined at bedside. Patient will be treated with Toradol injection. Zofran injection 4 mg, and Bolus. Intravenous fluids administered for dehydration and vomiting. Ordered CBC with differential, CMP, Lipase to evaluate her symptoms. The diffrential diagnoses include but are not limited to: dehydration, kidney failure, elevated liver enzymes, pancreatitis.  I informed the patient the need for labs and radiology to rule out any emergent processes. Currently awaiting labs and radiology results before deciding if intervention is necessary. Patient will be informed on the results once I have reviewed them. At this time, the patient was given an opportunity to ask questions. Patient verbalizes understanding and agreement to this plan of care.      7:49 AM - Patient is resting comfortably and her labs are normal. She states that she is feeling better and  would like to go home. I updated the patient on the results of their lab results.I discussed with the patient that they did not require any emergent interventions at this time. Plan for oral challenge and discharge home.     8:26 AM - Patient states that she is feeling much better, and looks significantly improved. She has been able to keep down a few sips of water. Plan for discharge after IV fluids.     8:30 AM - Patient was reevaluated at bedside. At this time, the patient was given an opportunity to ask questions. I discussed a plan of discharge with the patient, informing them to return to the ED for any new or worsening symptoms. I informed the patient that I would be writing her a prescription for Zofran for any lingering nausea and/or vomiting. I additionally spoke to the patient in regard to her high blood sugar levels. Patient informs me that she has gestational diabetes. I advised the patient to monitor her blood sugar to prevent type II diabetes. Patient verbalizes understanding and agreement to this plan of discharge.     PPE Note: I personally donned full PPE for all patient encounters during this visit, including being clean-shaven with an N95 respirator mask, gloves.     Scribe remained outside the patient's room and did not have any contact with the patient for the duration of patient encounter.        The patient will return for new or worsening symptoms and is stable at the time of discharge.    The patient is referred to a primary physician for blood pressure management, diabetic screening, and for all other preventative health concerns.    DISPOSITION:  Patient will be discharged home in stable condition.    FOLLOW UP:  Darnell Torres M.D.  [address] [place] [address] [id number]  [address]  [facility] [address]  [phone]    Call in 3 days  for recheck, As needed, If symptoms worsen    [facility], Emergency Dept  [address] [facility] [id number] [phone]    As needed, If symptoms  worsen      OUTPATIENT MEDICATIONS:  Discharge Medication List as of 9/18/2020  8:51 AM      START taking these medications    Details   ondansetron (ZOFRAN ODT) 4 MG TABLET DISPERSIBLE Take 1 Tab by mouth every 6 hours as needed., Disp-10 Tab,R-0, Normal              FINAL IMPRESSION  1. Non-intractable vomiting with nausea, unspecified vomiting type    2. Diarrhea, unspecified type          Halina LERNER), jona scribing for, and in the presence of, Dhara Huffman M.D..    Electronically signed by: Halina Chávez (Glenn), 9/18/2020    IDhara M.D. personally performed the services described in this documentation, as scribed by Halina Chávez in my presence, and it is both accurate and complete. C    The note accurately reflects work and decisions made by me.  Dhara Huffman M.D.  9/18/2020  9:37 AM

## 2020-09-29 ENCOUNTER — PRE-ADMISSION TESTING (OUTPATIENT)
Dept: ADMISSIONS | Facility: MEDICAL CENTER | Age: 40
End: 2020-09-29
Attending: PLASTIC SURGERY
Payer: MEDICAID

## 2020-09-29 DIAGNOSIS — Z01.812 PRE-OPERATIVE LABORATORY EXAMINATION: ICD-10-CM

## 2020-09-29 LAB
COVID ORDER STATUS COVID19: NORMAL
SARS-COV-2 RNA RESP QL NAA+PROBE: NOTDETECTED
SPECIMEN SOURCE: NORMAL

## 2020-09-29 PROCEDURE — C9803 HOPD COVID-19 SPEC COLLECT: HCPCS

## 2020-09-29 PROCEDURE — U0003 INFECTIOUS AGENT DETECTION BY NUCLEIC ACID (DNA OR RNA); SEVERE ACUTE RESPIRATORY SYNDROME CORONAVIRUS 2 (SARS-COV-2) (CORONAVIRUS DISEASE [COVID-19]), AMPLIFIED PROBE TECHNIQUE, MAKING USE OF HIGH THROUGHPUT TECHNOLOGIES AS DESCRIBED BY CMS-2020-01-R: HCPCS

## 2020-09-29 ASSESSMENT — FIBROSIS 4 INDEX: FIB4 SCORE: 0.54

## 2020-10-01 ENCOUNTER — HOSPITAL ENCOUNTER (OUTPATIENT)
Facility: MEDICAL CENTER | Age: 40
End: 2020-10-01
Attending: PLASTIC SURGERY | Admitting: PLASTIC SURGERY
Payer: MEDICAID

## 2020-10-01 ENCOUNTER — ANESTHESIA (OUTPATIENT)
Dept: SURGERY | Facility: MEDICAL CENTER | Age: 40
End: 2020-10-01
Payer: MEDICAID

## 2020-10-01 ENCOUNTER — ANESTHESIA EVENT (OUTPATIENT)
Dept: SURGERY | Facility: MEDICAL CENTER | Age: 40
End: 2020-10-01
Payer: MEDICAID

## 2020-10-01 VITALS
TEMPERATURE: 98.1 F | BODY MASS INDEX: 34.04 KG/M2 | HEIGHT: 72 IN | WEIGHT: 251.32 LBS | SYSTOLIC BLOOD PRESSURE: 125 MMHG | DIASTOLIC BLOOD PRESSURE: 82 MMHG | OXYGEN SATURATION: 100 % | RESPIRATION RATE: 20 BRPM | HEART RATE: 68 BPM

## 2020-10-01 DIAGNOSIS — G89.18 ACUTE POST-OPERATIVE PAIN: ICD-10-CM

## 2020-10-01 LAB — PATHOLOGY CONSULT NOTE: NORMAL

## 2020-10-01 PROCEDURE — 700102 HCHG RX REV CODE 250 W/ 637 OVERRIDE(OP): Performed by: PLASTIC SURGERY

## 2020-10-01 PROCEDURE — 88305 TISSUE EXAM BY PATHOLOGIST: CPT | Mod: 59

## 2020-10-01 PROCEDURE — 160047 HCHG PACU  - EA ADDL 30 MINS PHASE II: Performed by: PLASTIC SURGERY

## 2020-10-01 PROCEDURE — 160036 HCHG PACU - EA ADDL 30 MINS PHASE I: Performed by: PLASTIC SURGERY

## 2020-10-01 PROCEDURE — 160035 HCHG PACU - 1ST 60 MINS PHASE I: Performed by: PLASTIC SURGERY

## 2020-10-01 PROCEDURE — 160002 HCHG RECOVERY MINUTES (STAT): Performed by: PLASTIC SURGERY

## 2020-10-01 PROCEDURE — 700111 HCHG RX REV CODE 636 W/ 250 OVERRIDE (IP): Performed by: PLASTIC SURGERY

## 2020-10-01 PROCEDURE — A9270 NON-COVERED ITEM OR SERVICE: HCPCS | Performed by: ANESTHESIOLOGY

## 2020-10-01 PROCEDURE — 160025 RECOVERY II MINUTES (STATS): Performed by: PLASTIC SURGERY

## 2020-10-01 PROCEDURE — 700111 HCHG RX REV CODE 636 W/ 250 OVERRIDE (IP): Performed by: ANESTHESIOLOGY

## 2020-10-01 PROCEDURE — 700105 HCHG RX REV CODE 258: Performed by: PLASTIC SURGERY

## 2020-10-01 PROCEDURE — 500054 HCHG BANDAGE, ELASTIC 6: Performed by: PLASTIC SURGERY

## 2020-10-01 PROCEDURE — 160046 HCHG PACU - 1ST 60 MINS PHASE II: Performed by: PLASTIC SURGERY

## 2020-10-01 PROCEDURE — 700102 HCHG RX REV CODE 250 W/ 637 OVERRIDE(OP): Performed by: ANESTHESIOLOGY

## 2020-10-01 PROCEDURE — 160029 HCHG SURGERY MINUTES - 1ST 30 MINS LEVEL 4: Performed by: PLASTIC SURGERY

## 2020-10-01 PROCEDURE — 160048 HCHG OR STATISTICAL LEVEL 1-5: Performed by: PLASTIC SURGERY

## 2020-10-01 PROCEDURE — A9270 NON-COVERED ITEM OR SERVICE: HCPCS | Performed by: PLASTIC SURGERY

## 2020-10-01 PROCEDURE — 160009 HCHG ANES TIME/MIN: Performed by: PLASTIC SURGERY

## 2020-10-01 PROCEDURE — 501838 HCHG SUTURE GENERAL: Performed by: PLASTIC SURGERY

## 2020-10-01 PROCEDURE — 160041 HCHG SURGERY MINUTES - EA ADDL 1 MIN LEVEL 4: Performed by: PLASTIC SURGERY

## 2020-10-01 PROCEDURE — 700101 HCHG RX REV CODE 250: Performed by: ANESTHESIOLOGY

## 2020-10-01 RX ORDER — DIPHENHYDRAMINE HYDROCHLORIDE 50 MG/ML
12.5 INJECTION INTRAMUSCULAR; INTRAVENOUS
Status: DISCONTINUED | OUTPATIENT
Start: 2020-10-01 | End: 2020-10-01 | Stop reason: HOSPADM

## 2020-10-01 RX ORDER — OXYCODONE HCL 5 MG/5 ML
10 SOLUTION, ORAL ORAL
Status: COMPLETED | OUTPATIENT
Start: 2020-10-01 | End: 2020-10-01

## 2020-10-01 RX ORDER — SODIUM CHLORIDE, SODIUM LACTATE, POTASSIUM CHLORIDE, CALCIUM CHLORIDE 600; 310; 30; 20 MG/100ML; MG/100ML; MG/100ML; MG/100ML
INJECTION, SOLUTION INTRAVENOUS CONTINUOUS
Status: DISCONTINUED | OUTPATIENT
Start: 2020-10-01 | End: 2020-10-01 | Stop reason: HOSPADM

## 2020-10-01 RX ORDER — ONDANSETRON 2 MG/ML
INJECTION INTRAMUSCULAR; INTRAVENOUS PRN
Status: DISCONTINUED | OUTPATIENT
Start: 2020-10-01 | End: 2020-10-01 | Stop reason: SURG

## 2020-10-01 RX ORDER — HYDROMORPHONE HYDROCHLORIDE 1 MG/ML
0.1 INJECTION, SOLUTION INTRAMUSCULAR; INTRAVENOUS; SUBCUTANEOUS
Status: DISCONTINUED | OUTPATIENT
Start: 2020-10-01 | End: 2020-10-01 | Stop reason: HOSPADM

## 2020-10-01 RX ORDER — EPINEPHRINE 1 MG/ML(1)
AMPUL (ML) INJECTION
Status: DISCONTINUED
Start: 2020-10-01 | End: 2020-10-01 | Stop reason: HOSPADM

## 2020-10-01 RX ORDER — MEPERIDINE HYDROCHLORIDE 25 MG/ML
12.5 INJECTION INTRAMUSCULAR; INTRAVENOUS; SUBCUTANEOUS
Status: DISCONTINUED | OUTPATIENT
Start: 2020-10-01 | End: 2020-10-01 | Stop reason: HOSPADM

## 2020-10-01 RX ORDER — OXYCODONE HYDROCHLORIDE AND ACETAMINOPHEN 5; 325 MG/1; MG/1
1-2 TABLET ORAL EVERY 4 HOURS PRN
Qty: 15 TAB | Refills: 0 | Status: SHIPPED | OUTPATIENT
Start: 2020-10-01 | End: 2020-10-08

## 2020-10-01 RX ORDER — HALOPERIDOL 5 MG/ML
1 INJECTION INTRAMUSCULAR
Status: DISCONTINUED | OUTPATIENT
Start: 2020-10-01 | End: 2020-10-01 | Stop reason: HOSPADM

## 2020-10-01 RX ORDER — HYDROMORPHONE HYDROCHLORIDE 1 MG/ML
0.4 INJECTION, SOLUTION INTRAMUSCULAR; INTRAVENOUS; SUBCUTANEOUS
Status: DISCONTINUED | OUTPATIENT
Start: 2020-10-01 | End: 2020-10-01 | Stop reason: HOSPADM

## 2020-10-01 RX ORDER — OXYCODONE HCL 5 MG/5 ML
5 SOLUTION, ORAL ORAL
Status: COMPLETED | OUTPATIENT
Start: 2020-10-01 | End: 2020-10-01

## 2020-10-01 RX ORDER — HYDROMORPHONE HYDROCHLORIDE 1 MG/ML
0.2 INJECTION, SOLUTION INTRAMUSCULAR; INTRAVENOUS; SUBCUTANEOUS
Status: DISCONTINUED | OUTPATIENT
Start: 2020-10-01 | End: 2020-10-01 | Stop reason: HOSPADM

## 2020-10-01 RX ORDER — ONDANSETRON 2 MG/ML
4 INJECTION INTRAMUSCULAR; INTRAVENOUS
Status: DISCONTINUED | OUTPATIENT
Start: 2020-10-01 | End: 2020-10-01 | Stop reason: HOSPADM

## 2020-10-01 RX ORDER — BUPIVACAINE HYDROCHLORIDE 2.5 MG/ML
INJECTION, SOLUTION EPIDURAL; INFILTRATION; INTRACAUDAL
Status: DISCONTINUED
Start: 2020-10-01 | End: 2020-10-01 | Stop reason: HOSPADM

## 2020-10-01 RX ORDER — BUPIVACAINE HYDROCHLORIDE 2.5 MG/ML
INJECTION, SOLUTION EPIDURAL; INFILTRATION; INTRACAUDAL
Status: DISCONTINUED | OUTPATIENT
Start: 2020-10-01 | End: 2020-10-01 | Stop reason: HOSPADM

## 2020-10-01 RX ORDER — DEXAMETHASONE SODIUM PHOSPHATE 4 MG/ML
INJECTION, SOLUTION INTRA-ARTICULAR; INTRALESIONAL; INTRAMUSCULAR; INTRAVENOUS; SOFT TISSUE PRN
Status: DISCONTINUED | OUTPATIENT
Start: 2020-10-01 | End: 2020-10-01 | Stop reason: SURG

## 2020-10-01 RX ORDER — CEFAZOLIN SODIUM 1 G/3ML
INJECTION, POWDER, FOR SOLUTION INTRAMUSCULAR; INTRAVENOUS PRN
Status: DISCONTINUED | OUTPATIENT
Start: 2020-10-01 | End: 2020-10-01 | Stop reason: SURG

## 2020-10-01 RX ORDER — ONDANSETRON 4 MG/1
4 TABLET, FILM COATED ORAL EVERY 6 HOURS PRN
Qty: 10 TAB | Refills: 1 | Status: SHIPPED | OUTPATIENT
Start: 2020-10-01 | End: 2022-03-10

## 2020-10-01 RX ADMIN — DEXAMETHASONE SODIUM PHOSPHATE 4 MG: 4 INJECTION, SOLUTION INTRA-ARTICULAR; INTRALESIONAL; INTRAMUSCULAR; INTRAVENOUS; SOFT TISSUE at 13:47

## 2020-10-01 RX ADMIN — OXYCODONE HYDROCHLORIDE 10 MG: 5 SOLUTION ORAL at 15:11

## 2020-10-01 RX ADMIN — HYDROMORPHONE HYDROCHLORIDE 0.2 MG: 1 INJECTION, SOLUTION INTRAMUSCULAR; INTRAVENOUS; SUBCUTANEOUS at 15:47

## 2020-10-01 RX ADMIN — FENTANYL CITRATE 100 MCG: 50 INJECTION, SOLUTION INTRAMUSCULAR; INTRAVENOUS at 13:21

## 2020-10-01 RX ADMIN — POVIDONE IODINE 15 ML: 100 SOLUTION TOPICAL at 12:53

## 2020-10-01 RX ADMIN — FENTANYL CITRATE 50 MCG: 50 INJECTION, SOLUTION INTRAMUSCULAR; INTRAVENOUS at 14:50

## 2020-10-01 RX ADMIN — PROPOFOL 200 MG: 10 INJECTION, EMULSION INTRAVENOUS at 13:23

## 2020-10-01 RX ADMIN — FENTANYL CITRATE 50 MCG: 50 INJECTION INTRAMUSCULAR; INTRAVENOUS at 15:28

## 2020-10-01 RX ADMIN — FENTANYL CITRATE 50 MCG: 50 INJECTION, SOLUTION INTRAMUSCULAR; INTRAVENOUS at 13:40

## 2020-10-01 RX ADMIN — CEFAZOLIN 2 G: 330 INJECTION, POWDER, FOR SOLUTION INTRAMUSCULAR; INTRAVENOUS at 13:21

## 2020-10-01 RX ADMIN — ROCURONIUM BROMIDE 50 MG: 10 INJECTION, SOLUTION INTRAVENOUS at 14:06

## 2020-10-01 RX ADMIN — FENTANYL CITRATE 50 MCG: 50 INJECTION, SOLUTION INTRAMUSCULAR; INTRAVENOUS at 14:17

## 2020-10-01 RX ADMIN — FENTANYL CITRATE 50 MCG: 50 INJECTION, SOLUTION INTRAMUSCULAR; INTRAVENOUS at 14:30

## 2020-10-01 RX ADMIN — ROCURONIUM BROMIDE 50 MG: 10 INJECTION, SOLUTION INTRAVENOUS at 13:24

## 2020-10-01 RX ADMIN — SODIUM CHLORIDE, POTASSIUM CHLORIDE, SODIUM LACTATE AND CALCIUM CHLORIDE: 600; 310; 30; 20 INJECTION, SOLUTION INTRAVENOUS at 13:21

## 2020-10-01 RX ADMIN — ROCURONIUM BROMIDE 20 MG: 10 INJECTION, SOLUTION INTRAVENOUS at 14:30

## 2020-10-01 RX ADMIN — SUGAMMADEX 400 MG: 100 INJECTION, SOLUTION INTRAVENOUS at 14:49

## 2020-10-01 RX ADMIN — ONDANSETRON 4 MG: 2 INJECTION INTRAMUSCULAR; INTRAVENOUS at 13:47

## 2020-10-01 RX ADMIN — FENTANYL CITRATE 50 MCG: 50 INJECTION, SOLUTION INTRAMUSCULAR; INTRAVENOUS at 13:54

## 2020-10-01 ASSESSMENT — PAIN DESCRIPTION - PAIN TYPE
TYPE: SURGICAL PAIN

## 2020-10-01 ASSESSMENT — PAIN SCALES - GENERAL: PAIN_LEVEL: 1

## 2020-10-01 ASSESSMENT — FIBROSIS 4 INDEX: FIB4 SCORE: 0.54

## 2020-10-01 NOTE — OR NURSING
1628 to pacu 2 awake alert 96% on roomair report recvd from Eden martinez pt tolerating fluids without nausea states pain at tolerable level at this time   1700 resting comfortable awaiting spouse to arrive   1731 meets criteria to dc to home spouse here reviewed all dc instructions iv removed escorted out to car with all belongings accomp with cna and spouse   
COVID-19 Pre-surgery screenin. Do you have an undiagnosed respiratory illness or symptoms such as coughing or sneezing? NO (Yes/No)  a. Onset of Sx -  b. Acute vs. chronic respiratory illness -    2. Do you have an unexplained fever greater than 100.4 degrees Fahrenheit or 38 degrees Celsius?     NO (Yes/No)    3. Have you had direct exposure to a patient who tested positive for Covid-19?    NO (Yes/No)    4. Have you had any loss of your sense of taste or smell? Have you had N/V or sore throat? NO    Patient has been informed of visitor policy and asked to wear a mask upon entering the hospital   YES (Yes/No)      
Patient arrived to PACU needing airway assistance. Once awake she has been very emotional and crying out loud in pain. I have medicated her with IV and oral pain medications. She instantly falls asleep with IV medications and has apnea.  She is currently resting and ice in on her chest.  has been texted.   
Right breast tissue weighed 2164 g.  Left breast tissue weighed 2077 g.    
Unable to obtain PIV access, called PICC for US guided assistance. Notified anesthesia of NPO status charted in pre op checklist and informed waiting PIV placement.   
independent

## 2020-10-01 NOTE — ANESTHESIA POSTPROCEDURE EVALUATION
Patient: Kaela Ballesteros    Procedure Summary     Date: 10/01/20 Room / Location: [facility] ROOM 25 / SURGERY SAME DAY [facility]    Anesthesia Start: 1321 Anesthesia Stop: 1449    Procedures:       MAMMOPLASTY, REDUCTION (Bilateral Breast)      APPLICATION, GRAFT, SKIN, FULL-THICKNESS- NIPPLE GRAFT (Bilateral Breast) Diagnosis: (SYMPTOMATIC MACROMASTIA)    Surgeon: Freddie Richey M.D. Responsible Provider: Juan Dewey M.D.    Anesthesia Type: general ASA Status: 3          Final Anesthesia Type: general  Last vitals  BP   NIBP: 120/79    Temp   36.4 °C (97.5 °F)    Pulse   Pulse: 84   Resp   18    SpO2   97 %      Anesthesia Post Evaluation    Patient location during evaluation: PACU  Patient participation: complete - patient participated  Level of consciousness: awake and alert  Pain score: 1    Airway patency: patent  Anesthetic complications: no  Cardiovascular status: adequate and hemodynamically stable  Respiratory status: acceptable  Hydration status: acceptable    PONV: none           Nurse Pain Score: 0 (NPRS)

## 2020-10-01 NOTE — OR SURGEON
Immediate Post OP Note    PreOp Diagnosis: symptomatic macromastia    PostOp Diagnosis: same  Procedure(s):  MAMMOPLASTY, REDUCTION  APPLICATION, GRAFT, SKIN, FULL-THICKNESS- NIPPLE GRAFT    Surgeon(s):  TAINKA Jordan Jr., M.D.    Anesthesiologist/Type of Anesthesia:  Anesthesiologist: Juan Dewey M.D./* No anesthesia type entered *    Surgical Staff:  Monitoring Nurse: Linda Gordon R.N.  Scrub Person: Lorri Florez    Specimens removed if any:  ID Type Source Tests Collected by Time Destination   A :  Tissue Breast PATHOLOGY SPECIMEN Freddie Richey M.D. 10/1/2020  1:01 PM    B :  Tissue Breast PATHOLOGY SPECIMEN Freddie Richey M.D. 10/1/2020  1:01 PM        Estimated Blood Loss: 15 ml    Findings:large breasts, asymmetric    Complications: none        10/1/2020 1:07 PM Freddie Richey M.D.

## 2020-10-01 NOTE — ANESTHESIA TIME REPORT
Anesthesia Start and Stop Event Times     Date Time Event    10/1/2020 1237 Ready for Procedure     1321 Anesthesia Start     1449 Anesthesia Stop        Responsible Staff  10/01/20    Name Role Begin End    Juan Dewey M.D. Anesth 1321 1449        Preop Diagnosis (Free Text):  Pre-op Diagnosis     SYMPTOMATIC MACROMASTIA        Preop Diagnosis (Codes):    Post op Diagnosis  Macromastia      Premium Reason  Non-Premium    Comments:                                                                  Bilateral breast reduction
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
## 2020-10-01 NOTE — ANESTHESIA PREPROCEDURE EVALUATION
Relevant Problems   NEURO   (+) History of obstetric problem       Physical Exam    Airway   Mallampati: II  TM distance: >3 FB  Neck ROM: full       Cardiovascular - normal exam  Rhythm: regular  Rate: normal  (-) murmur     Dental - normal exam           Pulmonary - normal exam  Breath sounds clear to auscultation     Abdominal    Neurological - normal exam                 Anesthesia Plan    ASA 3       Plan - general       Airway plan will be ETT        Induction: intravenous    Postoperative Plan: Postoperative administration of opioids is intended.    Pertinent diagnostic labs and testing reviewed    Informed Consent:    Anesthetic plan and risks discussed with patient.    Use of blood products discussed with: patient whom consented to blood products.

## 2020-10-01 NOTE — DISCHARGE INSTRUCTIONS
ACTIVITY: Rest and take it easy for the first 24 hours.  A responsible adult is recommended to remain with you during that time.  It is normal to feel sleepy.  We encourage you to not do anything that requires balance, judgment or coordination.    MILD FLU-LIKE SYMPTOMS ARE NORMAL. YOU MAY EXPERIENCE GENERALIZED MUSCLE ACHES, THROAT IRRITATION, HEADACHE AND/OR SOME NAUSEA.    FOR 24 HOURS DO NOT:  Drive, operate machinery or run household appliances.  Drink beer or alcoholic beverages.   Make important decisions or sign legal documents.    SPECIAL INSTRUCTIONS:     Keep ACE wrap in place for 48 hours then DC.  Wear sports bra once ACE wrap is removed  OK to shower at 48 hours  Call with any worries or concerns  Call if one breast is greatly enlarged compared with the other.  Do not remove dressings on the nipples    DIET: To avoid nausea, slowly advance diet as tolerated, avoiding spicy or greasy foods for the first day.  Add more substantial food to your diet according to your physician's instructions.  Babies can be fed formula or breast milk as soon as they are hungry.  INCREASE FLUIDS AND FIBER TO AVOID CONSTIPATION.    FOLLOW-UP APPOINTMENT:  A follow-up appointment should be arranged with your doctor; call to schedule.    You should CALL YOUR PHYSICIAN if you develop:  Fever greater than 101 degrees F.  Pain not relieved by medication, or persistent nausea or vomiting.  Excessive bleeding (blood soaking through dressing) or unexpected drainage from the wound.  Extreme redness or swelling around the incision site, drainage of pus or foul smelling drainage.  Inability to urinate or empty your bladder within 8 hours.  Problems with breathing or chest pain.    You should call 911 if you develop problems with breathing or chest pain.  If you are unable to contact your doctor or surgical center, you should go to the nearest emergency room or urgent care center.  Physician's telephone #: Dr. Richey [phone]    If  any questions arise, call your doctor.  If your doctor is not available, please feel free to call the Surgical Center at [phone].  The Center is open Monday through Friday from 7AM to 7PM.  You can also call the HEALTH HOTLINE open 24 hours/day, 7 days/week and speak to a nurse at [phone], or toll free at [phone].    A registered nurse may call you a few days after your surgery to see how you are doing after your procedure.    MEDICATIONS: Resume taking daily medication.  Take prescribed pain medication with food.  If no medication is prescribed, you may take non-aspirin pain medication if needed.  PAIN MEDICATION CAN BE VERY CONSTIPATING.  Take a stool softener or laxative such as senokot, pericolace, or milk of magnesia if needed.    Prescription given for *percocet for pain and zofran for nausea **.  Last pain medication given at **3:15pm next dose ok to take after 7:15pm *.    If your physician has prescribed pain medication that includes Acetaminophen (Tylenol), do not take additional Acetaminophen (Tylenol) while taking the prescribed medication.    Depression / Suicide Risk    As you are discharged from this [facility] facility, it is important to learn how to keep safe from harming yourself.    Recognize the warning signs:  · Abrupt changes in personality, positive or negative- including increase in energy   · Giving away possessions  · Change in eating patterns- significant weight changes-  positive or negative  · Change in sleeping patterns- unable to sleep or sleeping all the time   · Unwillingness or inability to communicate  · Depression  · Unusual sadness, discouragement and loneliness  · Talk of wanting to die  · Neglect of personal appearance   · Rebelliousness- reckless behavior  · Withdrawal from people/activities they love  · Confusion- inability to concentrate     If you or a loved one observes any of these behaviors or has concerns about self-harm, here's what you can  do:  · Talk about it- your feelings and reasons for harming yourself  · Remove any means that you might use to hurt yourself (examples: pills, rope, extension cords, firearm)  · Get professional help from the community (Mental Health, Substance Abuse, psychological counseling)  · Do not be alone:Call your Safe Contact- someone whom you trust who will be there for you.  · Call your local CRISIS HOTLINE 503-2335 or 416-665-0858  · Call your local Children's Mobile Crisis Response Team [place] (056) 538-0906 or www.[facility]adanu  · Call the toll free National Suicide Prevention Hotlines   · National Suicide Prevention Lifeline 336-375-GVNE (6925)  · National Hope Line Network 800-SUICIDE (811-4228)

## 2020-10-01 NOTE — ANESTHESIA PROCEDURE NOTES
Airway    Date/Time: 10/1/2020 1:28 PM  Performed by: Juan Dewey M.D.  Authorized by: Juan Dewey M.D.     Location:  OR  Urgency:  Elective  Indications for Airway Management:  Anesthesia      Spontaneous Ventilation: absent    Sedation Level:  Deep  Preoxygenated: Yes    Patient Position:  Sniffing  Final Airway Type:  Endotracheal airway  Final Endotracheal Airway:  ETT  Cuffed: Yes    Technique Used for Successful ETT Placement:  Direct laryngoscopy    Insertion Site:  Oral  Blade Type:  Obrien  Laryngoscope Blade/Videolaryngoscope Blade Size:  2  ETT Size (mm):  7.5  Measured from:  Teeth  ETT to Teeth (cm):  1  Placement Verified by: auscultation and capnometry    Cormack-Lehane Classification:  Grade I - full view of glottis  Number of Attempts at Approach:  1

## 2020-10-02 NOTE — OP REPORT
DATE OF SERVICE:  10/01/2020    PREOPERATIVE DIAGNOSIS:  Symptomatic macromastia.    POSTOPERATIVE DIAGNOSIS:  Symptomatic macromastia.    PROCEDURE PERFORMED:  Bilateral reduction mammoplasty.    SURGEON:  Freddie Richey MD    ASSISTANT:  Mann Camarena MD    ANESTHESIOLOGIST:  Juan Dewey MD    OPERATIVE INDICATIONS:  The patient is, otherwise, healthy.  She has multiple   symptoms related to her large breasts.  She has failed conservative therapy.    She now wishes improvement in the discomfort and we discussed the risks,   benefits, and alternatives of reduction mammoplasty.  I told the patient it   was quite possible that she would have an amputation free nipple grafting   technique, but we would not know for sure until the time of surgery.  In any   event, we confirmed the surgical procedure at hand in the preoperative holding   area.  She consented to proceed with surgery and she was marked   appropriately.    OPERATIVE DESCRIPTION:  After induction of general endotracheal anesthesia,   the patient's chest was prepped and draped sterilely.  The patient's marks   were reinforced.  The patient had the nipple sized at 42 mm and it looked like   the pedicle was not as long as my recollection, so I opted to at least   initially do this with an inferior pedicle technique.  With that in mind, the   base of the pedicle was outlined at 8 cm in width.  Skin incisions were made.    The pedicle was made wider at the base and more narrow near the   nipple-areolar complex.  The pedicle was developed using electrocautery.    After the pedicle was developed, there was great bleeding at both   nipple-areolar complexes around the outside and I felt that we had more than   adequate vascularity to the nipple-areolar complex.  Next, the remaining skin   incisions were made.  Skin and fat were resected en bloc.  Weights were taken.    There is a total of 2164 g on the right and 2077 g on the left.  In   inspection of the  pedicle and the flaps, I felt that we had achieved very good   symmetry.  Wounds were made hemostatic.  The skin flap was undermined.    Wounds were closed in layers.  At the conclusion of closure, symmetry was   excellent and the nipple-areolar complex was without signs of arterial or   venous compromise.  Sterile dressings and a compressive wrap were applied.       ____________________________________     MD KARL EDMONDS / MÓNICA    DD:  10/01/2020 14:44:17  DT:  10/01/2020 17:10:02    D#:  [id number]  Job#:  [id number]

## 2020-10-21 ENCOUNTER — HOSPITAL ENCOUNTER (OUTPATIENT)
Facility: MEDICAL CENTER | Age: 40
End: 2020-10-21
Attending: PREVENTIVE MEDICINE
Payer: COMMERCIAL

## 2020-10-21 ENCOUNTER — EMPLOYEE HEALTH (OUTPATIENT)
Dept: OCCUPATIONAL MEDICINE | Facility: CLINIC | Age: 40
End: 2020-10-21

## 2020-10-21 ENCOUNTER — EH NON-PROVIDER (OUTPATIENT)
Dept: OCCUPATIONAL MEDICINE | Facility: CLINIC | Age: 40
End: 2020-10-21

## 2020-10-21 VITALS
HEIGHT: 72 IN | SYSTOLIC BLOOD PRESSURE: 124 MMHG | TEMPERATURE: 98 F | HEART RATE: 98 BPM | RESPIRATION RATE: 12 BRPM | DIASTOLIC BLOOD PRESSURE: 68 MMHG | WEIGHT: 242 LBS | OXYGEN SATURATION: 99 % | BODY MASS INDEX: 32.78 KG/M2

## 2020-10-21 DIAGNOSIS — Z02.89 ENCOUNTER FOR OCCUPATIONAL HEALTH EXAMINATION: ICD-10-CM

## 2020-10-21 DIAGNOSIS — Z02.89 ENCOUNTER FOR OCCUPATIONAL HEALTH EXAMINATION: Primary | ICD-10-CM

## 2020-10-21 DIAGNOSIS — Z02.1 PRE-EMPLOYMENT DRUG SCREENING: ICD-10-CM

## 2020-10-21 LAB
AMP AMPHETAMINE: NORMAL
BAR BARBITURATES: NORMAL
BZO BENZODIAZEPINES: NORMAL
COC COCAINE: NORMAL
INT CON NEG: NEGATIVE
INT CON POS: POSITIVE
MDMA ECSTASY: NORMAL
MET METHAMPHETAMINES: NORMAL
MTD METHADONE: NORMAL
OPI OPIATES: NORMAL
OXY OXYCODONE: NORMAL
PCP PHENCYCLIDINE: NORMAL
POC URINE DRUG SCREEN OCDRS: NORMAL
THC: NORMAL

## 2020-10-21 PROCEDURE — 80305 DRUG TEST PRSMV DIR OPT OBS: CPT | Performed by: PREVENTIVE MEDICINE

## 2020-10-21 PROCEDURE — 90686 IIV4 VACC NO PRSV 0.5 ML IM: CPT | Performed by: NURSE PRACTITIONER

## 2020-10-21 PROCEDURE — 86735 MUMPS ANTIBODY: CPT | Performed by: PREVENTIVE MEDICINE

## 2020-10-21 PROCEDURE — 86762 RUBELLA ANTIBODY: CPT | Performed by: PREVENTIVE MEDICINE

## 2020-10-21 PROCEDURE — 86765 RUBEOLA ANTIBODY: CPT | Performed by: PREVENTIVE MEDICINE

## 2020-10-21 PROCEDURE — 94375 RESPIRATORY FLOW VOLUME LOOP: CPT | Performed by: PREVENTIVE MEDICINE

## 2020-10-21 PROCEDURE — 86787 VARICELLA-ZOSTER ANTIBODY: CPT | Performed by: PREVENTIVE MEDICINE

## 2020-10-21 PROCEDURE — 8915 PR COMPREHENSIVE PHYSICAL: Performed by: PREVENTIVE MEDICINE

## 2020-10-21 PROCEDURE — 86480 TB TEST CELL IMMUN MEASURE: CPT | Performed by: PREVENTIVE MEDICINE

## 2020-10-21 ASSESSMENT — FIBROSIS 4 INDEX: FIB4 SCORE: 0.54

## 2020-10-22 LAB
MEV IGG SER IA-ACNC: 1.97
MUV IGG SER IA-ACNC: 1.74
RUBV AB SER QL: 1.11 IU/ML
VZV IGG SER IA-ACNC: 0.04

## 2020-10-23 LAB
GAMMA INTERFERON BACKGROUND BLD IA-ACNC: 0.02 IU/ML
M TB IFN-G BLD-IMP: NEGATIVE
M TB IFN-G CD4+ BCKGRND COR BLD-ACNC: 0 IU/ML
MITOGEN IGNF BCKGRD COR BLD-ACNC: >10 IU/ML
QFT TB2 - NIL TBQ2: 0.01 IU/ML

## 2020-10-26 ENCOUNTER — EH NON-PROVIDER (OUTPATIENT)
Dept: OCCUPATIONAL MEDICINE | Facility: CLINIC | Age: 40
End: 2020-10-26

## 2020-10-26 DIAGNOSIS — Z23 NEED FOR VARICELLA VACCINE: Primary | ICD-10-CM

## 2020-10-26 PROCEDURE — 90716 VAR VACCINE LIVE SUBQ: CPT | Performed by: NURSE PRACTITIONER

## 2020-10-28 ENCOUNTER — TELEPHONE (OUTPATIENT)
Dept: OCCUPATIONAL MEDICINE | Facility: CLINIC | Age: 40
End: 2020-10-28

## 2020-10-28 NOTE — TELEPHONE ENCOUNTER
SAVANNA NE. Pt. Non-immune for VZV and MMR. VZV given on 10/26/20. I called and notified pt about immunity status of MMR and added MMR #1 to her appointment on 11/30/20.

## 2020-11-20 ENCOUNTER — HOSPITAL ENCOUNTER (OUTPATIENT)
Dept: LAB | Facility: MEDICAL CENTER | Age: 40
End: 2020-11-20
Attending: FAMILY MEDICINE
Payer: MEDICAID

## 2020-11-20 PROCEDURE — C9803 HOPD COVID-19 SPEC COLLECT: HCPCS

## 2020-11-20 PROCEDURE — U0003 INFECTIOUS AGENT DETECTION BY NUCLEIC ACID (DNA OR RNA); SEVERE ACUTE RESPIRATORY SYNDROME CORONAVIRUS 2 (SARS-COV-2) (CORONAVIRUS DISEASE [COVID-19]), AMPLIFIED PROBE TECHNIQUE, MAKING USE OF HIGH THROUGHPUT TECHNOLOGIES AS DESCRIBED BY CMS-2020-01-R: HCPCS

## 2020-12-14 ENCOUNTER — EH NON-PROVIDER (OUTPATIENT)
Dept: OCCUPATIONAL MEDICINE | Facility: CLINIC | Age: 40
End: 2020-12-14

## 2020-12-14 DIAGNOSIS — Z23 NEED FOR VACCINATION: Primary | ICD-10-CM

## 2020-12-14 PROCEDURE — 90716 VAR VACCINE LIVE SUBQ: CPT | Performed by: NURSE PRACTITIONER

## 2020-12-14 PROCEDURE — 90707 MMR VACCINE SC: CPT | Performed by: NURSE PRACTITIONER

## 2020-12-17 DIAGNOSIS — Z23 NEED FOR VACCINATION: ICD-10-CM

## 2020-12-18 ENCOUNTER — EH NON-PROVIDER (OUTPATIENT)
Dept: OCCUPATIONAL MEDICINE | Facility: CLINIC | Age: 40
End: 2020-12-18

## 2020-12-18 DIAGNOSIS — Z71.85 IMMUNIZATION COUNSELING: ICD-10-CM

## 2020-12-22 ENCOUNTER — HOSPITAL ENCOUNTER (OUTPATIENT)
Dept: LAB | Facility: MEDICAL CENTER | Age: 40
End: 2020-12-22
Attending: EMERGENCY MEDICINE
Payer: COMMERCIAL

## 2021-01-15 ENCOUNTER — EH NON-PROVIDER (OUTPATIENT)
Dept: OCCUPATIONAL MEDICINE | Facility: CLINIC | Age: 41
End: 2021-01-15

## 2021-01-15 DIAGNOSIS — Z23 NEED FOR VACCINATION: ICD-10-CM

## 2021-01-15 PROCEDURE — 90707 MMR VACCINE SC: CPT | Performed by: NURSE PRACTITIONER

## 2021-04-26 ENCOUNTER — NON-PROVIDER VISIT (OUTPATIENT)
Dept: OCCUPATIONAL MEDICINE | Facility: CLINIC | Age: 41
End: 2021-04-26

## 2021-04-26 DIAGNOSIS — Z02.1 PRE-EMPLOYMENT DRUG SCREENING: ICD-10-CM

## 2021-04-26 LAB
AMP AMPHETAMINE: NORMAL
COC COCAINE: NORMAL
INT CON NEG: NORMAL
INT CON POS: NORMAL
MET METHAMPHETAMINES: NORMAL
OPI OPIATES: NORMAL
PCP PHENCYCLIDINE: NORMAL
POC DRUG COMMENT 753798-OCCUPATIONAL HEALTH: NORMAL
THC: NORMAL

## 2021-04-26 PROCEDURE — 80305 DRUG TEST PRSMV DIR OPT OBS: CPT | Performed by: NURSE PRACTITIONER

## 2022-01-10 ENCOUNTER — NON-PROVIDER VISIT (OUTPATIENT)
Dept: OCCUPATIONAL MEDICINE | Facility: CLINIC | Age: 42
End: 2022-01-10

## 2022-01-10 DIAGNOSIS — Z11.1 ENCOUNTER FOR PPD TEST: Primary | ICD-10-CM

## 2022-01-10 PROCEDURE — 86580 TB INTRADERMAL TEST: CPT | Performed by: NURSE PRACTITIONER

## 2022-01-12 ENCOUNTER — NON-PROVIDER VISIT (OUTPATIENT)
Dept: OCCUPATIONAL MEDICINE | Facility: CLINIC | Age: 42
End: 2022-01-12

## 2022-01-12 DIAGNOSIS — Z11.1 ENCOUNTER FOR PPD SKIN TEST READING: ICD-10-CM

## 2022-01-12 LAB — TB WHEAL 3D P 5 TU DIAM: NORMAL MM

## 2022-01-17 ENCOUNTER — NON-PROVIDER VISIT (OUTPATIENT)
Dept: OCCUPATIONAL MEDICINE | Facility: CLINIC | Age: 42
End: 2022-01-17

## 2022-01-17 DIAGNOSIS — Z11.1 ENCOUNTER FOR PPD TEST: Primary | ICD-10-CM

## 2022-01-17 PROCEDURE — 86580 TB INTRADERMAL TEST: CPT | Performed by: NURSE PRACTITIONER

## 2022-01-19 ENCOUNTER — NON-PROVIDER VISIT (OUTPATIENT)
Dept: OCCUPATIONAL MEDICINE | Facility: CLINIC | Age: 42
End: 2022-01-19

## 2022-01-19 LAB — TB WHEAL 3D P 5 TU DIAM: NORMAL MM

## 2022-02-10 ENCOUNTER — NON-PROVIDER VISIT (OUTPATIENT)
Dept: MEDICAL GROUP | Facility: CLINIC | Age: 42
End: 2022-02-10
Payer: MEDICAID

## 2022-02-10 DIAGNOSIS — Z23 ENCOUNTER FOR VACCINATION: ICD-10-CM

## 2022-02-10 PROCEDURE — 90471 IMMUNIZATION ADMIN: CPT | Performed by: FAMILY MEDICINE

## 2022-02-10 PROCEDURE — 90746 HEPB VACCINE 3 DOSE ADULT IM: CPT | Performed by: FAMILY MEDICINE

## 2022-02-10 NOTE — PROGRESS NOTES
"Kaela Ballesteros is a 41 y.o. female here for a non-provider visit for:   Hep B  Reason for immunization: continue or complete series started at the office  Immunization records indicate need for vaccine: Yes, confirmed with [place] [employer]  Minimum interval has been met for this vaccine: Yes  ABN completed: Yes    VIS Dated  02/19/2022 was given to patient: Yes  All IAC Questionnaire questions were answered \"No.\"    Patient tolerated injection and no adverse effects were observed or reported: No    Pt scheduled for next dose in series: Not Indicated  "

## 2022-03-09 ENCOUNTER — OFFICE VISIT (OUTPATIENT)
Dept: MEDICAL GROUP | Facility: CLINIC | Age: 42
End: 2022-03-09
Payer: MEDICAID

## 2022-03-09 VITALS
DIASTOLIC BLOOD PRESSURE: 80 MMHG | SYSTOLIC BLOOD PRESSURE: 124 MMHG | BODY MASS INDEX: 35.04 KG/M2 | HEIGHT: 72 IN | TEMPERATURE: 98.1 F | RESPIRATION RATE: 16 BRPM | OXYGEN SATURATION: 97 % | WEIGHT: 258.7 LBS | HEART RATE: 90 BPM

## 2022-03-09 DIAGNOSIS — Z00.00 ANNUAL PHYSICAL EXAM: ICD-10-CM

## 2022-03-09 DIAGNOSIS — R42 PAROXYSMAL VERTIGO: ICD-10-CM

## 2022-03-09 PROCEDURE — 99213 OFFICE O/P EST LOW 20 MIN: CPT | Mod: GC

## 2022-03-09 RX ORDER — PROMETHAZINE HYDROCHLORIDE 25 MG/1
SUPPOSITORY RECTAL
COMMUNITY
Start: 2019-04-13 | End: 2022-03-10

## 2022-03-09 RX ORDER — DICYCLOMINE HYDROCHLORIDE 10 MG/1
CAPSULE ORAL
COMMUNITY
Start: 2019-04-13 | End: 2022-03-10

## 2022-03-09 RX ORDER — CYCLOBENZAPRINE HCL 5 MG
TABLET ORAL
COMMUNITY
End: 2022-03-10

## 2022-03-09 RX ORDER — FAMOTIDINE 20 MG/1
TABLET, FILM COATED ORAL
COMMUNITY
Start: 2019-04-25 | End: 2022-03-10

## 2022-03-09 ASSESSMENT — FIBROSIS 4 INDEX: FIB4 SCORE: 0.56

## 2022-03-10 ENCOUNTER — HOSPITAL ENCOUNTER (OUTPATIENT)
Dept: LAB | Facility: MEDICAL CENTER | Age: 42
End: 2022-03-10
Payer: MEDICAID

## 2022-03-10 DIAGNOSIS — Z00.00 ANNUAL PHYSICAL EXAM: ICD-10-CM

## 2022-03-10 LAB
ANION GAP SERPL CALC-SCNC: 14 MMOL/L (ref 7–16)
BASOPHILS # BLD AUTO: 1.3 % (ref 0–1.8)
BASOPHILS # BLD: 0.05 K/UL (ref 0–0.12)
BUN SERPL-MCNC: 11 MG/DL (ref 8–22)
CALCIUM SERPL-MCNC: 10.1 MG/DL (ref 8.5–10.5)
CHLORIDE SERPL-SCNC: 104 MMOL/L (ref 96–112)
CHOLEST SERPL-MCNC: 162 MG/DL (ref 100–199)
CO2 SERPL-SCNC: 23 MMOL/L (ref 20–33)
CREAT SERPL-MCNC: 0.74 MG/DL (ref 0.5–1.4)
EOSINOPHIL # BLD AUTO: 0.07 K/UL (ref 0–0.51)
EOSINOPHIL NFR BLD: 1.9 % (ref 0–6.9)
ERYTHROCYTE [DISTWIDTH] IN BLOOD BY AUTOMATED COUNT: 39.1 FL (ref 35.9–50)
EST. AVERAGE GLUCOSE BLD GHB EST-MCNC: 123 MG/DL
GLUCOSE SERPL-MCNC: 97 MG/DL (ref 65–99)
HBA1C MFR BLD: 5.9 % (ref 4–5.6)
HCT VFR BLD AUTO: 42.9 % (ref 37–47)
HDLC SERPL-MCNC: 44 MG/DL
HGB BLD-MCNC: 14 G/DL (ref 12–16)
IMM GRANULOCYTES # BLD AUTO: 0.02 K/UL (ref 0–0.11)
IMM GRANULOCYTES NFR BLD AUTO: 0.5 % (ref 0–0.9)
LDLC SERPL CALC-MCNC: 100 MG/DL
LYMPHOCYTES # BLD AUTO: 1.25 K/UL (ref 1–4.8)
LYMPHOCYTES NFR BLD: 33.2 % (ref 22–41)
MCH RBC QN AUTO: 27.8 PG (ref 27–33)
MCHC RBC AUTO-ENTMCNC: 32.6 G/DL (ref 33.6–35)
MCV RBC AUTO: 85.1 FL (ref 81.4–97.8)
MONOCYTES # BLD AUTO: 0.32 K/UL (ref 0–0.85)
MONOCYTES NFR BLD AUTO: 8.5 % (ref 0–13.4)
NEUTROPHILS # BLD AUTO: 2.06 K/UL (ref 2–7.15)
NEUTROPHILS NFR BLD: 54.6 % (ref 44–72)
NRBC # BLD AUTO: 0 K/UL
NRBC BLD-RTO: 0 /100 WBC
PLATELET # BLD AUTO: 226 K/UL (ref 164–446)
PMV BLD AUTO: 11.2 FL (ref 9–12.9)
POTASSIUM SERPL-SCNC: 4.1 MMOL/L (ref 3.6–5.5)
RBC # BLD AUTO: 5.04 M/UL (ref 4.2–5.4)
SODIUM SERPL-SCNC: 141 MMOL/L (ref 135–145)
TRIGL SERPL-MCNC: 91 MG/DL (ref 0–149)
WBC # BLD AUTO: 3.8 K/UL (ref 4.8–10.8)

## 2022-03-10 PROCEDURE — 80048 BASIC METABOLIC PNL TOTAL CA: CPT

## 2022-03-10 PROCEDURE — 80061 LIPID PANEL: CPT

## 2022-03-10 PROCEDURE — 36415 COLL VENOUS BLD VENIPUNCTURE: CPT

## 2022-03-10 PROCEDURE — 85025 COMPLETE CBC W/AUTO DIFF WBC: CPT

## 2022-03-10 PROCEDURE — 83036 HEMOGLOBIN GLYCOSYLATED A1C: CPT

## 2022-03-10 NOTE — PROGRESS NOTES
"[facility]     PATIENT ID:  NAME:  Kaela Ballesteros  MRN:               [id number]  YOB: 1980    Date: 11:02 AM      Resident: Ollie Saravia MD     CC: Dizziness    HPI: Kaela Ballesteros is a 41 y.o. female who presents to clinic complaining of episodic dizziness over the past few years but worsening over the last few months.  Patient states that over the past few years she is noticed occasionally, maybe once a month, she will experience episodes of dizziness upon standing up.  She describes these episodes as an acute onset of a sensation as if the room is spinning, at times this will cause her to \"see spots\" and feel as if she may faint but she states these episodes resolve within seconds and then she is fine.  She states she never experiences multiple episodes in a row.  She denies any associated chest pain, shortness of breath or any other associated features.  She states that in the past 3 months she has been experiencing a few more episodes of normal, approximately 1-2 episodes per week.  She says she has felt that this is been more of a nuisance to her and twice she has not seen a physician for it in the past.  She denies any other complaints or concerns at this time.  She reports a past medical history that includes hysterectomy, and does not currently have menstrual cycles.  The patient is currently very busy as she is working, in school and has a family she cares for.  Patient has a history of cyclical vomiting syndrome secondary to cannabis use but states that she has not used in the past 3 years with resolution of her symptoms.  Patient does note that she has had slightly more frequent than usual urination and states she knows she needs to be drinking more water.  She also states that her diet is not the healthiest and she is making changes to eat a more balanced diet.    REVIEW OF SYSTEMS:   Ten systems reviewed and were negative except as noted in the HPI.              "   PROBLEM LIST  Patient Active Problem List   Diagnosis   • H/O oophorectomy   • Sickle cell carrier   • Women's annual routine gynecological examination   • Rectal pain   • History of obstetric problem   • Asymptomatic bacteriuria   • Thyromegaly   • Cannabinoid hyperemesis syndrome        PAST SURGICAL HISTORY:  Past Surgical History:   Procedure Laterality Date   • UT REDUCTION OF LARGE BREAST Bilateral 10/1/2020    Procedure: MAMMOPLASTY, REDUCTION;  Surgeon: Freddie Richey M.D.;  Location: SURGERY SAME DAY [facility];  Service: Plastics   • FULL THICKNESS SKIN GRAFT Bilateral 10/1/2020    Procedure: APPLICATION, GRAFT, SKIN, FULL-THICKNESS- NIPPLE GRAFT;  Surgeon: Freddie Richey M.D.;  Location: SURGERY SAME DAY [facility];  Service: Plastics   • BLADDER SLING FEMALE  6/4/2019    Procedure: BLADDER SLING, FEMALE- TOT;  Surgeon: Robbie Carney M.D.;  Location: SURGERY SAME DAY [facility];  Service: Gynecology   • VAGINAL HYSTERECTOMY SCOPE TOTAL  6/4/2019    Procedure: HYSTERECTOMY, TOTAL, VAGINAL, LAPAROSCOPY-ASSISTED;  Surgeon: Robbie Carney M.D.;  Location: SURGERY SAME DAY [facility];  Service: Gynecology   • SALPINGECTOMY Left 6/4/2019    Procedure: SALPINGECTOMY;  Surgeon: Robbie Carney M.D.;  Location: SURGERY SAME DAY [facility];  Service: Gynecology   • ANTERIOR AND POSTERIOR REPAIR  6/4/2019    Procedure: COLPORRHAPHY, COMBINED ANTEROPOSTERIOR;  Surgeon: Robbie Carney M.D.;  Location: SURGERY SAME DAY [facility];  Service: Gynecology   • ENTEROCELE REPAIR  6/4/2019    Procedure: REPAIR, ENTEROCELE- PERINEOPLASTY;  Surgeon: Robbie Carney M.D.;  Location: SURGERY SAME DAY [facility];  Service: Gynecology   • VAGINAL SUSPENSION  6/4/2019    Procedure: COLPOPEXY- SACROSPINOUS VAULT SUSPENSION;  Surgeon: Robbie Carney M.D.;  Location: SURGERY SAME DAY [facility];  Service: Gynecology   • PRIMARY C SECTION WITH TUBAL LIGATION  5/2/2017    Procedure: REPEAT C SECTION WITH TUBAL  LIGATION;  Surgeon: Kirti Echavarria M.D.;  Location: LABOR AND DELIVERY;  Service:    • ABDOMINAL EXPLORATION  2016   • PRIMARY C SECTION  2015    Procedure: PRIMARY C SECTION;  Surgeon: Yuriy Garay M.D.;  Location: LABOR AND DELIVERY;  Service:    • EGD W/ENDOSCOPIC ULTRASOUND  2015    Performed by Ricky Stewart M.D. at SURGERY [facility]   • GASTROSCOPY WITH BIOPSY  2015    Performed by Ricky Stewart M.D. at SURGERY [facility]   • DILATION AND CURETTAGE  12/3/2012    Performed by Deborah Keyes M.D. at LABOR AND DELIVERY   • PELVISCOPY  2012    Performed by DEBORAH FLYNN at SURGERY [facility]   • CHOLECYSTECTOMY         FAMILY HISTORY:  Family History   Problem Relation Age of Onset   • Diabetes Father    • Diabetes Brother    • Diabetes Paternal Grandmother        SOCIAL HISTORY:   Social History     Tobacco Use   • Smoking status: Former Smoker     Packs/day: 0.50     Years: 15.00     Pack years: 7.50     Types: Cigarettes     Quit date: 2011     Years since quittin.1   • Smokeless tobacco: Never Used   • Tobacco comment: occasional   Substance Use Topics   • Alcohol use: Yes     Comment: occasionally       ALLERGIES:  Allergies   Allergen Reactions   • Nkda [No Known Drug Allergy]        OUTPATIENT MEDICATIONS:    Current Outpatient Medications:   •  cyclobenzaprine (FLEXERIL) 5 mg tablet, cyclobenzaprine 5 mg tablet  TAKE 1 TABLET BY MOUTH AT BEDTIME AS NEEDED FOR MUSCLE SPASM (Patient not taking: Reported on 3/9/2022), Disp: , Rfl:   •  dicyclomine (BENTYL) 10 MG Cap, DICYCLOMINE HCL 10 MG CAPS (Patient not taking: Reported on 3/9/2022), Disp: , Rfl:   •  famotidine (PEPCID) 20 MG Tab, FAMOTIDINE 20 MG TABS (Patient not taking: Reported on 3/9/2022), Disp: , Rfl:   •  promethazine (PHENERGAN) 25 MG Suppos, PROMETHAZINE HCL 25 MG SUPP (Patient not taking: Reported on 3/9/2022), Disp: , Rfl:   •  ondansetron (ZOFRAN) 4 MG Tab tablet, Take 1 Tab by  mouth every 6 hours as needed for Nausea/Vomiting. (Patient not taking: Reported on 3/9/2022), Disp: 10 Tab, Rfl: 1  •  gabapentin (NEURONTIN) 100 MG Cap, Take 100 mg by mouth 1 time daily as needed. (Patient not taking: Reported on 3/9/2022), Disp: , Rfl:   •  ondansetron (ZOFRAN ODT) 4 MG TABLET DISPERSIBLE, Take 1 Tab by mouth every 6 hours as needed. (Patient not taking: No sig reported), Disp: 10 Tab, Rfl: 0    PHYSICAL EXAM:  Vitals:    03/09/22 1058   BP: 124/80   BP Location: Right arm   Patient Position: Sitting   BP Cuff Size: Adult   Pulse: 90   Resp: 16   Temp: 36.7 °C (98.1 °F)   TempSrc: Temporal   SpO2: 97%   Weight: 117 kg (258 lb 11.2 oz)   Height: 1.829 m (6')       General: Pt resting in NAD, pleasant and cooperative   Skin:  Pink, warm and dry.  HEENT: NC/AT. EOMI. PERRLA, neck supple, no lymphadenopathy  Lungs:  Symmetrical.  CTAB, good air movement, no respiratory distress  Cardiovascular:  S1/S2 RRR, no murmurs rubs or gallops, warm and well-perfused  Abdomen:  Abdomen is soft, nontender  Extremities:  Full range of motion.  CNS:  Muscle tone is normal. No gross focal neurologic deficits, no nystagmus      ASSESSMENT/PLAN:   41 y.o. female who presents to clinic complaining of multiple year-long history of proximal seconds long episodes of vertigo.  She states that these episodes usually come on after standing up from a seated position.  She states that the defining feature is the sensation that the room is spinning but occasionally she will experience spots in her vision as if she feels faint.  She states that the episodes have been happening slightly more frequently over the past 3 months and thought that she should finally be seen for it.  Discussed with the patient that his episodes seem to have some component of vertiginous dizziness and at times she seems to feel faint.  Will order for some basic lab work to see if the patient is anemic whether any electrolyte abnormalities.  Patient  also states that she has had slightly more frequent than usual urination will check and if hemoglobin A1c, lipid panel as well.  Patient will return to clinic after laboratory testing completed.  Advised patient to stay well-hydrated and to keep a log book of symptom episodes for review on next visit.  Discussed that as her episodes seem to come on without precipitating factor and resolved in such a short period of time I do not feel vertigo medications will be helpful at this time.      Visit Diagnoses     Annual physical exam        Relevant Orders    Lipid Profile    Basic Metabolic Panel    CBC WITH DIFFERENTIAL    HEMOGLOBIN A1C    Paroxysmal vertigo              Ollie Saravia MD  PGY-1  [facility]

## 2022-03-25 ENCOUNTER — OFFICE VISIT (OUTPATIENT)
Dept: MEDICAL GROUP | Facility: CLINIC | Age: 42
End: 2022-03-25
Payer: MEDICAID

## 2022-03-25 VITALS
OXYGEN SATURATION: 96 % | TEMPERATURE: 98.6 F | HEIGHT: 72 IN | HEART RATE: 90 BPM | WEIGHT: 256.6 LBS | RESPIRATION RATE: 14 BRPM | DIASTOLIC BLOOD PRESSURE: 70 MMHG | BODY MASS INDEX: 34.75 KG/M2 | SYSTOLIC BLOOD PRESSURE: 112 MMHG

## 2022-03-25 DIAGNOSIS — R73.03 PREDIABETES: ICD-10-CM

## 2022-03-25 DIAGNOSIS — R42 EPISODIC LIGHTHEADEDNESS: ICD-10-CM

## 2022-03-25 DIAGNOSIS — Z71.3 DIETARY COUNSELING: ICD-10-CM

## 2022-03-25 DIAGNOSIS — Z71.82 EXERCISE COUNSELING: ICD-10-CM

## 2022-03-25 PROCEDURE — 99213 OFFICE O/P EST LOW 20 MIN: CPT | Mod: GE

## 2022-03-25 RX ORDER — ONDANSETRON 4 MG/1
TABLET, ORALLY DISINTEGRATING ORAL
COMMUNITY
Start: 2019-04-13 | End: 2023-01-25

## 2022-03-25 ASSESSMENT — FIBROSIS 4 INDEX: FIB4 SCORE: 0.52

## 2022-03-25 NOTE — PROGRESS NOTES
[facility] MEDICINE     PATIENT ID:  NAME:  Kaela Ballesteros  MRN:               [id number]  YOB: 1980    Date: 2:50 PM      Resident: Ollie Saravia MD     CC:  Lab results       HPI: Kaela Ballesteros is a 41 y.o. female who presents to clinic after having labs drawn to follow-up on results.  Patient states that her dizziness is slightly improved and since she has not had any vertiginous symptoms but has had a few episodes of lightheadedness but feels that this is improving.  She reports that she saw the test results online and has started making some of the changes to her diet that we discussed.  She also states that she is joining a gym with the plan of pursuing more exercise daily.  She notes that she still has some continued back pain which she has described is chronic in the past.  She has left upper muscle relaxants from her previous diagnosis of back spasm but states she does not want to use them because it caused her to be sleepy during the day and she is currently in work, school and has a family to take care of at home.  She has been medicating her spasms with Tylenol and ibuprofen at night which has been tolerable for her.  Patient states that otherwise she has no complaints or concerns at this time.    No problems updated.    REVIEW OF SYSTEMS:   Ten systems reviewed and were negative except as noted in the HPI.                PROBLEM LIST  Patient Active Problem List   Diagnosis   • H/O oophorectomy   • Sickle cell carrier   • Women's annual routine gynecological examination   • Rectal pain   • History of obstetric problem   • Asymptomatic bacteriuria   • Thyromegaly   • Cannabinoid hyperemesis syndrome        PAST SURGICAL HISTORY:  Past Surgical History:   Procedure Laterality Date   • [place] REDUCTION OF LARGE BREAST Bilateral 10/1/2020    Procedure: MAMMOPLASTY, REDUCTION;  Surgeon: Freddie Richey M.D.;  Location: SURGERY SAME DAY [facility];  Service: Plastics   • FULL THICKNESS SKIN  GRAFT Bilateral 10/1/2020    Procedure: APPLICATION, GRAFT, SKIN, FULL-THICKNESS- NIPPLE GRAFT;  Surgeon: Freddie Richey M.D.;  Location: SURGERY SAME DAY [facility];  Service: Plastics   • BLADDER SLING FEMALE  6/4/2019    Procedure: BLADDER SLING, FEMALE- TOT;  Surgeon: Robbie Carney M.D.;  Location: SURGERY SAME DAY [facility];  Service: Gynecology   • VAGINAL HYSTERECTOMY SCOPE TOTAL  6/4/2019    Procedure: HYSTERECTOMY, TOTAL, VAGINAL, LAPAROSCOPY-ASSISTED;  Surgeon: Robbie Carney M.D.;  Location: SURGERY SAME DAY [facility];  Service: Gynecology   • SALPINGECTOMY Left 6/4/2019    Procedure: SALPINGECTOMY;  Surgeon: Robbie Carney M.D.;  Location: SURGERY SAME DAY [facility];  Service: Gynecology   • ANTERIOR AND POSTERIOR REPAIR  6/4/2019    Procedure: COLPORRHAPHY, COMBINED ANTEROPOSTERIOR;  Surgeon: Robbie Carney M.D.;  Location: SURGERY SAME DAY [facility];  Service: Gynecology   • ENTEROCELE REPAIR  6/4/2019    Procedure: REPAIR, ENTEROCELE- PERINEOPLASTY;  Surgeon: Robbie Carney M.D.;  Location: SURGERY SAME DAY [facility];  Service: Gynecology   • VAGINAL SUSPENSION  6/4/2019    Procedure: COLPOPEXY- SACROSPINOUS VAULT SUSPENSION;  Surgeon: Robbie Carney M.D.;  Location: SURGERY SAME DAY [facility];  Service: Gynecology   • PRIMARY C SECTION WITH TUBAL LIGATION  5/2/2017    Procedure: REPEAT C SECTION WITH TUBAL LIGATION;  Surgeon: Kirti Echavarria M.D.;  Location: LABOR AND DELIVERY;  Service:    • ABDOMINAL EXPLORATION  2016   • PRIMARY C SECTION  9/30/2015    Procedure: PRIMARY C SECTION;  Surgeon: Yuriy Garay M.D.;  Location: LABOR AND DELIVERY;  Service:    • EGD W/ENDOSCOPIC ULTRASOUND  1/26/2015    Performed by Ricky Stewart M.D. at [facility]   • GASTROSCOPY WITH BIOPSY  1/26/2015    Performed by Ricky Stewart M.D. at [facility]   • DILATION AND CURETTAGE  12/3/2012    Performed by Medina Keyes M.D. at LABOR AND DELIVERY   •  PELVISCOPY  2012    Performed by DEBORAH FLYNN at SURGERY [facility]   • CHOLECYSTECTOMY         FAMILY HISTORY:  Family History   Problem Relation Age of Onset   • Diabetes Father    • Diabetes Brother    • Diabetes Paternal Grandmother        SOCIAL HISTORY:   Social History     Tobacco Use   • Smoking status: Former Smoker     Packs/day: 0.50     Years: 15.00     Pack years: 7.50     Types: Cigarettes     Quit date: 2011     Years since quittin.2   • Smokeless tobacco: Never Used   • Tobacco comment: occasional   Substance Use Topics   • Alcohol use: Yes     Comment: occasionally       ALLERGIES:  Allergies   Allergen Reactions   • Nkda [No Known Drug Allergy]        OUTPATIENT MEDICATIONS:    Current Outpatient Medications:   •  ondansetron (ZOFRAN ODT) 4 MG TABLET DISPERSIBLE, ONDANSETRON 4 MG TBDP (Patient not taking: Reported on 3/25/2022), Disp: , Rfl:     PHYSICAL EXAM:  Vitals:    22 1355   BP: 112/70   BP Location: Left arm   Patient Position: Sitting   BP Cuff Size: Adult   Pulse: 90   Resp: 14   Temp: 37 °C (98.6 °F)   TempSrc: Temporal   SpO2: 96%   Weight: 116 kg (256 lb 9.6 oz)   Height: 1.829 m (6')       General: Pt resting in NAD, pleasant and cooperative   Skin:  Pink, warm and dry.  HEENT: NC/AT. EOMI. PERRLA, neck supple, no lymphadenopathy  Lungs:  Symmetrical.  CTAB, good air movement, no respiratory distress  Cardiovascular:  S1/S2 RRR, no murmurs rubs or gallops, warm and well-perfused  Abdomen:  Abdomen is soft, nontender  Back: Slight spasm to bilateral paraspinal muscles in the lumbar distribution with mild tenderness to palpation.  No midline spinous process tenderness.  Extremities:  Full range of motion.  CNS:  Muscle tone is normal.  Strength and sensation grossly intact      ASSESSMENT/PLAN:   41 y.o. female who presents to the clinic for evaluation of test results.  On review the patient CBC, BMP, HbA1c, and lipid panel the results are grossly  normal.  Advised the patient that her HbA1c is 5.9 which would put her in a prediabetic range and counseled the patient on dietary changes and counseled her on building an exercise regimen.  Also advised the patient that her LDL was found to be 100 mg/dL and discussed options for healthy fats in her diet.  Patient states she would like to start with diet and exercise life changes and will return to clinic in 1 month for evaluation of how these are going and continued motivation.  Patient understands and agrees with this course.      Visit Diagnoses     Prediabetes        Dietary counseling        Exercise counseling        Episodic alejandraedmartin Saravia MD   PGY-1  [facility]

## 2022-04-27 ENCOUNTER — OFFICE VISIT (OUTPATIENT)
Dept: MEDICAL GROUP | Facility: CLINIC | Age: 42
End: 2022-04-27
Payer: MEDICAID

## 2022-04-27 VITALS
HEART RATE: 88 BPM | WEIGHT: 256 LBS | OXYGEN SATURATION: 96 % | BODY MASS INDEX: 34.67 KG/M2 | DIASTOLIC BLOOD PRESSURE: 89 MMHG | HEIGHT: 72 IN | SYSTOLIC BLOOD PRESSURE: 132 MMHG

## 2022-04-27 DIAGNOSIS — M54.50 LUMBAR BACK PAIN: ICD-10-CM

## 2022-04-27 DIAGNOSIS — G56.03 BILATERAL CARPAL TUNNEL SYNDROME: ICD-10-CM

## 2022-04-27 PROCEDURE — 99213 OFFICE O/P EST LOW 20 MIN: CPT | Mod: GE

## 2022-04-27 RX ORDER — CYCLOBENZAPRINE HCL 5 MG
5 TABLET ORAL 3 TIMES DAILY PRN
Qty: 30 TABLET | Refills: 1 | Status: SHIPPED | OUTPATIENT
Start: 2022-04-27 | End: 2023-06-02

## 2022-04-27 ASSESSMENT — FIBROSIS 4 INDEX: FIB4 SCORE: 0.52

## 2022-04-28 NOTE — PROGRESS NOTES
[facility] MEDICINE     PATIENT ID:  NAME:  Kaela Ballesteros  MRN:               [id number]  YOB: 1980    Date: 6:44 PM      Resident: Ollie Saravia MD    CC:  Chronic lower back pain,  Chronic bilateral hand pain      HPI: Kaela Ballesteros is a 41 y.o. female who presents to the clinic complaining of an exacerbation of her chronic lower back pain.  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  She states that she is at school, works and has a family at home so she is on her feet all day long but has noticed episodes of spasm to her left lower back over the past few weeks.  She states that she has had these before in the past and was treated them with cyclobenzaprine.  She had a few leftover tablets from her last prescription and took them with some relief over the past week, but states she does not like to take the medication as it caused her to be sleepy and she cannot miss work or school.  She states that she has tried physical therapy in the past but found it was not very helpful.  She denies any recent trauma or injury to her back.  She states that certain movements seem to exacerbate her pain such as bending over.  Patient also notes that she has been diagnosed with carpal tunnel syndrome in the past and has been having worsening bilateral wrist and hand numbness/tingling and pain with overuse.  The patient states that she saw a hand surgeon and did testing such as conduction studies but then was never made aware of the results and has not seen that surgeon in quite some time.  The patient has braces at home but states she does not like to wear them as she cannot sleep with them and is unable to work while wearing them.  Patient denies any recent fever, chills, headache, nausea, vomiting, diarrhea, abdominal pain, lightheadedness or any other complaints or concerns.      REVIEW OF SYSTEMS:   Ten systems reviewed and were negative except as noted in the HPI.                 PROBLEM LIST  Patient Active Problem List   Diagnosis   • H/O oophorectomy   • Sickle cell carrier   • Women's annual routine gynecological examination   • Rectal pain   • History of obstetric problem   • Asymptomatic bacteriuria   • Thyromegaly   • Cannabinoid hyperemesis syndrome        PAST SURGICAL HISTORY:  Past Surgical History:   Procedure Laterality Date   • [place] BREAST REDUCTION Bilateral 10/1/2020    Procedure: MAMMOPLASTY, REDUCTION;  Surgeon: Freddie Richey M.D.;  Location: SURGERY SAME DAY [facility];  Service: Plastics   • FULL THICKNESS SKIN GRAFT Bilateral 10/1/2020    Procedure: APPLICATION, GRAFT, SKIN, FULL-THICKNESS- NIPPLE GRAFT;  Surgeon: Freddie Richey M.D.;  Location: SURGERY SAME DAY [facility];  Service: Plastics   • BLADDER SLING FEMALE  6/4/2019    Procedure: BLADDER SLING, FEMALE- TOT;  Surgeon: Robbie Carney M.D.;  Location: SURGERY SAME DAY [facility];  Service: Gynecology   • VAGINAL HYSTERECTOMY SCOPE TOTAL  6/4/2019    Procedure: HYSTERECTOMY, TOTAL, VAGINAL, LAPAROSCOPY-ASSISTED;  Surgeon: Robbie Carney M.D.;  Location: SURGERY SAME DAY [facility];  Service: Gynecology   • SALPINGECTOMY Left 6/4/2019    Procedure: SALPINGECTOMY;  Surgeon: Robbie Carney M.D.;  Location: SURGERY SAME DAY [facility];  Service: Gynecology   • ANTERIOR AND POSTERIOR REPAIR  6/4/2019    Procedure: COLPORRHAPHY, COMBINED ANTEROPOSTERIOR;  Surgeon: Robbie Carney M.D.;  Location: SURGERY SAME DAY [facility];  Service: Gynecology   • ENTEROCELE REPAIR  6/4/2019    Procedure: REPAIR, ENTEROCELE- PERINEOPLASTY;  Surgeon: Robbie Carney M.D.;  Location: SURGERY SAME DAY [facility];  Service: Gynecology   • VAGINAL SUSPENSION  6/4/2019    Procedure: COLPOPEXY- SACROSPINOUS VAULT SUSPENSION;  Surgeon: Robbie Carney M.D.;  Location: SURGERY SAME DAY [facility];  Service: Gynecology   • PRIMARY C SECTION WITH TUBAL LIGATION  5/2/2017    Procedure: REPEAT C SECTION WITH TUBAL LIGATION;   Surgeon: Kirti Echavarria M.D.;  Location: LABOR AND DELIVERY;  Service:    • ABDOMINAL EXPLORATION  2016   • PRIMARY C SECTION  2015    Procedure: PRIMARY C SECTION;  Surgeon: Yuriy Garay M.D.;  Location: LABOR AND DELIVERY;  Service:    • EGD W/ENDOSCOPIC ULTRASOUND  2015    Performed by Rikcy Stewart M.D. at SURGERY [facility]   • GASTROSCOPY WITH BIOPSY  2015    Performed by Ricky Stewart M.D. at SURGERY [facility]   • DILATION AND CURETTAGE  12/3/2012    Performed by Deborah Keyes M.D. at LABOR AND DELIVERY   • PELVISCOPY  2012    Performed by DEBORAH FLYNN at SURGERY [facility]   • CHOLECYSTECTOMY         FAMILY HISTORY:  Family History   Problem Relation Age of Onset   • Diabetes Father    • Diabetes Brother    • Diabetes Paternal Grandmother        SOCIAL HISTORY:   Social History     Tobacco Use   • Smoking status: Former Smoker     Packs/day: 0.50     Years: 15.00     Pack years: 7.50     Types: Cigarettes     Quit date: 2011     Years since quittin.3   • Smokeless tobacco: Never Used   • Tobacco comment: occasional   Substance Use Topics   • Alcohol use: Yes     Comment: occasionally       ALLERGIES:  Allergies   Allergen Reactions   • Nkda [No Known Drug Allergy]        OUTPATIENT MEDICATIONS:    Current Outpatient Medications:   •  cyclobenzaprine (FLEXERIL) 5 mg tablet, Take 1 Tablet by mouth 3 times a day as needed for Muscle Spasms., Disp: 30 Tablet, Rfl: 1  •  ondansetron (ZOFRAN ODT) 4 MG TABLET DISPERSIBLE, ONDANSETRON 4 MG TBDP (Patient not taking: No sig reported), Disp: , Rfl:     PHYSICAL EXAM:  Vitals:    22 1422   BP: 132/89   Pulse: 88   SpO2: 96%   Weight: 116 kg (256 lb)   Height: 1.829 m (6')       General: Pt resting in NAD, pleasant and cooperative   Skin:  Pink, warm and dry.  HEENT: NC/AT. EOMI. neck supple, no lymphadenopathy  Lungs:  Symmetrical.  CTAB, good air movement   Cardiovascular:  S1/S2 RRR, no murmurs,  warm and well-perfused  Abdomen:  Abdomen is obese, soft, nontender  Extremities:  Full range of motion.  Positive Phalen's test  Back: No midline tenderness, no spinous process tenderness, no step-offs, bilateral paraspinal tenderness in the lumbar region left greater than right, no evidence of spasm at this time  CNS:  Muscle tone is normal. No gross focal neurologic deficits      ASSESSMENT/PLAN:   41 y.o. female who presents to the clinic complaining of chronic lower back pain and bilateral wrist and hand numbness, tingling and pain.  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  Patient has treated this pain with cyclobenzaprine before in the past with improvement but is run out of medication as he has not does not take it very often and her last prescription was quite some time ago.  I advised the patient that I would like her to participate in physical therapy and due to her limited schedule with work, school and her family at home I will prescribe a 4-day course with the intent that she asked her  to build her a workout regimen that she can participate in at home.  Patient states she agrees with this course and is excited to try.  I will provide the patient with a refill of her cyclobenzaprine 5 mg to help with her current episodes of spasm.  We discussed the option of further imaging such as MRI but as the patient does not want to proceed with surgical options at this time and has a longstanding history of similar pain we will hold off at this time.  I advised patient that if her pain does not improve with cyclobenzaprine and/NSAID pain control over the next month she should return to clinic and we can discuss further options.  Patient states she understands and agrees with this course.    The patient's bilateral wrist and hand numbness, tingling and pain has been worked up before in the  past.  She states she was diagnosed with carpal tunnel syndrome and has seen a surgeon for this.  Patient has had conduction studies and was prescribed braces but states that she lost touch with her surgeon after that.  Patient reports that she is unable to tolerate wearing the braces due to discomfort with sleeping and unable to perform her job during the day.  Advised her that if she does not want to retry the braces she is to be seen by hand surgery to discuss other options.  On exam the patient has a positive Phalen's test with significant numbness and tingling and pain.  I we will provide the patient with referral to hand surgery to discuss this further with the specialist.      Visit Diagnoses     Bilateral carpal tunnel syndrome        Relevant Medications    cyclobenzaprine (FLEXERIL) 5 mg tablet    Other Relevant Orders    Referral to Hand Surgery    Lumbar back pain        Relevant Medications    cyclobenzaprine (FLEXERIL) 5 mg tablet    Other Relevant Orders    Referral to Physical Therapy          Ollie Saravia MD  PGY-1  [facility]

## 2022-05-06 ENCOUNTER — OFFICE VISIT (OUTPATIENT)
Dept: OCCUPATIONAL MEDICINE | Facility: CLINIC | Age: 42
End: 2022-05-06

## 2022-05-06 ENCOUNTER — HOSPITAL ENCOUNTER (OUTPATIENT)
Facility: MEDICAL CENTER | Age: 42
End: 2022-05-06
Attending: PREVENTIVE MEDICINE
Payer: COMMERCIAL

## 2022-05-06 ENCOUNTER — EH NON-PROVIDER (OUTPATIENT)
Dept: OCCUPATIONAL MEDICINE | Facility: CLINIC | Age: 42
End: 2022-05-06

## 2022-05-06 DIAGNOSIS — Z02.89 ENCOUNTER FOR OCCUPATIONAL HEALTH ASSESSMENT: Primary | ICD-10-CM

## 2022-05-06 DIAGNOSIS — Z02.89 ENCOUNTER FOR OCCUPATIONAL HEALTH ASSESSMENT: ICD-10-CM

## 2022-05-06 PROCEDURE — 94375 RESPIRATORY FLOW VOLUME LOOP: CPT | Performed by: PREVENTIVE MEDICINE

## 2022-05-06 PROCEDURE — 86480 TB TEST CELL IMMUN MEASURE: CPT | Performed by: PREVENTIVE MEDICINE

## 2022-05-06 PROCEDURE — 8915 PR COMPREHENSIVE PHYSICAL: Performed by: PREVENTIVE MEDICINE

## 2022-05-09 LAB
GAMMA INTERFERON BACKGROUND BLD IA-ACNC: 0.04 IU/ML
M TB IFN-G BLD-IMP: NEGATIVE
M TB IFN-G CD4+ BCKGRND COR BLD-ACNC: -0.01 IU/ML
MITOGEN IGNF BCKGRD COR BLD-ACNC: >10 IU/ML
QFT TB2 - NIL TBQ2: 0.26 IU/ML

## 2022-05-27 ENCOUNTER — HOSPITAL ENCOUNTER (OUTPATIENT)
Dept: LAB | Facility: MEDICAL CENTER | Age: 42
End: 2022-05-27
Attending: ORTHOPAEDIC SURGERY
Payer: MEDICAID

## 2022-05-27 DIAGNOSIS — M25.431 SWELLING OF BOTH WRISTS: ICD-10-CM

## 2022-05-27 DIAGNOSIS — M25.432 SWELLING OF BOTH WRISTS: ICD-10-CM

## 2022-05-27 PROBLEM — G56.03 BILATERAL CARPAL TUNNEL SYNDROME: Status: ACTIVE | Noted: 2022-05-27

## 2022-05-27 LAB
25(OH)D3 SERPL-MCNC: 12 NG/ML (ref 30–100)
ANION GAP SERPL CALC-SCNC: 13 MMOL/L (ref 7–16)
BASOPHILS # BLD AUTO: 0.3 % (ref 0–1.8)
BASOPHILS # BLD: 0.02 K/UL (ref 0–0.12)
BUN SERPL-MCNC: 12 MG/DL (ref 8–22)
C3 SERPL-MCNC: 154.2 MG/DL (ref 87–200)
C4 SERPL-MCNC: 47.7 MG/DL (ref 19–52)
CALCIUM SERPL-MCNC: 10.2 MG/DL (ref 8.5–10.5)
CHLORIDE SERPL-SCNC: 101 MMOL/L (ref 96–112)
CO2 SERPL-SCNC: 20 MMOL/L (ref 20–33)
CREAT SERPL-MCNC: 0.79 MG/DL (ref 0.5–1.4)
CRP SERPL HS-MCNC: 0.34 MG/DL (ref 0–0.75)
EOSINOPHIL # BLD AUTO: 0.02 K/UL (ref 0–0.51)
EOSINOPHIL NFR BLD: 0.3 % (ref 0–6.9)
ERYTHROCYTE [DISTWIDTH] IN BLOOD BY AUTOMATED COUNT: 38.8 FL (ref 35.9–50)
ERYTHROCYTE [SEDIMENTATION RATE] IN BLOOD BY WESTERGREN METHOD: 12 MM/HOUR (ref 0–25)
GFR SERPLBLD CREATININE-BSD FMLA CKD-EPI: 95 ML/MIN/1.73 M 2
GLUCOSE SERPL-MCNC: 141 MG/DL (ref 65–99)
HCT VFR BLD AUTO: 43.4 % (ref 37–47)
HGB BLD-MCNC: 14.1 G/DL (ref 12–16)
IMM GRANULOCYTES # BLD AUTO: 0.05 K/UL (ref 0–0.11)
IMM GRANULOCYTES NFR BLD AUTO: 0.8 % (ref 0–0.9)
INR PPP: 1.02 (ref 0.87–1.13)
LYMPHOCYTES # BLD AUTO: 0.63 K/UL (ref 1–4.8)
LYMPHOCYTES NFR BLD: 10.1 % (ref 22–41)
MCH RBC QN AUTO: 27.5 PG (ref 27–33)
MCHC RBC AUTO-ENTMCNC: 32.5 G/DL (ref 33.6–35)
MCV RBC AUTO: 84.8 FL (ref 81.4–97.8)
MONOCYTES # BLD AUTO: 0.12 K/UL (ref 0–0.85)
MONOCYTES NFR BLD AUTO: 1.9 % (ref 0–13.4)
NEUTROPHILS # BLD AUTO: 5.38 K/UL (ref 2–7.15)
NEUTROPHILS NFR BLD: 86.6 % (ref 44–72)
NRBC # BLD AUTO: 0 K/UL
NRBC BLD-RTO: 0 /100 WBC
PLATELET # BLD AUTO: 225 K/UL (ref 164–446)
PMV BLD AUTO: 11.2 FL (ref 9–12.9)
POTASSIUM SERPL-SCNC: 4.2 MMOL/L (ref 3.6–5.5)
PROTHROMBIN TIME: 13.1 SEC (ref 12–14.6)
RBC # BLD AUTO: 5.12 M/UL (ref 4.2–5.4)
RHEUMATOID FACT SER IA-ACNC: <10 IU/ML (ref 0–14)
SODIUM SERPL-SCNC: 134 MMOL/L (ref 135–145)
TSH SERPL DL<=0.005 MIU/L-ACNC: 1.3 UIU/ML (ref 0.38–5.33)
URATE SERPL-MCNC: 5.8 MG/DL (ref 1.9–8.2)
WBC # BLD AUTO: 6.2 K/UL (ref 4.8–10.8)

## 2022-05-27 PROCEDURE — 86038 ANTINUCLEAR ANTIBODIES: CPT

## 2022-05-27 PROCEDURE — 86235 NUCLEAR ANTIGEN ANTIBODY: CPT

## 2022-05-27 PROCEDURE — 85610 PROTHROMBIN TIME: CPT

## 2022-05-27 PROCEDURE — 86200 CCP ANTIBODY: CPT

## 2022-05-27 PROCEDURE — 86225 DNA ANTIBODY NATIVE: CPT

## 2022-05-27 PROCEDURE — 86160 COMPLEMENT ANTIGEN: CPT | Mod: 91

## 2022-05-27 PROCEDURE — 84550 ASSAY OF BLOOD/URIC ACID: CPT

## 2022-05-27 PROCEDURE — 82306 VITAMIN D 25 HYDROXY: CPT

## 2022-05-27 PROCEDURE — 36415 COLL VENOUS BLD VENIPUNCTURE: CPT

## 2022-05-27 PROCEDURE — 86431 RHEUMATOID FACTOR QUANT: CPT

## 2022-05-27 PROCEDURE — 85652 RBC SED RATE AUTOMATED: CPT

## 2022-05-27 PROCEDURE — 80048 BASIC METABOLIC PNL TOTAL CA: CPT

## 2022-05-27 PROCEDURE — 84443 ASSAY THYROID STIM HORMONE: CPT

## 2022-05-27 PROCEDURE — 85025 COMPLETE CBC W/AUTO DIFF WBC: CPT

## 2022-05-27 PROCEDURE — 86812 HLA TYPING A B OR C: CPT

## 2022-05-27 PROCEDURE — 86140 C-REACTIVE PROTEIN: CPT

## 2022-05-30 LAB
CCP IGG SERPL-ACNC: 4 UNITS (ref 0–19)
DSDNA AB TITR SER CLIF: 9 IU (ref 0–24)
ENA SM IGG SER-ACNC: 3 AU/ML (ref 0–40)
HLA-B27 QL FC: NEGATIVE
NUCLEAR IGG SER QL IA: NORMAL

## 2022-07-14 ENCOUNTER — PHYSICAL THERAPY (OUTPATIENT)
Dept: PHYSICAL THERAPY | Facility: REHABILITATION | Age: 42
End: 2022-07-14
Payer: COMMERCIAL

## 2022-07-14 DIAGNOSIS — M54.50 LUMBAR BACK PAIN: ICD-10-CM

## 2022-07-14 PROCEDURE — 97161 PT EVAL LOW COMPLEX 20 MIN: CPT

## 2022-07-14 SDOH — ECONOMIC STABILITY: GENERAL: QUALITY OF LIFE: GOOD

## 2022-07-14 ASSESSMENT — ENCOUNTER SYMPTOMS
PAIN SCALE: 8
PAIN SCALE AT HIGHEST: 9
PAIN SCALE AT LOWEST: 0
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
## 2022-07-20 ENCOUNTER — EH NON-PROVIDER (OUTPATIENT)
Dept: OCCUPATIONAL MEDICINE | Facility: CLINIC | Age: 42
End: 2022-07-20

## 2022-07-21 ENCOUNTER — PHYSICAL THERAPY (OUTPATIENT)
Dept: PHYSICAL THERAPY | Facility: REHABILITATION | Age: 42
End: 2022-07-21
Payer: COMMERCIAL

## 2022-07-21 DIAGNOSIS — M54.50 LUMBAR BACK PAIN: ICD-10-CM

## 2022-07-21 PROCEDURE — 97014 ELECTRIC STIMULATION THERAPY: CPT

## 2022-07-21 PROCEDURE — 97110 THERAPEUTIC EXERCISES: CPT

## 2022-07-21 NOTE — OP THERAPY DAILY TREATMENT
Outpatient Physical Therapy  DAILY TREATMENT     [facility] Outpatient Physical Therapy [place]  [address], [address]  [place] [address]  Phone:  [phone]  Fax:  [phone]    Date: 07/21/2022    Patient: Kaela Ballesteros  YOB: 1980  MRN: [id number]     Time Calculation    Start time: 1025  Stop time: 1110 Time Calculation (min): 45 minutes         Chief Complaint: Back Problem    Visit #: 2    SUBJECTIVE:  Patient reports that she has been working on tra control. Notes increased soreness.     OBJECTIVE:  Current objective measures:           Therapeutic Exercises (CPT 56072):     1. Basic tra edu, x10min    2. Basic tra with LE lifts, x30    3. Nu step L1, x10min    4. Tra with ball roll, x3min    5. Seated on DD with LE lifts, x30    6. Multifidi push, x20    Therapeutic Treatments and Modalities:     1. E Stim Unattended (CPT 12596), IFC to LB x15min 80-150hz    Time-based treatments/modalities:    Physical Therapy Timed Treatment Charges  Therapeutic exercise minutes (CPT 78501): 30 minutes      Pain rating (1-10) before treatment:  4  Pain rating (1-10) after treatment:  2    ASSESSMENT:   Response to treatment: Patient demonstrated poor exercise tolerance with an increase in symptoms post basic tra activation exercise. Patient to trial an increase in activity at next visit. Plan to continue with modalities as needed.     PLAN/RECOMMENDATIONS:   Plan for treatment: therapy treatment to continue next visit.  Planned interventions for next visit: continue with current treatment.

## 2022-07-28 ENCOUNTER — HOSPITAL ENCOUNTER (OUTPATIENT)
Dept: RADIOLOGY | Facility: MEDICAL CENTER | Age: 42
End: 2022-07-28
Attending: SPECIALIST
Payer: COMMERCIAL

## 2022-07-28 DIAGNOSIS — Z12.31 ENCOUNTER FOR SCREENING MAMMOGRAM FOR MALIGNANT NEOPLASM OF BREAST: ICD-10-CM

## 2022-07-28 PROCEDURE — 77063 BREAST TOMOSYNTHESIS BI: CPT

## 2022-07-29 ENCOUNTER — PHYSICAL THERAPY (OUTPATIENT)
Dept: PHYSICAL THERAPY | Facility: REHABILITATION | Age: 42
End: 2022-07-29
Payer: COMMERCIAL

## 2022-07-29 DIAGNOSIS — M54.50 LUMBAR BACK PAIN: ICD-10-CM

## 2022-07-29 PROCEDURE — 97110 THERAPEUTIC EXERCISES: CPT

## 2022-07-29 PROCEDURE — 97014 ELECTRIC STIMULATION THERAPY: CPT

## 2022-07-29 NOTE — OP THERAPY DAILY TREATMENT
Outpatient Physical Therapy  DAILY TREATMENT     [facility] Outpatient Physical Therapy [place]  [address], [address]  [facility] [address]  Phone:  [phone]  Fax:  [phone]    Date: 07/29/2022    Patient: Kaela Ballesteros  YOB: 1980  MRN: [id number]     Time Calculation    Start time: 1115  Stop time: 1200 Time Calculation (min): 45 minutes         Chief Complaint: Back Problem    Visit #: 3    SUBJECTIVE:  Patient reports that symptoms are still present notes no real changes.     OBJECTIVE:  Current objective measures:           Therapeutic Exercises (CPT 57403):     1. Basic tra edu, x10min    2. Basic tra with LE lifts, x30    3. Nu step L1, x10min    4. Tra with ball roll, x3min    5. Seated on DD with LE lifts, x30    6. Multifidi push, x20    7. Lumbar roll, x20    8. Lumbar flexion, x20    Therapeutic Treatments and Modalities:     1. E Stim Unattended (CPT 80529), IFC to LB x15min 80-150hz    Time-based treatments/modalities:    Physical Therapy Timed Treatment Charges  Therapeutic exercise minutes (CPT 89546): 30 minutes      Pain rating (1-10) before treatment:  4  Pain rating (1-10) after treatment:  1    ASSESSMENT:   Response to treatment: Patient responded well to therapy with an overall improvement in symptoms post IFC. Patient continues to demonstrate significant stability deficits.     PLAN/RECOMMENDATIONS:   Plan for treatment: therapy treatment to continue next visit.  Planned interventions for next visit: continue with current treatment.

## 2022-08-02 ENCOUNTER — HOSPITAL ENCOUNTER (OUTPATIENT)
Dept: RADIOLOGY | Facility: MEDICAL CENTER | Age: 42
End: 2022-08-02
Attending: SPECIALIST
Payer: COMMERCIAL

## 2022-08-02 ENCOUNTER — HOSPITAL ENCOUNTER (OUTPATIENT)
Dept: RADIOLOGY | Facility: MEDICAL CENTER | Age: 42
End: 2022-08-02
Payer: COMMERCIAL

## 2022-08-04 ENCOUNTER — PHYSICAL THERAPY (OUTPATIENT)
Dept: PHYSICAL THERAPY | Facility: REHABILITATION | Age: 42
End: 2022-08-04
Payer: COMMERCIAL

## 2022-08-04 DIAGNOSIS — M54.50 LUMBAR BACK PAIN: ICD-10-CM

## 2022-08-04 PROCEDURE — 97110 THERAPEUTIC EXERCISES: CPT

## 2022-08-04 PROCEDURE — 97014 ELECTRIC STIMULATION THERAPY: CPT

## 2022-08-04 NOTE — OP THERAPY DAILY TREATMENT
Outpatient Physical Therapy  DAILY TREATMENT     [facility] Outpatient Physical Therapy [place]  [address], [address]  [facility] [address]  Phone:  [phone]  Fax:  [phone]    Date: 08/04/2022    Patient: Kaela Ballesteros  YOB: 1980  MRN: [id number]     Time Calculation    Start time: 0945  Stop time: 1030 Time Calculation (min): 45 minutes         Chief Complaint: Back Problem    Visit #: 4    SUBJECTIVE:  Patient reports that symptoms continue to be present. Notes spasms after work.     OBJECTIVE:  Current objective measures:           Therapeutic Exercises (CPT 38888):     1. Basic tra edu, x10min    2. Basic tra with LE lifts, x30    3. Bike L4 hills, x10min    4. Tra with ball roll, x3min    5. Seated on DD with LE lifts, x30    6. Multifidi push, x20    7. Lumbar roll, x20    8. Lumbar flexion, x20    Therapeutic Treatments and Modalities:     1. E Stim Unattended (CPT 12165), IFC to LB x15min 80-150hz    Time-based treatments/modalities:    Physical Therapy Timed Treatment Charges  Therapeutic exercise minutes (CPT 86280): 30 minutes      Pain rating (1-10) before treatment:  4  Pain rating (1-10) after treatment:  1    ASSESSMENT:   Response to treatment: Patient responded fair to therapy. Basic mobility exercises continue to irritate the patient's back. If no improvement in 1-2 visits consider PMR.     PLAN/RECOMMENDATIONS:   Plan for treatment: therapy treatment to continue next visit.  Planned interventions for next visit: continue with current treatment.

## 2022-08-12 ENCOUNTER — APPOINTMENT (OUTPATIENT)
Dept: PHYSICAL THERAPY | Facility: REHABILITATION | Age: 42
End: 2022-08-12
Payer: COMMERCIAL

## 2022-08-23 NOTE — ASSESSMENT & PLAN NOTE
8/23/2022    Joel Daniel Wegener, MD  [address]  [facility] [address]    RE: Lauren Nick       Dear Colleague,     I had the pleasure of seeing Lauren Nick in the [facility].  HPI and Plan:   A very pleasant 69-year-old female s/p bioprosthetic aortic valve replacement with 23 mm Inspira valve for severe aortic stenosis in the setting of bicuspid aortic valve this was done in March 2021.  She also had pacemaker and plantation due to postoperative bradycardia.  Outside notes indicate no significant coronary disease.  She also has history of dyslipidemia with history of intolerance to several statins and is on Zetia.  I saw the patient in December 2021 when she established care with us after [place].  She got enrolled in our device clinic.  She also had an echocardiogram in March 2022 that showed normal LV function with well-seated normal functioning bioprosthetic valve with mean gradient of 13 mg okay without any aortic regurgitation.  Ascending aorta was noted to be 4.4 cm.  To be noted  in May 2020 when it was noted to be 4.2 cm.  She had an MRI aorta done in July 2022 that showed borderline dilated ascending order measuring 3.9 x 3.8 cm.  Today she is coming for routine follow-up.  Overall health wise she feels well.  No cardiac complaints.  Device interrogation has shown 25% atrial pacing less than 1% ventricular pacing stable sensing and pacing threshold stable impedance 4.9 to 8.1 years of battery longevity remaining without any atrial or ventricular arrhythmias.    Assessment and plan  A pleasant 69-year-old female s/p bioprosthetic aortic valve replacement for severe aortic valve stenosis in the setting of bicuspid aortic valve this was done in March 2021.  Echocardiogram earlier this year showed normal functioning bioprosthetic valve.  Cardiac auscultation was benign today without any significant murmur.  MR aorta shows ascending  History of right oophorectomy, status post left cystectomy for chocolate cyst 2012   aorta to be only borderline dilated.  I recommended we can repeat an MRI of the aorta in 2 years time.  She will continue predental work-up antibiotic prophylaxis and we can see her back in 1 year and she will continue regular device checkup through device clinic    1.  History of bicuspid aortic valve with severe aortic valve stenosis s/p bioprosthetic aortic valve replacement 2021.  Echocardiogram showing normal functioning prosthetic valve.  Normal LV function.  On aspirin.  2.  History of bradycardia post AVR s/p pacemaker implantation.  Dual-chamber pacemaker plantation.  Normal functioning pacemaker on recent device check.  3.  No significant coronary artery disease reported on coronary angiogram prior to AVR.  4.  Dyslipidemia with a history of significant intolerance to several statins including low potency statins.  On Zetia.  5.  Mild dilated ascending, echocardiogram overestimating the size on recent MRI mildly dilated ascending aorta measuring 3.9 cm.  On losartan and metoprolol    Recommendations  Continue aspirin  Continue predental work-up antibiotic prophylaxis  Continue regular device checkup  Follow-up in 1 year in cardiology clinic  Regarding follow-up of borderline dilated ascending aorta I recommend repeat MRA in 2 years time and we can discuss this again when we see her back next year    Orders Placed This Encounter   Procedures     Follow-Up with Cardiology       No orders of the defined types were placed in this encounter.      There are no discontinued medications.      Encounter Diagnoses   Name Primary?     H/O aortic valve replacement      Ascending aorta dilatation (H)        CURRENT MEDICATIONS:  Current Outpatient Medications   Medication Sig Dispense Refill     aspirin 81 MG EC tablet Take 81 mg by mouth daily       coenzyme Q-10 (CO-Q10) 50 MG capsule Take 2 capsules (100 mg) by mouth daily       escitalopram (LEXAPRO) 20 MG tablet Take 1 tablet (20 mg) by mouth daily 90 tablet 0      ezetimibe (ZETIA) 10 MG tablet TAKE 1 TABLET (10 MG) BY MOUTH DAILY 90 tablet 0     fluticasone (FLONASE) 50 MCG/ACT nasal spray Spray 2 sprays into both nostrils daily 16 g 11     fluticasone (FLOVENT HFA) 220 MCG/ACT inhaler Inhale 1 puff into the lungs 2 times daily 12 g 3     fluticasone-salmeterol (ADVAIR) 500-50 MCG/DOSE inhaler Inhale 1 puff into the lungs every 12 hours 3 each 3     levothyroxine (SYNTHROID/LEVOTHROID) 50 MCG tablet Take 1 tablet (50 mcg) by mouth daily 90 tablet 1     LORazepam (ATIVAN) 1 MG tablet Take 1 tablet (1 mg) by mouth nightly as needed for sleep 30 tablet 5     losartan (COZAAR) 25 MG tablet Take 1 tablet (25 mg) by mouth daily 90 tablet 3     metoprolol succinate ER (TOPROL-XL) 25 MG 24 hr tablet Take 1 tablet (25 mg) by mouth 2 times daily 180 tablet 2     spacer (OPTICHAMBER MURALI) holding chamber Use with inhaler 1 each 1     STATIN NOT PRESCRIBED (INTENTIONAL) Please choose reason not prescribed from choices below.       vitamin C (ASCORBIC ACID) 1000 MG TABS Take 1,000 mg by mouth daily       zinc gluconate 50 MG tablet Take 50 mg by mouth daily         ALLERGIES     Allergies   Allergen Reactions     Penicillins Hives     Statins [Hmg-Coa-R Inhibitors]      Myalgias to multiple statins       PAST MEDICAL HISTORY:  Past Medical History:   Diagnosis Date     Arthritis Some in knees, hips and shoulder     Cancer (H) Breast cancer 9/89 lumpectomy, 6 months of chemo     Depressive disorder Put on meds 20 years ago for this.     Heart disease Bicuspid valve diagnosed in my 50 s     Thyroid disease 02/21 diagnosed       PAST SURGICAL HISTORY:  Past Surgical History:   Procedure Laterality Date     BIOPSY  10/2018     BREAST SURGERY  09/25/89     CARDIAC SURGERY  03/22/21     CHOLECYSTECTOMY  03/2010     COLONOSCOPY  03/2019     ENT SURGERY  06/2011     EYE SURGERY  09/2015       FAMILY HISTORY:  Family History   Problem Relation Age of Onset     Hypertension Mother      Cancer  Mother      Other Cancer Mother          if kidney cancer at age 86     Other Cancer Father          of stomach cancer age 43     Heart Disease Paternal Grandmother      Hypertension Paternal Grandmother      Obesity Paternal Grandmother      Hypertension Sister      Hyperlipidemia Sister      Anesthesia Reaction Son      Hypertension Son      Heart Disease Daughter      Heart Disease Sister      Hypertension Sister      Hyperlipidemia Sister      Colon Cancer Sister      Diabetes Sister      Breast Cancer Sister      Coronary Artery Disease Sister      Hyperlipidemia Sister      Thyroid Disease Sister      Other Cancer Other         Neuroblastoma age 5       SOCIAL HISTORY:  Social History     Socioeconomic History     Marital status:      Spouse name: None     Number of children: None     Years of education: None     Highest education level: None   Tobacco Use     Smoking status: Former Smoker     Packs/day: 1.00     Years: 10.00     Pack years: 10.00     Types: Cigarettes     Start date: 1977     Quit date: 10/1/1979     Years since quittin.9     Smokeless tobacco: Never Used   Vaping Use     Vaping Use: Never used   Substance and Sexual Activity     Alcohol use: Yes     Comment: occ     Drug use: Never     Sexual activity: Not Currently     Partners: Male     Birth control/protection: Post-menopausal   Other Topics Concern     Parent/sibling w/ CABG, MI or angioplasty before 65F 55M? No       Review of Systems:  Skin:  Negative bruising     Eyes:  Positive for glasses    ENT:  Positive for hearing loss wears hearing aids  Respiratory: No particular shortness of breath  Cardiovascular:  Negative      Gastroenterology: Positive for reflux    Genitourinary:  Positive for nocturia    Musculoskeletal:  not assessed      Neurologic:  Negative      Psychiatric:  Positive for anxiety;depression treated  Heme/Lymph/Imm:  Positive for allergies seasonal  Endocrine:  Positive for thyroid disorder   "    Physical Exam:  Vitals: /83   Pulse 56   Ht 1.585 m (5' 2.4\")   Wt 66 kg (145 lb 9.6 oz)   SpO2 95%   BMI 26.29 kg/m      General patient appears comfortable  Neck normal JVP  Cardiovascular system S1-S2 normal no murmur rub or gallop  Respiratory system clear to auscultation  Extremities no edema  Neurological alert, oriented      CC  Reggie King MD  [address]  [place]  [place] [address]            Thank you for allowing me to participate in the care of your patient.      Sincerely,     Reggie King MD     [facility]  "

## 2022-09-09 ENCOUNTER — NON-PROVIDER VISIT (OUTPATIENT)
Dept: MEDICAL GROUP | Facility: CLINIC | Age: 42
End: 2022-09-09
Payer: COMMERCIAL

## 2022-09-09 DIAGNOSIS — Z23 ENCOUNTER FOR IMMUNIZATION: Primary | ICD-10-CM

## 2022-09-09 PROCEDURE — 90471 IMMUNIZATION ADMIN: CPT | Performed by: FAMILY MEDICINE

## 2022-09-09 PROCEDURE — 90746 HEPB VACCINE 3 DOSE ADULT IM: CPT | Performed by: FAMILY MEDICINE

## 2022-09-09 NOTE — PROGRESS NOTES
"Kaela Ballesteros is a 41 y.o. female here for a non-provider visit for:   HEPATITIS B 3 of 3    Reason for immunization: continue or complete series started at the office  Immunization records indicate need for vaccine: Yes, confirmed with [facility]  Minimum interval has been met for this vaccine: Yes  ABN completed: Yes    VIS Dated  09/09/2022 was given to patient: Yes  All IAC Questionnaire questions were answered \"No.\"    Patient tolerated injection and no adverse effects were observed or reported: Yes    Pt scheduled for next dose in series: Yes   "
Mother

## 2022-09-13 ENCOUNTER — HOSPITAL ENCOUNTER (OUTPATIENT)
Facility: MEDICAL CENTER | Age: 42
End: 2022-09-13
Attending: NURSE PRACTITIONER
Payer: COMMERCIAL

## 2022-09-13 ENCOUNTER — EH NON-PROVIDER (OUTPATIENT)
Dept: OCCUPATIONAL MEDICINE | Facility: CLINIC | Age: 42
End: 2022-09-13

## 2022-09-13 PROCEDURE — 86480 TB TEST CELL IMMUN MEASURE: CPT | Performed by: PREVENTIVE MEDICINE

## 2022-09-16 LAB
GAMMA INTERFERON BACKGROUND BLD IA-ACNC: 0.06 IU/ML
M TB IFN-G BLD-IMP: NEGATIVE
M TB IFN-G CD4+ BCKGRND COR BLD-ACNC: 0.05 IU/ML
MITOGEN IGNF BCKGRD COR BLD-ACNC: >10 IU/ML
QFT TB2 - NIL TBQ2: 0.01 IU/ML

## 2022-10-03 ENCOUNTER — IMMUNIZATION (OUTPATIENT)
Dept: OCCUPATIONAL MEDICINE | Facility: CLINIC | Age: 42
End: 2022-10-03

## 2022-10-03 DIAGNOSIS — Z23 NEED FOR VACCINATION: Primary | ICD-10-CM

## 2022-10-03 PROCEDURE — 90686 IIV4 VACC NO PRSV 0.5 ML IM: CPT | Performed by: PREVENTIVE MEDICINE

## 2022-11-22 ENCOUNTER — HOSPITAL ENCOUNTER (OUTPATIENT)
Facility: MEDICAL CENTER | Age: 42
End: 2022-11-22
Attending: PREVENTIVE MEDICINE
Payer: COMMERCIAL

## 2022-11-22 LAB
SARS-COV-2 RNA RESP QL NAA+PROBE: NOTDETECTED
SPECIMEN SOURCE: NORMAL

## 2022-12-14 ENCOUNTER — TELEPHONE (OUTPATIENT)
Dept: HEALTH INFORMATION MANAGEMENT | Facility: OTHER | Age: 42
End: 2022-12-14

## 2022-12-19 ENCOUNTER — TELEPHONE (OUTPATIENT)
Dept: SCHEDULING | Facility: IMAGING CENTER | Age: 42
End: 2022-12-19

## 2023-02-10 PROBLEM — I10 HYPERTENSION: Status: ACTIVE | Noted: 2018-11-19

## 2023-02-10 PROBLEM — M54.50 LOW BACK PAIN: Status: ACTIVE | Noted: 2019-01-09

## 2023-02-10 PROBLEM — E66.9 OBESITY WITH BODY MASS INDEX 30 OR GREATER: Status: ACTIVE | Noted: 2019-08-13

## 2023-02-10 PROBLEM — N62 MACROMASTIA: Status: ACTIVE | Noted: 2018-11-19

## 2023-02-10 PROBLEM — G90.A POSTURAL ORTHOSTATIC TACHYCARDIA SYNDROME: Status: ACTIVE | Noted: 2018-11-19

## 2023-03-14 ENCOUNTER — HOSPITAL ENCOUNTER (EMERGENCY)
Facility: MEDICAL CENTER | Age: 43
End: 2023-03-14
Attending: EMERGENCY MEDICINE
Payer: COMMERCIAL

## 2023-03-14 VITALS
HEIGHT: 72 IN | TEMPERATURE: 98.5 F | BODY MASS INDEX: 35.44 KG/M2 | WEIGHT: 261.69 LBS | OXYGEN SATURATION: 97 % | HEART RATE: 94 BPM | RESPIRATION RATE: 16 BRPM | SYSTOLIC BLOOD PRESSURE: 128 MMHG | DIASTOLIC BLOOD PRESSURE: 82 MMHG

## 2023-03-14 DIAGNOSIS — R19.7 DIARRHEA, UNSPECIFIED TYPE: ICD-10-CM

## 2023-03-14 DIAGNOSIS — R11.2 NAUSEA AND VOMITING, UNSPECIFIED VOMITING TYPE: ICD-10-CM

## 2023-03-14 DIAGNOSIS — R10.9 ABDOMINAL PAIN, UNSPECIFIED ABDOMINAL LOCATION: ICD-10-CM

## 2023-03-14 LAB
ALBUMIN SERPL BCP-MCNC: 4.5 G/DL (ref 3.2–4.9)
ALBUMIN/GLOB SERPL: 1.3 G/DL
ALP SERPL-CCNC: 65 U/L (ref 30–99)
ALT SERPL-CCNC: 44 U/L (ref 2–50)
ANION GAP SERPL CALC-SCNC: 14 MMOL/L (ref 7–16)
APPEARANCE UR: CLEAR
AST SERPL-CCNC: 30 U/L (ref 12–45)
BASOPHILS # BLD AUTO: 0.4 % (ref 0–1.8)
BASOPHILS # BLD: 0.02 K/UL (ref 0–0.12)
BILIRUB SERPL-MCNC: 0.5 MG/DL (ref 0.1–1.5)
BILIRUB UR QL STRIP.AUTO: NEGATIVE
BUN SERPL-MCNC: 11 MG/DL (ref 8–22)
CALCIUM ALBUM COR SERPL-MCNC: 9.5 MG/DL (ref 8.5–10.5)
CALCIUM SERPL-MCNC: 9.9 MG/DL (ref 8.5–10.5)
CHLORIDE SERPL-SCNC: 104 MMOL/L (ref 96–112)
CO2 SERPL-SCNC: 18 MMOL/L (ref 20–33)
COLOR UR: YELLOW
CREAT SERPL-MCNC: 0.69 MG/DL (ref 0.5–1.4)
EOSINOPHIL # BLD AUTO: 0.04 K/UL (ref 0–0.51)
EOSINOPHIL NFR BLD: 0.8 % (ref 0–6.9)
ERYTHROCYTE [DISTWIDTH] IN BLOOD BY AUTOMATED COUNT: 39.8 FL (ref 35.9–50)
GFR SERPLBLD CREATININE-BSD FMLA CKD-EPI: 109 ML/MIN/1.73 M 2
GLOBULIN SER CALC-MCNC: 3.5 G/DL (ref 1.9–3.5)
GLUCOSE SERPL-MCNC: 112 MG/DL (ref 65–99)
GLUCOSE UR STRIP.AUTO-MCNC: NEGATIVE MG/DL
HCT VFR BLD AUTO: 45.5 % (ref 37–47)
HGB BLD-MCNC: 14.7 G/DL (ref 12–16)
IMM GRANULOCYTES # BLD AUTO: 0.03 K/UL (ref 0–0.11)
IMM GRANULOCYTES NFR BLD AUTO: 0.6 % (ref 0–0.9)
KETONES UR STRIP.AUTO-MCNC: NEGATIVE MG/DL
LEUKOCYTE ESTERASE UR QL STRIP.AUTO: NEGATIVE
LIPASE SERPL-CCNC: 23 U/L (ref 11–82)
LYMPHOCYTES # BLD AUTO: 0.66 K/UL (ref 1–4.8)
LYMPHOCYTES NFR BLD: 13.2 % (ref 22–41)
MCH RBC QN AUTO: 27.3 PG (ref 27–33)
MCHC RBC AUTO-ENTMCNC: 32.3 G/DL (ref 33.6–35)
MCV RBC AUTO: 84.4 FL (ref 81.4–97.8)
MICRO URNS: NORMAL
MONOCYTES # BLD AUTO: 0.28 K/UL (ref 0–0.85)
MONOCYTES NFR BLD AUTO: 5.6 % (ref 0–13.4)
NEUTROPHILS # BLD AUTO: 3.97 K/UL (ref 2–7.15)
NEUTROPHILS NFR BLD: 79.4 % (ref 44–72)
NITRITE UR QL STRIP.AUTO: NEGATIVE
NRBC # BLD AUTO: 0 K/UL
NRBC BLD-RTO: 0 /100 WBC
PH UR STRIP.AUTO: 6.5 [PH] (ref 5–8)
PLATELET # BLD AUTO: 214 K/UL (ref 164–446)
PMV BLD AUTO: 10.8 FL (ref 9–12.9)
POTASSIUM SERPL-SCNC: 4.3 MMOL/L (ref 3.6–5.5)
PROT SERPL-MCNC: 8 G/DL (ref 6–8.2)
PROT UR QL STRIP: NEGATIVE MG/DL
RBC # BLD AUTO: 5.39 M/UL (ref 4.2–5.4)
RBC UR QL AUTO: NEGATIVE
SODIUM SERPL-SCNC: 136 MMOL/L (ref 135–145)
SP GR UR STRIP.AUTO: 1.01
UROBILINOGEN UR STRIP.AUTO-MCNC: 0.2 MG/DL
WBC # BLD AUTO: 5 K/UL (ref 4.8–10.8)

## 2023-03-14 PROCEDURE — 36415 COLL VENOUS BLD VENIPUNCTURE: CPT

## 2023-03-14 PROCEDURE — 96375 TX/PRO/DX INJ NEW DRUG ADDON: CPT

## 2023-03-14 PROCEDURE — 96374 THER/PROPH/DIAG INJ IV PUSH: CPT

## 2023-03-14 PROCEDURE — 80053 COMPREHEN METABOLIC PANEL: CPT

## 2023-03-14 PROCEDURE — 700105 HCHG RX REV CODE 258: Performed by: EMERGENCY MEDICINE

## 2023-03-14 PROCEDURE — 700111 HCHG RX REV CODE 636 W/ 250 OVERRIDE (IP): Performed by: EMERGENCY MEDICINE

## 2023-03-14 PROCEDURE — 85025 COMPLETE CBC W/AUTO DIFF WBC: CPT

## 2023-03-14 PROCEDURE — 83690 ASSAY OF LIPASE: CPT

## 2023-03-14 PROCEDURE — 99285 EMERGENCY DEPT VISIT HI MDM: CPT

## 2023-03-14 PROCEDURE — 81003 URINALYSIS AUTO W/O SCOPE: CPT

## 2023-03-14 RX ORDER — ONDANSETRON 4 MG/1
4 TABLET, ORALLY DISINTEGRATING ORAL EVERY 6 HOURS PRN
Qty: 10 TABLET | Refills: 0 | Status: SHIPPED | OUTPATIENT
Start: 2023-03-14 | End: 2023-06-02

## 2023-03-14 RX ORDER — ONDANSETRON 2 MG/ML
4 INJECTION INTRAMUSCULAR; INTRAVENOUS ONCE
Status: COMPLETED | OUTPATIENT
Start: 2023-03-14 | End: 2023-03-14

## 2023-03-14 RX ORDER — MORPHINE SULFATE 4 MG/ML
4 INJECTION INTRAVENOUS ONCE
Status: COMPLETED | OUTPATIENT
Start: 2023-03-14 | End: 2023-03-14

## 2023-03-14 RX ORDER — SODIUM CHLORIDE 9 MG/ML
1000 INJECTION, SOLUTION INTRAVENOUS ONCE
Status: COMPLETED | OUTPATIENT
Start: 2023-03-14 | End: 2023-03-14

## 2023-03-14 RX ADMIN — SODIUM CHLORIDE 1000 ML: 9 INJECTION, SOLUTION INTRAVENOUS at 15:38

## 2023-03-14 RX ADMIN — ONDANSETRON 4 MG: 2 INJECTION INTRAMUSCULAR; INTRAVENOUS at 15:37

## 2023-03-14 RX ADMIN — MORPHINE SULFATE 4 MG: 4 INJECTION INTRAVENOUS at 15:37

## 2023-03-14 NOTE — ED NOTES
Pt back to room stating N/V/D starting today. Pt reporting her  got sick with similar symptoms starting last night

## 2023-03-14 NOTE — ED TRIAGE NOTES
Chief Complaint   Patient presents with    Abdominal Pain     Started today.  Pt tried to drink gingerale and saltings but has barely been able to keep those down.      N/V     Pt denies bloody emesis.  Pt c/o throwing up bile.      Diarrhea     BP (!) 140/84   Pulse (!) 104   Temp 36.9 °C (98.4 °F) (Temporal)   Resp 18   Ht 1.829 m (6')   Wt 119 kg (261 lb 11 oz)   LMP 04/26/2019   SpO2 98%   BMI 35.49 kg/m²     Pt ambulatory from lobby with steady gait.  Pt unsure if she got sick from cabbage that she cooked last night.  Pt's  also got sick but is now feeling better.  Abdominal pain protocol ordered.     Pt is alert and oriented, speaking in full sentences, follows commands and responds appropriately to questions. Resp are even and unlabored.      Pt placed in lobby. Pt educated on triage process. Pt encouraged to alert staff for any changes.     Patient and staff wearing appropriate PPE
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
## 2023-04-03 ENCOUNTER — OFFICE VISIT (OUTPATIENT)
Dept: MEDICAL GROUP | Facility: CLINIC | Age: 43
End: 2023-04-03
Payer: COMMERCIAL

## 2023-04-03 ENCOUNTER — HOSPITAL ENCOUNTER (OUTPATIENT)
Dept: LAB | Facility: MEDICAL CENTER | Age: 43
End: 2023-04-03
Attending: HEALTH CARE PROVIDER
Payer: COMMERCIAL

## 2023-04-03 VITALS
TEMPERATURE: 98.5 F | HEART RATE: 96 BPM | HEIGHT: 72 IN | SYSTOLIC BLOOD PRESSURE: 132 MMHG | WEIGHT: 266.3 LBS | BODY MASS INDEX: 36.07 KG/M2 | OXYGEN SATURATION: 93 % | DIASTOLIC BLOOD PRESSURE: 90 MMHG

## 2023-04-03 DIAGNOSIS — M25.511 ACUTE PAIN OF RIGHT SHOULDER: ICD-10-CM

## 2023-04-03 DIAGNOSIS — M54.2 NECK PAIN: ICD-10-CM

## 2023-04-03 LAB
BASOPHILS # BLD AUTO: 0.9 % (ref 0–1.8)
BASOPHILS # BLD: 0.05 K/UL (ref 0–0.12)
CK SERPL-CCNC: 303 U/L (ref 0–154)
CRP SERPL HS-MCNC: 0.31 MG/DL (ref 0–0.75)
EOSINOPHIL # BLD AUTO: 0.11 K/UL (ref 0–0.51)
EOSINOPHIL NFR BLD: 2 % (ref 0–6.9)
ERYTHROCYTE [DISTWIDTH] IN BLOOD BY AUTOMATED COUNT: 39.7 FL (ref 35.9–50)
HCT VFR BLD AUTO: 43 % (ref 37–47)
HGB BLD-MCNC: 14.1 G/DL (ref 12–16)
IMM GRANULOCYTES # BLD AUTO: 0.02 K/UL (ref 0–0.11)
IMM GRANULOCYTES NFR BLD AUTO: 0.4 % (ref 0–0.9)
LYMPHOCYTES # BLD AUTO: 1.68 K/UL (ref 1–4.8)
LYMPHOCYTES NFR BLD: 30.2 % (ref 22–41)
MCH RBC QN AUTO: 27.4 PG (ref 27–33)
MCHC RBC AUTO-ENTMCNC: 32.8 G/DL (ref 33.6–35)
MCV RBC AUTO: 83.5 FL (ref 81.4–97.8)
MONOCYTES # BLD AUTO: 0.49 K/UL (ref 0–0.85)
MONOCYTES NFR BLD AUTO: 8.8 % (ref 0–13.4)
NEUTROPHILS # BLD AUTO: 3.21 K/UL (ref 2–7.15)
NEUTROPHILS NFR BLD: 57.7 % (ref 44–72)
NRBC # BLD AUTO: 0 K/UL
NRBC BLD-RTO: 0 /100 WBC
PLATELET # BLD AUTO: 211 K/UL (ref 164–446)
PMV BLD AUTO: 11.2 FL (ref 9–12.9)
RBC # BLD AUTO: 5.15 M/UL (ref 4.2–5.4)
WBC # BLD AUTO: 5.6 K/UL (ref 4.8–10.8)

## 2023-04-03 PROCEDURE — 82550 ASSAY OF CK (CPK): CPT

## 2023-04-03 PROCEDURE — 36415 COLL VENOUS BLD VENIPUNCTURE: CPT

## 2023-04-03 PROCEDURE — 86140 C-REACTIVE PROTEIN: CPT

## 2023-04-03 PROCEDURE — 85025 COMPLETE CBC W/AUTO DIFF WBC: CPT

## 2023-04-03 PROCEDURE — 99214 OFFICE O/P EST MOD 30 MIN: CPT | Mod: GC | Performed by: HEALTH CARE PROVIDER

## 2023-04-03 PROCEDURE — 85652 RBC SED RATE AUTOMATED: CPT

## 2023-04-03 RX ORDER — METHYLPREDNISOLONE 4 MG/1
TABLET ORAL
Qty: 21 TABLET | Refills: 0 | Status: SHIPPED | OUTPATIENT
Start: 2023-04-03 | End: 2023-06-02

## 2023-04-03 ASSESSMENT — FIBROSIS 4 INDEX: FIB4 SCORE: 0.93

## 2023-04-03 NOTE — LETTER
[facility]     April 3, 2023    Patient: Kaela Ballesteros   YOB: 1980   Date of Visit: 4/3/2023       To Whom It May Concern:    Kaela Ballesteros was seen and treated in our department on 4/3/2023.     Sincerely,     Jamie Lindsay M.D.

## 2023-04-04 ENCOUNTER — APPOINTMENT (OUTPATIENT)
Dept: RADIOLOGY | Facility: CLINIC | Age: 43
End: 2023-04-04
Attending: HEALTH CARE PROVIDER
Payer: COMMERCIAL

## 2023-04-04 DIAGNOSIS — M54.2 NECK PAIN: ICD-10-CM

## 2023-04-04 LAB — ERYTHROCYTE [SEDIMENTATION RATE] IN BLOOD BY WESTERGREN METHOD: 27 MM/HOUR (ref 0–25)

## 2023-04-04 PROCEDURE — 72040 X-RAY EXAM NECK SPINE 2-3 VW: CPT | Mod: TC | Performed by: FAMILY MEDICINE

## 2023-04-04 NOTE — PROGRESS NOTES
[facility]    Subjective:     CC: R shoulder pain    HPI:   Kaela is a 42 y.o. female with past medical history of intermittent MSK pain, sickle cell carrier presenting today for evaluation of 1 week history of R shoulder/arm pain. Pt states that pain began 1 week ago without inciting injury or event. She has been taking tylenol and using warming pads with minimal improvement at times. No recent improvement with these interventions and no change with use of a muscle relaxant. Denies fever, chills, chest pain, dyspnea. She states that she has had similar episodes in past which resolve within a few days without intervention.      Current Outpatient Medications Ordered in Epic   Medication Sig Dispense Refill    methylPREDNISolone (MEDROL DOSEPAK) 4 MG Tablet Therapy Pack As directed on the packaging label. 21 Tablet 0    cyclobenzaprine (FLEXERIL) 5 mg tablet Take 1 Tablet by mouth 3 times a day as needed for Muscle Spasms. 30 Tablet 1    ondansetron (ZOFRAN ODT) 4 MG TABLET DISPERSIBLE Take 1 Tablet by mouth every 6 hours as needed for Nausea/Vomiting. (Patient not taking: Reported on 4/3/2023) 10 Tablet 0     No current Epic-ordered facility-administered medications on file.         ROS:  Gen: no fevers/chills, no changes in weight  Eyes: no changes in vision  ENT: no sore throat, no hearing loss, no bloody nose  Pulm: no sob, no cough  CV: no chest pain, no palpitations  GI: no nausea/vomiting, no diarrhea  : no dysuria  MSk: + myalgias  Skin: no rash  Neuro: no headaches, no numbness/tingling  Heme/Lymph: no easy bruising      Objective:     Exam:  BP (!) 132/90 (BP Location: Left arm, Patient Position: Sitting, BP Cuff Size: Large adult)   Pulse 96   Temp 36.9 °C (98.5 °F) (Temporal)   Ht 1.829 m (6')   Wt 121 kg (266 lb 4.8 oz)   LMP 04/26/2019   SpO2 93%   BMI 36.12 kg/m²  Body mass index is 36.12 kg/m².    Gen:  Alert and oriented, No apparent distress.  HEENT: Neck is supple without  lymphadenopathy, EOMI, no pharyngeal erythema or exudate  Lungs:  Normal effort, CTA bilaterally, no wheezes, rhonchi, or rales  CV:  Regular rate and rhythm. No murmurs, rubs, or gallops.  Abd:      Soft, non-tender, non-distended    Shoulder Exam:    Side: R  ROM: Normal passive flexion, extension, internal rotation, external rotation, abduction  Strength: Decreased throughout ROM due to pain  Sensation: Intact in Upper extremity  Bony deformities: None  Erythema: None  Edema: None  AC joint tenderness: None  Localized Tenderness: Tenderness along rhomboid, deltoid, biceps, triceps, forearm muscles  Provocative maneuvers: unable to perform due to pain    Labs: None      Assessment & Plan:     42 y.o. female with the following -     Problem List Items Addressed This Visit       Right shoulder pain     43 yo F presents with 1 week history of progressive R upper back, shoulder, arm, and forearm pain with muscular tenderness. No erythema or swelling concerning for DVT or infection. No history of trauma that would be consistent with bruising. Presentation is most consistent with muscular etiology of pain. No overuse history that would be concerning for rhabdomyolysis. Considering myositis or other inflammatory disease. Will obtain ESR, CRP, and CK to further evaluate for inflammation. Pt advised to return to clinic on 4/4 for C-spine XR to eval for severe arthritis that would be consistent with radiculopathy. Will Rx medrol dosepak for suspected inflammatory etiology. RTC in 1 week for further evaluation and management.         Relevant Medications    methylPREDNISolone (MEDROL DOSEPAK) 4 MG Tablet Therapy Pack    Other Relevant Orders    Sed Rate (Completed)    CRP QUANTITIVE (NON-CARDIAC) (Completed)    CBC WITH DIFFERENTIAL (Completed)    CREATINE KINASE (Completed)     Other Visit Diagnoses       Neck pain        Relevant Orders    DX-CERVICAL SPINE-FLX-EXT ONLY                Return in about 1 week (around  4/10/2023).          Jamie Lindsay M.D.  [facility]  PGY-2

## 2023-04-04 NOTE — ASSESSMENT & PLAN NOTE
41 yo F presents with 1 week history of progressive R upper back, shoulder, arm, and forearm pain with muscular tenderness. No erythema or swelling concerning for DVT or infection. No history of trauma that would be consistent with bruising. Presentation is most consistent with muscular etiology of pain. No overuse history that would be concerning for rhabdomyolysis. Considering myositis or other inflammatory disease. Will obtain ESR, CRP, and CK to further evaluate for inflammation. Pt advised to return to clinic on 4/4 for C-spine XR to eval for severe arthritis that would be consistent with radiculopathy. Will Rx medrol dosepak for suspected inflammatory etiology. RTC in 1 week for further evaluation and management.

## 2023-06-02 ENCOUNTER — OFFICE VISIT (OUTPATIENT)
Dept: MEDICAL GROUP | Facility: CLINIC | Age: 43
End: 2023-06-02
Payer: COMMERCIAL

## 2023-06-02 VITALS
BODY MASS INDEX: 35.07 KG/M2 | TEMPERATURE: 97.8 F | HEIGHT: 72 IN | OXYGEN SATURATION: 94 % | SYSTOLIC BLOOD PRESSURE: 127 MMHG | WEIGHT: 258.9 LBS | DIASTOLIC BLOOD PRESSURE: 90 MMHG | HEART RATE: 81 BPM

## 2023-06-02 DIAGNOSIS — M54.2 NECK PAIN: ICD-10-CM

## 2023-06-02 DIAGNOSIS — M25.432 SWELLING OF BOTH WRISTS: ICD-10-CM

## 2023-06-02 DIAGNOSIS — M25.431 SWELLING OF BOTH WRISTS: ICD-10-CM

## 2023-06-02 DIAGNOSIS — M54.50 LUMBAR BACK PAIN: ICD-10-CM

## 2023-06-02 DIAGNOSIS — G62.9 NEUROPATHY: ICD-10-CM

## 2023-06-02 DIAGNOSIS — G56.03 BILATERAL CARPAL TUNNEL SYNDROME: ICD-10-CM

## 2023-06-02 PROCEDURE — 3080F DIAST BP >= 90 MM HG: CPT | Performed by: STUDENT IN AN ORGANIZED HEALTH CARE EDUCATION/TRAINING PROGRAM

## 2023-06-02 PROCEDURE — 99213 OFFICE O/P EST LOW 20 MIN: CPT | Mod: GE | Performed by: STUDENT IN AN ORGANIZED HEALTH CARE EDUCATION/TRAINING PROGRAM

## 2023-06-02 PROCEDURE — 3074F SYST BP LT 130 MM HG: CPT | Performed by: STUDENT IN AN ORGANIZED HEALTH CARE EDUCATION/TRAINING PROGRAM

## 2023-06-02 RX ORDER — GABAPENTIN 300 MG/1
300 CAPSULE ORAL 3 TIMES DAILY
Qty: 90 CAPSULE | Refills: 3 | Status: SHIPPED | OUTPATIENT
Start: 2023-06-02

## 2023-06-02 ASSESSMENT — FIBROSIS 4 INDEX: FIB4 SCORE: 0.94

## 2023-06-02 NOTE — PROGRESS NOTES
Subjective:     CC: Follow up wrist pain    HPI:   Kaela presents today with    Problem   Swelling of Both Wrists    Dealing with this for a while, starting one year ago. Got steroid injections in the past for carpal tunnel, didn't help.   Pain shoots up arm. Wakes her up from sleep. Feels like bone pain. Worse on right upper extremity. Lose strength in arm. Can't achieve strong  on right hand because pain. Somewhat weaker on right side. Medrol dosepak helped with pain. Tylenol and ibuprofen.   Carpal tunnel diagnosis. Uses braces at night.   Draws blood from patients.   Never tried wrist brace.   Nothing makes it better. Worse with activity. Pain worse at end of day when she's home from work.     No personal hx of autoimmune disease.  Voltaren gel doesn't help. Tried gabapentin and 600mg helped.    Numbness and tingling in bilateral hands.           Current Outpatient Medications Ordered in Epic   Medication Sig Dispense Refill    gabapentin (NEURONTIN) 300 MG Cap Take 1 Capsule by mouth 3 times a day. 90 Capsule 3     No current [facility]-ordered facility-administered medications on file.         Objective:     Exam:  BP (!) 127/90 (BP Location: Left arm, Patient Position: Sitting, BP Cuff Size: Large adult)   Pulse 81   Temp 36.6 °C (97.8 °F) (Temporal)   Ht 1.829 m (6')   Wt 117 kg (258 lb 14.4 oz)   LMP 04/26/2019   SpO2 94%   BMI 35.11 kg/m²  Body mass index is 35.11 kg/m².    General: Normal appearing. No distress.  Pulmonary: Clear to ausculation.  Normal effort. No rales, ronchi, or wheezing.  Cardiovascular: Regular rate and rhythm without murmur.   Musculoskeletal: Normal gait. No extremity cyanosis, clubbing, or edema.   Psych: Normal mood and affect. Alert and oriented x3. Judgment and insight is normal.      Assessment & Plan:     42 y.o. female with the following -     Problem List Items Addressed This Visit       Bilateral carpal tunnel syndrome    Relevant Medications    gabapentin  (NEURONTIN) 300 MG Cap    Other Relevant Orders    Referral to Orthopedics    Swelling of both wrists     Patient presenting with severe bilateral carpal tunnel syndrome.  She has all the classic signs and has been diagnosed with this in the past, unrelieved by multiple corticosteroid injections.  Recommended patient use night immobilization splints to help mitigate nighttime awakenings from pain.  We will start gabapentin 300 mg 3 times daily for severe neuropathy symptoms.  Will refer to orthopedic surgery for Carpal tunnel release procedure.     Additionally, reviewed recent lab work, which indicated mildly elevated ESR and creatine kinase, likely nonspecific.  Patient was worked up for rheumatological disorder 1 year ago, all of which was negative.            Other Visit Diagnoses       Neck pain        Relevant Orders    Referral to Physical Therapy    Lumbar back pain        Relevant Orders    Referral to Physical Therapy    Neuropathy        Relevant Medications    gabapentin (NEURONTIN) 300 MG Cap              No follow-ups on file.    Nadia Hylton MD   PGY-3

## 2023-06-03 NOTE — ASSESSMENT & PLAN NOTE
Patient presenting with severe bilateral carpal tunnel syndrome.  She has all the classic signs and has been diagnosed with this in the past, unrelieved by multiple corticosteroid injections.  Recommended patient use night immobilization splints to help mitigate nighttime awakenings from pain.  We will start gabapentin 300 mg 3 times daily for severe neuropathy symptoms.  Will refer to orthopedic surgery for Carpal tunnel release procedure.     Additionally, reviewed recent lab work, which indicated mildly elevated ESR and creatine kinase, likely nonspecific.  Patient was worked up for rheumatological disorder 1 year ago, all of which was negative.

## 2023-08-04 ENCOUNTER — APPOINTMENT (OUTPATIENT)
Dept: PHYSICAL THERAPY | Facility: REHABILITATION | Age: 43
End: 2023-08-04
Attending: STUDENT IN AN ORGANIZED HEALTH CARE EDUCATION/TRAINING PROGRAM
Payer: COMMERCIAL

## 2023-08-07 ENCOUNTER — HOSPITAL ENCOUNTER (OUTPATIENT)
Facility: MEDICAL CENTER | Age: 43
End: 2023-08-07
Attending: ORTHOPAEDIC SURGERY
Payer: COMMERCIAL

## 2023-08-07 DIAGNOSIS — G56.02 LEFT CARPAL TUNNEL SYNDROME: ICD-10-CM

## 2023-08-07 DIAGNOSIS — G56.01 RIGHT CARPAL TUNNEL SYNDROME: ICD-10-CM

## 2023-08-07 LAB
BASOPHILS # BLD AUTO: 0.7 % (ref 0–1.8)
BASOPHILS # BLD: 0.03 K/UL (ref 0–0.12)
C4 SERPL-MCNC: 49 MG/DL (ref 19–52)
CRP SERPL HS-MCNC: <0.3 MG/DL (ref 0–0.75)
EOSINOPHIL # BLD AUTO: 0.07 K/UL (ref 0–0.51)
EOSINOPHIL NFR BLD: 1.7 % (ref 0–6.9)
ERYTHROCYTE [DISTWIDTH] IN BLOOD BY AUTOMATED COUNT: 37.7 FL (ref 35.9–50)
ERYTHROCYTE [SEDIMENTATION RATE] IN BLOOD BY WESTERGREN METHOD: 7 MM/HOUR (ref 0–25)
HCT VFR BLD AUTO: 44 % (ref 37–47)
HGB BLD-MCNC: 14.2 G/DL (ref 12–16)
IMM GRANULOCYTES # BLD AUTO: 0.01 K/UL (ref 0–0.11)
IMM GRANULOCYTES NFR BLD AUTO: 0.2 % (ref 0–0.9)
LYMPHOCYTES # BLD AUTO: 1.26 K/UL (ref 1–4.8)
LYMPHOCYTES NFR BLD: 31.3 % (ref 22–41)
MCH RBC QN AUTO: 27.1 PG (ref 27–33)
MCHC RBC AUTO-ENTMCNC: 32.3 G/DL (ref 32.2–35.5)
MCV RBC AUTO: 84 FL (ref 81.4–97.8)
MONOCYTES # BLD AUTO: 0.33 K/UL (ref 0–0.85)
MONOCYTES NFR BLD AUTO: 8.2 % (ref 0–13.4)
NEUTROPHILS # BLD AUTO: 2.32 K/UL (ref 1.82–7.42)
NEUTROPHILS NFR BLD: 57.9 % (ref 44–72)
NRBC # BLD AUTO: 0 K/UL
NRBC BLD-RTO: 0 /100 WBC (ref 0–0.2)
PLATELET # BLD AUTO: 234 K/UL (ref 164–446)
PMV BLD AUTO: 10.7 FL (ref 9–12.9)
RBC # BLD AUTO: 5.24 M/UL (ref 4.2–5.4)
RHEUMATOID FACT SER IA-ACNC: <10 IU/ML (ref 0–14)
WBC # BLD AUTO: 4 K/UL (ref 4.8–10.8)

## 2023-08-07 PROCEDURE — 85025 COMPLETE CBC W/AUTO DIFF WBC: CPT

## 2023-08-07 PROCEDURE — 85652 RBC SED RATE AUTOMATED: CPT

## 2023-08-07 PROCEDURE — 86160 COMPLEMENT ANTIGEN: CPT

## 2023-08-07 PROCEDURE — 86431 RHEUMATOID FACTOR QUANT: CPT

## 2023-08-07 PROCEDURE — 86038 ANTINUCLEAR ANTIBODIES: CPT

## 2023-08-07 PROCEDURE — 86812 HLA TYPING A B OR C: CPT

## 2023-08-07 PROCEDURE — 86140 C-REACTIVE PROTEIN: CPT

## 2023-08-07 PROCEDURE — 86225 DNA ANTIBODY NATIVE: CPT

## 2023-08-08 LAB
DSDNA AB TITR SER CLIF: NORMAL {TITER}
HLA-B27 QL FC: NEGATIVE
NUCLEAR IGG SER QL IA: NORMAL

## 2023-08-31 ENCOUNTER — PHYSICAL THERAPY (OUTPATIENT)
Dept: PHYSICAL THERAPY | Facility: REHABILITATION | Age: 43
End: 2023-08-31
Attending: STUDENT IN AN ORGANIZED HEALTH CARE EDUCATION/TRAINING PROGRAM
Payer: COMMERCIAL

## 2023-08-31 DIAGNOSIS — M54.50 LOW BACK PAIN, UNSPECIFIED BACK PAIN LATERALITY, UNSPECIFIED CHRONICITY, UNSPECIFIED WHETHER SCIATICA PRESENT: ICD-10-CM

## 2023-08-31 DIAGNOSIS — M54.2 NECK PAIN: ICD-10-CM

## 2023-08-31 PROCEDURE — 97162 PT EVAL MOD COMPLEX 30 MIN: CPT

## 2023-08-31 PROCEDURE — 97014 ELECTRIC STIMULATION THERAPY: CPT

## 2023-08-31 SDOH — ECONOMIC STABILITY: GENERAL: QUALITY OF LIFE: GOOD

## 2023-08-31 ASSESSMENT — ENCOUNTER SYMPTOMS
QUALITY: CRAMPING
PAIN SCALE AT LOWEST: 4
PAIN SCALE: 8
PAIN SCALE AT HIGHEST: 10
QUALITY: ACHING
PAIN TIMING: CONSTANT
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
## 2023-09-01 ENCOUNTER — PHYSICAL THERAPY (OUTPATIENT)
Dept: PHYSICAL THERAPY | Facility: REHABILITATION | Age: 43
End: 2023-09-01
Attending: STUDENT IN AN ORGANIZED HEALTH CARE EDUCATION/TRAINING PROGRAM
Payer: COMMERCIAL

## 2023-09-01 DIAGNOSIS — M54.50 LOW BACK PAIN, UNSPECIFIED BACK PAIN LATERALITY, UNSPECIFIED CHRONICITY, UNSPECIFIED WHETHER SCIATICA PRESENT: ICD-10-CM

## 2023-09-01 DIAGNOSIS — M54.2 NECK PAIN: ICD-10-CM

## 2023-09-01 DIAGNOSIS — M54.50 LUMBAR BACK PAIN: ICD-10-CM

## 2023-09-01 PROCEDURE — 97012 MECHANICAL TRACTION THERAPY: CPT

## 2023-09-01 PROCEDURE — 97140 MANUAL THERAPY 1/> REGIONS: CPT

## 2023-09-01 PROCEDURE — 97110 THERAPEUTIC EXERCISES: CPT

## 2023-09-01 NOTE — OP THERAPY DAILY TREATMENT
"  Outpatient Physical Therapy  DAILY TREATMENT     [facility] [address].  [address]  [place] [address]  Phone:  [phone]  Fax:  [phone]    Date: 09/01/2023    Patient: Kaela Ballesteros  YOB: 1980  MRN: [id number]     Time Calculation    Start time: 0118  Stop time: 0215 Time Calculation (min): 57 minutes         Chief Complaint: No chief complaint on file.    Visit #: 2    SUBJECTIVE:  Lbp orse with standing and pt. C/o R shoulder pain past year    OBJECTIVE:            Therapeutic Treatments and Modalities:     Therapeutic Treatment and Modalities Summary: [place] prgression  tall ewalll  Median nerve glides  Manula distraction  Segmental stm to c5-t2 with gd 1-2 joint mbs  R MFR scapula  Mechanical traction  16/8 x 60/20 x 15' w/ mhp     Time-based treatments/modalities:    Physical Therapy Timed Treatment Charges  Manual therapy minutes (CPT 01649): 20 minutes  Therapeutic exercise minutes (CPT 22649): 20 minutes      Pain rating (1-10) before treatment:  2 lbp,, post/lat delt , lateral forearm to wrsit 4/10  Pain rating (1-10) after treatment:  \"better\"0 no pain back, hand or arm pain, no n/t    ASSESSMENt:  Limited tolerance to exercise due to changing pain.  Overall patient reported signifcant decrease in all sx after traction and posture ed.  Poor posture awareness  PLAN/RECOMMENDATIONS:   Roller progression, tx, sub occip release, nerve glides         "

## 2023-09-08 ENCOUNTER — PHYSICAL THERAPY (OUTPATIENT)
Dept: PHYSICAL THERAPY | Facility: REHABILITATION | Age: 43
End: 2023-09-08
Attending: STUDENT IN AN ORGANIZED HEALTH CARE EDUCATION/TRAINING PROGRAM
Payer: COMMERCIAL

## 2023-09-08 DIAGNOSIS — M54.50 LOW BACK PAIN, UNSPECIFIED BACK PAIN LATERALITY, UNSPECIFIED CHRONICITY, UNSPECIFIED WHETHER SCIATICA PRESENT: ICD-10-CM

## 2023-09-08 DIAGNOSIS — M54.2 NECK PAIN: ICD-10-CM

## 2023-09-08 PROCEDURE — 97014 ELECTRIC STIMULATION THERAPY: CPT

## 2023-09-08 PROCEDURE — 97012 MECHANICAL TRACTION THERAPY: CPT

## 2023-09-08 PROCEDURE — 97110 THERAPEUTIC EXERCISES: CPT

## 2023-09-08 NOTE — OP THERAPY DAILY TREATMENT
"  Outpatient Physical Therapy  DAILY TREATMENT     [facility] [address].  [address]  [place] [address]  Phone:  [phone]  Fax:  [phone]    Date: 09/08/2023    Patient: Kaela Ballesteros  YOB: 1980  MRN: [id number]     Time Calculation    Start time: 0115  Stop time: 0220 Time Calculation (min): 65 minutes         Chief Complaint: No chief complaint on file.    Visit #: 2    SUBJECTIVE:  Some relief w/ ex. But still having pain with long standing and did a nightshift  night and really   Sore and tired with a headache starting about an hour ago.  OBJECTIVE:            Therapeutic Treatments and Modalities:     Therapeutic Treatment and Modalities Summary: NATHALY  attempted reil w/ significant pain but relaxed afterwards  Supine chin tuck into balloon with alternating arms  Ball bridge x 20\"  Segmental stm to c5-t2 with gd 1-2 joint mbs  R MFR scapula  Mechanical traction  100/ x 60/20 x 15' w/ mhp  with [place] 5/5 to bilateral upper traps    Time-based treatments/modalities:    Physical Therapy Timed Treatment Charges  Therapeutic exercise minutes (CPT 40072): 25 minutes      Pain rating (1-10) before treatment: 10/10 low back and CTJ no arm  h/a 5/10  Pain rating (1-10) after treatment:  \"better\" 3/10 pain back and CTJ , no more h/a,    ASSESSMENt:  Patient reported significant pain \" everywhere with multiple exercises with pain and \"stretch\" moving from back to arm, wrist regardless of activity--varying inconsistent pain throughout treatment with minimal tolerance( at one moment an arm motion hurt and then a moment later the same arm motion \" feels good.\")  patient eventual reported some reduction in pain overall but still c/o pain random movements--noted poor posture awareness     PLAN/RECOMMENDATIONS:   Roller progression, tx, sub occip release, nerve glides, core stabv, elina progression         "

## 2023-09-09 NOTE — DISCHARGE PLANNING
Encounter Date: 9/9/2023    ED Physician Progress Notes         EKG - STEMI Decision  Initial Reading: No STEMI present (AFib RVR, knees room and monitor.).       Patient is eligible for Medicaid Meds to Beds at discharge if they have coverage with [place] [employer], Medicaid FFS, Medicaid HMO ([facility]), or [place]. This service is provided through the [facility] if orders are received by the pharmacy prior to 4pm Monday through Friday excluding holidays. Preferred pharmacy has been changed to [facility]. Please call [phone] prior to discharge.

## 2023-09-22 ENCOUNTER — APPOINTMENT (OUTPATIENT)
Dept: PHYSICAL THERAPY | Facility: REHABILITATION | Age: 43
End: 2023-09-22
Attending: STUDENT IN AN ORGANIZED HEALTH CARE EDUCATION/TRAINING PROGRAM
Payer: COMMERCIAL

## 2023-09-26 ENCOUNTER — IMMUNIZATION (OUTPATIENT)
Dept: OCCUPATIONAL MEDICINE | Facility: CLINIC | Age: 43
End: 2023-09-26

## 2023-09-26 DIAGNOSIS — Z23 NEED FOR VACCINATION: Primary | ICD-10-CM

## 2023-09-26 PROCEDURE — 90686 IIV4 VACC NO PRSV 0.5 ML IM: CPT | Performed by: PREVENTIVE MEDICINE

## 2023-10-06 ENCOUNTER — APPOINTMENT (OUTPATIENT)
Dept: PHYSICAL THERAPY | Facility: REHABILITATION | Age: 43
End: 2023-10-06
Attending: STUDENT IN AN ORGANIZED HEALTH CARE EDUCATION/TRAINING PROGRAM
Payer: COMMERCIAL

## 2023-10-13 ENCOUNTER — APPOINTMENT (OUTPATIENT)
Dept: PHYSICAL THERAPY | Facility: REHABILITATION | Age: 43
End: 2023-10-13
Attending: STUDENT IN AN ORGANIZED HEALTH CARE EDUCATION/TRAINING PROGRAM
Payer: COMMERCIAL

## 2023-10-20 ENCOUNTER — APPOINTMENT (OUTPATIENT)
Dept: PHYSICAL THERAPY | Facility: REHABILITATION | Age: 43
End: 2023-10-20
Attending: STUDENT IN AN ORGANIZED HEALTH CARE EDUCATION/TRAINING PROGRAM
Payer: COMMERCIAL

## 2023-11-21 ENCOUNTER — HOSPITAL ENCOUNTER (EMERGENCY)
Facility: MEDICAL CENTER | Age: 43
End: 2023-11-21
Attending: EMERGENCY MEDICINE
Payer: COMMERCIAL

## 2023-11-21 VITALS
OXYGEN SATURATION: 95 % | WEIGHT: 257.72 LBS | SYSTOLIC BLOOD PRESSURE: 134 MMHG | HEIGHT: 71 IN | RESPIRATION RATE: 16 BRPM | TEMPERATURE: 97.7 F | BODY MASS INDEX: 36.08 KG/M2 | HEART RATE: 84 BPM | DIASTOLIC BLOOD PRESSURE: 92 MMHG

## 2023-11-21 DIAGNOSIS — R11.0 NAUSEA: ICD-10-CM

## 2023-11-21 DIAGNOSIS — R19.7 DIARRHEA, UNSPECIFIED TYPE: ICD-10-CM

## 2023-11-21 LAB
ALBUMIN SERPL BCP-MCNC: 4.4 G/DL (ref 3.2–4.9)
ALBUMIN/GLOB SERPL: 1.5 G/DL
ALP SERPL-CCNC: 77 U/L (ref 30–99)
ALT SERPL-CCNC: 29 U/L (ref 2–50)
ANION GAP SERPL CALC-SCNC: 12 MMOL/L (ref 7–16)
APPEARANCE UR: ABNORMAL
AST SERPL-CCNC: 19 U/L (ref 12–45)
BACTERIA #/AREA URNS HPF: ABNORMAL /HPF
BASOPHILS # BLD AUTO: 0.7 % (ref 0–1.8)
BASOPHILS # BLD: 0.03 K/UL (ref 0–0.12)
BILIRUB SERPL-MCNC: 0.3 MG/DL (ref 0.1–1.5)
BILIRUB UR QL STRIP.AUTO: NEGATIVE
BUN SERPL-MCNC: 9 MG/DL (ref 8–22)
CALCIUM ALBUM COR SERPL-MCNC: 9.1 MG/DL (ref 8.5–10.5)
CALCIUM SERPL-MCNC: 9.4 MG/DL (ref 8.5–10.5)
CHLORIDE SERPL-SCNC: 107 MMOL/L (ref 96–112)
CO2 SERPL-SCNC: 22 MMOL/L (ref 20–33)
COLOR UR: YELLOW
CREAT SERPL-MCNC: 0.79 MG/DL (ref 0.5–1.4)
EOSINOPHIL # BLD AUTO: 0.12 K/UL (ref 0–0.51)
EOSINOPHIL NFR BLD: 2.9 % (ref 0–6.9)
EPI CELLS #/AREA URNS HPF: ABNORMAL /HPF
ERYTHROCYTE [DISTWIDTH] IN BLOOD BY AUTOMATED COUNT: 37.8 FL (ref 35.9–50)
GFR SERPLBLD CREATININE-BSD FMLA CKD-EPI: 94 ML/MIN/1.73 M 2
GLOBULIN SER CALC-MCNC: 3 G/DL (ref 1.9–3.5)
GLUCOSE SERPL-MCNC: 126 MG/DL (ref 65–99)
GLUCOSE UR STRIP.AUTO-MCNC: NEGATIVE MG/DL
HCT VFR BLD AUTO: 43.8 % (ref 37–47)
HGB BLD-MCNC: 14.2 G/DL (ref 12–16)
HYALINE CASTS #/AREA URNS LPF: ABNORMAL /LPF
IMM GRANULOCYTES # BLD AUTO: 0.01 K/UL (ref 0–0.11)
IMM GRANULOCYTES NFR BLD AUTO: 0.2 % (ref 0–0.9)
KETONES UR STRIP.AUTO-MCNC: NEGATIVE MG/DL
LEUKOCYTE ESTERASE UR QL STRIP.AUTO: NEGATIVE
LIPASE SERPL-CCNC: 38 U/L (ref 11–82)
LYMPHOCYTES # BLD AUTO: 1.17 K/UL (ref 1–4.8)
LYMPHOCYTES NFR BLD: 27.9 % (ref 22–41)
MCH RBC QN AUTO: 27 PG (ref 27–33)
MCHC RBC AUTO-ENTMCNC: 32.4 G/DL (ref 32.2–35.5)
MCV RBC AUTO: 83.4 FL (ref 81.4–97.8)
MICRO URNS: ABNORMAL
MONOCYTES # BLD AUTO: 0.39 K/UL (ref 0–0.85)
MONOCYTES NFR BLD AUTO: 9.3 % (ref 0–13.4)
NEUTROPHILS # BLD AUTO: 2.47 K/UL (ref 1.82–7.42)
NEUTROPHILS NFR BLD: 59 % (ref 44–72)
NITRITE UR QL STRIP.AUTO: NEGATIVE
NRBC # BLD AUTO: 0 K/UL
NRBC BLD-RTO: 0 /100 WBC (ref 0–0.2)
PH UR STRIP.AUTO: 5.5 [PH] (ref 5–8)
PLATELET # BLD AUTO: 259 K/UL (ref 164–446)
PMV BLD AUTO: 10.8 FL (ref 9–12.9)
POTASSIUM SERPL-SCNC: 3.6 MMOL/L (ref 3.6–5.5)
PROT SERPL-MCNC: 7.4 G/DL (ref 6–8.2)
PROT UR QL STRIP: NEGATIVE MG/DL
RBC # BLD AUTO: 5.25 M/UL (ref 4.2–5.4)
RBC # URNS HPF: ABNORMAL /HPF
RBC UR QL AUTO: NEGATIVE
SODIUM SERPL-SCNC: 141 MMOL/L (ref 135–145)
SP GR UR STRIP.AUTO: 1.02
UROBILINOGEN UR STRIP.AUTO-MCNC: 0.2 MG/DL
WBC # BLD AUTO: 4.2 K/UL (ref 4.8–10.8)
WBC #/AREA URNS HPF: ABNORMAL /HPF

## 2023-11-21 PROCEDURE — 700105 HCHG RX REV CODE 258: Performed by: EMERGENCY MEDICINE

## 2023-11-21 PROCEDURE — 83690 ASSAY OF LIPASE: CPT

## 2023-11-21 PROCEDURE — 85025 COMPLETE CBC W/AUTO DIFF WBC: CPT

## 2023-11-21 PROCEDURE — 700111 HCHG RX REV CODE 636 W/ 250 OVERRIDE (IP): Mod: JZ | Performed by: EMERGENCY MEDICINE

## 2023-11-21 PROCEDURE — 96374 THER/PROPH/DIAG INJ IV PUSH: CPT

## 2023-11-21 PROCEDURE — 80053 COMPREHEN METABOLIC PANEL: CPT

## 2023-11-21 PROCEDURE — 36415 COLL VENOUS BLD VENIPUNCTURE: CPT

## 2023-11-21 PROCEDURE — 99285 EMERGENCY DEPT VISIT HI MDM: CPT

## 2023-11-21 PROCEDURE — 81001 URINALYSIS AUTO W/SCOPE: CPT

## 2023-11-21 RX ORDER — SODIUM CHLORIDE 9 MG/ML
1000 INJECTION, SOLUTION INTRAVENOUS ONCE
Status: COMPLETED | OUTPATIENT
Start: 2023-11-21 | End: 2023-11-21

## 2023-11-21 RX ORDER — ONDANSETRON 4 MG/1
4 TABLET, ORALLY DISINTEGRATING ORAL EVERY 6 HOURS PRN
Qty: 10 TABLET | Refills: 0 | Status: SHIPPED | OUTPATIENT
Start: 2023-11-21

## 2023-11-21 RX ORDER — ONDANSETRON 2 MG/ML
4 INJECTION INTRAMUSCULAR; INTRAVENOUS ONCE
Status: COMPLETED | OUTPATIENT
Start: 2023-11-21 | End: 2023-11-21

## 2023-11-21 RX ADMIN — SODIUM CHLORIDE 1000 ML: 9 INJECTION, SOLUTION INTRAVENOUS at 08:16

## 2023-11-21 RX ADMIN — ONDANSETRON 4 MG: 2 INJECTION INTRAMUSCULAR; INTRAVENOUS at 08:16

## 2023-11-21 ASSESSMENT — PAIN DESCRIPTION - PAIN TYPE: TYPE: ACUTE PAIN

## 2023-11-21 ASSESSMENT — FIBROSIS 4 INDEX: FIB4 SCORE: 0.87

## 2023-11-21 NOTE — ED NOTES
Pt stable for discharge. Pt educated and reviewed discharge instructions with RN. Pt verbalized understanding & all questions were answered. Pt AoX 4. Pt ambulated independently with balanced and steady gait out of the ED with all belongings. Pt encouraged to come back if symptoms worsen.

## 2023-11-21 NOTE — RESULT ENCOUNTER NOTE
Good morning Kaela,    I have reviewed your recent labs and no significant abnormalities were noted. This is good news! No need for further testing at this time. Please let me know if you have any questions or concerns.    Sincerely,    Jamie Lindsay M.D.

## 2023-11-21 NOTE — ED NOTES
Pt ambulated to the bathroom with a steady gait. Denies dizziness/lightheadedness. No balance issues observed. Provided a urine sample.

## 2023-11-21 NOTE — Clinical Note
Kaela Ballesteros was seen and treated in our emergency department on 11/21/2023.  She may return to work on 11/25/2023.       If you have any questions or concerns, please don't hesitate to call.      Joseline Arzate D.O.

## 2023-11-21 NOTE — ED TRIAGE NOTES
"Chief Complaint   Patient presents with    Diarrhea    Abdominal Pain     Patient ambulatory to triage c/o abdominal pain x2 days, and now has diarrhea. Denies vomiting but endorses nausea        BP (!) 147/99   Pulse 96   Temp 36.2 °C (97.1 °F) (Temporal)   Resp 16   Ht 1.803 m (5' 11\")   Wt 117 kg (257 lb 11.5 oz)   SpO2 97%     Patient educated on ed triage process, instructed to notify staff of any new or worsening symptoms, verbalizes understanding. Patient returned to ed lobby, apologized for wait times.     "

## 2023-11-21 NOTE — ED NOTES
PIV established, pt medicated per MAR. IVF infusing. Call light in place. Pt denies needs at this time. Unable to void as she voided on arrival prior to sample request.

## 2023-11-21 NOTE — ED PROVIDER NOTES
ED Provider Note    CHIEF COMPLAINT  Chief Complaint   Patient presents with    Diarrhea    Abdominal Pain     Patient ambulatory to triage c/o abdominal pain x2 days, and now has diarrhea. Denies vomiting but endorses nausea        EXTERNAL RECORDS REVIEWED  Patient is being seen in the physical therapy office.  Most recently she was evaluated September 8 of this year for chronic low back pain, neck pain.    Last seen in the orthopedic clinic July of this year for the bilateral carpal tunnel syndrome.    Last ED visit was March of this year she was seen for abdominal pain, nausea vomiting and diarrhea.    HPI/ROS  LIMITATION TO HISTORY   Select: : None  OUTSIDE HISTORIAN(S):  None    Kaela Ballesteros is a 43 y.o. female who presents to the emergency department with a chief complaint of nausea and diarrhea.  Patient was in her normal state of health yesterday.  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  They had a workup potluck yesterday and by 5 PM, she works 7 AM to 7 PM, she began having stomach upset and began having diarrhea.  At home, this continued throughout the night.  She denies having a fever.  No blood in her stool.  No vomiting although she has been nauseated.  She is otherwise healthy with no past medical history, other than back pain for which she takes gabapentin and occasionally ibuprofen.  She has had a hysterectomy and her gallbladder removed.  She has had a breast reduction.  She does not drink, smoke or use drugs.  No known sick contacts.    PAST MEDICAL HISTORY   has a past medical history of Anemia (2017), Backpain (2001), Diabetes (HCC) (2017), Hypertension (2017), Infectious disease (1995), Other specified symptom associated with female genital organs (2012), Pain (03/03/15), Pancreatitis (2011/2014), Sickle cell trait syndrome, Urinary incontinence, and Urinary tract infection.    SURGICAL HISTORY   has a past surgical history that includes egd w/endoscopic  "ultrasound (2015); gastroscopy with biopsy (2015); abdominal exploration (); bladder sling female (2019); pelviscopy (2012); dilation and curettage (12/3/2012); primary c section (2015); primary c section with tubal ligation (2017); vaginal hysterectomy scope total (2019); salpingectomy (Left, 2019); anterior and posterior repair (2019); enterocele repair (2019); vaginal suspension (2019); cholecystectomy; breast reduction (Bilateral, 10/1/2020); and full thickness skin graft (Bilateral, 10/1/2020).    FAMILY HISTORY  Family History   Problem Relation Age of Onset    Diabetes Father     Diabetes Brother     Diabetes Paternal Grandmother        SOCIAL HISTORY  Social History     Tobacco Use    Smoking status: Former     Current packs/day: 0.00     Average packs/day: 0.5 packs/day for 15.0 years (7.5 ttl pk-yrs)     Types: Cigarettes     Start date: 1996     Quit date: 2011     Years since quittin.8    Smokeless tobacco: Never    Tobacco comments:     occasional   Vaping Use    Vaping Use: Never used   Substance and Sexual Activity    Alcohol use: Yes     Comment: occasionally    Drug use: Not Currently     Types: Marijuana, Inhaled     Comment: marijuana     Sexual activity: Yes     Partners: Male     Comment: BTL       CURRENT MEDICATIONS  Home Medications       Reviewed by Rabia Arora R.N. (Registered Nurse) on 23 at 0707  Med List Status: Not Addressed     Medication Last Dose Status   cyclobenzaprine (FLEXERIL) 5 mg tablet  Active   gabapentin (NEURONTIN) 300 MG Cap  Active                    ALLERGIES  Allergies   Allergen Reactions    Nkda [No Known Drug Allergy]        PHYSICAL EXAM  VITAL SIGNS: /84   Pulse 77   Temp 36.2 °C (97.1 °F) (Temporal)   Resp 16   Ht 1.803 m (5' 11\")   Wt 117 kg (257 lb 11.5 oz)   LMP 2019   SpO2 93%   BMI 35.94 kg/m²    Vitals reviewed.  Constitutional: Patient is oriented to person, " place, and time. Appears well-developed and well-nourished. Mild distress.    Head: Normocephalic and atraumatic.   Mouth/Throat: Oropharynx is clear and moist  Eyes: Conjunctivae are normal.   Neck: Normal range of motion.   Cardiovascular: Normal rate, regular rhythm and normal heart sounds.   Pulmonary/Chest: Effort normal and breath sounds normal. No respiratory distress, no wheezes, rhonchi, or rales.   Abdominal: Soft. Bowel sounds are normal. There is no tenderness, rebound or guarding, or peritoneal signs. No CVA tenderness.  Musculoskeletal: No edema and no tenderness.   Neurological: No focal deficits.   Skin: Skin is warm and dry. No erythema. No pallor.   Psychiatric: Patient has a normal mood and affect.     DIAGNOSTIC STUDIES / PROCEDURES    LABS  Results for orders placed or performed during the hospital encounter of 11/21/23   CBC with Differential   Result Value Ref Range    WBC 4.2 (L) 4.8 - 10.8 K/uL    RBC 5.25 4.20 - 5.40 M/uL    Hemoglobin 14.2 12.0 - 16.0 g/dL    Hematocrit 43.8 37.0 - 47.0 %    MCV 83.4 81.4 - 97.8 fL    MCH 27.0 27.0 - 33.0 pg    MCHC 32.4 32.2 - 35.5 g/dL    RDW 37.8 35.9 - 50.0 fL    Platelet Count 259 164 - 446 K/uL    MPV 10.8 9.0 - 12.9 fL    Neutrophils-Polys 59.00 44.00 - 72.00 %    Lymphocytes 27.90 22.00 - 41.00 %    Monocytes 9.30 0.00 - 13.40 %    Eosinophils 2.90 0.00 - 6.90 %    Basophils 0.70 0.00 - 1.80 %    Immature Granulocytes 0.20 0.00 - 0.90 %    Nucleated RBC 0.00 0.00 - 0.20 /100 WBC    Neutrophils (Absolute) 2.47 1.82 - 7.42 K/uL    Lymphs (Absolute) 1.17 1.00 - 4.80 K/uL    Monos (Absolute) 0.39 0.00 - 0.85 K/uL    Eos (Absolute) 0.12 0.00 - 0.51 K/uL    Baso (Absolute) 0.03 0.00 - 0.12 K/uL    Immature Granulocytes (abs) 0.01 0.00 - 0.11 K/uL    NRBC (Absolute) 0.00 K/uL   Complete Metabolic Panel   Result Value Ref Range    Sodium 141 135 - 145 mmol/L    Potassium 3.6 3.6 - 5.5 mmol/L    Chloride 107 96 - 112 mmol/L    Co2 22 20 - 33 mmol/L    Anion  Gap 12.0 7.0 - 16.0    Glucose 126 (H) 65 - 99 mg/dL    Bun 9 8 - 22 mg/dL    Creatinine 0.79 0.50 - 1.40 mg/dL    Calcium 9.4 8.5 - 10.5 mg/dL    Correct Calcium 9.1 8.5 - 10.5 mg/dL    AST(SGOT) 19 12 - 45 U/L    ALT(SGPT) 29 2 - 50 U/L    Alkaline Phosphatase 77 30 - 99 U/L    Total Bilirubin 0.3 0.1 - 1.5 mg/dL    Albumin 4.4 3.2 - 4.9 g/dL    Total Protein 7.4 6.0 - 8.2 g/dL    Globulin 3.0 1.9 - 3.5 g/dL    A-G Ratio 1.5 g/dL   Lipase   Result Value Ref Range    Lipase 38 11 - 82 U/L   Urinalysis    Specimen: Urine   Result Value Ref Range    Color Yellow     Character Cloudy (A)     Specific Gravity 1.017 <1.035    Ph 5.5 5.0 - 8.0    Glucose Negative Negative mg/dL    Ketones Negative Negative mg/dL    Protein Negative Negative mg/dL    Bilirubin Negative Negative    Urobilinogen, Urine 0.2 Negative    Nitrite Negative Negative    Leukocyte Esterase Negative Negative    Occult Blood Negative Negative    Micro Urine Req Microscopic    ESTIMATED GFR   Result Value Ref Range    GFR (CKD-EPI) 94 >60 mL/min/1.73 m 2   URINE MICROSCOPIC (W/UA)   Result Value Ref Range    WBC 5-10 (A) /hpf    RBC 2-5 (A) /hpf    Bacteria Moderate (A) None /hpf    Epithelial Cells Many (A) /hpf    Hyaline Cast 0-2 /lpf       COURSE & MEDICAL DECISION MAKING    ED Observation Status? No; Patient does not meet criteria for ED Observation.     INITIAL ASSESSMENT, COURSE AND PLAN  Care Narrative:     This is a well-appearing, previously healthy 43-year-old female who presents with diarrhea and nausea.  No fever.  No blood in her stool.  No vomiting.  She suspects, it was related to food that she ate at a potluck at work yesterday.  Labs drawn per nursing protocols.  Her white blood cell count slightly low 4.2.  H&H 14 and 43.  There is no neutrophilic shift.  Lipase is normal.  Chemistry is entirely normal other than an elevated glucose of 126.  Overall, she has a benign abdominal exam.    9:47 AM patient is reevaluated at the bedside.   She has had no diarrhea here.  She is feeling better.  We discussed lab results which are reassuring.  She remains afebrile with a benign abdominal exam.  She is advised on frequent small amounts of fluids throughout the day to ensure hydration.  She is advised on monitoring urine output.  I have recommended that she takes Zofran for the next day or day and a half so that she can remain hydrated.  She is given a work note.  She is given strict return precautions.  Overall, she is well-appearing, nontoxic with reassuring vital signs.  Anticipate discharge to home shortly after fluids are infused.  About 200 cc remaining.      HYDRATION: Based on the patient's presentation of Acute Diarrhea the patient was given IV fluids. IV Hydration was used because oral hydration was not adequate alone. Upon recheck following hydration, the patient was improved.        DISPOSITION AND DISCUSSIONS  I have discussed management of the patient with the following physicians and KARTIK's: None    Discussion of management with other QHP or appropriate source(s): None    Escalation of care considered, and ultimately not performed:diagnostic imaging    Barriers to care at this time, including but not limited to:  None .     Decision tools and prescription drugs considered including, but not limited to: Antibiotics not indicated and this likely viral illness. .    FINAL DIAGNOSIS  1. Nausea    2. Diarrhea, unspecified type           Electronically signed by: Joseline Arzate D.O., 11/21/2023 7:34 AM

## 2023-12-08 ENCOUNTER — EH NON-PROVIDER (OUTPATIENT)
Dept: OCCUPATIONAL MEDICINE | Facility: CLINIC | Age: 43
End: 2023-12-08

## 2023-12-08 DIAGNOSIS — Z02.89 ENCOUNTER FOR OCCUPATIONAL HEALTH ASSESSMENT: ICD-10-CM

## 2023-12-08 PROCEDURE — 94375 RESPIRATORY FLOW VOLUME LOOP: CPT | Performed by: PREVENTIVE MEDICINE

## 2025-03-09 ENCOUNTER — HOSPITAL ENCOUNTER (EMERGENCY)
Facility: MEDICAL CENTER | Age: 45
End: 2025-03-10
Attending: STUDENT IN AN ORGANIZED HEALTH CARE EDUCATION/TRAINING PROGRAM

## 2025-03-09 ENCOUNTER — APPOINTMENT (OUTPATIENT)
Dept: RADIOLOGY | Facility: MEDICAL CENTER | Age: 45
End: 2025-03-09
Attending: STUDENT IN AN ORGANIZED HEALTH CARE EDUCATION/TRAINING PROGRAM

## 2025-03-09 DIAGNOSIS — R19.7 DIARRHEA, UNSPECIFIED TYPE: ICD-10-CM

## 2025-03-09 DIAGNOSIS — I10 ACCELERATED HYPERTENSION: ICD-10-CM

## 2025-03-09 DIAGNOSIS — R11.2 NAUSEA AND VOMITING, UNSPECIFIED VOMITING TYPE: ICD-10-CM

## 2025-03-09 LAB
ALBUMIN SERPL BCP-MCNC: 4.6 G/DL (ref 3.2–4.9)
ALBUMIN/GLOB SERPL: 1.2 G/DL
ALP SERPL-CCNC: 75 U/L (ref 30–99)
ALT SERPL-CCNC: 63 U/L (ref 2–50)
ANION GAP SERPL CALC-SCNC: 15 MMOL/L (ref 7–16)
APPEARANCE UR: CLEAR
AST SERPL-CCNC: 39 U/L (ref 12–45)
BACTERIA #/AREA URNS HPF: ABNORMAL /HPF
BASOPHILS # BLD AUTO: 0.1 % (ref 0–1.8)
BASOPHILS # BLD: 0.01 K/UL (ref 0–0.12)
BILIRUB SERPL-MCNC: 0.6 MG/DL (ref 0.1–1.5)
BILIRUB UR QL STRIP.AUTO: NEGATIVE
BUN SERPL-MCNC: 11 MG/DL (ref 8–22)
CALCIUM ALBUM COR SERPL-MCNC: 9.4 MG/DL (ref 8.5–10.5)
CALCIUM SERPL-MCNC: 9.9 MG/DL (ref 8.5–10.5)
CASTS URNS QL MICRO: ABNORMAL /LPF (ref 0–2)
CHLORIDE SERPL-SCNC: 105 MMOL/L (ref 96–112)
CO2 SERPL-SCNC: 20 MMOL/L (ref 20–33)
COLOR UR: YELLOW
CREAT SERPL-MCNC: 0.88 MG/DL (ref 0.5–1.4)
EKG IMPRESSION: NORMAL
EOSINOPHIL # BLD AUTO: 0.02 K/UL (ref 0–0.51)
EOSINOPHIL NFR BLD: 0.3 % (ref 0–6.9)
EPITHELIAL CELLS 1715: ABNORMAL /HPF (ref 0–5)
ERYTHROCYTE [DISTWIDTH] IN BLOOD BY AUTOMATED COUNT: 38.4 FL (ref 35.9–50)
GFR SERPLBLD CREATININE-BSD FMLA CKD-EPI: 82 ML/MIN/1.73 M 2
GLOBULIN SER CALC-MCNC: 3.8 G/DL (ref 1.9–3.5)
GLUCOSE SERPL-MCNC: 136 MG/DL (ref 65–99)
GLUCOSE UR STRIP.AUTO-MCNC: NEGATIVE MG/DL
HCT VFR BLD AUTO: 45.4 % (ref 37–47)
HGB BLD-MCNC: 15 G/DL (ref 12–16)
IMM GRANULOCYTES # BLD AUTO: 0.02 K/UL (ref 0–0.11)
IMM GRANULOCYTES NFR BLD AUTO: 0.3 % (ref 0–0.9)
KETONES UR STRIP.AUTO-MCNC: NEGATIVE MG/DL
LEUKOCYTE ESTERASE UR QL STRIP.AUTO: NEGATIVE
LIPASE SERPL-CCNC: 25 U/L (ref 11–82)
LYMPHOCYTES # BLD AUTO: 0.24 K/UL (ref 1–4.8)
LYMPHOCYTES NFR BLD: 3.6 % (ref 22–41)
MCH RBC QN AUTO: 27 PG (ref 27–33)
MCHC RBC AUTO-ENTMCNC: 33 G/DL (ref 32.2–35.5)
MCV RBC AUTO: 81.8 FL (ref 81.4–97.8)
MICRO URNS: ABNORMAL
MONOCYTES # BLD AUTO: 0.19 K/UL (ref 0–0.85)
MONOCYTES NFR BLD AUTO: 2.8 % (ref 0–13.4)
NEUTROPHILS # BLD AUTO: 6.27 K/UL (ref 1.82–7.42)
NEUTROPHILS NFR BLD: 92.9 % (ref 44–72)
NITRITE UR QL STRIP.AUTO: NEGATIVE
NRBC # BLD AUTO: 0 K/UL
NRBC BLD-RTO: 0 /100 WBC (ref 0–0.2)
PH UR STRIP.AUTO: 5.5 [PH] (ref 5–8)
PLATELET # BLD AUTO: 230 K/UL (ref 164–446)
PMV BLD AUTO: 10.5 FL (ref 9–12.9)
POTASSIUM SERPL-SCNC: 4.1 MMOL/L (ref 3.6–5.5)
PROT SERPL-MCNC: 8.4 G/DL (ref 6–8.2)
PROT UR QL STRIP: 30 MG/DL
RBC # BLD AUTO: 5.55 M/UL (ref 4.2–5.4)
RBC # URNS HPF: ABNORMAL /HPF (ref 0–2)
RBC UR QL AUTO: ABNORMAL
SODIUM SERPL-SCNC: 140 MMOL/L (ref 135–145)
SP GR UR STRIP.AUTO: 1.02
TROPONIN T SERPL-MCNC: <6 NG/L (ref 6–19)
UROBILINOGEN UR STRIP.AUTO-MCNC: 1 EU/DL
WBC # BLD AUTO: 6.8 K/UL (ref 4.8–10.8)
WBC #/AREA URNS HPF: ABNORMAL /HPF

## 2025-03-09 PROCEDURE — 96375 TX/PRO/DX INJ NEW DRUG ADDON: CPT

## 2025-03-09 PROCEDURE — 81001 URINALYSIS AUTO W/SCOPE: CPT

## 2025-03-09 PROCEDURE — 84484 ASSAY OF TROPONIN QUANT: CPT

## 2025-03-09 PROCEDURE — 700102 HCHG RX REV CODE 250 W/ 637 OVERRIDE(OP): Performed by: STUDENT IN AN ORGANIZED HEALTH CARE EDUCATION/TRAINING PROGRAM

## 2025-03-09 PROCEDURE — 700111 HCHG RX REV CODE 636 W/ 250 OVERRIDE (IP): Mod: JZ | Performed by: STUDENT IN AN ORGANIZED HEALTH CARE EDUCATION/TRAINING PROGRAM

## 2025-03-09 PROCEDURE — 71045 X-RAY EXAM CHEST 1 VIEW: CPT

## 2025-03-09 PROCEDURE — 93005 ELECTROCARDIOGRAM TRACING: CPT | Mod: TC

## 2025-03-09 PROCEDURE — 80053 COMPREHEN METABOLIC PANEL: CPT

## 2025-03-09 PROCEDURE — 700105 HCHG RX REV CODE 258: Performed by: STUDENT IN AN ORGANIZED HEALTH CARE EDUCATION/TRAINING PROGRAM

## 2025-03-09 PROCEDURE — 36415 COLL VENOUS BLD VENIPUNCTURE: CPT

## 2025-03-09 PROCEDURE — 83690 ASSAY OF LIPASE: CPT

## 2025-03-09 PROCEDURE — A9270 NON-COVERED ITEM OR SERVICE: HCPCS | Performed by: STUDENT IN AN ORGANIZED HEALTH CARE EDUCATION/TRAINING PROGRAM

## 2025-03-09 PROCEDURE — 99285 EMERGENCY DEPT VISIT HI MDM: CPT

## 2025-03-09 PROCEDURE — 96374 THER/PROPH/DIAG INJ IV PUSH: CPT

## 2025-03-09 PROCEDURE — 85025 COMPLETE CBC W/AUTO DIFF WBC: CPT

## 2025-03-09 PROCEDURE — 93005 ELECTROCARDIOGRAM TRACING: CPT | Mod: TC | Performed by: STUDENT IN AN ORGANIZED HEALTH CARE EDUCATION/TRAINING PROGRAM

## 2025-03-09 RX ORDER — MORPHINE SULFATE 4 MG/ML
4 INJECTION INTRAVENOUS ONCE
Status: COMPLETED | OUTPATIENT
Start: 2025-03-09 | End: 2025-03-09

## 2025-03-09 RX ORDER — ONDANSETRON 4 MG/1
4 TABLET, ORALLY DISINTEGRATING ORAL EVERY 6 HOURS PRN
Qty: 10 TABLET | Refills: 0 | Status: SHIPPED | OUTPATIENT
Start: 2025-03-09

## 2025-03-09 RX ORDER — SODIUM CHLORIDE 9 MG/ML
1000 INJECTION, SOLUTION INTRAVENOUS ONCE
Status: DISCONTINUED | OUTPATIENT
Start: 2025-03-09 | End: 2025-03-10 | Stop reason: HOSPADM

## 2025-03-09 RX ORDER — FAMOTIDINE 20 MG/1
20 TABLET, FILM COATED ORAL 2 TIMES DAILY
Qty: 60 TABLET | Refills: 0 | Status: SHIPPED | OUTPATIENT
Start: 2025-03-09

## 2025-03-09 RX ORDER — DICYCLOMINE HCL 20 MG
20 TABLET ORAL ONCE
Status: COMPLETED | OUTPATIENT
Start: 2025-03-09 | End: 2025-03-09

## 2025-03-09 RX ORDER — ONDANSETRON 2 MG/ML
4 INJECTION INTRAMUSCULAR; INTRAVENOUS ONCE
Status: COMPLETED | OUTPATIENT
Start: 2025-03-09 | End: 2025-03-09

## 2025-03-09 RX ORDER — DIPHENHYDRAMINE HYDROCHLORIDE 50 MG/ML
25 INJECTION, SOLUTION INTRAMUSCULAR; INTRAVENOUS ONCE
Status: COMPLETED | OUTPATIENT
Start: 2025-03-09 | End: 2025-03-09

## 2025-03-09 RX ORDER — DICYCLOMINE HCL 20 MG
20 TABLET ORAL EVERY 6 HOURS
Qty: 120 TABLET | Refills: 0 | Status: SHIPPED | OUTPATIENT
Start: 2025-03-09

## 2025-03-09 RX ORDER — ACETAMINOPHEN 325 MG/1
650 TABLET ORAL ONCE
Status: COMPLETED | OUTPATIENT
Start: 2025-03-09 | End: 2025-03-09

## 2025-03-09 RX ORDER — SODIUM CHLORIDE 9 MG/ML
1000 INJECTION, SOLUTION INTRAVENOUS ONCE
Status: COMPLETED | OUTPATIENT
Start: 2025-03-09 | End: 2025-03-10

## 2025-03-09 RX ADMIN — SODIUM CHLORIDE 1000 ML: 9 INJECTION, SOLUTION INTRAVENOUS at 23:07

## 2025-03-09 RX ADMIN — DIPHENHYDRAMINE HYDROCHLORIDE 25 MG: 50 INJECTION, SOLUTION INTRAMUSCULAR; INTRAVENOUS at 23:09

## 2025-03-09 RX ADMIN — MORPHINE SULFATE 4 MG: 4 INJECTION, SOLUTION INTRAMUSCULAR; INTRAVENOUS at 22:37

## 2025-03-09 RX ADMIN — ONDANSETRON 4 MG: 2 INJECTION INTRAMUSCULAR; INTRAVENOUS at 22:37

## 2025-03-09 RX ADMIN — DICYCLOMINE HYDROCHLORIDE 20 MG: 20 TABLET ORAL at 22:36

## 2025-03-09 RX ADMIN — ACETAMINOPHEN 650 MG: 325 TABLET ORAL at 23:42

## 2025-03-09 ASSESSMENT — PAIN DESCRIPTION - PAIN TYPE
TYPE: ACUTE PAIN
TYPE: VISCERAL PAIN
TYPE: ACUTE PAIN

## 2025-03-09 ASSESSMENT — FIBROSIS 4 INDEX: FIB4 SCORE: 0.63

## 2025-03-10 ENCOUNTER — PHARMACY VISIT (OUTPATIENT)
Dept: PHARMACY | Facility: MEDICAL CENTER | Age: 45
End: 2025-03-10
Payer: COMMERCIAL

## 2025-03-10 VITALS
HEART RATE: 104 BPM | RESPIRATION RATE: 16 BRPM | DIASTOLIC BLOOD PRESSURE: 76 MMHG | BODY MASS INDEX: 36.13 KG/M2 | WEIGHT: 266.76 LBS | HEIGHT: 72 IN | TEMPERATURE: 100.1 F | SYSTOLIC BLOOD PRESSURE: 133 MMHG | OXYGEN SATURATION: 96 %

## 2025-03-10 PROCEDURE — RXMED WILLOW AMBULATORY MEDICATION CHARGE: Performed by: STUDENT IN AN ORGANIZED HEALTH CARE EDUCATION/TRAINING PROGRAM

## 2025-03-10 NOTE — ED NOTES
Patient identity verified in [facility]. Pt ambulated to rm red 6 from the [facility] with a steady gait, changed into gown, monitors on. This RN agrees with triage note. Chart up for ERP.

## 2025-03-10 NOTE — ED TRIAGE NOTES
"Chief Complaint   Patient presents with    Nausea/Vomiting/Diarrhea    Abdominal Pain     Patient ambulatory to triage for above. AAOx4, Appropriate precautions in place.     Patient says she woke up feeling well this morning, but at 1100 she started feeling nauseated and at 1300 she suddenly developed \"stabbing\" pain to her upper abdomen.     + nausea, 5 episodes of emesis, and 4 episodes of diarrhea. No blood in stool or vomit.    Patient said she went to [facility] this afternoon and they told her it was likely a viral infection and discharged her home. Patient states pain has been worsening since.     Explained wait time and triage process. Placed to triage room for EKG. Told to notify ED tech or RN of any changes, verbalized understanding.    BP (!) 152/92   Pulse (!) 115   Temp 37.8 °C (100.1 °F) (Oral)   Resp 18   Ht 1.829 m (6')   Wt 121 kg (266 lb 12.1 oz)   LMP 04/26/2019   SpO2 97%   BMI 36.18 kg/m²     "
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
## 2025-03-10 NOTE — ED NOTES
After pt was medicated with zofran and morphine via PIV, pt's arm started itching and swelling with possible hive like rash. PIV removed. ERP notified. Pt states she has zofran and morphine prior to today without reactions in the past.

## 2025-07-29 ENCOUNTER — APPOINTMENT (OUTPATIENT)
Dept: RADIOLOGY | Facility: MEDICAL CENTER | Age: 45
End: 2025-07-29

## 2025-07-29 ENCOUNTER — APPOINTMENT (OUTPATIENT)
Dept: RADIOLOGY | Facility: MEDICAL CENTER | Age: 45
End: 2025-07-29
Attending: STUDENT IN AN ORGANIZED HEALTH CARE EDUCATION/TRAINING PROGRAM

## 2025-07-29 ENCOUNTER — HOSPITAL ENCOUNTER (EMERGENCY)
Facility: MEDICAL CENTER | Age: 45
End: 2025-07-29
Attending: STUDENT IN AN ORGANIZED HEALTH CARE EDUCATION/TRAINING PROGRAM

## 2025-07-29 ASSESSMENT — PAIN DESCRIPTION - PAIN TYPE
TYPE: ACUTE PAIN
TYPE: ACUTE PAIN

## 2025-07-29 ASSESSMENT — FIBROSIS 4 INDEX: FIB4 SCORE: 0.98

## 2025-07-30 NOTE — DISCHARGE INSTRUCTIONS
We have given you prescription for nausea medication and antibiotics.  You can use ibuprofen and Tylenol for pain.  If you have uncontrolled pain, vomiting please return to the emergency room.

## 2025-07-30 NOTE — ED PROVIDER NOTES
ED Provider Note    CHIEF COMPLAINT  Chief Complaint   Patient presents with    Abdominal Pain     Sharp, intermittent. RUQ/LUQ    N/V     Started yesterday       EXTERNAL RECORDS REVIEWED  Other ER visit from 3/9/2025 patient seen for abdominal pain    HPI/ROS  LIMITATION TO HISTORY     OUTSIDE HISTORIAN(S):      Kaela Ballesteros is a 44 y.o. female who presents with abdominal pain, nausea and vomiting.  Patient says that over the past 24 hours she has had frequent nonbloody vomiting and generalized abdominal pain.  Patient says she is also had increased frequency of urination.  Patient denies fever, chills, cough, chest pain, shortness of breath.  Patient denies vaginal bleeding or discharge.    PAST MEDICAL HISTORY   has a past medical history of Anemia (2017), Backpain (2001), Diabetes (HCC) (2017), Hypertension (2017), Infectious disease (1995), Other specified symptom associated with female genital organs (2012), Pain (03/03/15), Pancreatitis (2011/2014), Sickle cell trait syndrome, Urinary incontinence, and Urinary tract infection.    SURGICAL HISTORY   has a past surgical history that includes egd w/endoscopic ultrasound (1/26/2015); gastroscopy with biopsy (1/26/2015); abdominal exploration (2016); bladder sling female (6/4/2019); pelviscopy (1/13/2012); dilation and curettage (12/3/2012); primary c section (9/30/2015); primary c section with tubal ligation (5/2/2017); vaginal hysterectomy scope total (6/4/2019); salpingectomy (Left, 6/4/2019); anterior and posterior repair (6/4/2019); enterocele repair (6/4/2019); vaginal suspension (6/4/2019); cholecystectomy; breast reduction (Bilateral, 10/1/2020); and full thickness skin graft (Bilateral, 10/1/2020).    FAMILY HISTORY  Family History   Problem Relation Age of Onset    Diabetes Father     Diabetes Brother     Diabetes Paternal Grandmother        SOCIAL HISTORY  Social History     Tobacco Use    Smoking status: Former     Current packs/day: 0.00      Average packs/day: 0.5 packs/day for 15.0 years (7.5 ttl pk-yrs)     Types: Cigarettes     Start date: 1996     Quit date: 2011     Years since quittin.5    Smokeless tobacco: Never    Tobacco comments:     occasional   Vaping Use    Vaping status: Never Used   Substance and Sexual Activity    Alcohol use: Not Currently    Drug use: Not Currently     Types: Marijuana, Inhaled     Comment: marijuana     Sexual activity: Yes     Partners: Male     Comment: BTL       CURRENT MEDICATIONS  Home Medications       Reviewed by Betty Mallory R.N. (Registered Nurse) on 25 at 1826  Med List Status: Partial     Medication Last Dose Status   cyclobenzaprine (FLEXERIL) 5 mg tablet  Active   dicyclomine (BENTYL) 20 MG Tab  Active   famotidine (PEPCID) 20 MG Tab  Active   gabapentin (NEURONTIN) 300 MG Cap  Active   ondansetron (ZOFRAN ODT) 4 MG TABLET DISPERSIBLE  Active   ondansetron (ZOFRAN ODT) 4 MG TABLET DISPERSIBLE  Active                  Audit from Redirected Encounters    **Home medications have not yet been reviewed for this encounter**         ALLERGIES  Allergies[1]    PHYSICAL EXAM  VITAL SIGNS: BP (!) 145/121   Pulse (!) 116   Temp 36.8 °C (98.3 °F) (Oral)   Resp 18   Ht 1.829 m (6')   Wt 121 kg (265 lb 10.5 oz)   LMP 2019   SpO2 97%   BMI 36.03 kg/m²    Constitutional: Alert in no apparent distress.  HENT: No signs of trauma, Bilateral external ears normal, Nose normal.   Eyes: Pupils are equal and reactive, Conjunctiva normal, Non-icteric.   Neck: Normal range of motion, No tenderness, Supple, No stridor.   Cardiovascular: Regular rate and rhythm, no murmurs.   Thorax & Lungs: Normal breath sounds, No respiratory distress, No wheezing, No chest tenderness.   Abdomen: Bowel sounds normal, Soft, generalized tenderness, no rebound tenderness or guarding, No masses, No pulsatile masses.   Skin: Warm, Dry, No erythema, No rash.   Back: No bony tenderness, No CVA tenderness.    Extremities: Intact distal pulses, No edema, No tenderness, No cyanosis  Musculoskeletal: Good range of motion in all major joints. No tenderness to palpation or major deformities noted.   Neurologic: Alert , Normal motor function, Normal sensory function, No focal deficits noted.       EKG/LABS  Results for orders placed or performed during the hospital encounter of 07/29/25   CBC with Differential    Collection Time: 07/29/25  6:47 PM   Result Value Ref Range    WBC 4.4 (L) 4.8 - 10.8 K/uL    RBC 5.22 4.20 - 5.40 M/uL    Hemoglobin 13.9 12.0 - 16.0 g/dL    Hematocrit 42.1 37.0 - 47.0 %    MCV 80.7 (L) 81.4 - 97.8 fL    MCH 26.6 (L) 27.0 - 33.0 pg    MCHC 33.0 32.2 - 35.5 g/dL    RDW 37.7 35.9 - 50.0 fL    Platelet Count 228 164 - 446 K/uL    MPV 11.0 9.0 - 12.9 fL    Neutrophils-Polys 70.50 44.00 - 72.00 %    Lymphocytes 20.40 (L) 22.00 - 41.00 %    Monocytes 8.20 0.00 - 13.40 %    Eosinophils 0.50 0.00 - 6.90 %    Basophils 0.20 0.00 - 1.80 %    Immature Granulocytes 0.20 0.00 - 0.90 %    Nucleated RBC 0.00 0.00 - 0.20 /100 WBC    Neutrophils (Absolute) 3.11 1.82 - 7.42 K/uL    Lymphs (Absolute) 0.90 (L) 1.00 - 4.80 K/uL    Monos (Absolute) 0.36 0.00 - 0.85 K/uL    Eos (Absolute) 0.02 0.00 - 0.51 K/uL    Baso (Absolute) 0.01 0.00 - 0.12 K/uL    Immature Granulocytes (abs) 0.01 0.00 - 0.11 K/uL    NRBC (Absolute) 0.00 K/uL   Complete Metabolic Panel    Collection Time: 07/29/25  6:47 PM   Result Value Ref Range    Sodium 138 135 - 145 mmol/L    Potassium 3.7 3.6 - 5.5 mmol/L    Chloride 105 96 - 112 mmol/L    Co2 21 20 - 33 mmol/L    Anion Gap 12.0 7.0 - 16.0    Glucose 125 (H) 65 - 99 mg/dL    Bun 8 8 - 22 mg/dL    Creatinine 0.87 0.50 - 1.40 mg/dL    Calcium 9.7 8.5 - 10.5 mg/dL    Correct Calcium 9.5 8.5 - 10.5 mg/dL    AST(SGOT) 25 12 - 45 U/L    ALT(SGPT) 45 2 - 50 U/L    Alkaline Phosphatase 71 30 - 99 U/L    Total Bilirubin 0.5 0.1 - 1.5 mg/dL    Albumin 4.3 3.2 - 4.9 g/dL    Total Protein 7.4 6.0 - 8.2  g/dL    Globulin 3.1 1.9 - 3.5 g/dL    A-G Ratio 1.4 g/dL   Lipase    Collection Time: 25  6:47 PM   Result Value Ref Range    Lipase 21 11 - 82 U/L   Troponin    Collection Time: 25  6:47 PM   Result Value Ref Range    Troponin T <6 6 - 19 ng/L   ESTIMATED GFR    Collection Time: 25  6:47 PM   Result Value Ref Range    GFR (CKD-EPI) 83 >60 mL/min/1.73 m 2   HCG QUAL SERUM    Collection Time: 25  6:47 PM   Result Value Ref Range    Beta-Hcg Qualitative Serum Negative Negative   Urinalysis    Collection Time: 25  8:53 PM    Specimen: Urine   Result Value Ref Range    Color Yellow     Character Cloudy (A)     Specific Gravity 1.019 <1.035    Ph 6.5 5.0 - 8.0    Glucose Negative Negative mg/dL    Ketones Negative Negative mg/dL    Protein 30 (A) Negative mg/dL    Bilirubin Negative Negative    Urobilinogen, Urine 1.0 <=1.0 EU/dL    Nitrite Positive (A) Negative    Leukocyte Esterase Negative Negative    Occult Blood Negative Negative    Micro Urine Req Microscopic    URINE MICROSCOPIC (W/UA)    Collection Time: 25  8:53 PM   Result Value Ref Range    WBC 0-2 /hpf    RBC 0-2 0 - 2 /hpf    Bacteria Moderate (A) None /hpf    Epithelial Cells 11-20 (A) 0 - 5 /hpf    Urine Casts 0-2 0 - 2 /lpf   EKG    Collection Time: 25 10:55 PM   Result Value Ref Range    Report       [facility]    Test Date:  2025  Pt Name:    SIMON HICKS            Department: ER  MRN:        [id number]                      Room:  Gender:     Female                       Technician: [id number]  :        [id number]                   Requested By:ER TRIAGE PROTOCOL  Order #:    [id number]                    Reading MD: Richie Stallworth    Measurements  Intervals                                Axis  Rate:       112                          P:          77  VT:         185                          QRS:        58  QRSD:       82                           T:           21  QT:         331  QTc:        452    Interpretive Statements  Interpreted by me: Sinus tachycardia, rate 112, no signs of acute ischemia  when compared to prior  Electronically Signed On 07- 22:55:59 PDT by Richie Stallworth           I have independently interpreted this EKG    RADIOLOGY/PROCEDURES   I have independently interpreted the diagnostic imaging associated with this visit and am waiting the final reading from the radiologist.   My preliminary interpretation is as follows: No free fluid    Radiologist interpretation:  CT-ABDOMEN-PELVIS WITH   Final Result         1.  No acute intra-abdominal abnormality identified.            DX-CHEST-PORTABLE (1 VIEW)   Final Result         1.  No acute cardiopulmonary disease.          COURSE & MEDICAL DECISION MAKING    ASSESSMENT, COURSE AND PLAN  Care Narrative: Patient presenting with vomiting generalized abdominal pain tenderness.  Patient has no peritoneal signs or clear distention.  Patient has had multiple previous abdominal surgeries will obtain CT to assess for obstruction.  Patient will be started on IV fluids, antiemetics and analgesics.  Patient has no focal right upper quadrant tenderness concerning for biliary disease.  Patient has no CVA tenderness.    Blood work notable for stable mild leukopenia, mild hyperglycemia.  Urinalysis with positive nitrites as well as positive epithelial cells.  Given patient's reported urinary frequency will treat for possible UTI.  Patient has been able to tolerate p.o. and has had significant proved in symptoms.  Discussed with patient return precautions and outpatient treatment plan.    Hydration: Based on the patient's presentation of Dehydration the patient was given IV fluids. IV Hydration was used because oral hydration was not adequate alone. Upon recheck following hydration, the patient was improved.          ADDITIONAL PROBLEMS MANAGED      DISPOSITION AND DISCUSSIONS    Decision tools and prescription  drugs considered including, but not limited to: Antibiotics  .    FINAL DIAGNOSIS  1. Acute UTI         Electronically signed by: Richie Stallworth D.O., 7/29/2025 8:33 PM           [1]   Allergies  Allergen Reactions    Nkda [No Known Drug Allergy]

## 2025-07-30 NOTE — ED TRIAGE NOTES
Kaela Ballesteros  44 y.o. female  Chief Complaint   Patient presents with    Abdominal Pain     Sharp, intermittent. RUQ/LUQ    N/V     Started yesterday     Pt presents to ED for above. Pt took two Pepcid without relief pta.      Pt A&Ox4, GCS 15, ambulatory. Protocols ordered.     Patient educated on triage process and encouraged to alert staff of any changes in condition.

## (undated) DEVICE — ELECTRODE 850 FOAM ADHESIVE - HYDROGEL RADIOTRNSPRNT (50/PK)

## (undated) DEVICE — ARMREST CRADLE FOAM - (2PR/PK 12PR/CA)

## (undated) DEVICE — CHLORAPREP 26 ML APPLICATOR - ORANGE TINT(25/CA)

## (undated) DEVICE — LIGASURE LAPAROSCOPIC 5MM - (6EA/CA)

## (undated) DEVICE — SET EXTENSION WITH 2 PORTS (48EA/CA) ***PART #2C8610 IS A SUBSTITUTE*****

## (undated) DEVICE — TUBING CLEARLINK DUO-VENT - C-FLO (48EA/CA)

## (undated) DEVICE — CATHETER IV 20 GA X 1-1/4 ---SURG.& SDS ONLY--- (50EA/BX)

## (undated) DEVICE — Device

## (undated) DEVICE — PACK MINOR BASIN - (2EA/CA)

## (undated) DEVICE — SODIUM CHL IRRIGATION 0.9% 1000ML (12EA/CA)

## (undated) DEVICE — TRAY SPINAL ANESTHESIA NON-SAFETY (10/CA)

## (undated) DEVICE — CANISTER SUCTION RIGID RED 1500CC (40EA/CA)

## (undated) DEVICE — GLOVESZ 8.5 BIOGEL PI MICRO - PF LF (50PR/BX)

## (undated) DEVICE — SUTURE 2-0 VICRYL PLUS CT-1 36 (36PK/BX)"

## (undated) DEVICE — KIT  I.V. START (100EA/CA)

## (undated) DEVICE — SENSOR SPO2 NEO LNCS ADHESIVE (20/BX) SEE USER NOTES

## (undated) DEVICE — WATER IRRIGATION STERILE 1000ML (12EA/CA)

## (undated) DEVICE — GLOVE BIOGEL INDICATOR SZ 7.5 SURGICAL PF LTX - (50PR/BX 4BX/CA)

## (undated) DEVICE — TROCAR STEP 5MM - (3/CA)

## (undated) DEVICE — TUBING SETDISPOS HIGH FLOW II - (10/BX)

## (undated) DEVICE — PACK LAPAROSCOPY - (1/CA)

## (undated) DEVICE — BANDAGE ELASTIC STERILE MATRIX 6 X 10 (20EA/CA)

## (undated) DEVICE — DEVICE SUTURE CAPTURING

## (undated) DEVICE — KIT ANESTHESIA W/CIRCUIT & 3/LT BAG W/FILTER (20EA/CA)

## (undated) DEVICE — SET SUCTION/IRRIGATION WITH DISPOSABLE TIP (6/CA )PART #0250-070-520 IS A SUB

## (undated) DEVICE — CATHETER IV NON-SAFETY 18 GA X 1 1/4 (50/BX 4BX/CA)

## (undated) DEVICE — LACTATED RINGERS INJ 1000 ML - (14EA/CA 60CA/PF)

## (undated) DEVICE — DRAPE VAGINAL BIB W/ POUCH (10EA/CA)

## (undated) DEVICE — DRESSING PETROLEUM GAUZE 5 X 9" (50EA/BX 4BX/CA)"

## (undated) DEVICE — NEPTUNE 4 PORT MANIFOLD - (20/PK)

## (undated) DEVICE — PROTECTOR ULNA NERVE - (36PR/CA)

## (undated) DEVICE — NEEDLE INSUFFLATION FOR STEP - (12/BX)

## (undated) DEVICE — ELECTRODE DUAL RETURN W/ CORD - (50/PK)

## (undated) DEVICE — GLOVE BIOGEL PI INDICATOR SZ 6.5 SURGICAL PF LF - (50/BX 4BX/CA)

## (undated) DEVICE — DRESSING TRANSPARENT FILM TEGADERM 4 X 4.75" (50EA/BX)"

## (undated) DEVICE — GOWN SURGEONS X-LARGE - DISP. (30/CA)

## (undated) DEVICE — TUBE CONNECTING SUCTION - CLEAR PLASTIC STERILE 72 IN (50EA/CA)

## (undated) DEVICE — BLADE SURGICAL #11 - (50/BX)

## (undated) DEVICE — SUTURE 0 VICRYL PLUS CT-1 - 8 X 18 INCH (12/BX)

## (undated) DEVICE — TRAY SRGPRP PVP IOD WT PRP - (20/CA)

## (undated) DEVICE — GLOVE BIOGEL SZ 7 SURGICAL PF LTX - (50PR/BX 4BX/CA)

## (undated) DEVICE — DRESSING XEROFORM 1X8 - (50/BX 4BX/CA)

## (undated) DEVICE — STAPLER SKIN DISP - (6/BX 10BX/CA) VISISTAT

## (undated) DEVICE — CANISTER SUCTION 3000ML MECHANICAL FILTER AUTO SHUTOFF MEDI-VAC NONSTERILE LF DISP  (40EA/CA)

## (undated) DEVICE — SET LEADWIRE 5 LEAD BEDSIDE DISPOSABLE ECG (1SET OF 5/EA)

## (undated) DEVICE — HEAD HOLDER JUNIOR/ADULT

## (undated) DEVICE — SUCTION INSTRUMENT YANKAUER BULBOUS TIP W/O VENT (50EA/CA)

## (undated) DEVICE — CLEANER ELECTRO-SURGICAL TIP - (25/BX 4BX/CA)

## (undated) DEVICE — DRAPE SURGICAL U 77X120 - (10/CA)

## (undated) DEVICE — BANDAID X-LARGE 2 X 4 IN LF (50EA/BX)

## (undated) DEVICE — PAD SANITARY 11IN MAXI IND WRAPPED  (12EA/PK 24PK/CA)

## (undated) DEVICE — GLOVE BIOGEL SZ 7.5 SURGICAL PF LTX - (50PR/BX 4BX/CA)

## (undated) DEVICE — SUTURE 1 CHROMIC CTX ETHICON - (36PK/BX)

## (undated) DEVICE — GLOVE BIOGEL SZ 6 PF LATEX - (50EA/BX 4BX/CA)

## (undated) DEVICE — SYRINGE 10 ML CONTROL LL (25EA/BX 4BX/CA)

## (undated) DEVICE — SET IRRIGATION CYSTOSCOPY TUBE L80 IN (20EA/CA)

## (undated) DEVICE — WATER IRRIG. STER. 1500 ML - (9/CA)

## (undated) DEVICE — BANDAID SHEER STRIP 3/4 IN (100EA/BX 12BX/CA)

## (undated) DEVICE — SUTURE GENERAL

## (undated) DEVICE — PAD LAP STERILE 18 X 18 - (5/PK 40PK/CA)

## (undated) DEVICE — DRESSING ABDOMINAL PAD STERILE 8 X 10" (360EA/CA)"

## (undated) DEVICE — TROCAR STEP 11MM - (3/CA)

## (undated) DEVICE — SUTURE CAPIO (12EA/BX)

## (undated) DEVICE — GLOVE BIOGEL SZ 6.5 SURGICAL PF LTX (50PR/BX 4BX/CA)

## (undated) DEVICE — SUTURE 4-0 VICRYL PLUS FS-2 - 27 INCH (36/BX)

## (undated) DEVICE — BLADE SURGICAL #15 - (50/BX 3BX/CA)

## (undated) DEVICE — MASK ANESTHESIA ADULT  - (100/CA)

## (undated) DEVICE — PACK BREAST RECONSTRUCTION (4/CA)

## (undated) DEVICE — SLEEVE, VASO, THIGH, MED

## (undated) DEVICE — SUTURE 1 VICRYL PLUSCT-1 27IN - (36/BX)

## (undated) DEVICE — SYRINGE SAFETY 3 ML 18 GA X 1 1/2 BLUNT LL (100/BX 8BX/CA)

## (undated) DEVICE — HEMOSTAT ARISTA PWD 5 GRAM - (5/BX)

## (undated) DEVICE — DETERGENT RENUZYME PLUS 10 OZ PACKET (50/BX)

## (undated) DEVICE — COTTON ROLL 1 POUND BIOSEAL - (5RL/CA)

## (undated) DEVICE — SUTURE 0 VICRYL PLUS CT-1 - 36 INCH (36/BX)

## (undated) DEVICE — GOWN WARMING STANDARD FLEX - (30/CA)

## (undated) DEVICE — BOVIE BLADE COATED &INSULATED (50EA/PK)

## (undated) DEVICE — DEPRESSOR TONGUE JR STERILE - 5.5 IN (100/BX)

## (undated) DEVICE — GLOVE BIOGEL SZ 8 SURGICAL PF LTX - (50PR/BX 4BX/CA)

## (undated) DEVICE — APPICATOR HEMOSTAT ARISTA XL - FLEXITIP (10EA/CA)

## (undated) DEVICE — SUTURE 3-0 MONOCRYL PLUS PS-1 - 27 INCH (36/BX)

## (undated) DEVICE — SUTURE 2/0 GUT-PLAIN (36PK/BX)

## (undated) DEVICE — TRAY FOLEY CATHETER STATLOCK 16FR SURESTEP  (10EA/CA)

## (undated) DEVICE — PACK C-SECTION (2EA/CA)